# Patient Record
Sex: FEMALE | Race: ASIAN | NOT HISPANIC OR LATINO | Employment: UNEMPLOYED | URBAN - METROPOLITAN AREA
[De-identification: names, ages, dates, MRNs, and addresses within clinical notes are randomized per-mention and may not be internally consistent; named-entity substitution may affect disease eponyms.]

---

## 2019-03-15 ENCOUNTER — OFFICE VISIT CONVERTED (OUTPATIENT)
Dept: INTERNAL MEDICINE | Facility: CLINIC | Age: 69
End: 2019-03-15
Attending: INTERNAL MEDICINE

## 2019-06-17 ENCOUNTER — OFFICE VISIT CONVERTED (OUTPATIENT)
Dept: INTERNAL MEDICINE | Facility: CLINIC | Age: 69
End: 2019-06-17
Attending: INTERNAL MEDICINE

## 2019-06-17 ENCOUNTER — HOSPITAL ENCOUNTER (OUTPATIENT)
Dept: OTHER | Facility: HOSPITAL | Age: 69
Discharge: HOME | End: 2019-06-17
Attending: INTERNAL MEDICINE

## 2019-06-17 LAB
ALBUMIN SERPL-MCNC: 4.3 G/DL (ref 3.5–5)
ALBUMIN/GLOB SERPL: 1.4 {RATIO} (ref 1.4–2.6)
ALP SERPL-CCNC: 47 U/L (ref 43–160)
ALT SERPL-CCNC: 78 U/L (ref 10–40)
ANION GAP SERPL CALC-SCNC: 22 MMOL/L (ref 8–19)
AST SERPL-CCNC: 72 U/L (ref 15–50)
BASOPHILS # BLD AUTO: 0.03 10*3/UL (ref 0–0.2)
BASOPHILS NFR BLD AUTO: 0.6 % (ref 0–3)
BILIRUB SERPL-MCNC: 0.57 MG/DL (ref 0.2–1.3)
BUN SERPL-MCNC: 9 MG/DL (ref 5–25)
BUN/CREAT SERPL: 11 {RATIO} (ref 6–20)
CALCIUM SERPL-MCNC: 9.8 MG/DL (ref 8.7–10.4)
CHLORIDE SERPL-SCNC: 103 MMOL/L (ref 99–111)
CHOLEST SERPL-MCNC: 143 MG/DL (ref 107–200)
CHOLEST/HDLC SERPL: 3.3 {RATIO} (ref 3–6)
CONV ABS IMM GRAN: 0.01 10*3/UL (ref 0–0.2)
CONV CO2: 22 MMOL/L (ref 22–32)
CONV IMMATURE GRAN: 0.2 % (ref 0–1.8)
CONV TOTAL PROTEIN: 7.4 G/DL (ref 6.3–8.2)
CREAT UR-MCNC: 0.85 MG/DL (ref 0.5–0.9)
DEPRECATED RDW RBC AUTO: 51.8 FL (ref 36.4–46.3)
EOSINOPHIL # BLD AUTO: 0.05 10*3/UL (ref 0–0.7)
EOSINOPHIL # BLD AUTO: 1 % (ref 0–7)
ERYTHROCYTE [DISTWIDTH] IN BLOOD BY AUTOMATED COUNT: 13.4 % (ref 11.7–14.4)
GFR SERPLBLD BASED ON 1.73 SQ M-ARVRAT: >60 ML/MIN/{1.73_M2}
GLOBULIN UR ELPH-MCNC: 3.1 G/DL (ref 2–3.5)
GLUCOSE SERPL-MCNC: 125 MG/DL (ref 65–99)
HBA1C MFR BLD: 13.5 G/DL (ref 12–16)
HCT VFR BLD AUTO: 43.6 % (ref 37–47)
HDLC SERPL-MCNC: 44 MG/DL (ref 40–60)
LDLC SERPL CALC-MCNC: 74 MG/DL (ref 70–100)
LYMPHOCYTES # BLD AUTO: 2.34 10*3/UL (ref 1–5)
MCH RBC QN AUTO: 32.5 PG (ref 27–31)
MCHC RBC AUTO-ENTMCNC: 31 G/DL (ref 33–37)
MCV RBC AUTO: 104.8 FL (ref 81–99)
MONOCYTES # BLD AUTO: 0.34 10*3/UL (ref 0.2–1.2)
MONOCYTES NFR BLD AUTO: 7.1 % (ref 3–10)
NEUTROPHILS # BLD AUTO: 2.05 10*3/UL (ref 2–8)
NEUTROPHILS NFR BLD AUTO: 42.6 % (ref 30–85)
NRBC CBCN: 0 % (ref 0–0.7)
OSMOLALITY SERPL CALC.SUM OF ELEC: 294 MOSM/KG (ref 273–304)
PLATELET # BLD AUTO: 181 10*3/UL (ref 130–400)
PMV BLD AUTO: 11.9 FL (ref 9.4–12.3)
POTASSIUM SERPL-SCNC: 4.5 MMOL/L (ref 3.5–5.3)
RBC # BLD AUTO: 4.16 10*6/UL (ref 4.2–5.4)
SODIUM SERPL-SCNC: 142 MMOL/L (ref 135–147)
T4 FREE SERPL-MCNC: 1.5 NG/DL (ref 0.9–1.8)
TRIGL SERPL-MCNC: 124 MG/DL (ref 40–150)
TSH SERPL-ACNC: 2.1 M[IU]/L (ref 0.27–4.2)
VARIANT LYMPHS NFR BLD MANUAL: 48.5 % (ref 20–45)
VLDLC SERPL-MCNC: 25 MG/DL (ref 5–37)
WBC # BLD AUTO: 4.82 10*3/UL (ref 4.8–10.8)

## 2019-06-19 ENCOUNTER — HOSPITAL ENCOUNTER (OUTPATIENT)
Dept: OTHER | Facility: HOSPITAL | Age: 69
Discharge: HOME | End: 2019-06-19
Attending: INTERNAL MEDICINE

## 2019-06-19 LAB
EST. AVERAGE GLUCOSE BLD GHB EST-MCNC: 140 MG/DL
HBA1C MFR BLD: 6.5 % (ref 3.5–5.7)

## 2019-08-26 ENCOUNTER — HOSPITAL ENCOUNTER (OUTPATIENT)
Dept: URGENT CARE | Facility: CLINIC | Age: 69
Discharge: HOME | End: 2019-08-26
Attending: EMERGENCY MEDICINE

## 2019-08-28 ENCOUNTER — OFFICE VISIT CONVERTED (OUTPATIENT)
Dept: INTERNAL MEDICINE | Facility: CLINIC | Age: 69
End: 2019-08-28
Attending: INTERNAL MEDICINE

## 2019-08-28 LAB — BACTERIA UR CULT: NORMAL

## 2019-11-19 ENCOUNTER — HOSPITAL ENCOUNTER (OUTPATIENT)
Dept: URGENT CARE | Facility: CLINIC | Age: 69
Discharge: HOME | End: 2019-11-19
Attending: NURSE PRACTITIONER

## 2019-11-19 LAB
C TRACH RRNA CVX QL NAA+PROBE: NOT DETECTED
N GONORRHOEA DNA SPEC QL NAA+PROBE: NOT DETECTED

## 2019-11-21 LAB — BACTERIA UR CULT: NORMAL

## 2019-11-22 ENCOUNTER — HOSPITAL ENCOUNTER (OUTPATIENT)
Dept: OTHER | Facility: HOSPITAL | Age: 69
Discharge: HOME | End: 2019-11-22
Attending: INTERNAL MEDICINE

## 2019-11-22 ENCOUNTER — OFFICE VISIT CONVERTED (OUTPATIENT)
Dept: INTERNAL MEDICINE | Facility: CLINIC | Age: 69
End: 2019-11-22
Attending: INTERNAL MEDICINE

## 2019-11-22 LAB
ALBUMIN SERPL-MCNC: 4.4 G/DL (ref 3.5–5)
ALBUMIN/GLOB SERPL: 1.6 {RATIO} (ref 1.4–2.6)
ALP SERPL-CCNC: 53 U/L (ref 43–160)
ALT SERPL-CCNC: 47 U/L (ref 10–40)
ANION GAP SERPL CALC-SCNC: 17 MMOL/L (ref 8–19)
AST SERPL-CCNC: 52 U/L (ref 15–50)
BILIRUB SERPL-MCNC: 0.44 MG/DL (ref 0.2–1.3)
BUN SERPL-MCNC: 7 MG/DL (ref 5–25)
BUN/CREAT SERPL: 8 {RATIO} (ref 6–20)
CALCIUM SERPL-MCNC: 9.9 MG/DL (ref 8.7–10.4)
CHLORIDE SERPL-SCNC: 101 MMOL/L (ref 99–111)
CHOLEST SERPL-MCNC: 122 MG/DL (ref 107–200)
CHOLEST/HDLC SERPL: 2.9 {RATIO} (ref 3–6)
CONV CO2: 26 MMOL/L (ref 22–32)
CONV TOTAL PROTEIN: 7.2 G/DL (ref 6.3–8.2)
CREAT UR-MCNC: 0.87 MG/DL (ref 0.5–0.9)
EST. AVERAGE GLUCOSE BLD GHB EST-MCNC: 151 MG/DL
GFR SERPLBLD BASED ON 1.73 SQ M-ARVRAT: >60 ML/MIN/{1.73_M2}
GLOBULIN UR ELPH-MCNC: 2.8 G/DL (ref 2–3.5)
GLUCOSE SERPL-MCNC: 226 MG/DL (ref 65–99)
HBA1C MFR BLD: 6.9 % (ref 3.5–5.7)
HDLC SERPL-MCNC: 42 MG/DL (ref 40–60)
LDLC SERPL CALC-MCNC: 52 MG/DL (ref 70–100)
OSMOLALITY SERPL CALC.SUM OF ELEC: 295 MOSM/KG (ref 273–304)
POTASSIUM SERPL-SCNC: 4.3 MMOL/L (ref 3.5–5.3)
SODIUM SERPL-SCNC: 140 MMOL/L (ref 135–147)
T4 FREE SERPL-MCNC: 1.2 NG/DL (ref 0.9–1.8)
TRIGL SERPL-MCNC: 142 MG/DL (ref 40–150)
TSH SERPL-ACNC: 3.48 M[IU]/L (ref 0.27–4.2)
VLDLC SERPL-MCNC: 28 MG/DL (ref 5–37)

## 2020-02-17 ENCOUNTER — OFFICE VISIT CONVERTED (OUTPATIENT)
Dept: INTERNAL MEDICINE | Facility: CLINIC | Age: 70
End: 2020-02-17
Attending: INTERNAL MEDICINE

## 2020-02-17 ENCOUNTER — HOSPITAL ENCOUNTER (OUTPATIENT)
Dept: OTHER | Facility: HOSPITAL | Age: 70
Discharge: HOME | End: 2020-02-17
Attending: INTERNAL MEDICINE

## 2020-02-17 LAB
ALBUMIN SERPL-MCNC: 4.1 G/DL (ref 3.5–5)
ALBUMIN/GLOB SERPL: 1.4 {RATIO} (ref 1.4–2.6)
ALP SERPL-CCNC: 50 U/L (ref 43–160)
ALT SERPL-CCNC: 38 U/L (ref 10–40)
ANION GAP SERPL CALC-SCNC: 19 MMOL/L (ref 8–19)
AST SERPL-CCNC: 49 U/L (ref 15–50)
BASOPHILS # BLD AUTO: 0.03 10*3/UL (ref 0–0.2)
BASOPHILS NFR BLD AUTO: 0.5 % (ref 0–3)
BILIRUB SERPL-MCNC: 0.47 MG/DL (ref 0.2–1.3)
BUN SERPL-MCNC: 14 MG/DL (ref 5–25)
BUN/CREAT SERPL: 16 {RATIO} (ref 6–20)
CALCIUM SERPL-MCNC: 9.7 MG/DL (ref 8.7–10.4)
CHLORIDE SERPL-SCNC: 104 MMOL/L (ref 99–111)
CHOLEST SERPL-MCNC: 142 MG/DL (ref 107–200)
CHOLEST/HDLC SERPL: 3.3 {RATIO} (ref 3–6)
CONV ABS IMM GRAN: 0.02 10*3/UL (ref 0–0.2)
CONV CO2: 22 MMOL/L (ref 22–32)
CONV IMMATURE GRAN: 0.3 % (ref 0–1.8)
CONV TOTAL PROTEIN: 7 G/DL (ref 6.3–8.2)
CREAT UR-MCNC: 0.9 MG/DL (ref 0.5–0.9)
DEPRECATED RDW RBC AUTO: 46.5 FL (ref 36.4–46.3)
EOSINOPHIL # BLD AUTO: 0.04 10*3/UL (ref 0–0.7)
EOSINOPHIL # BLD AUTO: 0.6 % (ref 0–7)
ERYTHROCYTE [DISTWIDTH] IN BLOOD BY AUTOMATED COUNT: 13 % (ref 11.7–14.4)
EST. AVERAGE GLUCOSE BLD GHB EST-MCNC: 163 MG/DL
GFR SERPLBLD BASED ON 1.73 SQ M-ARVRAT: >60 ML/MIN/{1.73_M2}
GLOBULIN UR ELPH-MCNC: 2.9 G/DL (ref 2–3.5)
GLUCOSE SERPL-MCNC: 108 MG/DL (ref 65–99)
HBA1C MFR BLD: 7.3 % (ref 3.5–5.7)
HCT VFR BLD AUTO: 41.8 % (ref 37–47)
HDLC SERPL-MCNC: 43 MG/DL (ref 40–60)
HGB BLD-MCNC: 13.6 G/DL (ref 12–16)
LDLC SERPL CALC-MCNC: 72 MG/DL (ref 70–100)
LYMPHOCYTES # BLD AUTO: 2.58 10*3/UL (ref 1–5)
LYMPHOCYTES NFR BLD AUTO: 41.2 % (ref 20–45)
MCH RBC QN AUTO: 32.1 PG (ref 27–31)
MCHC RBC AUTO-ENTMCNC: 32.5 G/DL (ref 33–37)
MCV RBC AUTO: 98.6 FL (ref 81–99)
MONOCYTES # BLD AUTO: 0.39 10*3/UL (ref 0.2–1.2)
MONOCYTES NFR BLD AUTO: 6.2 % (ref 3–10)
NEUTROPHILS # BLD AUTO: 3.2 10*3/UL (ref 2–8)
NEUTROPHILS NFR BLD AUTO: 51.2 % (ref 30–85)
NRBC CBCN: 0 % (ref 0–0.7)
OSMOLALITY SERPL CALC.SUM OF ELEC: 291 MOSM/KG (ref 273–304)
PLATELET # BLD AUTO: 197 10*3/UL (ref 130–400)
PMV BLD AUTO: 11.6 FL (ref 9.4–12.3)
POTASSIUM SERPL-SCNC: 4.7 MMOL/L (ref 3.5–5.3)
RBC # BLD AUTO: 4.24 10*6/UL (ref 4.2–5.4)
SODIUM SERPL-SCNC: 140 MMOL/L (ref 135–147)
TRIGL SERPL-MCNC: 134 MG/DL (ref 40–150)
VLDLC SERPL-MCNC: 27 MG/DL (ref 5–37)
WBC # BLD AUTO: 6.26 10*3/UL (ref 4.8–10.8)

## 2020-02-18 LAB — TSH SERPL-ACNC: 3.08 M[IU]/L (ref 0.27–4.2)

## 2020-03-04 ENCOUNTER — CONVERSION ENCOUNTER (OUTPATIENT)
Dept: CARDIOLOGY | Facility: CLINIC | Age: 70
End: 2020-03-04

## 2020-03-04 ENCOUNTER — OFFICE VISIT CONVERTED (OUTPATIENT)
Dept: CARDIOLOGY | Facility: CLINIC | Age: 70
End: 2020-03-04
Attending: INTERNAL MEDICINE

## 2020-03-09 ENCOUNTER — CONVERSION ENCOUNTER (OUTPATIENT)
Dept: CARDIOLOGY | Facility: CLINIC | Age: 70
End: 2020-03-09
Attending: INTERNAL MEDICINE

## 2020-03-13 ENCOUNTER — HOSPITAL ENCOUNTER (OUTPATIENT)
Dept: NUCLEAR MEDICINE | Facility: HOSPITAL | Age: 70
Discharge: HOME | End: 2020-03-13
Attending: INTERNAL MEDICINE

## 2020-05-18 ENCOUNTER — CONVERSION ENCOUNTER (OUTPATIENT)
Dept: INTERNAL MEDICINE | Facility: CLINIC | Age: 70
End: 2020-05-18

## 2020-05-18 ENCOUNTER — HOSPITAL ENCOUNTER (OUTPATIENT)
Dept: OTHER | Facility: HOSPITAL | Age: 70
Discharge: HOME | End: 2020-05-18
Attending: INTERNAL MEDICINE

## 2020-05-18 ENCOUNTER — OFFICE VISIT CONVERTED (OUTPATIENT)
Dept: INTERNAL MEDICINE | Facility: CLINIC | Age: 70
End: 2020-05-18
Attending: INTERNAL MEDICINE

## 2020-05-18 LAB
ALBUMIN SERPL-MCNC: 4.4 G/DL (ref 3.5–5)
ALBUMIN/GLOB SERPL: 1.5 {RATIO} (ref 1.4–2.6)
ALP SERPL-CCNC: 40 U/L (ref 43–160)
ALT SERPL-CCNC: 51 U/L (ref 10–40)
ANION GAP SERPL CALC-SCNC: 15 MMOL/L (ref 8–19)
AST SERPL-CCNC: 43 U/L (ref 15–50)
BASOPHILS # BLD AUTO: 0.03 10*3/UL (ref 0–0.2)
BASOPHILS NFR BLD AUTO: 0.5 % (ref 0–3)
BILIRUB SERPL-MCNC: 0.41 MG/DL (ref 0.2–1.3)
BUN SERPL-MCNC: 14 MG/DL (ref 5–25)
BUN/CREAT SERPL: 20 {RATIO} (ref 6–20)
CALCIUM SERPL-MCNC: 9.8 MG/DL (ref 8.7–10.4)
CHLORIDE SERPL-SCNC: 103 MMOL/L (ref 99–111)
CHOLEST SERPL-MCNC: 160 MG/DL (ref 107–200)
CHOLEST/HDLC SERPL: 3.6 {RATIO} (ref 3–6)
CONV ABS IMM GRAN: 0.01 10*3/UL (ref 0–0.2)
CONV CO2: 27 MMOL/L (ref 22–32)
CONV CREATININE URINE, RANDOM: 84.9 MG/DL (ref 10–300)
CONV IMMATURE GRAN: 0.2 % (ref 0–1.8)
CONV MICROALBUM.,U,RANDOM: <12 MG/L (ref 0–20)
CONV TOTAL PROTEIN: 7.3 G/DL (ref 6.3–8.2)
CREAT UR-MCNC: 0.69 MG/DL (ref 0.5–0.9)
DEPRECATED RDW RBC AUTO: 45.8 FL (ref 36.4–46.3)
EOSINOPHIL # BLD AUTO: 0.04 10*3/UL (ref 0–0.7)
EOSINOPHIL # BLD AUTO: 0.6 % (ref 0–7)
ERYTHROCYTE [DISTWIDTH] IN BLOOD BY AUTOMATED COUNT: 12.9 % (ref 11.7–14.4)
EST. AVERAGE GLUCOSE BLD GHB EST-MCNC: 137 MG/DL
GFR SERPLBLD BASED ON 1.73 SQ M-ARVRAT: >60 ML/MIN/{1.73_M2}
GLOBULIN UR ELPH-MCNC: 2.9 G/DL (ref 2–3.5)
GLUCOSE SERPL-MCNC: 110 MG/DL (ref 65–99)
HBA1C MFR BLD: 6.4 % (ref 3.5–5.7)
HCT VFR BLD AUTO: 40.6 % (ref 37–47)
HDLC SERPL-MCNC: 44 MG/DL (ref 40–60)
HGB BLD-MCNC: 13.4 G/DL (ref 12–16)
LDLC SERPL CALC-MCNC: 91 MG/DL (ref 70–100)
LYMPHOCYTES # BLD AUTO: 2.48 10*3/UL (ref 1–5)
LYMPHOCYTES NFR BLD AUTO: 37.2 % (ref 20–45)
MCH RBC QN AUTO: 32.4 PG (ref 27–31)
MCHC RBC AUTO-ENTMCNC: 33 G/DL (ref 33–37)
MCV RBC AUTO: 98.1 FL (ref 81–99)
MICROALBUMIN/CREAT UR: 14.1 MG/G{CRE} (ref 0–35)
MONOCYTES # BLD AUTO: 0.49 10*3/UL (ref 0.2–1.2)
MONOCYTES NFR BLD AUTO: 7.4 % (ref 3–10)
NEUTROPHILS # BLD AUTO: 3.61 10*3/UL (ref 2–8)
NEUTROPHILS NFR BLD AUTO: 54.1 % (ref 30–85)
NRBC CBCN: 0 % (ref 0–0.7)
OSMOLALITY SERPL CALC.SUM OF ELEC: 293 MOSM/KG (ref 273–304)
PLATELET # BLD AUTO: 193 10*3/UL (ref 130–400)
PMV BLD AUTO: 11.6 FL (ref 9.4–12.3)
POTASSIUM SERPL-SCNC: 4.1 MMOL/L (ref 3.5–5.3)
RBC # BLD AUTO: 4.14 10*6/UL (ref 4.2–5.4)
SODIUM SERPL-SCNC: 141 MMOL/L (ref 135–147)
T4 FREE SERPL-MCNC: 1.9 NG/DL (ref 0.9–1.8)
TRIGL SERPL-MCNC: 125 MG/DL (ref 40–150)
TSH SERPL-ACNC: 0.16 M[IU]/L (ref 0.27–4.2)
VLDLC SERPL-MCNC: 25 MG/DL (ref 5–37)
WBC # BLD AUTO: 6.66 10*3/UL (ref 4.8–10.8)

## 2020-08-13 ENCOUNTER — HOSPITAL ENCOUNTER (OUTPATIENT)
Dept: MAMMOGRAPHY | Facility: HOSPITAL | Age: 70
Discharge: HOME | End: 2020-08-13
Attending: INTERNAL MEDICINE

## 2020-09-03 ENCOUNTER — HOSPITAL ENCOUNTER (OUTPATIENT)
Dept: OTHER | Facility: HOSPITAL | Age: 70
Discharge: HOME | End: 2020-09-03
Attending: INTERNAL MEDICINE

## 2020-09-03 ENCOUNTER — OFFICE VISIT CONVERTED (OUTPATIENT)
Dept: INTERNAL MEDICINE | Facility: CLINIC | Age: 70
End: 2020-09-03
Attending: INTERNAL MEDICINE

## 2020-09-04 LAB
ALBUMIN SERPL-MCNC: 4.6 G/DL (ref 3.5–5)
ALBUMIN/GLOB SERPL: 1.6 {RATIO} (ref 1.4–2.6)
ALP SERPL-CCNC: 43 U/L (ref 43–160)
ALT SERPL-CCNC: 43 U/L (ref 10–40)
ANION GAP SERPL CALC-SCNC: 18 MMOL/L (ref 8–19)
AST SERPL-CCNC: 52 U/L (ref 15–50)
BILIRUB SERPL-MCNC: 0.52 MG/DL (ref 0.2–1.3)
BUN SERPL-MCNC: 13 MG/DL (ref 5–25)
BUN/CREAT SERPL: 14 {RATIO} (ref 6–20)
CALCIUM SERPL-MCNC: 9.8 MG/DL (ref 8.7–10.4)
CHLORIDE SERPL-SCNC: 102 MMOL/L (ref 99–111)
CHOLEST SERPL-MCNC: 157 MG/DL (ref 107–200)
CHOLEST/HDLC SERPL: 3.6 {RATIO} (ref 3–6)
CONV CO2: 24 MMOL/L (ref 22–32)
CONV TOTAL PROTEIN: 7.4 G/DL (ref 6.3–8.2)
CREAT UR-MCNC: 0.95 MG/DL (ref 0.5–0.9)
EST. AVERAGE GLUCOSE BLD GHB EST-MCNC: 140 MG/DL
GFR SERPLBLD BASED ON 1.73 SQ M-ARVRAT: >60 ML/MIN/{1.73_M2}
GLOBULIN UR ELPH-MCNC: 2.8 G/DL (ref 2–3.5)
GLUCOSE SERPL-MCNC: 145 MG/DL (ref 65–99)
HBA1C MFR BLD: 6.5 % (ref 3.5–5.7)
HDLC SERPL-MCNC: 44 MG/DL (ref 40–60)
LDLC SERPL CALC-MCNC: 85 MG/DL (ref 70–100)
OSMOLALITY SERPL CALC.SUM OF ELEC: 293 MOSM/KG (ref 273–304)
POTASSIUM SERPL-SCNC: 4.4 MMOL/L (ref 3.5–5.3)
SODIUM SERPL-SCNC: 140 MMOL/L (ref 135–147)
T4 FREE SERPL-MCNC: 1.5 NG/DL (ref 0.9–1.8)
TRIGL SERPL-MCNC: 138 MG/DL (ref 40–150)
TSH SERPL-ACNC: 0.69 M[IU]/L (ref 0.27–4.2)
VLDLC SERPL-MCNC: 28 MG/DL (ref 5–37)

## 2020-12-07 ENCOUNTER — OFFICE VISIT CONVERTED (OUTPATIENT)
Dept: INTERNAL MEDICINE | Facility: CLINIC | Age: 70
End: 2020-12-07
Attending: INTERNAL MEDICINE

## 2020-12-07 ENCOUNTER — HOSPITAL ENCOUNTER (OUTPATIENT)
Dept: OTHER | Facility: HOSPITAL | Age: 70
Discharge: HOME | End: 2020-12-07
Attending: INTERNAL MEDICINE

## 2020-12-08 LAB
ALBUMIN SERPL-MCNC: 4.9 G/DL (ref 3.5–5)
ALBUMIN/GLOB SERPL: 1.6 {RATIO} (ref 1.4–2.6)
ALP SERPL-CCNC: 48 U/L (ref 43–160)
ALT SERPL-CCNC: 36 U/L (ref 10–40)
ANION GAP SERPL CALC-SCNC: 26 MMOL/L (ref 8–19)
AST SERPL-CCNC: 43 U/L (ref 15–50)
BILIRUB SERPL-MCNC: 0.35 MG/DL (ref 0.2–1.3)
BUN SERPL-MCNC: 21 MG/DL (ref 5–25)
BUN/CREAT SERPL: 22 {RATIO} (ref 6–20)
CALCIUM SERPL-MCNC: 10.8 MG/DL (ref 8.7–10.4)
CHLORIDE SERPL-SCNC: 104 MMOL/L (ref 99–111)
CHOLEST SERPL-MCNC: 178 MG/DL (ref 107–200)
CHOLEST/HDLC SERPL: 4 {RATIO} (ref 3–6)
CONV CO2: 21 MMOL/L (ref 22–32)
CONV TOTAL PROTEIN: 7.9 G/DL (ref 6.3–8.2)
CREAT UR-MCNC: 0.97 MG/DL (ref 0.5–0.9)
EST. AVERAGE GLUCOSE BLD GHB EST-MCNC: 131 MG/DL
GFR SERPLBLD BASED ON 1.73 SQ M-ARVRAT: 59 ML/MIN/{1.73_M2}
GLOBULIN UR ELPH-MCNC: 3 G/DL (ref 2–3.5)
GLUCOSE SERPL-MCNC: 124 MG/DL (ref 65–99)
HBA1C MFR BLD: 6.2 % (ref 3.5–5.7)
HDLC SERPL-MCNC: 45 MG/DL (ref 40–60)
LDLC SERPL CALC-MCNC: 108 MG/DL (ref 70–100)
OSMOLALITY SERPL CALC.SUM OF ELEC: 306 MOSM/KG (ref 273–304)
POTASSIUM SERPL-SCNC: 4.9 MMOL/L (ref 3.5–5.3)
SODIUM SERPL-SCNC: 146 MMOL/L (ref 135–147)
T4 FREE SERPL-MCNC: 1.5 NG/DL (ref 0.9–1.8)
TRIGL SERPL-MCNC: 127 MG/DL (ref 40–150)
TSH SERPL-ACNC: 1.36 M[IU]/L (ref 0.27–4.2)
VLDLC SERPL-MCNC: 25 MG/DL (ref 5–37)

## 2021-02-23 ENCOUNTER — HOSPITAL ENCOUNTER (OUTPATIENT)
Dept: VACCINE CLINIC | Facility: HOSPITAL | Age: 71
Discharge: HOME | End: 2021-02-23
Attending: INTERNAL MEDICINE

## 2021-03-26 ENCOUNTER — HOSPITAL ENCOUNTER (OUTPATIENT)
Dept: VACCINE CLINIC | Facility: HOSPITAL | Age: 71
Discharge: HOME | End: 2021-03-26
Attending: INTERNAL MEDICINE

## 2021-04-01 ENCOUNTER — OFFICE VISIT CONVERTED (OUTPATIENT)
Dept: INTERNAL MEDICINE | Facility: CLINIC | Age: 71
End: 2021-04-01
Attending: INTERNAL MEDICINE

## 2021-05-13 NOTE — PROGRESS NOTES
Progress Note      Patient Name: Susan Hernandez   Patient ID: [id number]   Sex: Female   YOB: 1950    Primary Care Provider: Rigoberto Gomes MD   Referring Provider: Bobby Cardoso MD    Visit Date: September 3, 2020    Provider: Rigoberto Gomes MD   Location: [facility]   Location Address: [address] [place] [address], [address]  [place], [place]  [id number]   Location Phone: [phone]          Chief Complaint  · Follow up DM2      History Of Present Illness  Susan Hernandez is a 69 year old  female who presents for evaluation and treatment of:      pt cont to c/o vaginal discharge. pt has been on multiple medications via GYN, but without resolution. pt reports it has been problematic for approx. 4 years.  DM2- pt reports normal BG with highest readings 130-140s. pt denies hypoglycemic events.   HLD- doing well on statin  hypothyroid- due for recheck. pt reports feeling more normal with current dose.    GERD- pt reports doing well with PPI and H2 blocker as needed.          Past Medical History  Disease Name Date Onset Notes   Arthritis --  --    Cancer --  --    Cancer, colon --  --    Diabetes Mellitus, Type II --  --    Gastroesophageal Reflux Disorder --  --    Pain, Lumbar --  --    Small bowel obstruction --  --          Past Surgical History  Procedure Name Date Notes   Appendectomy --  --    Colon Surgery 2006 /2011 chemo cancer    Hysterectomy 2004 --          Medication List  Name Date Started Instructions   Calcium 500 500 mg calcium (1,250 mg) oral tablet 03/06/2020 take 1 tablet by oral route 2 times a day for 90 days   docusate calcium 240 mg oral capsule 03/05/2020 take 1 capsule (240 mg) by oral route 2 times per day for 90 days   famotidine 40 mg oral tablet 05/18/2020 take 1 tablet (40 mg) by oral route once daily at bedtime for 90 days   Fish Oil 1,000 mg (120 mg-180 mg) oral capsule 08/28/2019 take 1 capsule by oral route 2 times a day for 90 days   Januvia 100 mg oral  tablet 03/06/2020 take 1 tablet (100 mg) by oral route once daily for 90 days   lidocaine 5 % topical adhesive patch,medicated 05/05/2020 apply 1 patch by transdermal route once daily (May wear up to 12hours.) for 90 days   Lyrica 150 mg oral capsule 08/11/2020 take 1 capsule by oral route 3 times a day for 30 days   metformin 500 mg oral tablet 02/18/2020 take 2 tablets (1,000 mg) by oral route 2 times per day with morning and evening meals for 90 days   omeprazole 40 mg oral capsule,delayed release(DR/EC) 05/18/2020 take 1 capsule (40 mg) by oral route once daily before a meal for 90 days   Refresh Plus 0.5 % ophthalmic (eye) dropperette 09/03/2020 instill 2 drops by ophthalmic route 4 times a day   simvastatin 20 mg oral tablet 03/05/2020 take 1 tablet (20 mg) by oral route once daily in the evening for 90 days   Synthroid 75 mcg oral tablet 08/27/2020 take 1 tablet (75 mcg) by oral route once daily for 90 days   Vitamin D3 50 mcg (2,000 unit) oral capsule 03/05/2020 take 1 capsule by oral route daily for 90 days   Voltaren 1 % topical gel  apply 2 gram to the affected area(s) by topical route 4 times per day         Allergy List  Allergen Name Date Reaction Notes   aspirin --  --  --    doxycycline hyclate --  --  --    penicillin V potassium --  --  --          Family Medical History  Disease Name Relative/Age Notes   Family history of breast cancer Brother/   Brother   Renal Cancer Brother/   Brother   *No Known Family History  --          Social History  Finding Status Start/Stop Quantity Notes   Alcohol Former --/-- --  --    Caffeine Current - status unknown --/-- --  drinks 3-4 times per day   Second hand smoke exposure Never --/-- --  no   Tobacco Former 8/-- --  started smoking at age 8         Immunizations  NameDate Admin Mfg Trade Name Lot Number Route Inj VIS Given VIS Publication   InfluenzaRefused 02/17/2020 NE Not Entered  NE NE     Comments:    InfluenzaDeferred 06/17/2019 NE Not Entered  NE NE    "  Comments:    Pvbyecpht8167/03/2020 SKB PNEUMOVAX 23 [id number] IM RD 09/03/2020    Comments: pt tolerated well, left office in stable condition   Prevnar 1306/17/2019 WAL PREVNAR 13 [id number] IM LD 06/17/2019    Comments: Pt tolerated well and left office in stable condition         Review of Systems  · Constitutional  o Denies  o : fever, fatigue, weight loss, weight gain  · Cardiovascular  o Denies  o : lower extremity edema, chest pressure, palpitations  · Respiratory  o Denies  o : shortness of breath, wheezing, frequent cough, dyspnea on exertion  · Gastrointestinal  o Denies  o : nausea, vomiting, diarrhea, constipation, abdominal pain      Vitals  Date Time BP Position Site L\R Cuff Size HR RR TEMP (F) WT  HT  BMI kg/m2 BSA m2 O2 Sat HC       03/04/2020 10:00 /76 Sitting    62 - R   151lbs 0oz 5'  4\" 25.92 1.76     05/18/2020 02:08 /64 Sitting    88 - R  97.8 147lbs 16oz 5'  4\" 25.4 1.74 98 %    09/03/2020 11:32 /68 Sitting    70 - R  96.6 146lbs 4oz 5'  4\" 25.1 1.73 97 %          Physical Examination  · Constitutional  o Appearance  o : no acute distress, well-nourished  · Head and Face  o Head  o :   § Inspection  § : atraumatic, normocephalic  · Eyes  o Eyes  o : extraocular movements intact, no scleral icterus, no conjunctival injection  · Ears, Nose, Mouth and Throat  o Ears  o :   § External Ears  § : normal  o Nose  o :   § Intranasal Exam  § : nares patent  o Oral Cavity  o :   § Oral Mucosa  § : moist mucous membranes  · Respiratory  o Respiratory Effort  o : breathing comfortably, symmetric chest rise  o Auscultation of Lungs  o : clear to asculatation bilaterally, no wheezes, rales, or rhonchii  · Cardiovascular  o Heart  o :   § Auscultation of Heart  § : regular rate and rhythm, no murmurs, rubs, or gallops  o Peripheral Vascular System  o :   § Extremities  § : no edema  · Neurologic  o Mental Status Examination  o :   § Orientation  § : grossly oriented to person, place and " time  o Gait and Station  o :   § Gait Screening  § : normal gait  · Psychiatric  o General  o : normal mood and affect              Assessment  · Diabetes Mellitus, Type II     250.00/E11.9  check labs. cont current regimen. due for ppsv23  · Gastroesophageal Reflux Disorder     530.81  cont current regimen  · Need for pneumococcal vaccination     V03.82/Z23  · Hyperlipidemia     272.4/E78.5  check lipids.   · Hypothyroidism     244.9/E03.9  check TSH and adjust synthroid accordingly.   · Vaginal discharge     623.5/N89.8  will add florastor to regimen. cont to f/u with gyn.     Problems Reconciled  Plan  · Orders  o ACO-15: Pneumococcal Vaccine Administered or Previously Received (4040F) - V03.82/Z23 - 09/03/2020  o Thyroid Profile (85518, 44290, THYII) - 244.9/E03.9 - 09/03/2020  o Immunization Admin Fee (Single) ([facility]) (24676) - - 09/03/2020  o ACO-39: Current medications updated and reviewed () - - 09/03/2020  o ACO-20: Screening Mammography documented and reviewed () - - 09/03/2020  o ACO-19: Colorectal cancer screening results documented and reviewed (3017F) - - 09/03/2020  o Diabetes 1 Panel (CMP, Lipid, A1c) [facility] (28035, 64699, 19986) - 250.00/E11.9, 272.4/E78.5 - 09/03/2020  o [id number] Vaccine (27333) - - 09/03/2020   Vaccine - [id number]; Dose: 0.5; Site: Right Deltoid; Route: Intramuscular; Date: 09/03/2020 12:27:00; Exp: 12/03/2021; Lot: [id number]; Mfg: [email or web address]; TradeName: PNEUMOVAX 23; Administered By: Vaishali Ramirez; Comment: pt tolerated well, left office in stable condition  · Medications  o Florastor 250 mg oral capsule   SIG: take 1 capsule by oral route 2 times a day for 90 days   DISP: (180) capsules with 1 refills  Prescribed on 09/03/2020     o Medications have been Reconciled  o Transition of Care or Provider Policy  · Instructions  o Patient was educated/instructed on their diagnosis, treatment and medications prior to discharge from the clinic  today.  · Disposition  o f/u in 3 months  o labs done in clinic            Electronically Signed by: Rigoberto Gomes MD -Author on September 3, 2020 04:52:12 PM

## 2021-05-13 NOTE — PROGRESS NOTES
Progress Note      Patient Name: Susan Hernandez   Patient ID: [id number]   Sex: Female   YOB: 1950    Primary Care Provider: Rigoberto Gomes MD   Referring Provider: Bobby Cardoso MD    Visit Date: December 7, 2020    Provider: Rigoberto Gomes MD   Location: [facility]   Location Address: [address] [place] [address], [address]  [place], [place]  [id number]   Location Phone: [phone]          Chief Complaint  · Annual Wellness Exam      History Of Present Illness  The patient is a 69 year old  female who has come to this office for her Annual Wellness Visit.   Her Primary Care Provider is Rigoberto Gomes MD. Her comprehensive Care Team list, including suppliers, has been updated on the Facesheet. Her medical/family history, height, weight, BMI, and blood pressure have been reviewed and are in the chart. The Health Risk Assessment has been completed and scanned in the chart.   Medications are listed in the medication list.   The active problem list includes: Arthritis, Diabetes Mellitus, Type II, Gastroesophageal Reflux Disorder, and Pain, Lumbar   The patient does not have a history of substance use.   Patient reports her diet is adequate.   The Mini-Cog has been administered and is scanned in chart. The results are negative. Her cognitive function is without limitation.   A hearing loss screen was completed today and the result is negative.   Patient does not have any risk factors for depression. Patient completed the PHQ-9 today and it has been scanned in the chart. The total score is 1-4.   The Timed Up and Go screen was administered today and the result is negative.   The [place] Index of [place] in ADLs indicated full function (score of 6).   A Falls Risk Assessment has been completed, including a review of home fall hazards and medication review.   Overall, the patient's functional ability is noted by this provider to be within normal limits. Her level of safety is noted  to be within normal limits. Her balance/gait is within normal limits. There has been a fall without injury in the past year. Patient-specific home safety recommendations have been reviewed and a copy has been given to patient.   She denies issues with leaking urine.   There are no additional risk factors identified.   Living Will/Advanced Directive has not previously been completed.   Personalized health advice was given to the patient and a written health screening schedule was established; see Plan for details.       Past Medical History  Disease Name Date Onset Notes   Arthritis --  --    Cancer --  --    Cancer, colon --  --    Diabetes Mellitus, Type II --  --    Gastroesophageal Reflux Disorder --  --    Pain, Lumbar --  --    Small bowel obstruction --  --          Past Surgical History  Procedure Name Date Notes   Appendectomy --  --    Colon Surgery 2006 /2011 chemo cancer    Hysterectomy 2004 --          Medication List  Name Date Started Instructions   docusate calcium 240 mg oral capsule 09/14/2020 take 1 capsule (240 mg) by oral route 2 times per day for 90 days   famotidine 40 mg oral tablet 05/18/2020 take 1 tablet (40 mg) by oral route once daily at bedtime for 90 days   Fish Oil 1,000 mg (120 mg-180 mg) oral capsule 08/28/2019 take 1 capsule by oral route 2 times a day for 90 days   Januvia 100 mg oral tablet 09/09/2020 take 1 tablet (100 mg) by oral route once daily for 90 days   lidocaine 5 % topical adhesive patch,medicated 05/05/2020 apply 1 patch by transdermal route once daily (May wear up to 12hours.) for 90 days   Lyrica 150 mg oral capsule 12/07/2020 take 1 capsule by oral route 3 times a day for 30 days   metformin 500 mg oral tablet 02/18/2020 take 2 tablets (1,000 mg) by oral route 2 times per day with morning and evening meals for 90 days   omeprazole 40 mg oral capsule,delayed release(DR/EC) 05/18/2020 take 1 capsule (40 mg) by oral route once daily before a meal for 90 days  "  pravastatin 10 mg oral tablet 12/07/2020 take 1 tablet (10 mg) by oral route once daily at bedtime for 90 days   Refresh Plus 0.5 % ophthalmic (eye) dropperette 12/07/2020 instill 2 drops by ophthalmic route 4 times a day   Synthroid 75 mcg oral tablet 11/30/2020 take 1 tablet (75 mcg) by oral route once daily for 90 days   Vitamin D3 50 mcg (2,000 unit) oral capsule 03/05/2020 take 1 capsule by oral route daily for 90 days         Allergy List  Allergen Name Date Reaction Notes   aspirin --  --  --    doxycycline hyclate --  --  --    penicillin V potassium --  --  --          Family Medical History  Disease Name Relative/Age Notes   Family history of breast cancer Brother/   Brother   Renal Cancer Brother/   Brother   *No Known Family History  --          Social History  Finding Status Start/Stop Quantity Notes   Alcohol Former --/-- --  --    Caffeine Current - status unknown --/-- --  drinks 3-4 times per day   Second hand smoke exposure Never --/-- --  no   Tobacco Former 8/-- --  started smoking at age 8         Immunizations  NameDate Admin Mfg Trade Name Lot Number Route Inj VIS Given VIS Publication   InfluenzaRefused 02/17/2020 NE Not Entered  NE NE     Comments:    Zyifaaztd0862/03/2020 SKB PNEUMOVAX 23 [id number] IM RD 09/03/2020    Comments: pt tolerated well, left office in stable condition   Prevnar 1306/17/2019 [facility] PREVNAR 13 [id number] IM LD 06/17/2019    Comments: Pt tolerated well and left office in stable condition         Vitals  Date Time BP Position Site L\R Cuff Size HR RR TEMP (F) WT  HT  BMI kg/m2 BSA m2 O2 Sat FR L/min FiO2 HC       12/07/2020 01:37 /70 Sitting    78 - R  97.1 146lbs 0oz 5'  4\" 25.06 1.73 98 %  21%              Assessment  · Pain, Lumbar     724.2  · Arthritis     V13.4/V13.4  · Diabetes Mellitus, Type II     250.00/E11.9  · Gastroesophageal Reflux Disorder     530.81  · Encounter for Medicare annual wellness exam     V70.0/Z00.00  · Screening for " depression     V79.0/Z13.89  · Screening for alcoholism     V79.1/Z13.39  · Advanced care planning/counseling discussion     V65.49/Z71.89    Problems Reconciled  Plan  · Orders  o Falls Risk Assessment Completed (3288F) - V70.0/Z00.00 - 12/07/2020  o Brief hearing screening (written) [facility] () - V70.0/Z00.00 - 12/07/2020  o Annual Wellness Visit-includes a Personalized Prevention Plan of Service (PPS), SUBSEQUENT VISIT (Medicare) () - V70.0/Z00.00 - 12/07/2020  o ACO-13: Fall Risk Screening with no falls in past year or only one fall without injury in the past year (1101F) - V70.0/Z00.00 - 12/07/2020  o Presence or absence of urinary incontinence assessed (HAO) (1090F) - V70.0/Z00.00 - 12/07/2020  o ACO-18: Negative screen for clinical depression using a standardized tool () - V79.0/Z13.89 - 12/07/2020  o Negative alcohol screening () - V79.1/Z13.39 - 12/07/2020  o ACO - Pt declines to or was not able to provide an Advance Care Plan or name a Surrogate Decision Maker (1124F) - V65.49/Z71.89 - 12/07/2020  o ACO-39: Current medications updated and reviewed (, 1159F) - - 12/07/2020  o ACO-19: Colorectal cancer screening results documented and reviewed (3017F) - - 12/07/2020  o ACO-20: Screening Mammography documented and reviewed () - - 12/07/2020  o Influenza immunization was not ordered or administered for reasons documented by clinician () - - 12/07/2020  o ACO-15: Pneumococcal Vaccine Administered or Previously Received (4040F) - - 12/07/2020  o ACO - Pt declines to or was not able to provide an Advance Care Plan or name a Surrogate Decision Maker (1124F) - - 12/07/2020  · Medications  o Medications have been Reconciled  o Transition of Care or Provider Policy  · Instructions  o Health Risk Assessment has been reviewed with the patient.  o Written health screening schedule for next 5-10 years was established with patient; information scanned in chart and given/mailed to  patient.  o Fall prevention methods discussed and a copy of recommendations given/mailed to patient.  o Today's PHQ-9 score is: _4__            Electronically Signed by: Rigoberto Gomes MD -Author on December 7, 2020 04:32:05 PM

## 2021-05-13 NOTE — PROGRESS NOTES
Progress Note      Patient Name: Susan Hernandez   Patient ID: [id number]   Sex: Female   YOB: 1950    Primary Care Provider: Rigoberto Gomes MD   Referring Provider: Rigoberto Gomes MD    Visit Date: May 18, 2020    Provider: Rigoberto Gomes MD   Location: [facility]   Location Address: [address] [place] [address]  [id number]   Location Phone: [phone]          Chief Complaint  · Follow up  · DM2  · Wants refill on nexium and omeprazole      History Of Present Illness  Susan Hernandez is a 69 year old  female who presents for evaluation and treatment of:      GERD - pt need refill on omeprazole  DM2- pt reports highest BG reading was 147. pt reports lowest is 80-90. pt reports hypoglycemic event approx once every 2 months. pt is on metformin on 2000mg daily as well as Januvia.     HLD- doing well on statin  hypothyroid- due for recheck  due for DEXA       Past Medical History  Disease Name Date Onset Notes   Arthritis --  --    Cancer --  --    Cancer, colon --  --    Diabetes mellitus, type II --  --    Gastroesophageal Reflux Disorder --  --    Pain, Lumbar --  --    Small bowel obstruction --  --          Past Surgical History  Procedure Name Date Notes   Appendectomy --  --    Colon Surgery 2006 /2011 chemo cancer    Hysterectomy 2004 --          Medication List  Name Date Started Instructions   Calcium 500 500 mg calcium (1,250 mg) oral tablet 03/06/2020 take 1 tablet by oral route 2 times a day for 90 days   docusate calcium 240 mg oral capsule 03/05/2020 take 1 capsule (240 mg) by oral route 2 times per day for 90 days   Fish Oil 1,000 mg (120 mg-180 mg) oral capsule 08/28/2019 take 1 capsule by oral route 2 times a day for 90 days   Januvia 100 mg oral tablet 03/06/2020 take 1 tablet (100 mg) by oral route once daily for 90 days   lidocaine 5 % topical adhesive patch,medicated 05/05/2020 apply 1 patch by transdermal route once daily (May wear up to  12hours.) for 90 days   Lyrica 150 mg oral capsule 05/18/2020 take 1 capsule by oral route 3 times a day for 30 days   metformin 500 mg oral tablet 02/18/2020 take 2 tablets (1,000 mg) by oral route 2 times per day with morning and evening meals for 90 days   omeprazole 40 mg oral capsule,delayed release(DR/EC) 05/18/2020 take 1 capsule (40 mg) by oral route once daily before a meal for 90 days   Refresh Plus 0.5 % ophthalmic (eye) dropperette 02/17/2020 instill 2 drops by ophthalmic route 4 times a day   simvastatin 20 mg oral tablet 03/05/2020 take 1 tablet (20 mg) by oral route once daily in the evening for 90 days   Synthroid 75 mcg oral tablet 05/19/2020 take 1 tablet (75 mcg) by oral route once daily for 90 days   Vitamin D3 50 mcg (2,000 unit) oral capsule 03/05/2020 take 1 capsule by oral route daily for 90 days   Voltaren 1 % topical gel  apply 2 gram to the affected area(s) by topical route 4 times per day         Allergy List  Allergen Name Date Reaction Notes   aspirin --  --  --    doxycycline hyclate --  --  --    penicillin V potassium --  --  --        Allergies Reconciled  Family Medical History  Disease Name Relative/Age Notes   Family history of breast cancer Brother/   Brother   Renal cancer Brother/   Brother   *No Known Family History  --          Social History  Finding Status Start/Stop Quantity Notes   Alcohol Former --/-- --  --    Caffeine Current - status unknown --/-- --  drinks 3-4 times per day   Second hand smoke exposure Never --/-- --  no   Tobacco Former 8/-- --  started smoking at age 8         Immunizations  NameDate Admin Mfg Trade Name Lot Number Route Inj VIS Given VIS Publication   InfluenzaRefused 02/17/2020 NE Not Entered  NE NE     Comments:    InfluenzaDeferred 06/17/2019 NE Not Entered  NE NE     Comments:    Prevnar 1306/17/2019 W[facility] PREVNAR 13 [id number] IM LD 06/17/2019    Comments: Pt tolerated well and left office in stable condition         Review of  "Systems  · Constitutional  o Denies  o : fever, fatigue, weight loss, weight gain  · Cardiovascular  o Denies  o : lower extremity edema, chest pressure, palpitations  · Respiratory  o Denies  o : shortness of breath, wheezing, frequent cough, dyspnea on exertion  · Gastrointestinal  o Denies  o : nausea, vomiting, diarrhea, constipation, abdominal pain      Vitals  Date Time BP Position Site L\R Cuff Size HR RR TEMP (F) WT  HT  BMI kg/m2 BSA m2 O2 Sat HC       02/17/2020 01:53 /74 Sitting    75 - R  98 150lbs 6oz 5'  4\" 25.81 1.75 96 %    03/04/2020 10:00 /76 Sitting    62 - R   151lbs 0oz 5'  4\" 25.92 1.76     05/18/2020 02:08 /64 Sitting    88 - R  97.8 147lbs 16oz 5'  4\" 25.4 1.74 98 %          Physical Examination  · Constitutional  o Appearance  o : no acute distress, well-nourished  · Head and Face  o Head  o :   § Inspection  § : atraumatic, normocephalic  · Eyes  o Eyes  o : extraocular movements intact, no scleral icterus, no conjunctival injection  · Ears, Nose, Mouth and Throat  o Ears  o :   § External Ears  § : normal  o Nose  o :   § Intranasal Exam  § : nares patent  o Oral Cavity  o :   § Oral Mucosa  § : moist mucous membranes  · Respiratory  o Respiratory Effort  o : breathing comfortably, symmetric chest rise  o Auscultation of Lungs  o : clear to asculatation bilaterally, no wheezes, rales, or rhonchii  · Cardiovascular  o Heart  o :   § Auscultation of Heart  § : regular rate and rhythm, no murmurs, rubs, or gallops  o Peripheral Vascular System  o :   § Extremities  § : no edema  · Neurologic  o Mental Status Examination  o :   § Orientation  § : grossly oriented to person, place and time  o Gait and Station  o :   § Gait Screening  § : normal gait  · Psychiatric  o General  o : normal mood and affect          Assessment  · Post menopausal syndrome     V49.81/Z78.0  · Diabetes mellitus, type 2     250.00/E11.9  well controlled based on home readings. check labs. cont regimen. "   · GERD (gastroesophageal reflux disease)     530.81/K21.9  will Rx famotidine and refill PPI  · Hyperlipidemia     272.4/E78.5  cont statin. check labs.   · Hypothyroidism     244.9/E03.9  check TSH and adjust TSH accordingly.     Problems Reconciled  Plan  · Orders  o DEXA Bone Density, 1 or more sites, axial skeleton [facility] (77301) - V49.81/Z78.0 - 05/18/2020  o Diabetes 2 Panel (Urine Microalbumin, CMP, Lipid, A1c, ) [facility] (20069, 05598, 95701, 08016) - - 05/18/2020  o CBC with Auto Diff [facility] (76468) - - 05/18/2020  o ACO-41: Dilated Diabetic eye exam completed this year and results in chart/reviewed (2022F) - - 05/18/2020  o Thyroid Profile (10155, 49454, THYII) - 244.9/E03.9 - 05/18/2020  o ACO-39: Current medications updated and reviewed () - - 05/18/2020  o ACO-20: Screening Mammography documented and reviewed () - - 05/27/2020  o ACO-19: Colorectal cancer screening results documented and reviewed (3017F) - - 05/27/2020  · Medications  o famotidine 40 mg oral tablet   SIG: take 1 tablet (40 mg) by oral route once daily at bedtime for 90 days   DISP: (90) tablets with 3 refills  Prescribed on 05/18/2020     o omeprazole 40 mg oral capsule,delayed release(DR/EC)   SIG: take 1 capsule (40 mg) by oral route once daily before a meal for 90 days   DISP: (90) capsules with 3 refills  Adjusted on 05/18/2020     o Medications have been Reconciled  o Transition of Care or Provider Policy  · Instructions  o Stop taking calcium supplements for at least 48 hours prior to your exam. Failure to stop supplements could alter results, and the radiologists will require you to reschedule your test.  o Patient was educated/instructed on their diagnosis, treatment and medications prior to discharge from the clinic today.  · Disposition  o f/u in 3 months  o labs done in clinic            Electronically Signed by: Rigoberto Gomes MD -Author on May 27, 2020 01:09:19 PM

## 2021-05-13 NOTE — PROGRESS NOTES
Progress Note      Patient Name: Susan Hernandez   Patient ID: [id number]   Sex: Female   YOB: 1950    Primary Care Provider: Rigoberto Gomes MD   Referring Provider: Bobby Cardoso MD    Visit Date: December 7, 2020    Provider: Rigoberto Gomes MD   Location: [facility]   Location Address: [address] [place] [address] 3  [place], [place]  [id number]   Location Phone: [phone]          Chief Complaint  · 3 month follow up       History Of Present Illness  Susan Hernandez is a 69 year old  female who presents for evaluation and treatment of:      DM2- pt reports home BG typically . pt denies hypoglycemic events.   hypothyroid- due for recheck  GERD- pt reports much better with current regimen.    vaginal discharge - cleared with florastor.   due for flu - pt defers       Past Medical History  Disease Name Date Onset Notes   Arthritis --  --    Cancer --  --    Cancer, colon --  --    Diabetes Mellitus, Type II --  --    Gastroesophageal Reflux Disorder --  --    Pain, Lumbar --  --    Small bowel obstruction --  --          Past Surgical History  Procedure Name Date Notes   Appendectomy --  --    Colon Surgery 2006 /2011 chemo cancer    Hysterectomy 2004 --          Medication List  Name Date Started Instructions   docusate calcium 240 mg oral capsule 09/14/2020 take 1 capsule (240 mg) by oral route 2 times per day for 90 days   famotidine 40 mg oral tablet 05/18/2020 take 1 tablet (40 mg) by oral route once daily at bedtime for 90 days   Fish Oil 1,000 mg (120 mg-180 mg) oral capsule 08/28/2019 take 1 capsule by oral route 2 times a day for 90 days   Januvia 100 mg oral tablet 09/09/2020 take 1 tablet (100 mg) by oral route once daily for 90 days   lidocaine 5 % topical adhesive patch,medicated 05/05/2020 apply 1 patch by transdermal route once daily (May wear up to 12hours.) for 90 days   Lyrica 150 mg oral capsule 12/07/2020 take 1 capsule by oral route 3 times a day for  30 days   metformin 500 mg oral tablet 02/18/2020 take 2 tablets (1,000 mg) by oral route 2 times per day with morning and evening meals for 90 days   omeprazole 40 mg oral capsule,delayed release(DR/EC) 05/18/2020 take 1 capsule (40 mg) by oral route once daily before a meal for 90 days   Refresh Plus 0.5 % ophthalmic (eye) dropperette 09/03/2020 instill 2 drops by ophthalmic route 4 times a day   simvastatin 20 mg oral tablet 03/05/2020 take 1 tablet (20 mg) by oral route once daily in the evening for 90 days   Synthroid 75 mcg oral tablet 11/30/2020 take 1 tablet (75 mcg) by oral route once daily for 90 days   Vitamin D3 50 mcg (2,000 unit) oral capsule 03/05/2020 take 1 capsule by oral route daily for 90 days         Allergy List  Allergen Name Date Reaction Notes   aspirin --  --  --    doxycycline hyclate --  --  --    penicillin V potassium --  --  --        Allergies Reconciled  Family Medical History  Disease Name Relative/Age Notes   Family history of breast cancer Brother/   Brother   Renal Cancer Brother/   Brother   *No Known Family History  --          Social History  Finding Status Start/Stop Quantity Notes   Alcohol Former --/-- --  --    Caffeine Current - status unknown --/-- --  drinks 3-4 times per day   Second hand smoke exposure Never --/-- --  no   Tobacco Former 8/-- --  started smoking at age 8         Immunizations  NameDate Admin Mfg Trade Name Lot Number Route Inj VIS Given VIS Publication   InfluenzaRefused 02/17/2020 NE Not Entered  NE NE     Comments:    Sizvnnfno6878/03/2020 [facility] PNEUMOVAX 23 [id number] IM RD 09/03/2020    Comments: pt tolerated well, left office in stable condition   Prevnar 1306/17/2019 [facility] PREVNAR 13 [id number] IM LD 06/17/2019    Comments: Pt tolerated well and left office in stable condition         Review of Systems  · Constitutional  o Denies  o : fever, fatigue, weight loss, weight gain  · Cardiovascular  o Denies  o : lower extremity edema, chest pressure,  "palpitations  · Respiratory  o Denies  o : shortness of breath, wheezing, frequent cough, dyspnea on exertion  · Gastrointestinal  o Denies  o : nausea, vomiting, diarrhea, constipation, abdominal pain      Vitals  Date Time BP Position Site L\R Cuff Size HR RR TEMP (F) WT  HT  BMI kg/m2 BSA m2 O2 Sat FR L/min FiO2 HC       05/18/2020 02:08 /64 Sitting    88 - R  97.8 147lbs 16oz 5'  4\" 25.4 1.74 98 %      09/03/2020 11:32 /68 Sitting    70 - R  96.6 146lbs 4oz 5'  4\" 25.1 1.73 97 %      12/07/2020 01:37 /70 Sitting    78 - R  97.1 146lbs 0oz 5'  4\" 25.06 1.73 98 %  21%          Physical Examination  · Constitutional  o Appearance  o : no acute distress, well-nourished  · Head and Face  o Head  o :   § Inspection  § : atraumatic, normocephalic  · Eyes  o Eyes  o : extraocular movements intact, no scleral icterus, no conjunctival injection  · Ears, Nose, Mouth and Throat  o Ears  o :   § External Ears  § : normal  o Nose  o :   § Intranasal Exam  § : nares patent  o Oral Cavity  o :   § Oral Mucosa  § : moist mucous membranes  · Respiratory  o Respiratory Effort  o : breathing comfortably, symmetric chest rise  · Cardiovascular  o Heart  o :   § Auscultation of Heart  § : regular rate  o Peripheral Vascular System  o :   § Extremities  § : no edema  · Neurologic  o Mental Status Examination  o :   § Orientation  § : grossly oriented to person, place and time  o Gait and Station  o :   § Gait Screening  § : normal gait  · Psychiatric  o General  o : normal mood and affect          Assessment  · Diabetes Mellitus, Type II     250.00/E11.9  cont regimen. check labs.   · Gastroesophageal Reflux Disorder     530.81  cont current regimen.   · Hyperlipidemia     272.4/E78.5  given side effects, switch to Pravachol. monitor.  · Hypothyroidism     244.9/E03.9  check TSH and adjust Synthroid accordingly.   · Vaginal discharge     623.5/N89.8  improved with florastor.     Problems " Reconciled  Plan  · Orders  o Thyroid Profile (THYII, 31699, 06378) - 244.9/E03.9 - 12/07/2020  o ACO-39: Current medications updated and reviewed (1159F, ) - - 12/07/2020  o Diabetes 1 Panel (CMP, Lipid, A1c) [facility] (50169, 83996, 87608) - 250.00/E11.9, 272.4/E78.5 - 12/07/2020  · Medications  o pravastatin 10 mg oral tablet   SIG: take 1 tablet (10 mg) by oral route once daily at bedtime for 90 days   DISP: (90) Tablet with 3 refills  Adjusted on 12/07/2020     o Lyrica 150 mg oral capsule   SIG: take 1 capsule by oral route 3 times a day for 30 days   DISP: (90) Capsule with 0 refills  Refilled on 12/07/2020     o Refresh Plus 0.5 % ophthalmic (eye) dropperette   SIG: instill 2 drops by ophthalmic route 4 times a day   DISP: (3) Box with 3 refills  Refilled on 12/07/2020     o Medications have been Reconciled  · Instructions  o Patient was educated/instructed on their diagnosis, treatment and medications prior to discharge from the clinic today.  · Disposition  o f/u in 3 months  o labs done in clinic            Electronically Signed by: Rigoberto Gomes MD -Author on December 7, 2020 02:24:14 PM

## 2021-05-14 VITALS
DIASTOLIC BLOOD PRESSURE: 70 MMHG | TEMPERATURE: 97.1 F | OXYGEN SATURATION: 98 % | HEIGHT: 64 IN | BODY MASS INDEX: 24.92 KG/M2 | HEART RATE: 78 BPM | WEIGHT: 146 LBS | SYSTOLIC BLOOD PRESSURE: 104 MMHG

## 2021-05-14 VITALS
BODY MASS INDEX: 24.97 KG/M2 | TEMPERATURE: 96.6 F | HEIGHT: 64 IN | SYSTOLIC BLOOD PRESSURE: 115 MMHG | OXYGEN SATURATION: 97 % | WEIGHT: 146.25 LBS | HEART RATE: 70 BPM | DIASTOLIC BLOOD PRESSURE: 68 MMHG

## 2021-05-14 VITALS
TEMPERATURE: 97.9 F | BODY MASS INDEX: 25.1 KG/M2 | HEIGHT: 64 IN | OXYGEN SATURATION: 97 % | SYSTOLIC BLOOD PRESSURE: 139 MMHG | WEIGHT: 147 LBS | DIASTOLIC BLOOD PRESSURE: 83 MMHG | HEART RATE: 64 BPM

## 2021-05-14 NOTE — PROGRESS NOTES
"   Progress Note      Patient Name: Susan Hernandez   Patient ID: [id number]   Sex: Female   YOB: 1950    Primary Care Provider: Rigoberto Gomes MD   Referring Provider: Bobby Cardoso MD    Visit Date: April 1, 2021    Provider: Rigoberto Gomes MD   Location: [facility] [place]   Location Address: [address] [place] [address]; [address] [place] [address]  [id number]   Location Phone: [phone]          Chief Complaint  · F/U Diabetes Mellitus      History Of Present Illness  Susan Hernandez is a 70 year old  female who presents for evaluation and treatment of:      DM2- pt denies hypoglycemic events. pt without home BG readings.   hypothyroid-due for recheck  HLD- pt unable to tolerate Pravachol due to GI side effects.   GERD- does well with Pepcid.   pt reports having stiff neck and unable to turn to the right since having fall. pt had MRI with results pending. pt has been using some OTC cream but with little relief.   due for mammo       Allergy List    Allergies Reconciled  Vitals  Date Time BP Position Site L\R Cuff Size HR RR TEMP (F) WT  HT  BMI kg/m2 BSA m2 O2 Sat FR L/min FiO2 HC       09/03/2020 11:32 /68 Sitting    70 - R  96.6 146lbs 4oz 5'  4\" 25.1 1.73 97 %      12/07/2020 01:37 /70 Sitting    78 - R  97.1 146lbs 0oz 5'  4\" 25.06 1.73 98 %  21%    04/01/2021 11:49 /83 Sitting    64 - R  97.9 146lbs 16oz 5'  4\" 25.23 1.74 97 %  21%          Physical Examination  · Constitutional  o Appearance  o : no acute distress, well-nourished  · Head and Face  o Head  o :   § Inspection  § : atraumatic, normocephalic  · Eyes  o Eyes  o : extraocular movements intact, no scleral icterus, no conjunctival injection  · Ears, Nose, Mouth and Throat  o Ears  o :   § External Ears  § : normal  o Nose  o :   § Intranasal Exam  § : nares patent  o Oral Cavity  o :   § Oral Mucosa  § : moist mucous membranes  · Respiratory  o Respiratory Effort  o : breathing " comfortably, symmetric chest rise  o Auscultation of Lungs  o : clear to asculatation bilaterally, no wheezes, rales, or rhonchii  · Cardiovascular  o Heart  o :   § Auscultation of Heart  § : regular rate and rhythm, no murmurs, rubs, or gallops  o Peripheral Vascular System  o :   § Extremities  § : no edema  · Neurologic  o Mental Status Examination  o :   § Orientation  § : grossly oriented to person, place and time  o Gait and Station  o :   § Gait Screening  § : normal gait  · Psychiatric  o General  o : normal mood and affect          Assessment  · Cervical pain (neck)     723.1/M54.2  possibly due to statin use? MRI read pending. pt also has f/u with [facility] within 1 month. cont Tylenol and NSAIDs as needed. refill lyrica. luc reviewed.   · Diabetes mellitus, type 2     250.00/E11.9  last labs reviewed with good control of BG. possibly poor glycemic control contributing to neck pain? will Rx BG monitor kit and supplies to check BG.   · Hyperlipidemia     272.4/E78.5  switch to simvastatin as pt previously tolerated.   · Hypothyroidism     244.9/E03.9  check with next labs.     Problems Reconciled  Plan  · Orders  o ACO-39: Current medications updated and reviewed (, 1159F) - - 04/01/2021  · Medications  o Blood Glucose Monitoring miscellaneous kit   SIG: use as directed   DISP: (1) Kit with 0 refills  Prescribed on 04/01/2021     o simvastatin 20 mg oral tablet   SIG: take 1 tablet (20 mg) by oral route once daily in the evening for 90 days   DISP: (90) Tablet with 3 refills  Adjusted on 04/01/2021     o Blood Glucose Test miscellaneous strip   SIG: use as directed to test blood sugar BID PRN DX E11.9   DISP: (2) Box with 4 refills  Refilled on 04/01/2021     o Lyrica 150 mg oral capsule   SIG: take 1 capsule by oral route 3 times a day for 30 days   DISP: (90) Capsule with 0 refills  Refilled on 04/01/2021     o Fish Oil 1,000 mg (120 mg-180 mg) oral capsule   SIG: take 1 capsule by oral route 2 times a  day for 90 days   DISP: (180) capsules with 3 refills  Discontinued on 04/01/2021     o Medications have been Reconciled  o Transition of Care or Provider Policy  · Instructions  o Patient was educated/instructed on their diagnosis, treatment and medications prior to discharge from the clinic today.  · Disposition  o f/u in 3 months            Electronically Signed by: Rigoberto Gomes MD -Author on April 20, 2021 08:12:10 AM

## 2021-05-15 VITALS
HEART RATE: 76 BPM | HEIGHT: 64 IN | TEMPERATURE: 97.2 F | OXYGEN SATURATION: 95 % | DIASTOLIC BLOOD PRESSURE: 72 MMHG | WEIGHT: 153 LBS | SYSTOLIC BLOOD PRESSURE: 112 MMHG | BODY MASS INDEX: 26.12 KG/M2

## 2021-05-15 VITALS
OXYGEN SATURATION: 98 % | HEART RATE: 88 BPM | TEMPERATURE: 97.8 F | SYSTOLIC BLOOD PRESSURE: 115 MMHG | WEIGHT: 148 LBS | HEIGHT: 64 IN | DIASTOLIC BLOOD PRESSURE: 64 MMHG | BODY MASS INDEX: 25.27 KG/M2

## 2021-05-15 VITALS
HEART RATE: 73 BPM | HEIGHT: 64 IN | TEMPERATURE: 97.4 F | DIASTOLIC BLOOD PRESSURE: 72 MMHG | BODY MASS INDEX: 25.48 KG/M2 | SYSTOLIC BLOOD PRESSURE: 120 MMHG | OXYGEN SATURATION: 95 % | WEIGHT: 149.25 LBS

## 2021-05-15 VITALS
BODY MASS INDEX: 24.41 KG/M2 | SYSTOLIC BLOOD PRESSURE: 116 MMHG | DIASTOLIC BLOOD PRESSURE: 72 MMHG | HEART RATE: 79 BPM | OXYGEN SATURATION: 94 % | WEIGHT: 143 LBS | HEIGHT: 64 IN | TEMPERATURE: 97.2 F

## 2021-05-15 VITALS
WEIGHT: 150.37 LBS | BODY MASS INDEX: 25.67 KG/M2 | OXYGEN SATURATION: 96 % | TEMPERATURE: 98 F | SYSTOLIC BLOOD PRESSURE: 120 MMHG | HEART RATE: 75 BPM | HEIGHT: 64 IN | DIASTOLIC BLOOD PRESSURE: 74 MMHG

## 2021-05-15 VITALS
BODY MASS INDEX: 27.23 KG/M2 | TEMPERATURE: 98 F | DIASTOLIC BLOOD PRESSURE: 82 MMHG | SYSTOLIC BLOOD PRESSURE: 114 MMHG | HEART RATE: 80 BPM | WEIGHT: 148 LBS | HEIGHT: 62 IN | OXYGEN SATURATION: 95 %

## 2021-05-15 VITALS
DIASTOLIC BLOOD PRESSURE: 76 MMHG | SYSTOLIC BLOOD PRESSURE: 140 MMHG | HEIGHT: 64 IN | BODY MASS INDEX: 25.78 KG/M2 | WEIGHT: 151 LBS | HEART RATE: 62 BPM

## 2021-06-10 ENCOUNTER — TELEPHONE (OUTPATIENT)
Dept: INTERNAL MEDICINE | Facility: CLINIC | Age: 71
End: 2021-06-10

## 2021-06-10 RX ORDER — PREGABALIN 150 MG/1
CAPSULE ORAL
COMMUNITY
End: 2021-06-10 | Stop reason: SDUPTHER

## 2021-06-10 RX ORDER — PREGABALIN 150 MG/1
150 CAPSULE ORAL 3 TIMES DAILY PRN
Qty: 90 CAPSULE | Refills: 0 | Status: SHIPPED | OUTPATIENT
Start: 2021-06-10 | End: 2021-07-09 | Stop reason: SDUPTHER

## 2021-06-10 NOTE — TELEPHONE ENCOUNTER
Caller: Susan Hernandez    Relationship: Self    Best call back number: [phone]     Medication needed: LYRICA     When do you need the refill by:06/10/21    What additional details did the patient provide when requesting the medication:     Does the patient have less than a 3 day supply:  [x] Yes  [] No    What is the patient's preferred pharmacy:    [facility]

## 2021-06-18 ENCOUNTER — OFFICE VISIT (OUTPATIENT)
Dept: NEUROSURGERY | Facility: CLINIC | Age: 71
End: 2021-06-18

## 2021-06-18 VITALS
DIASTOLIC BLOOD PRESSURE: 73 MMHG | HEIGHT: 64 IN | WEIGHT: 144.6 LBS | BODY MASS INDEX: 24.69 KG/M2 | SYSTOLIC BLOOD PRESSURE: 107 MMHG

## 2021-06-18 DIAGNOSIS — M47.22 CERVICAL SPONDYLOSIS WITH RADICULOPATHY: ICD-10-CM

## 2021-06-18 DIAGNOSIS — M54.2 CERVICALGIA: Primary | ICD-10-CM

## 2021-06-18 PROCEDURE — 99203 OFFICE O/P NEW LOW 30 MIN: CPT | Performed by: NEUROLOGICAL SURGERY

## 2021-06-18 RX ORDER — MELATONIN
COMMUNITY
Start: 2021-04-14 | End: 2022-07-21 | Stop reason: SDUPTHER

## 2021-06-18 RX ORDER — LEVOTHYROXINE SODIUM 0.03 MG/1
TABLET ORAL
COMMUNITY
End: 2021-07-09 | Stop reason: ALTCHOICE

## 2021-06-18 RX ORDER — PREGABALIN 150 MG/1
CAPSULE ORAL
COMMUNITY
Start: 2021-05-13 | End: 2021-07-09

## 2021-06-18 RX ORDER — OMEPRAZOLE 40 MG/1
CAPSULE, DELAYED RELEASE ORAL
COMMUNITY
Start: 2021-03-17 | End: 2021-07-09 | Stop reason: SDUPTHER

## 2021-06-18 RX ORDER — DOCUSATE CALCIUM 240 MG
CAPSULE ORAL
COMMUNITY
Start: 2021-04-14 | End: 2021-07-09 | Stop reason: SDUPTHER

## 2021-06-18 RX ORDER — FAMOTIDINE 40 MG/1
40 TABLET, FILM COATED ORAL DAILY
COMMUNITY
End: 2021-07-16

## 2021-06-18 RX ORDER — SIMVASTATIN 20 MG
20 TABLET ORAL NIGHTLY
COMMUNITY
End: 2021-10-12

## 2021-06-18 RX ORDER — CHLORAL HYDRATE 500 MG
1000 CAPSULE ORAL
COMMUNITY

## 2021-06-18 NOTE — PROGRESS NOTES
"Chief Complaint  Neck Pain    Subjective          Susan Hernandez who is a 70 y.o. year old female who presents to [facility] for Evaluation of the Spine.     The patient complains of pain located in the Cervical Spine.  Patients states the pain has been present for 5 months.  The pain came on acutely.  The pain scaled level is 2-10/10 in severity.  The pain does radiates into the right arm in a nonspecific distribution. The pain is waxing/waning and described as sharp and pressure like.  The pain is worse at nighttime and awakens the patient at night. Patient states nothing improves the pain.  Patient states nothing worsens the pain.    Associated Symptoms Include: Numbness and Tingling    Was this the result of an injury or accident?: Yes, Fall    History of Previous Spinal Surgery?: No     reports that she has quit smoking. She has never used smokeless tobacco.    Review of Systems   HENT: Positive for sinus pressure.    Musculoskeletal: Positive for arthralgias, back pain, myalgias and neck pain.   Neurological: Positive for numbness and headache.   Psychiatric/Behavioral: Positive for depressed mood.        Objective   Vital Signs:   /73 (BP Location: Left arm, Patient Position: Sitting)   Ht 162.6 cm (64\")   Wt 65.6 kg (144 lb 9.6 oz)   BMI 24.82 kg/m²       Physical Exam  HENT:      Head: Normocephalic and atraumatic.   Pulmonary:      Effort: Pulmonary effort is normal.   Musculoskeletal:      Cervical back: Normal range of motion.   Neurological:      Mental Status: She is alert.      Sensory: Sensory deficit present.      Motor: No weakness.      Deep Tendon Reflexes: Reflexes normal.      Comments: -Hoffmans     Psychiatric:         Mood and Affect: Mood normal.            Result Review :   C5-6 and C6-7 stenosis with foraminal narrowing       Assessment and Plan    Diagnoses and all orders for this visit:    1. Cervicalgia (Primary)    2. Cervical " spondylosis with radiculopathy    Her options include PT/CESB/or C5-6 and C6-7 ACDF.    Follow Up   No follow-ups on file.  Patient was given instructions and counseling regarding her condition or for health maintenance advice. Please see specific information pulled into the AVS if appropriate.

## 2021-06-18 NOTE — PATIENT INSTRUCTIONS
If physical therapy is not helpful, she would like to try pain management prior to considering surgery. She is not interested in surgery at the present time.

## 2021-07-01 NOTE — TELEPHONE ENCOUNTER
Caller: Susan Hernandez    Relationship: Self    Best call back number: [phone]    Medication needed:   Requested Prescriptions     Pending Prescriptions Disp Refills   • levothyroxine (SYNTHROID, LEVOTHROID) 25 MCG tablet         When do you need the refill by: ASAP      What additional details did the patient provide when requesting the medication: PATIENT HAS 1 PILL LEFT     Does the patient have less than a 3 day supply:  [x] Yes  [] No    What is the patient's preferred pharmacy: [facility]  [place] - [address] [facility] [phone] [facility] [phone]   [phone]

## 2021-07-07 RX ORDER — LEVOTHYROXINE SODIUM 0.03 MG/1
TABLET ORAL
Status: CANCELLED | OUTPATIENT
Start: 2021-07-07

## 2021-07-07 RX ORDER — OMEPRAZOLE 40 MG/1
CAPSULE, DELAYED RELEASE ORAL
Status: CANCELLED | OUTPATIENT
Start: 2021-07-07

## 2021-07-08 RX ORDER — LEVOTHYROXINE SODIUM 0.03 MG/1
TABLET ORAL
OUTPATIENT
Start: 2021-07-08

## 2021-07-08 RX ORDER — LEVOTHYROXINE SODIUM 0.07 MG/1
75 TABLET ORAL DAILY
Qty: 90 TABLET | Refills: 6 | Status: SHIPPED | OUTPATIENT
Start: 2021-07-08 | End: 2021-07-09 | Stop reason: SDUPTHER

## 2021-07-09 ENCOUNTER — OFFICE VISIT (OUTPATIENT)
Dept: INTERNAL MEDICINE | Facility: CLINIC | Age: 71
End: 2021-07-09

## 2021-07-09 VITALS
DIASTOLIC BLOOD PRESSURE: 83 MMHG | TEMPERATURE: 96.9 F | HEART RATE: 79 BPM | HEIGHT: 64 IN | SYSTOLIC BLOOD PRESSURE: 137 MMHG | WEIGHT: 144.8 LBS | BODY MASS INDEX: 24.72 KG/M2 | OXYGEN SATURATION: 95 %

## 2021-07-09 DIAGNOSIS — E03.9 HYPOTHYROIDISM, UNSPECIFIED TYPE: ICD-10-CM

## 2021-07-09 DIAGNOSIS — K59.00 CONSTIPATION, UNSPECIFIED CONSTIPATION TYPE: ICD-10-CM

## 2021-07-09 DIAGNOSIS — E11.9 TYPE 2 DIABETES MELLITUS WITHOUT COMPLICATION, WITHOUT LONG-TERM CURRENT USE OF INSULIN (HCC): Primary | ICD-10-CM

## 2021-07-09 DIAGNOSIS — M50.30 DDD (DEGENERATIVE DISC DISEASE), CERVICAL: ICD-10-CM

## 2021-07-09 DIAGNOSIS — K21.9 GASTROESOPHAGEAL REFLUX DISEASE WITHOUT ESOPHAGITIS: ICD-10-CM

## 2021-07-09 PROBLEM — M54.50 LOW BACK PAIN: Status: ACTIVE | Noted: 2021-07-09

## 2021-07-09 PROBLEM — M19.90 ARTHRITIS: Status: ACTIVE | Noted: 2021-07-09

## 2021-07-09 PROBLEM — K56.609 SMALL BOWEL OBSTRUCTION (HCC): Status: ACTIVE | Noted: 2021-07-09

## 2021-07-09 PROBLEM — C18.9 CANCER, COLON: Status: ACTIVE | Noted: 2021-07-09

## 2021-07-09 LAB
ALBUMIN SERPL-MCNC: 4.6 G/DL (ref 3.5–5.2)
ALBUMIN UR-MCNC: <1.2 MG/DL
ALBUMIN/GLOB SERPL: 2.3 G/DL
ALP SERPL-CCNC: 59 U/L (ref 39–117)
ALT SERPL W P-5'-P-CCNC: 43 U/L (ref 1–33)
ANION GAP SERPL CALCULATED.3IONS-SCNC: 7.7 MMOL/L (ref 5–15)
AST SERPL-CCNC: 34 U/L (ref 1–32)
BASOPHILS # BLD AUTO: 0.02 10*3/MM3 (ref 0–0.2)
BASOPHILS NFR BLD AUTO: 0.3 % (ref 0–1.5)
BILIRUB SERPL-MCNC: 0.4 MG/DL (ref 0–1.2)
BUN SERPL-MCNC: 11 MG/DL (ref 8–23)
BUN/CREAT SERPL: 12.9 (ref 7–25)
CALCIUM SPEC-SCNC: 9.6 MG/DL (ref 8.6–10.5)
CHLORIDE SERPL-SCNC: 101 MMOL/L (ref 98–107)
CHOLEST SERPL-MCNC: 175 MG/DL (ref 0–200)
CO2 SERPL-SCNC: 29.3 MMOL/L (ref 22–29)
CREAT SERPL-MCNC: 0.85 MG/DL (ref 0.57–1)
DEPRECATED RDW RBC AUTO: 45.6 FL (ref 37–54)
EOSINOPHIL # BLD AUTO: 0.06 10*3/MM3 (ref 0–0.4)
EOSINOPHIL NFR BLD AUTO: 1 % (ref 0.3–6.2)
ERYTHROCYTE [DISTWIDTH] IN BLOOD BY AUTOMATED COUNT: 12.7 % (ref 12.3–15.4)
GFR SERPL CREATININE-BSD FRML MDRD: 66 ML/MIN/1.73
GFR SERPL CREATININE-BSD FRML MDRD: 80 ML/MIN/1.73
GLOBULIN UR ELPH-MCNC: 2 GM/DL
GLUCOSE SERPL-MCNC: 204 MG/DL (ref 65–99)
HBA1C MFR BLD: 6.5 % (ref 4.8–5.6)
HCT VFR BLD AUTO: 42.5 % (ref 34–46.6)
HDLC SERPL-MCNC: 46 MG/DL (ref 40–60)
HGB BLD-MCNC: 13.8 G/DL (ref 12–15.9)
IMM GRANULOCYTES # BLD AUTO: 0.02 10*3/MM3 (ref 0–0.05)
IMM GRANULOCYTES NFR BLD AUTO: 0.3 % (ref 0–0.5)
LDLC SERPL CALC-MCNC: 100 MG/DL (ref 0–100)
LDLC/HDLC SERPL: 2.07 {RATIO}
LYMPHOCYTES # BLD AUTO: 2.79 10*3/MM3 (ref 0.7–3.1)
LYMPHOCYTES NFR BLD AUTO: 45.8 % (ref 19.6–45.3)
MCH RBC QN AUTO: 31.9 PG (ref 26.6–33)
MCHC RBC AUTO-ENTMCNC: 32.5 G/DL (ref 31.5–35.7)
MCV RBC AUTO: 98.4 FL (ref 79–97)
MONOCYTES # BLD AUTO: 0.39 10*3/MM3 (ref 0.1–0.9)
MONOCYTES NFR BLD AUTO: 6.4 % (ref 5–12)
NEUTROPHILS NFR BLD AUTO: 2.81 10*3/MM3 (ref 1.7–7)
NEUTROPHILS NFR BLD AUTO: 46.2 % (ref 42.7–76)
NRBC BLD AUTO-RTO: 0 /100 WBC (ref 0–0.2)
PLATELET # BLD AUTO: 241 10*3/MM3 (ref 140–450)
PMV BLD AUTO: 11.1 FL (ref 6–12)
POTASSIUM SERPL-SCNC: 4.3 MMOL/L (ref 3.5–5.2)
PROT SERPL-MCNC: 6.6 G/DL (ref 6–8.5)
RBC # BLD AUTO: 4.32 10*6/MM3 (ref 3.77–5.28)
SODIUM SERPL-SCNC: 138 MMOL/L (ref 136–145)
TRIGL SERPL-MCNC: 169 MG/DL (ref 0–150)
TSH SERPL DL<=0.05 MIU/L-ACNC: 2.97 UIU/ML (ref 0.27–4.2)
VLDLC SERPL-MCNC: 29 MG/DL (ref 5–40)
WBC # BLD AUTO: 6.09 10*3/MM3 (ref 3.4–10.8)

## 2021-07-09 PROCEDURE — 80053 COMPREHEN METABOLIC PANEL: CPT | Performed by: INTERNAL MEDICINE

## 2021-07-09 PROCEDURE — 80061 LIPID PANEL: CPT | Performed by: INTERNAL MEDICINE

## 2021-07-09 PROCEDURE — 82043 UR ALBUMIN QUANTITATIVE: CPT | Performed by: INTERNAL MEDICINE

## 2021-07-09 PROCEDURE — 83036 HEMOGLOBIN GLYCOSYLATED A1C: CPT | Performed by: INTERNAL MEDICINE

## 2021-07-09 PROCEDURE — 84443 ASSAY THYROID STIM HORMONE: CPT | Performed by: INTERNAL MEDICINE

## 2021-07-09 PROCEDURE — 85025 COMPLETE CBC W/AUTO DIFF WBC: CPT | Performed by: INTERNAL MEDICINE

## 2021-07-09 PROCEDURE — 99214 OFFICE O/P EST MOD 30 MIN: CPT | Performed by: INTERNAL MEDICINE

## 2021-07-09 RX ORDER — LEVOTHYROXINE SODIUM 0.07 MG/1
75 TABLET ORAL DAILY
Qty: 90 TABLET | Refills: 6 | Status: SHIPPED | OUTPATIENT
Start: 2021-07-09 | End: 2021-12-22 | Stop reason: SDUPTHER

## 2021-07-09 RX ORDER — PREGABALIN 150 MG/1
150 CAPSULE ORAL 3 TIMES DAILY
Qty: 90 CAPSULE | Refills: 3 | Status: CANCELLED | OUTPATIENT
Start: 2021-07-09

## 2021-07-09 RX ORDER — DULOXETIN HYDROCHLORIDE 30 MG/1
30 CAPSULE, DELAYED RELEASE ORAL DAILY
Qty: 90 CAPSULE | Refills: 1 | Status: SHIPPED | OUTPATIENT
Start: 2021-07-09 | End: 2021-12-22 | Stop reason: SDUPTHER

## 2021-07-09 RX ORDER — PREGABALIN 150 MG/1
150 CAPSULE ORAL 3 TIMES DAILY PRN
Qty: 90 CAPSULE | Refills: 0 | Status: SHIPPED | OUTPATIENT
Start: 2021-07-09 | End: 2021-07-12 | Stop reason: SDUPTHER

## 2021-07-09 RX ORDER — OMEPRAZOLE 40 MG/1
40 CAPSULE, DELAYED RELEASE ORAL DAILY
Qty: 90 CAPSULE | Refills: 3 | Status: SHIPPED | OUTPATIENT
Start: 2021-07-09 | End: 2022-04-11 | Stop reason: SDUPTHER

## 2021-07-09 RX ORDER — DOCUSATE CALCIUM 240 MG
240 CAPSULE ORAL 2 TIMES DAILY PRN
Qty: 90 CAPSULE | Refills: 3 | Status: SHIPPED | OUTPATIENT
Start: 2021-07-09 | End: 2022-07-18
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
## 2021-07-12 DIAGNOSIS — M50.30 DDD (DEGENERATIVE DISC DISEASE), CERVICAL: ICD-10-CM

## 2021-07-12 NOTE — TELEPHONE ENCOUNTER
PATIENT CALLED BACK AND HUB ATTEMPTED TO RELAY MESSAGE TO HER . HUB THEN ATTEMPTED WARM TRANSFER BUT PATIENT DISCONNECTED CALL.

## 2021-07-12 NOTE — TELEPHONE ENCOUNTER
Caller: Susan Hernandez    Relationship: Self    Best call back number: [phone]    Medication needed:   Requested Prescriptions     Pending Prescriptions Disp Refills   • pregabalin (Lyrica) 150 MG capsule 90 capsule 0     Sig: Take 1 capsule by mouth 3 (Three) Times a Day As Needed (pain).       When do you need the refill by: ASAP    Does the patient have less than a 3 day supply:  [x] Yes  [] No    What is the patient's preferred [id number] [place] [place] - [address] [facility] AT [facility] OF [address] [facility] & [place] - [phone] [facility] [phone]         PLEASE CALL PATIENT WHEN SENT IN.

## 2021-07-12 NOTE — TELEPHONE ENCOUNTER
ATTEMPTED TO CONTACT PT PER PROVIDER'S INSTRUCTIONS     NO ANSWER     LEFT VOICEMAIL WITH INSTRUCTIONS TO RETURN CALL TO OFFICE AT [phone]    OK FOR HUB TO ADVISE/READ    Rigoberto Gomes Jr., MD   7/12/2021  7:57 AM EDT      Stable labs. Elevated triglycerides, which are the storage form of sugar - recommend lower carbohydrate/sugar intake.

## 2021-07-12 NOTE — TELEPHONE ENCOUNTER
----- Message from Rigoberto Gomes Jr., MD sent at 7/12/2021  7:57 AM EDT -----  Stable labs. Elevated triglycerides, which are the storage form of sugar - recommend lower carbohydrate/sugar intake.

## 2021-07-14 NOTE — TELEPHONE ENCOUNTER
ATTEMPTED TO CONTACT PT PER PROVIDER'S INSTRUCTIONS     NO ANSWER     LEFT VOICEMAIL WITH INSTRUCTIONS TO RETURN CALL TO OFFICE AT [phone]

## 2021-07-15 ENCOUNTER — TELEPHONE (OUTPATIENT)
Dept: INTERNAL MEDICINE | Facility: CLINIC | Age: 71
End: 2021-07-15

## 2021-07-15 NOTE — TELEPHONE ENCOUNTER
CONTACTED PT PER PROVIDER'S INSTRUCTIONS   Rigoberto Gomes Jr., MD   7/12/2021  7:57 AM EDT      Stable labs. Elevated triglycerides, which are the storage form of sugar - recommend lower carbohydrate/sugar intake.

## 2021-07-16 ENCOUNTER — APPOINTMENT (OUTPATIENT)
Dept: CT IMAGING | Facility: HOSPITAL | Age: 71
End: 2021-07-16

## 2021-07-16 ENCOUNTER — HOSPITAL ENCOUNTER (EMERGENCY)
Facility: HOSPITAL | Age: 71
Discharge: HOME | End: 2021-07-16
Attending: EMERGENCY MEDICINE | Admitting: EMERGENCY MEDICINE

## 2021-07-16 ENCOUNTER — APPOINTMENT (OUTPATIENT)
Dept: GENERAL RADIOLOGY | Facility: HOSPITAL | Age: 71
End: 2021-07-16

## 2021-07-16 VITALS
BODY MASS INDEX: 24.77 KG/M2 | RESPIRATION RATE: 16 BRPM | OXYGEN SATURATION: 97 % | HEART RATE: 72 BPM | SYSTOLIC BLOOD PRESSURE: 137 MMHG | WEIGHT: 145.06 LBS | HEIGHT: 64 IN | DIASTOLIC BLOOD PRESSURE: 78 MMHG | TEMPERATURE: 98.1 F

## 2021-07-16 DIAGNOSIS — R19.7 DIARRHEA, UNSPECIFIED TYPE: Primary | ICD-10-CM

## 2021-07-16 LAB
ALBUMIN SERPL-MCNC: 4.5 G/DL (ref 3.5–5.2)
ALBUMIN/GLOB SERPL: 1.7 G/DL
ALP SERPL-CCNC: 63 U/L (ref 39–117)
ALT SERPL W P-5'-P-CCNC: 46 U/L (ref 1–33)
ANION GAP SERPL CALCULATED.3IONS-SCNC: 11.9 MMOL/L (ref 5–15)
AST SERPL-CCNC: 38 U/L (ref 1–32)
BACTERIA UR QL AUTO: ABNORMAL /HPF
BASOPHILS # BLD AUTO: 0.03 10*3/MM3 (ref 0–0.2)
BASOPHILS NFR BLD AUTO: 0.5 % (ref 0–1.5)
BILIRUB SERPL-MCNC: 0.6 MG/DL (ref 0–1.2)
BILIRUB UR QL STRIP: NEGATIVE
BUN SERPL-MCNC: 10 MG/DL (ref 8–23)
BUN/CREAT SERPL: 11.1 (ref 7–25)
CALCIUM SPEC-SCNC: 9.3 MG/DL (ref 8.6–10.5)
CHLORIDE SERPL-SCNC: 100 MMOL/L (ref 98–107)
CLARITY UR: CLEAR
CO2 SERPL-SCNC: 25.1 MMOL/L (ref 22–29)
COLOR UR: YELLOW
CREAT SERPL-MCNC: 0.9 MG/DL (ref 0.57–1)
DEPRECATED RDW RBC AUTO: 44.1 FL (ref 37–54)
EOSINOPHIL # BLD AUTO: 0.03 10*3/MM3 (ref 0–0.4)
EOSINOPHIL NFR BLD AUTO: 0.5 % (ref 0.3–6.2)
ERYTHROCYTE [DISTWIDTH] IN BLOOD BY AUTOMATED COUNT: 12.8 % (ref 12.3–15.4)
GFR SERPL CREATININE-BSD FRML MDRD: 62 ML/MIN/1.73
GFR SERPL CREATININE-BSD FRML MDRD: 75 ML/MIN/1.73
GLOBULIN UR ELPH-MCNC: 2.7 GM/DL
GLUCOSE SERPL-MCNC: 135 MG/DL (ref 65–99)
GLUCOSE UR STRIP-MCNC: NEGATIVE MG/DL
HCT VFR BLD AUTO: 41.2 % (ref 34–46.6)
HGB BLD-MCNC: 13.9 G/DL (ref 12–15.9)
HGB UR QL STRIP.AUTO: NEGATIVE
HOLD SPECIMEN: NORMAL
HOLD SPECIMEN: NORMAL
HYALINE CASTS UR QL AUTO: ABNORMAL /LPF
IMM GRANULOCYTES # BLD AUTO: 0.02 10*3/MM3 (ref 0–0.05)
IMM GRANULOCYTES NFR BLD AUTO: 0.3 % (ref 0–0.5)
KETONES UR QL STRIP: NEGATIVE
LEUKOCYTE ESTERASE UR QL STRIP.AUTO: ABNORMAL
LIPASE SERPL-CCNC: 54 U/L (ref 13–60)
LYMPHOCYTES # BLD AUTO: 2.35 10*3/MM3 (ref 0.7–3.1)
LYMPHOCYTES NFR BLD AUTO: 37.4 % (ref 19.6–45.3)
MCH RBC QN AUTO: 32.1 PG (ref 26.6–33)
MCHC RBC AUTO-ENTMCNC: 33.7 G/DL (ref 31.5–35.7)
MCV RBC AUTO: 95.2 FL (ref 79–97)
MONOCYTES # BLD AUTO: 0.41 10*3/MM3 (ref 0.1–0.9)
MONOCYTES NFR BLD AUTO: 6.5 % (ref 5–12)
NEUTROPHILS NFR BLD AUTO: 3.45 10*3/MM3 (ref 1.7–7)
NEUTROPHILS NFR BLD AUTO: 54.8 % (ref 42.7–76)
NITRITE UR QL STRIP: NEGATIVE
NRBC BLD AUTO-RTO: 0 /100 WBC (ref 0–0.2)
PH UR STRIP.AUTO: 6.5 [PH] (ref 5–8)
PLATELET # BLD AUTO: 212 10*3/MM3 (ref 140–450)
PMV BLD AUTO: 10.7 FL (ref 6–12)
POTASSIUM SERPL-SCNC: 4.3 MMOL/L (ref 3.5–5.2)
PROT SERPL-MCNC: 7.2 G/DL (ref 6–8.5)
PROT UR QL STRIP: NEGATIVE
RBC # BLD AUTO: 4.33 10*6/MM3 (ref 3.77–5.28)
RBC # UR: ABNORMAL /HPF
REF LAB TEST METHOD: ABNORMAL
SODIUM SERPL-SCNC: 137 MMOL/L (ref 136–145)
SP GR UR STRIP: 1.01 (ref 1–1.03)
SQUAMOUS #/AREA URNS HPF: ABNORMAL /HPF
UROBILINOGEN UR QL STRIP: ABNORMAL
WBC # BLD AUTO: 6.29 10*3/MM3 (ref 3.4–10.8)
WBC UR QL AUTO: ABNORMAL /HPF
WHOLE BLOOD HOLD SPECIMEN: NORMAL

## 2021-07-16 PROCEDURE — 99283 EMERGENCY DEPT VISIT LOW MDM: CPT

## 2021-07-16 PROCEDURE — 81001 URINALYSIS AUTO W/SCOPE: CPT | Performed by: EMERGENCY MEDICINE

## 2021-07-16 PROCEDURE — 80053 COMPREHEN METABOLIC PANEL: CPT | Performed by: EMERGENCY MEDICINE

## 2021-07-16 PROCEDURE — 85025 COMPLETE CBC W/AUTO DIFF WBC: CPT | Performed by: EMERGENCY MEDICINE

## 2021-07-16 PROCEDURE — 83690 ASSAY OF LIPASE: CPT | Performed by: EMERGENCY MEDICINE

## 2021-07-16 RX ORDER — DIPHENOXYLATE HYDROCHLORIDE AND ATROPINE SULFATE 2.5; .025 MG/1; MG/1
1 TABLET ORAL 4 TIMES DAILY PRN
Qty: 15 TABLET | Refills: 0 | Status: SHIPPED | OUTPATIENT
Start: 2021-07-16 | End: 2022-04-11

## 2021-07-16 RX ORDER — SODIUM CHLORIDE 0.9 % (FLUSH) 0.9 %
10 SYRINGE (ML) INJECTION AS NEEDED
Status: DISCONTINUED | OUTPATIENT
Start: 2021-07-16 | End: 2021-07-16 | Stop reason: HOSPADM

## 2021-07-16 RX ORDER — SODIUM CHLORIDE 0.9 % (FLUSH) 0.9 %
10 SYRINGE (ML) INJECTION AS NEEDED
Status: DISCONTINUED | OUTPATIENT
Start: 2021-07-16 | End: 2021-07-16

## 2021-07-16 RX ORDER — DIPHENOXYLATE HYDROCHLORIDE AND ATROPINE SULFATE 2.5; .025 MG/1; MG/1
1 TABLET ORAL 4 TIMES DAILY PRN
Qty: 15 TABLET | Refills: 0 | Status: SHIPPED | OUTPATIENT
Start: 2021-07-16 | End: 2021-07-16 | Stop reason: SDUPTHER

## 2021-07-16 RX ADMIN — SODIUM CHLORIDE 1000 ML: 9 INJECTION, SOLUTION INTRAVENOUS at 16:49

## 2021-07-16 NOTE — DISCHARGE INSTRUCTIONS
Plenty of fluids. Take medications as directed for diarrhea. Return for worsening symptoms such as severe abdominal pain, fever, rectal bleeding. Follow-up with Dr. Owens and your primary care doctor next week

## 2021-07-16 NOTE — ED PROVIDER NOTES
"Time: 3:58 PM EDT  Arrived by: private vehicle   Chief Complaint: DIARRHEA   History provided by: pt     History of Present Illness:  Patient is a 70 y.o. year old female that presents to the emergency department with DIARRHEA. She states that this has been ongoing for a months and is still present. It is moderate in severity. Nothing improves or worsens symptoms. She has not tried any remedies for the diarrhea including imodium. No recent abx use.     She states that she has diarrhea \"all day\" and that about 30 minutes after eating she has a bowel movement. Pt has had weakness and dizziness. No sore throat or emesis.     Hx of colon CA and DM. Pt has had both COVID-19 vaccines.       History provided by:  Patient    Similar Symptoms Previously: no  Recently seen: Pt was seen at  today and was referred to the ED for further evaluation and management.       Patient Care Team  Primary Care Provider: Rigoberto Gomes Jr., MD     Past Medical History:     Allergies   Allergen Reactions   • Aspirin Hives   • Penicillin V Potassium Rash     Past Medical History:   Diagnosis Date   • Arthritis    • Cancer (CMS/HCC)    • Colon cancer (CMS/HCC)    • Diabetes mellitus, type 2 (CMS/HCC)    • Disease of thyroid gland    • GERD (gastroesophageal reflux disease)    • Lumbar pain    • Small bowel obstruction (CMS/HCC)      Past Surgical History:   Procedure Laterality Date   • APPENDECTOMY     • COLON SURGERY  2011,2006    CANCER, CHEMO   • HYSTERECTOMY  2004     Family History   Problem Relation Age of Onset   • Breast cancer Brother    • Other Brother         RENAL CANCER       Home Medications:  Prior to Admission medications    Medication Sig Start Date End Date Taking? Authorizing Provider   calcium carbonate-cholecalciferol 500-400 MG-UNIT tablet tablet Take  by mouth Daily.    Jaye Wynne MD   cholecalciferol (VITAMIN D3) 25 MCG (1000 UT) tablet  4/14/21   ProviderJaye MD   docusate calcium " (SURFAK) 240 MG capsule Take 1 capsule by mouth 2 (Two) Times a Day As Needed for Constipation. 7/9/21   Rigoberto Gomes Jr., MD   DULoxetine (CYMBALTA) 30 MG capsule Take 1 capsule by mouth Daily. 7/9/21   Rigoberto Gomes Jr., MD   levothyroxine (Synthroid) 75 MCG tablet Take 1 tablet by mouth Daily. 7/9/21   Rigoberto Gomes Jr., MD   metFORMIN (GLUCOPHAGE) 500 MG tablet metformin 500 mg oral tablet take 2 tablets (1,000 mg) by oral route 2 times per day with morning and evening meals for 90 days 2/24/2021  Active 2/24/21   Jaye Wynne MD   ofloxacin (OCUFLOX) 0.3 % ophthalmic solution INSTILL 1 DROP IN OPERATIVE EYE FOUR TIMES DAILY BEGINNING 1 DAY PRIOR TO SURGERY 6/24/21   Emergency, Nurse [facility], RN   Omega-3 Fatty Acids (fish oil) 1000 MG capsule capsule Take  by mouth Daily With Breakfast.    Jaye Wynne MD   omeprazole (priLOSEC) 40 MG capsule Take 1 capsule by mouth Daily. 7/9/21   Rigoberto Gomes Jr., MD   pregabalin (Lyrica) 150 MG capsule Take 1 capsule by mouth 3 (Three) Times a Day As Needed (pain). 7/9/21   Rigoberto Gomes Jr., MD   simvastatin (ZOCOR) 20 MG tablet Take 20 mg by mouth Every Night.    Jaye Wynne MD   SITagliptin (Januvia) 100 MG tablet Januvia 100 mg oral tablet take 1 tablet (100 mg) by oral route once daily for 90 days 3/10/2021  Active 3/10/21   Jaye Wynne MD   famotidine (PEPCID) 40 MG tablet Take 40 mg by mouth Daily.  7/16/21  Jaye Wynne MD        Social History:   PT  reports that she has quit smoking. She has quit using smokeless tobacco. She reports previous alcohol use. She reports that she does not use drugs.    Record Review:  I have reviewed the patient's records in [facility].     Review of Systems  Review of Systems   Constitutional: Negative for chills, diaphoresis and fever.   HENT: Negative for ear discharge and nosebleeds.    Eyes: Negative for photophobia.   Respiratory: Negative  "for shortness of breath.    Cardiovascular: Negative for chest pain.   Gastrointestinal: Positive for diarrhea. Negative for nausea and vomiting.   Genitourinary: Negative for dysuria.   Musculoskeletal: Negative for back pain and neck pain.   Skin: Negative for rash.   Neurological: Positive for dizziness and weakness. Negative for headaches.        Physical Exam  /78 (BP Location: Right arm, Patient Position: Sitting)   Pulse 72   Temp 98.1 °F (36.7 °C) (Oral)   Resp 16   Ht 162.6 cm (64\")   Wt 65.8 kg (145 lb 1 oz)   SpO2 97%   BMI 24.90 kg/m²     Physical Exam  Vitals and nursing note reviewed.   Constitutional:       General: She is not in acute distress.     Appearance: Normal appearance.   HENT:      Head: Normocephalic and atraumatic.      Nose: Nose normal.   Eyes:      General: No scleral icterus.  Cardiovascular:      Rate and Rhythm: Normal rate and regular rhythm.      Heart sounds: Normal heart sounds.   Pulmonary:      Effort: Pulmonary effort is normal. No respiratory distress.      Breath sounds: Normal breath sounds.   Abdominal:      Palpations: Abdomen is soft.      Tenderness: There is no abdominal tenderness.   Musculoskeletal:         General: Normal range of motion.      Cervical back: Neck supple.      Right lower leg: No edema.      Left lower leg: No edema.   Skin:     General: Skin is warm and dry.   Neurological:      General: No focal deficit present.      Mental Status: She is alert and oriented to person, place, and time.      Sensory: No sensory deficit.      Motor: No weakness.                  ED Course  /78 (BP Location: Right arm, Patient Position: Sitting)   Pulse 72   Temp 98.1 °F (36.7 °C) (Oral)   Resp 16   Ht 162.6 cm (64\")   Wt 65.8 kg (145 lb 1 oz)   SpO2 97%   BMI 24.90 kg/m²   Results for orders placed or performed during the hospital encounter of 07/16/21   Comprehensive Metabolic Panel    Specimen: Blood   Result Value Ref Range    Glucose 135 " (H) 65 - 99 mg/dL    BUN 10 8 - 23 mg/dL    Creatinine 0.90 0.57 - 1.00 mg/dL    Sodium 137 136 - 145 mmol/L    Potassium 4.3 3.5 - 5.2 mmol/L    Chloride 100 98 - 107 mmol/L    CO2 25.1 22.0 - 29.0 mmol/L    Calcium 9.3 8.6 - 10.5 mg/dL    Total Protein 7.2 6.0 - 8.5 g/dL    Albumin 4.50 3.50 - 5.20 g/dL    ALT (SGPT) 46 (H) 1 - 33 U/L    AST (SGOT) 38 (H) 1 - 32 U/L    Alkaline Phosphatase 63 39 - 117 U/L    Total Bilirubin 0.6 0.0 - 1.2 mg/dL    eGFR Non African Amer 62 >60 mL/min/1.73    eGFR  African Amer 75 >60 mL/min/1.73    Globulin 2.7 gm/dL    A/G Ratio 1.7 g/dL    BUN/Creatinine Ratio 11.1 7.0 - 25.0    Anion Gap 11.9 5.0 - 15.0 mmol/L   Lipase    Specimen: Blood   Result Value Ref Range    Lipase 54 13 - 60 U/L   Urinalysis With Microscopic If Indicated (No Culture) - Urine, Clean Catch    Specimen: Urine, Clean Catch   Result Value Ref Range    Color, UA Yellow Yellow, Straw    Appearance, UA Clear Clear    pH, UA 6.5 5.0 - 8.0    Specific Gravity, UA 1.012 1.005 - 1.030    Glucose, UA Negative Negative    Ketones, UA Negative Negative    Bilirubin, UA Negative Negative    Blood, UA Negative Negative    Protein, UA Negative Negative    Leuk Esterase, UA Moderate (2+) (A) Negative    Nitrite, UA Negative Negative    Urobilinogen, UA 0.2 E.U./dL 0.2 - 1.0 E.U./dL   CBC Auto Differential    Specimen: Blood   Result Value Ref Range    WBC 6.29 3.40 - 10.80 10*3/mm3    RBC 4.33 3.77 - 5.28 10*6/mm3    Hemoglobin 13.9 12.0 - 15.9 g/dL    Hematocrit 41.2 34.0 - 46.6 %    MCV 95.2 79.0 - 97.0 fL    MCH 32.1 26.6 - 33.0 pg    MCHC 33.7 31.5 - 35.7 g/dL    RDW 12.8 12.3 - 15.4 %    RDW-SD 44.1 37.0 - 54.0 fl    MPV 10.7 6.0 - 12.0 fL    Platelets 212 140 - 450 10*3/mm3    Neutrophil % 54.8 42.7 - 76.0 %    Lymphocyte % 37.4 19.6 - 45.3 %    Monocyte % 6.5 5.0 - 12.0 %    Eosinophil % 0.5 0.3 - 6.2 %    Basophil % 0.5 0.0 - 1.5 %    Immature Grans % 0.3 0.0 - 0.5 %    Neutrophils, Absolute 3.45 1.70 - 7.00  10*3/mm3    Lymphocytes, Absolute 2.35 0.70 - 3.10 10*3/mm3    Monocytes, Absolute 0.41 0.10 - 0.90 10*3/mm3    Eosinophils, Absolute 0.03 0.00 - 0.40 10*3/mm3    Basophils, Absolute 0.03 0.00 - 0.20 10*3/mm3    Immature Grans, Absolute 0.02 0.00 - 0.05 10*3/mm3    nRBC 0.0 0.0 - 0.2 /100 WBC   Urinalysis, Microscopic Only - Urine, Clean Catch    Specimen: Urine, Clean Catch   Result Value Ref Range    RBC, UA 0-2 (A) None Seen /HPF    WBC, UA 6-12 (A) None Seen /HPF    Bacteria, UA None Seen None Seen /HPF    Squamous Epithelial Cells, UA 0-2 None Seen, 0-2 /HPF    Hyaline Casts, UA 0-2 None Seen /LPF    Methodology Automated Microscopy    Green Top (Gel)   Result Value Ref Range    Extra Tube Hold for add-ons.    Lavender Top   Result Value Ref Range    Extra Tube hold for add-on    Gold Top - SST   Result Value Ref Range    Extra Tube Hold for add-ons.      Medications   sodium chloride 0.9 % flush 10 mL (has no administration in time range)   sodium chloride 0.9 % bolus 1,000 mL (1,000 mL Intravenous New Bag 7/16/21 1649)     No results found.    Procedures/EKGs:  Procedures    Medical Decision Making:                     MDM  Number of Diagnoses or Management Options  Diagnosis management comments: The patient has been stable her entire emergency department stay.  She is taking p.o. without difficulty.  She has no abdominal pain she has no fever she has no bloody diarrhea.  The patient be discharged home on antidiarrheals and she is to follow-up with her primary care doctor and or gastroenterology as directed.       Amount and/or Complexity of Data Reviewed  Clinical lab tests: reviewed    Patient Progress  Patient progress: improved       Final diagnoses:   Diarrhea, unspecified type        Disposition:  ED Disposition     ED Disposition Condition Comment    Discharge Stable           Documentation assistance provided by Romina Costa acting as scribe for Christiano Baum DO. Information recorded by the  scribe was done at my direction and has been verified and validated by me.       Romina Costa  07/16/21 1602       Romina Costa  07/16/21 1621       Christiano Baum,   07/16/21 1812       Christiano Baum,   07/16/21 1813

## 2021-07-22 RX ORDER — PREGABALIN 150 MG/1
150 CAPSULE ORAL 3 TIMES DAILY PRN
Qty: 90 CAPSULE | Refills: 0 | Status: SHIPPED | OUTPATIENT
Start: 2021-07-22 | End: 2021-08-09 | Stop reason: SDUPTHER

## 2021-07-22 RX ORDER — PREGABALIN 150 MG/1
150 CAPSULE ORAL 3 TIMES DAILY PRN
Qty: 90 CAPSULE | Refills: 0 | Status: SHIPPED | OUTPATIENT
Start: 2021-07-22 | End: 2021-07-22

## 2021-08-09 DIAGNOSIS — M50.30 DDD (DEGENERATIVE DISC DISEASE), CERVICAL: ICD-10-CM

## 2021-08-09 NOTE — TELEPHONE ENCOUNTER
Caller: Susan Hernandez Art    Relationship: Self    Best call back number: [phone]    Medication needed:   Requested Prescriptions     Pending Prescriptions Disp Refills   • pregabalin (Lyrica) 150 MG capsule 90 capsule 0     Sig: Take 1 capsule by mouth 3 (Three) Times a Day As Needed (pain).       When do you need the refill by: 08/09/21    What additional details did the patient provide when requesting the medication:     Does the patient have less than a 3 day supply:  [x] Yes  [] No    What is the patient's preferred pharmacy: [facility] [place] [facility] [place]  [address] [facility] - [phone] [facility] [phone]

## 2021-08-10 RX ORDER — PREGABALIN 150 MG/1
150 CAPSULE ORAL 3 TIMES DAILY PRN
Qty: 90 CAPSULE | Refills: 0 | Status: SHIPPED | OUTPATIENT
Start: 2021-08-10 | End: 2021-09-09 | Stop reason: SDUPTHER

## 2021-08-25 ENCOUNTER — TELEPHONE (OUTPATIENT)
Dept: NEUROSURGERY | Facility: CLINIC | Age: 71
End: 2021-08-25

## 2021-08-25 NOTE — TELEPHONE ENCOUNTER
"Caller: Susan Hernandez    Relationship to patient: Self    Best call back number: [phone]  Chief complaint: PHYSICAL THERAPY NOT HELPFUL.     Type of visit: FOLLOW UP/FOLLOW UP EXT.  Requested date: N/A    Additional notes: PATIENT CALLED TO STATE THAT SHE WOULD LIKE TO SCHEDULE AN APPOINTMENT WITH .     LAST OFFICE NOTE STATES \"If physical therapy is not helpful, she would like to try pain management prior to considering surgery. She is not interested in surgery at the present time.\"    PLEASE ADVISE, THANK YOU!  "

## 2021-09-09 DIAGNOSIS — M50.30 DDD (DEGENERATIVE DISC DISEASE), CERVICAL: ICD-10-CM

## 2021-09-09 RX ORDER — PREGABALIN 150 MG/1
150 CAPSULE ORAL 3 TIMES DAILY PRN
Qty: 90 CAPSULE | Refills: 0 | Status: SHIPPED | OUTPATIENT
Start: 2021-09-09 | End: 2021-09-09

## 2021-09-09 RX ORDER — PREGABALIN 150 MG/1
150 CAPSULE ORAL 3 TIMES DAILY PRN
Qty: 90 CAPSULE | Refills: 0 | Status: SHIPPED | OUTPATIENT
Start: 2021-09-09 | End: 2021-10-11 | Stop reason: SDUPTHER

## 2021-09-09 NOTE — TELEPHONE ENCOUNTER
Caller: Susan Hernandez Art    Relationship: Self    Best call back number: [phone]    Medication needed:   Requested Prescriptions     Pending Prescriptions Disp Refills   • pregabalin (Lyrica) 150 MG capsule 90 capsule 0     Sig: Take 1 capsule by mouth 3 (Three) Times a Day As Needed (pain).       When do you need the refill by: 09/10    What additional details did the patient provide when requesting the medication: 2 DAYS LEFT    Does the patient have less than a 3 day supply:  [x] Yes  [] No    What is the patient's preferred pharmacy: [facility] [place] [facility] [place]  [address] [facility] - [phone] [facility] [phone]

## 2021-09-14 DIAGNOSIS — K21.9 GASTROESOPHAGEAL REFLUX DISEASE WITHOUT ESOPHAGITIS: ICD-10-CM

## 2021-09-14 NOTE — TELEPHONE ENCOUNTER
ATTEMPTED TO CONTACT PT PER PROVIDER'S INSTRUCTIONS     NO ANSWER     LEFT VOICEMAIL WITH INSTRUCTIONS TO RETURN CALL TO OFFICE AT [phone]    OK FOR HUB TO ADVISE/READ   SIGNED [facility] SCRIPT IS READY TO BE PICKED UP DURING REGULAR BUSINESS HOURS

## 2021-09-14 NOTE — TELEPHONE ENCOUNTER
Caller: Susan Hernandez    Relationship: Self    Best call back number: [phone]     Medication needed:   Requested Prescriptions     Pending Prescriptions Disp Refills   • SITagliptin (Januvia) 100 MG tablet         When do you need the refill by: ASAP    Does the patient have less than a 3 day supply:  [] Yes  [x] No    What is the patient's [facility] [address] [phone] [facility] [phone]      PATIENT REQUESTS CALL BACK WHEN PRESCRIPTION HAS BEEN SENT.

## 2021-09-15 NOTE — TELEPHONE ENCOUNTER
PT VERIFIED      CONTACTED PT PER PROVIDER'S INSTRUCTIONS     CONFIRMED PT PHARMACY ON FILE FOR PRESCRIPTIONS    SIGNED [facility] SCRIPT IS READY TO BE PICKED UP DURING REGULAR BUSINESS HOURS    PT STATES SHE ALREADY PICKED UP SCRIPT FROM [facility]

## 2021-09-16 ENCOUNTER — OFFICE VISIT (OUTPATIENT)
Dept: NEUROSURGERY | Facility: CLINIC | Age: 71
End: 2021-09-16

## 2021-09-16 VITALS
WEIGHT: 141.8 LBS | DIASTOLIC BLOOD PRESSURE: 78 MMHG | BODY MASS INDEX: 24.21 KG/M2 | HEIGHT: 64 IN | SYSTOLIC BLOOD PRESSURE: 126 MMHG

## 2021-09-16 DIAGNOSIS — M48.061 SPINAL STENOSIS OF LUMBAR REGION WITH RADICULOPATHY: Primary | ICD-10-CM

## 2021-09-16 DIAGNOSIS — M54.2 CERVICALGIA: ICD-10-CM

## 2021-09-16 DIAGNOSIS — M54.16 SPINAL STENOSIS OF LUMBAR REGION WITH RADICULOPATHY: Primary | ICD-10-CM

## 2021-09-16 PROCEDURE — 99213 OFFICE O/P EST LOW 20 MIN: CPT | Performed by: NEUROLOGICAL SURGERY
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
## 2021-10-11 ENCOUNTER — OFFICE VISIT (OUTPATIENT)
Dept: INTERNAL MEDICINE | Facility: CLINIC | Age: 71
End: 2021-10-11

## 2021-10-11 VITALS
WEIGHT: 148.6 LBS | SYSTOLIC BLOOD PRESSURE: 111 MMHG | BODY MASS INDEX: 25.37 KG/M2 | TEMPERATURE: 96.8 F | OXYGEN SATURATION: 94 % | HEART RATE: 78 BPM | DIASTOLIC BLOOD PRESSURE: 72 MMHG | HEIGHT: 64 IN

## 2021-10-11 DIAGNOSIS — M50.30 DDD (DEGENERATIVE DISC DISEASE), CERVICAL: ICD-10-CM

## 2021-10-11 DIAGNOSIS — E11.9 TYPE 2 DIABETES MELLITUS WITHOUT COMPLICATION, WITHOUT LONG-TERM CURRENT USE OF INSULIN (HCC): Primary | ICD-10-CM

## 2021-10-11 DIAGNOSIS — E03.9 ACQUIRED HYPOTHYROIDISM: ICD-10-CM

## 2021-10-11 DIAGNOSIS — E78.49 OTHER HYPERLIPIDEMIA: ICD-10-CM

## 2021-10-11 LAB
ALBUMIN SERPL-MCNC: 4.5 G/DL (ref 3.5–5.2)
ALBUMIN/GLOB SERPL: 1.7 G/DL
ALP SERPL-CCNC: 71 U/L (ref 39–117)
ALT SERPL W P-5'-P-CCNC: 69 U/L (ref 1–33)
ANION GAP SERPL CALCULATED.3IONS-SCNC: 9.2 MMOL/L (ref 5–15)
AST SERPL-CCNC: 71 U/L (ref 1–32)
BASOPHILS # BLD AUTO: 0.05 10*3/MM3 (ref 0–0.2)
BASOPHILS NFR BLD AUTO: 0.8 % (ref 0–1.5)
BILIRUB SERPL-MCNC: 0.6 MG/DL (ref 0–1.2)
BUN SERPL-MCNC: 13 MG/DL (ref 8–23)
BUN/CREAT SERPL: 17.3 (ref 7–25)
CALCIUM SPEC-SCNC: 9.8 MG/DL (ref 8.6–10.5)
CHLORIDE SERPL-SCNC: 100 MMOL/L (ref 98–107)
CHOLEST SERPL-MCNC: 195 MG/DL (ref 0–200)
CO2 SERPL-SCNC: 27.8 MMOL/L (ref 22–29)
CREAT SERPL-MCNC: 0.75 MG/DL (ref 0.57–1)
DEPRECATED RDW RBC AUTO: 46.6 FL (ref 37–54)
EOSINOPHIL # BLD AUTO: 0.05 10*3/MM3 (ref 0–0.4)
EOSINOPHIL NFR BLD AUTO: 0.8 % (ref 0.3–6.2)
ERYTHROCYTE [DISTWIDTH] IN BLOOD BY AUTOMATED COUNT: 12.9 % (ref 12.3–15.4)
GFR SERPL CREATININE-BSD FRML MDRD: 76 ML/MIN/1.73
GFR SERPL CREATININE-BSD FRML MDRD: 93 ML/MIN/1.73
GLOBULIN UR ELPH-MCNC: 2.6 GM/DL
GLUCOSE SERPL-MCNC: 241 MG/DL (ref 65–99)
HBA1C MFR BLD: 6.77 % (ref 4.8–5.6)
HCT VFR BLD AUTO: 40.3 % (ref 34–46.6)
HDLC SERPL-MCNC: 46 MG/DL (ref 40–60)
HGB BLD-MCNC: 13.3 G/DL (ref 12–15.9)
IMM GRANULOCYTES # BLD AUTO: 0.02 10*3/MM3 (ref 0–0.05)
IMM GRANULOCYTES NFR BLD AUTO: 0.3 % (ref 0–0.5)
LDLC SERPL CALC-MCNC: 123 MG/DL (ref 0–100)
LDLC/HDLC SERPL: 2.61 {RATIO}
LYMPHOCYTES # BLD AUTO: 2.47 10*3/MM3 (ref 0.7–3.1)
LYMPHOCYTES NFR BLD AUTO: 38.9 % (ref 19.6–45.3)
MCH RBC QN AUTO: 32.3 PG (ref 26.6–33)
MCHC RBC AUTO-ENTMCNC: 33 G/DL (ref 31.5–35.7)
MCV RBC AUTO: 97.8 FL (ref 79–97)
MONOCYTES # BLD AUTO: 0.43 10*3/MM3 (ref 0.1–0.9)
MONOCYTES NFR BLD AUTO: 6.8 % (ref 5–12)
NEUTROPHILS NFR BLD AUTO: 3.33 10*3/MM3 (ref 1.7–7)
NEUTROPHILS NFR BLD AUTO: 52.4 % (ref 42.7–76)
NRBC BLD AUTO-RTO: 0 /100 WBC (ref 0–0.2)
PLATELET # BLD AUTO: 232 10*3/MM3 (ref 140–450)
PMV BLD AUTO: 11.1 FL (ref 6–12)
POTASSIUM SERPL-SCNC: 4.4 MMOL/L (ref 3.5–5.2)
PROT SERPL-MCNC: 7.1 G/DL (ref 6–8.5)
RBC # BLD AUTO: 4.12 10*6/MM3 (ref 3.77–5.28)
SODIUM SERPL-SCNC: 137 MMOL/L (ref 136–145)
TRIGL SERPL-MCNC: 145 MG/DL (ref 0–150)
TSH SERPL DL<=0.05 MIU/L-ACNC: 0.92 UIU/ML (ref 0.27–4.2)
VLDLC SERPL-MCNC: 26 MG/DL (ref 5–40)
WBC # BLD AUTO: 6.35 10*3/MM3 (ref 3.4–10.8)

## 2021-10-11 PROCEDURE — 80061 LIPID PANEL: CPT | Performed by: INTERNAL MEDICINE

## 2021-10-11 PROCEDURE — 99214 OFFICE O/P EST MOD 30 MIN: CPT | Performed by: INTERNAL MEDICINE

## 2021-10-11 PROCEDURE — 83036 HEMOGLOBIN GLYCOSYLATED A1C: CPT | Performed by: INTERNAL MEDICINE

## 2021-10-11 PROCEDURE — 80053 COMPREHEN METABOLIC PANEL: CPT | Performed by: INTERNAL MEDICINE

## 2021-10-11 PROCEDURE — 84443 ASSAY THYROID STIM HORMONE: CPT | Performed by: INTERNAL MEDICINE

## 2021-10-11 PROCEDURE — 85025 COMPLETE CBC W/AUTO DIFF WBC: CPT | Performed by: INTERNAL MEDICINE

## 2021-10-11 RX ORDER — PREGABALIN 150 MG/1
150 CAPSULE ORAL 3 TIMES DAILY PRN
Qty: 90 CAPSULE | Refills: 0 | Status: SHIPPED | OUTPATIENT
Start: 2021-10-11 | End: 2021-11-02 | Stop reason: SDUPTHER

## 2021-10-12 ENCOUNTER — TELEPHONE (OUTPATIENT)
Dept: INTERNAL MEDICINE | Facility: CLINIC | Age: 71
End: 2021-10-12

## 2021-10-12 RX ORDER — SIMVASTATIN 20 MG
20 TABLET ORAL NIGHTLY
Qty: 90 TABLET | Refills: 3 | Status: SHIPPED | OUTPATIENT
Start: 2021-10-12 | End: 2021-10-26 | Stop reason: SDUPTHER

## 2021-10-26 DIAGNOSIS — M50.30 DDD (DEGENERATIVE DISC DISEASE), CERVICAL: ICD-10-CM

## 2021-10-26 RX ORDER — SIMVASTATIN 20 MG
20 TABLET ORAL NIGHTLY
Qty: 90 TABLET | Refills: 3 | Status: SHIPPED | OUTPATIENT
Start: 2021-10-26 | End: 2022-04-13 | Stop reason: SDUPTHER

## 2021-10-29 NOTE — TELEPHONE ENCOUNTER
ATTEMPTED TO CONTACT PT PER PROVIDER'S INSTRUCTIONS     NO ANSWER     LEFT VOICEMAIL WITH INSTRUCTIONS TO RETURN CALL TO OFFICE AT [phone]    OK FOR HUB TO ADVISE/READ   Rigoberto Gomes Jr., MD  to [facility] Clinical Pool    DA      4:18 PM  Please notify pt that Prescription have been sent

## 2021-11-02 RX ORDER — PREGABALIN 150 MG/1
150 CAPSULE ORAL 3 TIMES DAILY PRN
Qty: 90 CAPSULE | Refills: 0 | Status: SHIPPED | OUTPATIENT
Start: 2021-11-02 | End: 2021-12-10 | Stop reason: SDUPTHER

## 2021-12-10 DIAGNOSIS — M50.30 DDD (DEGENERATIVE DISC DISEASE), CERVICAL: ICD-10-CM

## 2021-12-10 NOTE — TELEPHONE ENCOUNTER
Caller: Susan Hernandez    Relationship: Self    Best call back number: [phone]     Requested Prescriptions:   Requested Prescriptions     Pending Prescriptions Disp Refills   • pregabalin (Lyrica) 150 MG capsule 90 capsule 0     Sig: Take 1 capsule by mouth 3 (Three) Times a Day As Needed (pain).        Pharmacy where request should be sent: [facility] [place] [facility] [place] - [address] [facility] [phone] [facility] [phone] [place]       Does the patient have less than a 3 day supply:  [x] Yes  [] No    Chandu Chambers Rep   12/10/21 09:02 EST

## 2021-12-10 NOTE — TELEPHONE ENCOUNTER
CONTROLLED MEDICATION REFILL REQUEST     URINE DRUG SCREEN: 03/05/2021    LAST OFFICE VISIT: Office Visit with Rigoberto Gomes Jr., MD (10/11/2021)    NARC CONSENT: 10/12/2020 [place]    NEXT OFFICE VISIT: Appointment with Rigoberto Gomes Jr., MD (04/11/2022)    MEDICATION: pregabalin (Lyrica) 150 MG capsule (11/02/2021)    OVER 6 MONTHS SINCE LAST UDS     OVER A YEAR SINCE LAST NARC CONSENT    PROVIDER PLEASE ADVISE

## 2021-12-13 ENCOUNTER — TELEPHONE (OUTPATIENT)
Dept: INTERNAL MEDICINE | Facility: CLINIC | Age: 71
End: 2021-12-13

## 2021-12-13 DIAGNOSIS — S09.93XA DENTAL INJURY, INITIAL ENCOUNTER: ICD-10-CM

## 2021-12-13 DIAGNOSIS — S60.419A ABRASION OF FINGER OF RIGHT HAND, INITIAL ENCOUNTER: ICD-10-CM
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
## 2021-12-13 DIAGNOSIS — S89.91XA INJURY OF RIGHT KNEE, INITIAL ENCOUNTER: ICD-10-CM

## 2021-12-13 RX ORDER — PREGABALIN 150 MG/1
150 CAPSULE ORAL 3 TIMES DAILY PRN
Qty: 90 CAPSULE | Refills: 0 | Status: SHIPPED | OUTPATIENT
Start: 2021-12-13 | End: 2022-01-10 | Stop reason: SDUPTHER

## 2021-12-13 NOTE — TELEPHONE ENCOUNTER
Was alerted by Guerda DE JESUS) that Miss Hernandez had had a fall.    I went to examine Miss Hernandez.  She reported that she had had a fall on the steps outside the building, and had hit one of her teeth in the fall (as well as her right knee).  On my exam, she was well appearing with no abrasions or other abnormalities on her face.  On exam of her oropharynx, I noted mild tenderness to percussion of one of the mandibular teeth (left incisor).  Physical exam also notable for a small abrasion on the right hand fourth finger.  Also an approximately 2 cm abrasion on her right knee.  On my exam of her right knee and noticed some tenderness to palpation of the patella.  On exam of the heart unnoted regular rate and rhythm no murmurs rubs or gallops.  Lungs were clear to auscultation bilaterally.    I did advise Ms. Hernandez to present to the ER immediately for evaluation.  She refused.  I also advised her to contact her dentist to have him evaluated and she also refused.  She stated that she will see him in in in 1 month's time.  I placed orders for x-rays of her right knee as well as her face, and encouraged her to obtain these images this evening before the imaging center closes.  She refused she said that she would go tomorrow to obtain the images.

## 2021-12-13 NOTE — TELEPHONE ENCOUNTER
Caller: Johnna Hernandez    Relationship: Self    Best call back number: [phone]     What is the best time to reach you: ANYTIME    Who are you requesting to speak with (clinical staff, provider,  specific staff member): CLINICAL    Do you know the name of the person who called: JOHNNA GARCIA     What was the call regarding: PATIENT CALLED TO CHECK ON THIS REQUEST. STATES SHE IS COMPLETELY OUT OF HER MEDICINE NOW. WOULD LIKE A CALL ONCE THE SCRIPT IS READY FOR .    Do you require a callback: YES

## 2021-12-13 NOTE — TELEPHONE ENCOUNTER
PT VERIFIED     CONTACTED PT PER PROVIDER'S INSTRUCTIONS  Rigoberto Gomes Jr., MD  [facility] 26 minutes ago (3:58 PM)     Prescription has been printed and place din signed folder. Please notify patient..

## 2021-12-14 ENCOUNTER — IMMUNIZATION (OUTPATIENT)
Dept: INTERNAL MEDICINE | Facility: CLINIC | Age: 71
End: 2021-12-14

## 2021-12-14 DIAGNOSIS — Z23 NEED FOR COVID-19 VACCINE: Primary | ICD-10-CM

## 2021-12-14 PROCEDURE — 0003A COVID-19 (PFIZER): CPT | Performed by: INTERNAL MEDICINE

## 2021-12-14 PROCEDURE — 91300 COVID-19 (PFIZER): CPT | Performed by: INTERNAL MEDICINE

## 2021-12-15 ENCOUNTER — OFFICE VISIT (OUTPATIENT)
Dept: INTERNAL MEDICINE | Facility: CLINIC | Age: 71
End: 2021-12-15

## 2021-12-15 VITALS
BODY MASS INDEX: 26.12 KG/M2 | DIASTOLIC BLOOD PRESSURE: 88 MMHG | WEIGHT: 153 LBS | SYSTOLIC BLOOD PRESSURE: 141 MMHG | HEIGHT: 64 IN | HEART RATE: 102 BPM | OXYGEN SATURATION: 94 % | TEMPERATURE: 98 F

## 2021-12-15 DIAGNOSIS — R29.6 FREQUENT FALLS: ICD-10-CM
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
## 2021-12-15 DIAGNOSIS — G44.40 MEDICATION OVERUSE HEADACHE: ICD-10-CM

## 2021-12-15 DIAGNOSIS — E11.649 TYPE 2 DIABETES MELLITUS WITH HYPOGLYCEMIA WITHOUT COMA, WITHOUT LONG-TERM CURRENT USE OF INSULIN (HCC): ICD-10-CM

## 2021-12-15 DIAGNOSIS — S89.91XD INJURY OF RIGHT KNEE, SUBSEQUENT ENCOUNTER: ICD-10-CM

## 2021-12-15 DIAGNOSIS — S60.419D ABRASION OF FINGER OF RIGHT HAND, SUBSEQUENT ENCOUNTER: ICD-10-CM

## 2021-12-15 PROCEDURE — 99215 OFFICE O/P EST HI 40 MIN: CPT | Performed by: STUDENT IN AN ORGANIZED HEALTH CARE EDUCATION/TRAINING PROGRAM

## 2021-12-15 RX ORDER — PRAVASTATIN SODIUM 10 MG
TABLET ORAL
COMMUNITY
Start: 2021-11-22 | End: 2022-04-11

## 2021-12-15 NOTE — PATIENT INSTRUCTIONS
Cooking With Less Salt  Cooking with less salt is one way to reduce the amount of sodium you get from food. Sodium is one of the elements that make up salt. It is found naturally in foods and is also added to certain foods. Depending on your condition and overall health, your health care provider or dietitian may recommend that you reduce your sodium intake. Most people should have less than 2,300 milligrams (mg) of sodium each day. If you have high blood pressure (hypertension), you may need to limit your sodium to 1,500 mg each day. Follow the tips below to help reduce your sodium intake.  What are tips for eating less sodium?  Reading food labels       · Check the food label before buying or using packaged ingredients. Always check the label for the serving size and sodium content.  · Look for products with no more than 140 mg of sodium in one serving.  · Check the % Daily Value column to see what percent of the daily recommended amount of sodium is provided in one serving of the product. Foods with 5% or less in this column are considered low in sodium. Foods with 20% or higher are considered high in sodium.  · Do not choose foods with salt as one of the first three ingredients on the ingredients list. If salt is one of the first three ingredients, it usually means the item is high in sodium.     Shopping  1. Buy sodium-free or low-sodium products. Look for the following words on food labels:  ? Low-sodium.  ? Sodium-free.  ? Reduced-sodium.  ? No salt added.  ? Unsalted.  2. Always check the sodium content even if foods are labeled as low-sodium or no salt added.  3. Buy fresh foods.  Cooking  · Use herbs, seasonings without salt, and spices as substitutes for salt.  · Use sodium-free baking soda when baking.  · Ranchettes, braise, or roast foods to add flavor with less salt.  · Avoid adding salt to pasta, rice, or hot cereals.  · Drain and rinse canned vegetables, beans, and meat before use.  · Avoid adding salt  "when cooking sweets and desserts.  · Cook with low-sodium ingredients.  What foods are high in sodium?  Vegetables  Regular canned vegetables (not low-sodium or reduced-sodium). Sauerkraut, pickled vegetables, and relishes. Olives. French fries. Onion rings. Regular canned tomato sauce and paste. Regular tomato and vegetable juice. Frozen vegetables in sauces.  Grains  Instant hot cereals. Bread stuffing, pancake, and biscuit mixes. Croutons. Seasoned rice or pasta mixes. Noodle soup cups. Boxed or frozen macaroni and cheese. Regular salted crackers. Self-rising flour. Rolls. Bagels. Flour tortillas and wraps.  Meats and other proteins  Meat or fish that is salted, canned, smoked, cured, spiced, or pickled. This includes flores, ham, sausages, hot dogs, corned beef, chipped beef, meat loaves, salt pork, jerky, pickled herring, anchovies, regular canned tuna, and sardines. Salted nuts.  Dairy  Processed cheese and cheese spreads. Cheese curds. Blue cheese. Feta cheese. String cheese. Regular cottage cheese. Buttermilk. Canned milk.  The items listed above may not be a complete list of foods high in sodium. Actual amounts of sodium may be different depending on processing. Contact a dietitian for more information.  What foods are low in sodium?  Fruits  Fresh, frozen, or canned fruit with no sauce added. Fruit juice.  Vegetables  Fresh or frozen vegetables with no sauce added. \"No salt added\" canned vegetables. \"No salt added\" tomato sauce and paste. Low-sodium or reduced-sodium tomato and vegetable juice.  Grains  Noodles, pasta, quinoa, rice. Shredded or puffed wheat or puffed rice. Regular or quick oats (not instant). Low-sodium crackers. Low-sodium bread. Whole-grain bread and whole-grain pasta. Unsalted popcorn.  Meats and other proteins  Fresh or frozen whole meats, poultry (not injected with sodium), and fish with no sauce added. Unsalted nuts. Dried peas, beans, and lentils without added salt. Unsalted canned " beans. Eggs. Unsalted nut butters. Low-sodium canned tuna or chicken.  Dairy  Milk. Soy milk. Yogurt. Low-sodium cheeses, such as Swiss, [place], mozzarella, and ricotta. Sherbet or ice cream (keep to ½ cup per serving). Cream cheese.  Fats and oils  Unsalted butter or margarine.  Other foods  Homemade pudding. Sodium-free baking soda and baking powder. Herbs and spices. Low-sodium seasoning mixes.  Beverages  Coffee and tea. Carbonated beverages.  The items listed above may not be a complete list of foods low in sodium. Actual amounts of sodium may be different depending on processing. Contact a dietitian for more information.  What are some salt alternatives when cooking?  The following are herbs, seasonings, and spices that can be used instead of salt to flavor your food. Herbs should be fresh or dried. Do not choose packaged mixes. Next to the name of the herb, spice, or seasoning are some examples of foods you can pair it with.  Herbs  · Bay leaves - Soups, meat and vegetable dishes, and spaghetti sauce.  · Basil - Italian dishes, soups, pasta, and fish dishes.  · Cilantro - Meat, poultry, and vegetable dishes.  · Chili powder - Marinades and Mexican dishes.  · Chives - Salad dressings and potato dishes.  · Cumin - Mexican dishes, couscous, and meat dishes.  · Dill - Fish dishes, sauces, and salads.  · Fennel - Meat and vegetable dishes, breads, and cookies.  · Garlic (do not use garlic salt) - Italian dishes, meat dishes, salad dressings, and sauces.  · Marjoram - Soups, potato dishes, and meat dishes.  · Oregano - Pizza and spaghetti sauce.  · Parsley - Salads, soups, pasta, and meat dishes.  · Anastasia - Italian dishes, salad dressings, soups, and red meats.  · Saffron - Fish dishes, pasta, and some poultry dishes.  · Anthony - Stuffings and sauces.  · Tarragon - Fish and poultry dishes.  · Thyme - Stuffing, meat, and fish dishes.  Seasonings  · Lemon juice - Fish dishes, poultry dishes, vegetables, and  "salads.  · Vinegar - Salad dressings, vegetables, and fish dishes.  Spices  · Cinnamon - Sweet dishes, such as cakes, cookies, and puddings.  · Cloves - Gingerbread, puddings, and marinades for meats.  · Manning - Vegetable dishes, fish and poultry dishes, and stir-antonio dishes.  · Kareen - Vegetable dishes, fish dishes, and stir-antonio dishes.  · Nutmeg - Pasta, vegetables, poultry, fish dishes, and custard.  Summary  · Cooking with less salt is one way to reduce the amount of sodium that you get from food.  · Buy sodium-free or low-sodium products.  · Check the food label before using or buying packaged ingredients.  · Use herbs, seasonings without salt, and spices as substitutes for salt in foods.  This information is not intended to replace advice given to you by your health care provider. Make sure you discuss any questions you have with your health care provider.  Document Revised: 12/09/2020 Document Reviewed: 12/09/2020  [employer] Patient Education © 2021 [employer]      https://www.nhlbi.nih.gov/files/docs/public/heart/dash_brief.pdf\">   DASH Eating Plan  DASH stands for Dietary Approaches to Stop Hypertension. The DASH eating plan is a healthy eating plan that has been shown to:  · Reduce high blood pressure (hypertension).  · Reduce your risk for type 2 diabetes, heart disease, and stroke.  · Help with weight loss.  What are tips for following this plan?  Reading food labels  · Check food labels for the amount of salt (sodium) per serving. Choose foods with less than 5 percent of the Daily Value of sodium. Generally, foods with less than 300 milligrams (mg) of sodium per serving fit into this eating plan.  · To find whole grains, look for the word \"whole\" as the first word in the ingredient list.  Shopping  · Buy products labeled as \"low-sodium\" or \"no salt added.\"  · Buy fresh foods. Avoid canned foods and pre-made or frozen meals.  Cooking  · Avoid adding salt when cooking. Use salt-free seasonings or herbs " instead of table salt or sea salt. Check with your health care provider or pharmacist before using salt substitutes.  · Do not antonio foods. Cook foods using healthy methods such as baking, boiling, grilling, roasting, and broiling instead.  · Cook with heart-healthy oils, such as olive, canola, avocado, soybean, or sunflower oil.  Meal planning       1. Eat a balanced diet that includes:  ? 4 or more servings of fruits and 4 or more servings of vegetables each day. Try to fill one-half of your plate with fruits and vegetables.  ? 6-8 servings of whole grains each day.  ? Less than 6 oz (170 g) of lean meat, poultry, or fish each day. A 3-oz (85-g) serving of meat is about the same size as a deck of cards. One egg equals 1 oz (28 g).  ? 2-3 servings of low-fat dairy each day. One serving is 1 cup (237 mL).  ? 1 serving of nuts, seeds, or beans 5 times each week.  ? 2-3 servings of heart-healthy fats. Healthy fats called omega-3 fatty acids are found in foods such as walnuts, flaxseeds, fortified milks, and eggs. These fats are also found in cold-water fish, such as sardines, salmon, and mackerel.  2. Limit how much you eat of:  ? Canned or prepackaged foods.  ? Food that is high in trans fat, such as some fried foods.  ? Food that is high in saturated fat, such as fatty meat.  ? Desserts and other sweets, sugary drinks, and other foods with added sugar.  ? Full-fat dairy products.  3. Do not salt foods before eating.  4. Do not eat more than 4 egg yolks a week.  5. Try to eat at least 2 vegetarian meals a week.  6. Eat more home-cooked food and less restaurant, buffet, and fast food.     Lifestyle  1. When eating at a restaurant, ask that your food be prepared with less salt or no salt, if possible.  2. If you drink alcohol:  1. Limit how much you use to:  § 0-1 drink a day for women who are not pregnant.  § 0-2 drinks a day for men.  2. Be aware of how much alcohol is in your drink. In the [place] one drink equals one  12 oz bottle of beer (355 mL), one 5 oz glass of wine (148 mL), or one 1½ oz glass of hard liquor (44 mL).  General information  · Avoid eating more than 2,300 mg of salt a day. If you have hypertension, you may need to reduce your sodium intake to 1,500 mg a day.  · Work with your health care provider to maintain a healthy body weight or to lose weight. Ask what an ideal weight is for you.  · Get at least 30 minutes of exercise that causes your heart to beat faster (aerobic exercise) most days of the week. Activities may include walking, swimming, or biking.  · Work with your health care provider or dietitian to adjust your eating plan to your individual calorie needs.  What foods should I eat?  Fruits  All fresh, dried, or frozen fruit. Canned fruit in natural juice (without added sugar).  Vegetables  Fresh or frozen vegetables (raw, steamed, roasted, or grilled). Low-sodium or reduced-sodium tomato and vegetable juice. Low-sodium or reduced-sodium tomato sauce and tomato paste. Low-sodium or reduced-sodium canned vegetables.  Grains  Whole-grain or whole-wheat bread. Whole-grain or whole-wheat pasta. Brown rice. Oatmeal. Quinoa. Bulgur. Whole-grain and low-sodium cereals. Lidia bread. Low-fat, low-sodium crackers. Whole-wheat flour tortillas.  Meats and other proteins  Skinless chicken or turkey. Ground chicken or turkey. Pork with fat trimmed off. Fish and seafood. Egg whites. Dried beans, peas, or lentils. Unsalted nuts, nut butters, and seeds. Unsalted canned beans. Lean cuts of beef with fat trimmed off. Low-sodium, lean precooked or cured meat, such as sausages or meat loaves.  Dairy  Low-fat (1%) or fat-free (skim) milk. Reduced-fat, low-fat, or fat-free cheeses. Nonfat, low-sodium ricotta or cottage cheese. Low-fat or nonfat yogurt. Low-fat, low-sodium cheese.  Fats and oils  Soft margarine without trans fats. Vegetable oil. Reduced-fat, low-fat, or light mayonnaise and salad dressings (reduced-sodium).  Canola, safflower, olive, avocado, soybean, and sunflower oils. Avocado.  Seasonings and condiments  Herbs. Spices. Seasoning mixes without salt.  Other foods  Unsalted popcorn and pretzels. Fat-free sweets.  The items listed above may not be a complete list of foods and beverages you can eat. Contact a dietitian for more information.  What foods should I avoid?  Fruits  Canned fruit in a light or heavy syrup. Fried fruit. Fruit in cream or butter sauce.  Vegetables  Creamed or fried vegetables. Vegetables in a cheese sauce. Regular canned vegetables (not low-sodium or reduced-sodium). Regular canned tomato sauce and paste (not low-sodium or reduced-sodium). Regular tomato and vegetable juice (not low-sodium or reduced-sodium). Pickles. Olives.  Grains  Baked goods made with fat, such as croissants, muffins, or some breads. Dry pasta or rice meal packs.  Meats and other proteins  Fatty cuts of meat. Ribs. Fried meat. [place]. Bologna, salami, and other precooked or cured meats, such as sausages or meat loaves. Fat from the back of a pig (fatback). Bratwurst. Salted nuts and seeds. Canned beans with added salt. Canned or smoked fish. Whole eggs or egg yolks. Chicken or turkey with skin.  Dairy  Whole or 2% milk, cream, and half-and-half. Whole or full-fat cream cheese. Whole-fat or sweetened yogurt. Full-fat cheese. Nondairy creamers. Whipped toppings. Processed cheese and cheese spreads.  Fats and oils  Butter. Stick margarine. Lard. Shortening. Ghee. Benson fat. Tropical oils, such as coconut, palm kernel, or palm oil.  Seasonings and condiments  Onion salt, garlic salt, seasoned salt, table salt, and sea salt. Worcestershire sauce. Tartar sauce. Barbecue sauce. Teriyaki sauce. Soy sauce, including reduced-sodium. Steak sauce. Canned and packaged gravies. Fish sauce. Oyster sauce. Cocktail sauce. Store-bought horseradish. Ketchup. Mustard. Meat flavorings and tenderizers. Bouillon cubes. Hot sauces. Pre-made or  packaged marinades. Pre-made or packaged taco seasonings. Relishes. Regular salad dressings.  Other foods  Salted popcorn and pretzels.  The items listed above may not be a complete list of foods and beverages you should avoid. Contact a dietitian for more information.  Where to find more information  · National Heart, Lung, and Blood [place]: www.nhlbi.nih.gov  · American Heart Association: www.heart.org  · Academy of Nutrition and Dietetics: www.eatright.org  · National Kidney Foundation: www.kidney.org  Summary  · The DASH eating plan is a healthy eating plan that has been shown to reduce high blood pressure (hypertension). It may also reduce your risk for type 2 diabetes, heart disease, and stroke.  · When on the DASH eating plan, aim to eat more fresh fruits and vegetables, whole grains, lean proteins, low-fat dairy, and heart-healthy fats.  · With the DASH eating plan, you should limit salt (sodium) intake to 2,300 mg a day. If you have hypertension, you may need to reduce your sodium intake to 1,500 mg a day.  · Work with your health care provider or dietitian to adjust your eating plan to your individual calorie needs.  This information is not intended to replace advice given to you by your health care provider. Make sure you discuss any questions you have with your health care provider.  Document Revised: 11/20/2020 Document Reviewed: 11/20/2020  [employer] Patient Education © 2021 [employer]       Heart-Healthy Eating Plan  Heart-healthy meal planning includes:  · Eating less unhealthy fats.  · Eating more healthy fats.  · Making other changes in your diet.  Talk with your doctor or a diet specialist (dietitian) to create an eating plan that is right for you.  What is my plan?  Your doctor may recommend an eating plan that includes:  · Total fat: ______% or less of total calories a day.  · Saturated fat: ______% or less of total calories a day.  · Cholesterol: less than _________mg a day.  What are tips  for following this plan?  Cooking  Avoid frying your food. Try to bake, boil, grill, or broil it instead. You can also reduce fat by:  · Removing the skin from poultry.  · Removing all visible fats from meats.  · Steaming vegetables in water or broth.  Meal planning       1. At meals, divide your plate into four equal parts:  ? Fill one-half of your plate with vegetables and green salads.  ? Fill one-fourth of your plate with whole grains.  ? Fill one-fourth of your plate with lean protein foods.  2. Eat 4-5 servings of vegetables per day. A serving of vegetables is:  ? 1 cup of raw or cooked vegetables.  ? 2 cups of raw leafy greens.  3. Eat 4-5 servings of fruit per day. A serving of fruit is:  ? 1 medium whole fruit.  ? ¼ cup of dried fruit.  ? ½ cup of fresh, frozen, or canned fruit.  ? ½ cup of 100% fruit juice.  4. Eat more foods that have soluble fiber. These are apples, broccoli, carrots, beans, peas, and barley. Try to get 20-30 g of fiber per day.  5. Eat 4-5 servings of nuts, legumes, and seeds per week:  ? 1 serving of dried beans or legumes equals ½ cup after being cooked.  ? 1 serving of nuts is ¼ cup.  ? 1 serving of seeds equals 1 tablespoon.     General information  · Eat more home-cooked food. Eat less restaurant, buffet, and fast food.  · Limit or avoid alcohol.  · Limit foods that are high in starch and sugar.  · Avoid fried foods.  · Lose weight if you are overweight.  · Keep track of how much salt (sodium) you eat. This is important if you have high blood pressure. Ask your doctor to tell you more about this.  · Try to add vegetarian meals each week.  Fats  1. Choose healthy fats. These include olive oil and canola oil, flaxseeds, walnuts, almonds, and seeds.  2. Eat more omega-3 fats. These include salmon, mackerel, sardines, tuna, flaxseed oil, and ground flaxseeds. Try to eat fish at least 2 times each week.  3. Check food labels. Avoid foods with trans fats or high amounts of saturated  fat.  4. Limit saturated fats.  ? These are often found in animal products, such as meats, butter, and cream.  ? These are also found in plant foods, such as palm oil, palm kernel oil, and coconut oil.  5. Avoid foods with partially hydrogenated oils in them. These have trans fats. Examples are stick margarine, some tub margarines, cookies, crackers, and other baked goods.  What foods can I eat?  Fruits  All fresh, canned (in natural juice), or frozen fruits.  Vegetables  Fresh or frozen vegetables (raw, steamed, roasted, or grilled). Green salads.  Grains  Most grains. Choose whole wheat and whole grains most of the time. Rice and pasta, including brown rice and pastas made with whole wheat.  Meats and other proteins  Lean, well-trimmed beef, veal, pork, and lamb. Chicken and turkey without skin. All fish and shellfish. Wild duck, rabbit, pheasant, and venison. Egg whites or low-cholesterol egg substitutes. Dried beans, peas, lentils, and tofu. Seeds and most nuts.  Dairy  Low-fat or nonfat cheeses, including ricotta and mozzarella. Skim or 1% milk that is liquid, powdered, or evaporated. Buttermilk that is made with low-fat milk. Nonfat or low-fat yogurt.  Fats and oils  Non-hydrogenated (trans-free) margarines. Vegetable oils, including soybean, sesame, sunflower, olive, peanut, safflower, corn, canola, and cottonseed. Salad dressings or mayonnaise made with a vegetable oil.  Beverages  Mineral water. Coffee and tea. Diet carbonated beverages.  Sweets and desserts  Sherbet, gelatin, and fruit ice. Small amounts of dark chocolate.  Limit all sweets and desserts.  Seasonings and condiments  All seasonings and condiments.  The items listed above may not be a complete list of foods and drinks you can eat. Contact a dietitian for more options.  What foods should I avoid?  Fruits  Canned fruit in heavy syrup. Fruit in cream or butter sauce. Fried fruit. Limit coconut.  Vegetables  Vegetables cooked in cheese, cream, or  butter sauce. Fried vegetables.  Grains  Breads that are made with saturated or trans fats, oils, or whole milk. Croissants. Sweet rolls. Donuts. High-fat crackers, such as cheese crackers.  Meats and other proteins  Fatty meats, such as hot dogs, ribs, sausage, flores, rib-eye roast or steak. High-fat deli meats, such as salami and bologna. Caviar. Domestic duck and goose. Organ meats, such as liver.  Dairy  Cream, sour cream, cream cheese, and creamed cottage cheese. Whole-milk cheeses. Whole or 2% milk that is liquid, evaporated, or condensed. Whole buttermilk. Cream sauce or high-fat cheese sauce. Yogurt that is made from whole milk.  Fats and oils  Meat fat, or shortening. Cocoa butter, hydrogenated oils, palm oil, coconut oil, palm kernel oil. Solid fats and shortenings, including flores fat, salt pork, lard, and butter. Nondairy cream substitutes. Salad dressings with cheese or sour cream.  Beverages  Regular sodas and juice drinks with added sugar.  Sweets and desserts  Frosting. Pudding. Cookies. Cakes. Pies. Milk chocolate or white chocolate. Buttered syrups. Full-fat ice cream or ice cream drinks.  The items listed above may not be a complete list of foods and drinks to avoid. Contact a dietitian for more information.  Summary  · Heart-healthy meal planning includes eating less unhealthy fats, eating more healthy fats, and making other changes in your diet.  · Eat a balanced diet. This includes fruits and vegetables, low-fat or nonfat dairy, lean protein, nuts and legumes, whole grains, and heart-healthy oils and fats.  This information is not intended to replace advice given to you by your health care provider. Make sure you discuss any questions you have with your health care provider.  Document Revised: 02/21/2019 Document Reviewed: 01/25/2019  [employer] Patient Education © 2021 [employer]       Mediterranean Diet  A Mediterranean diet refers to food and lifestyle choices that are based on the  traditions of countries located on the [place]. This way of eating has been shown to help prevent certain conditions and improve outcomes for people who have chronic diseases, like kidney disease and heart disease.  What are tips for following this plan?  Lifestyle  1. Cook and eat meals together with your family, when possible.  2. Drink enough fluid to keep your urine clear or pale yellow.  3. Be physically active every day. This includes:  ? Aerobic exercise like running or swimming.  ? Leisure activities like gardening, walking, or housework.  4. Get 7-8 hours of sleep each night.  5. If recommended by your health care provider, drink red wine in moderation. This means 1 glass a day for nonpregnant women and 2 glasses a day for men. A glass of wine equals 5 oz (150 mL).  Reading food labels       · Check the serving size of packaged foods. For foods such as rice and pasta, the serving size refers to the amount of cooked product, not dry.  · Check the total fat in packaged foods. Avoid foods that have saturated fat or trans fats.  · Check the ingredients list for added sugars, such as corn syrup.     Shopping  1. At the grocery store, buy most of your food from the areas near the walls of the store. This includes:  ? Fresh fruits and vegetables (produce).  ? Grains, beans, nuts, and seeds. Some of these may be available in unpackaged forms or large amounts (in bulk).  ? Fresh seafood.  ? Poultry and eggs.  ? Low-fat dairy products.  2. Buy whole ingredients instead of prepackaged foods.  3. Buy fresh fruits and vegetables in-season from local farmers markets.  4. Buy frozen fruits and vegetables in resealable bags.  5. If you do not have access to quality fresh seafood, buy precooked frozen shrimp or canned fish, such as tuna, salmon, or sardines.  6. Buy small amounts of raw or cooked vegetables, salads, or olives from the deli or salad bar at your store.  7. Stock your pantry so you always have  certain foods on hand, such as olive oil, canned tuna, canned tomatoes, rice, pasta, and beans.  Cooking  · Cook foods with extra-virgin olive oil instead of using butter or other vegetable oils.  · Have meat as a side dish, and have vegetables or grains as your main dish. This means having meat in small portions or adding small amounts of meat to foods like pasta or stew.  · Use beans or vegetables instead of meat in common dishes like chili or lasagna.  · [place] with different cooking methods. Try roasting or broiling vegetables instead of steaming or sautéeing them.  · Add frozen vegetables to soups, stews, pasta, or rice.  · Add nuts or seeds for added healthy fat at each meal. You can add these to yogurt, salads, or vegetable dishes.  · Marinate fish or vegetables using olive oil, lemon juice, garlic, and fresh herbs.  Meal planning       1. Plan to eat 1 vegetarian meal one day each week. Try to work up to 2 vegetarian meals, if possible.  2. Eat seafood 2 or more times a week.  3. Have healthy snacks readily available, such as:  ? Vegetable sticks with hummus.  ? Greek yogurt.  ? Fruit and nut trail mix.  4. Eat balanced meals throughout the week. This includes:  ? Fruit: 2-3 servings a day  ? Vegetables: 4-5 servings a day  ? Low-fat dairy: 2 servings a day  ? Fish, poultry, or lean meat: 1 serving a day  ? Beans and legumes: 2 or more servings a week  ? Nuts and seeds: 1-2 servings a day  ? Whole grains: 6-8 servings a day  ? Extra-virgin olive oil: 3-4 servings a day  5. Limit red meat and sweets to only a few servings a month     What are my food choices?  1. Mediterranean diet  1. Recommended  § Grains: Whole-grain pasta. Brown rice. Bulgar wheat. Polenta. Couscous. Whole-wheat bread. Oatmeal. Quinoa.  § Vegetables: Artichokes. Beets. Broccoli. Cabbage. Carrots. Eggplant. Green beans. Chard. Kale. Spinach. Onions. Leeks. Peas. Squash. Tomatoes. Peppers. Radishes.  § Fruits: Apples. Apricots. Avocado.  Berries. Bananas. Cherries. Dates. Figs. Grapes. Gracie. Melon. Oranges. Peaches. Plums. Pomegranate.  § Meats and other protein foods: Beans. Almonds. Sunflower seeds. Pine nuts. Peanuts. Cod. [place]. Scallops. Shrimp. Tuna. Tilapia. Clams. Oysters. Eggs.  § Dairy: Low-fat milk. Cheese. Greek yogurt.  § Beverages: Water. Red wine. Herbal tea.  § Fats and oils: Extra virgin olive oil. Avocado oil. Grape seed oil.  § Sweets and desserts: Greek yogurt with honey. Baked apples. Poached pears. Trail mix.  § Seasoning and other foods: Basil. Cilantro. Coriander. Cumin. Mint. Parsley. [place]. Rosemary. Tarragon. Garlic. Oregano. Thyme. Pepper. Balsalmic vinegar. Tahini. Hummus. Tomato sauce. Olives. Mushrooms.  2. Limit these  § Grains: Prepackaged pasta or rice dishes. Prepackaged cereal with added sugar.  § Vegetables: Deep fried potatoes (french fries).  § Fruits: Fruit canned in syrup.  § Meats and other protein foods: Beef. Pork. Lamb. Poultry with skin. Hot dogs. [place].  § Dairy: Ice cream. Sour cream. Whole milk.  § Beverages: Juice. Sugar-sweetened soft drinks. Beer. Liquor and spirits.  § Fats and oils: Butter. Canola oil. Vegetable oil. Beef fat (tallow). Lard.  § Sweets and desserts: Cookies. Cakes. Pies. Candy.  § Seasoning and other foods: Mayonnaise. Premade sauces and marinades.  The items listed may not be a complete list. Talk with your dietitian about what dietary choices are right for you.  Summary  · The Mediterranean diet includes both food and lifestyle choices.  · Eat a variety of fresh fruits and vegetables, beans, nuts, seeds, and whole grains.  · Limit the amount of red meat and sweets that you eat.  · Talk with your health care provider about whether it is safe for you to drink red wine in moderation. This means 1 glass a day for nonpregnant women and 2 glasses a day for men. A glass of wine equals 5 oz (150 mL).  This information is not intended to replace advice given to you by your health care  provider. Make sure you discuss any questions you have with your health care provider.  Document Revised: 08/17/2017 Document Reviewed: 08/10/2017  [employer] Patient Education © 2020 [employer]         Exercising to Stay Healthy  To become healthy and stay healthy, it is recommended that you do moderate-intensity and vigorous-intensity exercise. You can tell that you are exercising at a moderate intensity if your heart starts beating faster and you start breathing faster but can still hold a conversation. You can tell that you are exercising at a vigorous intensity if you are breathing much harder and faster and cannot hold a conversation while exercising.  Exercising regularly is important. It has many health benefits, such as:  · Improving overall fitness, flexibility, and endurance.  · Increasing bone density.  · Helping with weight control.  · Decreasing body fat.  · Increasing muscle strength.  · Reducing stress and tension.  · Improving overall health.  How often should I exercise?  Choose an activity that you enjoy, and set realistic goals. Your health care provider can help you make an activity plan that works for you.  Exercise regularly as told by your health care provider. This may include:  1. Doing strength training two times a week, such as:  ? Lifting weights.  ? Using resistance bands.  ? Push-ups.  ? Sit-ups.  ? Yoga.  2. Doing a certain intensity of exercise for a given amount of time. Choose from these options:  ? A total of 150 minutes of moderate-intensity exercise every week.  ? A total of 75 minutes of vigorous-intensity exercise every week.  ? A mix of moderate-intensity and vigorous-intensity exercise every week.  Children, pregnant women, people who have not exercised regularly, people who are overweight, and older adults may need to talk with a health care provider about what activities are safe to do. If you have a medical condition, be sure to talk with your health care provider before  you start a new exercise program.  What are some exercise ideas?    Moderate-intensity exercise ideas include:  · Walking 1 mile (1.6 km) in about 15 minutes.  · Biking.  · Hiking.  · Golfing.  · Dancing.  · Water aerobics.  Vigorous-intensity exercise ideas include:  · Walking 4.5 miles (7.2 km) or more in about 1 hour.  · Jogging or running 5 miles (8 km) in about 1 hour.  · Biking 10 miles (16.1 km) or more in about 1 hour.  · Lap swimming.  · Roller-skating or in-line skating.  · Cross-country skiing.  · Vigorous competitive sports, such as football, basketball, and soccer.  · Jumping rope.  · Aerobic dancing.  What are some everyday activities that can help me to get exercise?  1. Yard work, such as:  ? Pushing a .  ? Raking and bagging leaves.  2. Washing your car.  3. Pushing a stroller.  4. Shoveling snow.  5. Gardening.  6. Washing windows or floors.  How can I be more active in my day-to-day activities?  · Use stairs instead of an elevator.  · Take a walk during your lunch break.  · If you drive, park your car farther away from your work or school.  · If you take public transportation, get off one stop early and walk the rest of the way.  · Stand up or walk around during all of your indoor phone calls.  · Get up, stretch, and walk around every 30 minutes throughout the day.  · Enjoy exercise with a friend. Support to continue exercising will help you keep a regular routine of activity.  What guidelines can I follow while exercising?  · Before you start a new exercise program, talk with your health care provider.  · Do not exercise so much that you hurt yourself, feel dizzy, or get very short of breath.  · Wear comfortable clothes and wear shoes with good support.  · Drink plenty of water while you exercise to prevent dehydration or heat stroke.  · Work out until your breathing and your heartbeat get faster.  Where to find more information  · [place] Department of Health and Human Services:  www.hhs.gov  · Centers for Disease Control and Prevention (CDC): www.cdc.gov  Summary  · Exercising regularly is important. It will improve your overall fitness, flexibility, and endurance.  · Regular exercise also will improve your overall health. It can help you control your weight, reduce stress, and improve your bone density.  · Do not exercise so much that you hurt yourself, feel dizzy, or get very short of breath.  · Before you start a new exercise program, talk with your health care provider.  This information is not intended to replace advice given to you by your health care provider. Make sure you discuss any questions you have with your health care provider.  Document Revised: 11/30/2018 Document Reviewed: 11/08/2018  [employer] Patient Education © 2021 [employer]           Mindfulness-Based Stress Reduction  Mindfulness-based stress reduction (MBSR) is a program that helps people learn to practice mindfulness. Mindfulness is the practice of intentionally paying attention to the present moment. It can be learned and practiced through techniques such as education, breathing exercises, meditation, and yoga. MBSR includes several mindfulness techniques in one program.  MBSR works best when you understand the treatment, are willing to try new things, and can commit to spending time practicing what you learn. MBSR training may include learning about:  · How your emotions, thoughts, and reactions affect your body.  · New ways to respond to things that cause negative thoughts to start (triggers).  · How to notice your thoughts and let go of them.  · Practicing awareness of everyday things that you normally do without thinking.  · The techniques and goals of different types of meditation.  What are the benefits of MBSR?  MBSR can have many benefits, which include helping you to:  · Develop self-awareness. This refers to knowing and understanding yourself.  · Learn skills and attitudes that help you to participate  in your own health care.  · Learn new ways to care for yourself.  · Be more accepting about how things are, and let things go.  · Be less judgmental and approach things with an open mind.  · Be patient with yourself and trust yourself more.  MBSR has also been shown to:  · Reduce negative emotions, such as depression and anxiety.  · Improve memory and focus.  · Change how you sense and approach pain.  · Boost your body's ability to fight infections.  · Help you connect better with other people.  · Improve your sense of well-being.  Follow these instructions at home:       1. Find a local in-person or online MBSR program.  2. Set aside some time regularly for mindfulness practice.  3. Find a mindfulness practice that works best for you. This may include one or more of the following:  ? Meditation. Meditation involves focusing your mind on a certain thought or activity.  ? Breathing awareness exercises. These help you to stay present by focusing on your breath.  ? Body scan. For this practice, you lie down and pay attention to each part of your body from head to toe. You can identify tension and soreness and intentionally relax parts of your body.  ? Yoga. Yoga involves stretching and breathing, and it can improve your ability to move and be flexible. It can also provide an experience of testing your body's limits, which can help you release stress.  ? Mindful eating. This way of eating involves focusing on the taste, texture, color, and smell of each bite of food. Because this slows down eating and helps you feel full sooner, it can be an important part of a weight-loss plan.  4. Find a podcast or recording that provides guidance for breathing awareness, body scan, or meditation exercises. You can listen to these any time when you have a free moment to rest without distractions.  5. Follow your treatment plan as told by your health care provider. This may include taking regular medicines and making changes to your  diet or lifestyle as recommended.  How to practice mindfulness  To do a basic awareness exercise:  · Find a comfortable place to sit.  · Pay attention to the present moment. Observe your thoughts, feelings, and surroundings just as they are.  · Avoid placing judgment on yourself, your feelings, or your surroundings. Make note of any judgment that comes up, and let it go.  · Your mind may wander, and that is okay. Make note of when your thoughts drift, and return your attention to the present moment.  To do basic mindfulness meditation:  1. Find a comfortable place to sit. This may include a stable chair or a firm floor cushion.  ? Sit upright with your back straight. Let your arms fall next to your side with your hands resting on your legs.  ? If sitting in a chair, rest your feet flat on the floor.  ? If sitting on a cushion, cross your legs in front of you.  2. Keep your head in a neutral position with your chin dropped slightly. Relax your jaw and rest the tip of your tongue on the roof of your mouth. Drop your gaze to the floor. You can close your eyes if you like.  3. Breathe normally and pay attention to your breath. Feel the air moving in and out of your nose. Feel your belly expanding and relaxing with each breath.  4. Your mind may wander, and that is okay. Make note of when your thoughts drift, and return your attention to your breath.  5. Avoid placing judgment on yourself, your feelings, or your surroundings. Make note of any judgment or feelings that come up, let them go, and bring your attention back to your breath.  6. When you are ready, lift your gaze or open your eyes. Pay attention to how your body feels after the meditation.  Where to find more information  You can find more information about MBSR from:  · Your health care provider.  · Community-based meditation centers or programs.  · Programs offered near you.  Summary  · Mindfulness-based stress reduction (MBSR) is a program that teaches you  how to intentionally pay attention to the present moment. It is used with other treatments to help you cope better with daily stress, emotions, and pain.  · MBSR focuses on developing self-awareness, which allows you to respond to life stress without judgment or negative emotions.  · MBSR programs may involve learning different mindfulness practices, such as breathing exercises, meditation, yoga, body scan, or mindful eating. Find a mindfulness practice that works best for you, and set aside time for it on a regular basis.  This information is not intended to replace advice given to you by your health care provider. Make sure you discuss any questions you have with your health care provider.  Document Revised: 02/22/2021 Document Reviewed: 04/26/2018  [employer] Patient Education © 2021 [employer]     Analgesic Rebound Headache  An analgesic rebound headache, sometimes called a medication overuse headache or a drug-induced headache, is a secondary disorder that is caused by the overuse of pain medicine (analgesic) to treat the original (primary) headache. Any type of primary headache can return as a rebound headache if a person regularly takes analgesics.  The types of primary headaches that are commonly associated with rebound headaches include:  · Migraines.  · Headaches that are caused by tense muscles in the head and neck area (tension headaches).  · Headaches that develop and happen again on one side of the head and around the eye (cluster headaches).  If rebound headaches continue, they can become long-term, daily headaches.  What are the causes?  This condition may be caused by frequent use of:  · Over-the-counter medicines such as aspirin, ibuprofen, and acetaminophen.  · Sinus-relief medicines and medicines that contain caffeine.  · Narcotic pain medicines such as codeine and oxycodone.  · Some prescription migraine medicines.  What are the signs or symptoms?  The symptoms of a rebound headache are the  same as the symptoms of the original headache. Some of the symptoms of specific types of headaches include:  Migraine headache  · Pulsing or throbbing pain on one or both sides of the head.  · Severe pain that interferes with daily activities.  · Pain that gets worse with physical activity.  · Nausea, vomiting, or both.  · Pain and sensitivity with exposure to bright light, loud noises, or strong smells.  · Visual changes.  · Numbness of one or both arms.  Tension headache  · Pressure around the head.  · Dull, aching head pain.  · Pain felt over the front and sides of the head.  · Tenderness in the muscles of the head, neck, and shoulders.  Cluster headache  · Severe pain that begins in or around one eye or temple.  · Droopy or swollen eyelid, or redness and tearing in the eye on the same side as the pain.  · One-sided head pain.  · Nausea.  · Runny nose.  · Sweaty, pale facial skin.  · Restlessness.  How is this diagnosed?  This condition is diagnosed by:  · Reviewing your medical history. This includes the nature of your primary headaches.  · Reviewing the types of pain medicines that you have been using to treat your primary headaches and how often you take them.  How is this treated?  This condition may be treated or managed by:  · Discontinuing frequent use of the analgesic medicine. Doing this may worsen your headaches at first, but the pain should eventually become more manageable, less frequent, and less severe.  · Seeing a headache specialist. He or she may be able to help you manage your headaches and help make sure there is not another cause of the headaches.  · Using methods of stress relief, such as acupuncture, counseling, biofeedback, and massage. Talk with your health care provider about which methods might be good for you.  Follow these instructions at home:  Medicines    · Take over-the-counter and prescription medicines only as told by your health care provider.  · Stop the repeated use of pain  medicine as told by your health care provider. Stopping can be difficult. Carefully follow instructions from your health care provider.    Lifestyle    · Follow a regular sleep schedule. Do not vary the time that you go to bed or the amount that you sleep from day to day. It is important to stay on the same schedule to help prevent headaches. Get 7-9 hours of sleep each night, or the amount recommended by your health care provider.  · Exercise regularly. Exercise for at least 30 minutes, 5 times each week.  · Limit or manage stress. Consider stress-relief options such as acupuncture, counseling, biofeedback, and massage. Talk with your health care provider about which methods might be good for you.  · Do not drink alcohol.  · Do not use any products that contain nicotine or tobacco, such as cigarettes, e-cigarettes, and chewing tobacco. If you need help quitting, ask your health care provider.    General instructions  · Avoid triggers that are known to cause your primary headaches.  · Keep all follow-up visits as told by your health care provider. This is important.  Contact a health care provider if:  · You continue to experience headaches after following treatments that your health care provider recommended.  Get help right away if you have:  · New headache pain.  · Headache pain that is different than what you have experienced in the past.  · Numbness or tingling in your arms or legs.  · Changes in your speech or vision.  Summary  · An analgesic rebound headache, sometimes called a medication overuse headache or a drug-induced headache, is caused by the overuse of pain medicine (analgesic) to treat the original (primary) headache.  · Any type of primary headache can return as a rebound headache if a person regularly takes analgesics. The types of primary headaches that are commonly associated with rebound headaches include migraines, tension headaches, and cluster headaches.  · Analgesic rebound headaches can  occur with frequent use of over-the-counter medicines and prescription medicines.  · Treatment involves stopping the medicine that is being overused. This will improve headache frequency and severity.  This information is not intended to replace advice given to you by your health care provider. Make sure you discuss any questions you have with your health care provider.  Document Revised: 01/14/2021 Document Reviewed: 01/14/2021  [employer] Patient Education © 2021 [employer]

## 2021-12-15 NOTE — PROGRESS NOTES
Chief Complaint  Follow-up (from fall in parking lot 12/13) and Dizziness (pt states first time she almost blacked out. states that if she moves it get worse)    Subjective          Sun Art Hernandez presents to [facility]  History of Present Illness    Ms. Hernandez had a mechanical fall while entering our clinic Bldg. 2 days ago.  At that time, she injured her right knee, her right fourth finger, and complained of dental pain.  I advised her at that time after examining her that she should go to the ER for further evaluation.  She refused.  I ordered imaging for her to obtain which she said that she would get in the morning.  I did at that time advised her to get imaging immediately, and she refused.    Today she reports that she has not gotten imaging and will not get imaging because she feels fine.  She reports the dental pain has since gone away.  She reports that her knee and finger feeling much better.  She does have chronic bilateral upper extremity pain that is unrelated to this episode.  She has seen neurosurgery for this in the past and has declined surgery.    Of note, in the last year she has fallen 4 times.  She is actually just finished 6 months of PT today.    Today she is also here to discuss dizziness.  By dizziness she denies vertigo as such, but reports that she feels lightheaded at times.  Denies syncope.  These episodes typically occur between 5 and 6:00 PM.  She reports that she eats sweet things in order to keep her sugar up, and at this improve symptoms.  She has not checked her blood sugar in a month, but she reports that when she previously did at around this time [facility] blood sugar would be around 70.      Also with complaints of daily headaches now.  These are bilateral frontal headaches, and she reports using Tylenol every day to treat them.  They do not seem to be associated with the hypoglycemic episodes between 5 and 6 PM alluded to  "above.      Objective   Vital Signs:   /88 (BP Location: Right arm, Patient Position: Sitting, Cuff Size: Adult)   Pulse 102   Temp 98 °F (36.7 °C) (Temporal)   Ht 162.6 cm (64\")   Wt 69.4 kg (153 lb)   SpO2 94%   BMI 26.26 kg/m²     Physical Exam  Constitutional:       General: She is not in acute distress.     Appearance: Normal appearance. She is normal weight. She is not ill-appearing or toxic-appearing.   HENT:      Head: Normocephalic and atraumatic.      Right Ear: Tympanic membrane, ear canal and external ear normal.      Left Ear: Tympanic membrane, ear canal and external ear normal.      Mouth/Throat:      Mouth: Mucous membranes are moist.      Pharynx: Oropharynx is clear. No oropharyngeal exudate or posterior oropharyngeal erythema.      Comments: Front incisors and canines maxillary and mandibular are non-tender to percussion.  Exam of gums unremarkable  Eyes:      Conjunctiva/sclera: Conjunctivae normal.   Cardiovascular:      Rate and Rhythm: Normal rate and regular rhythm.      Pulses: Normal pulses.      Heart sounds: Normal heart sounds. No murmur heard.      Pulmonary:      Effort: Pulmonary effort is normal.      Breath sounds: Normal breath sounds.   Abdominal:      General: Abdomen is flat.      Palpations: Abdomen is soft.   Musculoskeletal:      Right lower leg: No edema.      Left lower leg: No edema.      Comments: Small abrasion of right fourth finger.  5 out of 5  strength bilateral hands.  Anatomic snuffbox is nontender to palpation bilaterally.  Abrasion noted on right kneecap.  No effusion.  Patella is nontender to palpation.   Skin:     General: Skin is warm and dry.   Neurological:      General: No focal deficit present.      Mental Status: She is alert. Mental status is at baseline.   Psychiatric:         Mood and Affect: Mood normal.         Behavior: Behavior normal.         Thought Content: Thought content normal.         Judgment: Judgment normal.        Result " Review :                 Assessment and Plan    Diagnoses and all orders for this visit:    1. Accident due to mechanical fall without injury, subsequent encounter (Primary)    2. Frequent falls    3. Abrasion of finger of right hand, subsequent encounter    4. Injury of right knee, subsequent encounter    5. Medication overuse headache    6. Type 2 diabetes mellitus with hypoglycemia without coma, without long-term current use of insulin (HCC)    Mechanical fall/frequent falls:  -Did advise obtaining radiographs that I previously ordered, and Ms. Hernandez has declined stating that she feels fine    Medication overuse headache:  -Recommended abstaining from NSAIDs for approximately 2 weeks and then cautiously introducing, no more than 2 times a week    Type 2 diabetes:  -Counseled on the role 15, if symptomatic hypoglycemia or low blood sugar reading on glucometer can give self 15 g of carbohydrates and recheck in 15 minutes time.  If still hypoglycemic can give us a self another bolus of sugary food or drink.      I spent 40 minutes caring for Sun on this date of service. This time includes time spent by me in the following activities:preparing for the visit, reviewing tests, obtaining and/or reviewing a separately obtained history, performing a medically appropriate examination and/or evaluation , counseling and educating the patient/family/caregiver and documenting information in the medical record  Follow Up   Return if symptoms worsen or fail to improve.  Patient was given instructions and counseling regarding her condition or for health maintenance advice. Please see specific information pulled into the AVS if appropriate.

## 2021-12-22 DIAGNOSIS — M50.30 DDD (DEGENERATIVE DISC DISEASE), CERVICAL: ICD-10-CM

## 2021-12-22 DIAGNOSIS — E03.9 HYPOTHYROIDISM, UNSPECIFIED TYPE: ICD-10-CM

## 2021-12-22 RX ORDER — LEVOTHYROXINE SODIUM 0.07 MG/1
75 TABLET ORAL DAILY
Qty: 90 TABLET | Refills: 6 | Status: SHIPPED | OUTPATIENT
Start: 2021-12-22 | End: 2021-12-27 | Stop reason: SDUPTHER

## 2021-12-22 RX ORDER — DULOXETIN HYDROCHLORIDE 30 MG/1
30 CAPSULE, DELAYED RELEASE ORAL DAILY
Qty: 90 CAPSULE | Refills: 1 | Status: SHIPPED | OUTPATIENT
Start: 2021-12-22 | End: 2021-12-27 | Stop reason: SDUPTHER

## 2021-12-22 NOTE — TELEPHONE ENCOUNTER
Caller: Mary Susan Cordova    Relationship: Self    Best call back number: [phone]    Requested Prescriptions:   Requested Prescriptions     Pending Prescriptions Disp Refills   • levothyroxine (Synthroid) 75 MCG tablet 90 tablet 6     Sig: Take 1 tablet by mouth Daily.        Pharmacy where request should be sent: [facility] [place] [facility] -  [place] - [address] [facility] - [phone] [facility] [phone] FX     Does the patient have less than a 3 day supply:  [x] Yes  [] No    Chandu Tracy Rep   12/22/21 15:43 EST

## 2021-12-22 NOTE — TELEPHONE ENCOUNTER
Caller: Susan Hernandez    Relationship: Self    Best call back number: [phone]    Requested Prescriptions:   Requested Prescriptions     Pending Prescriptions Disp Refills   • DULoxetine (CYMBALTA) 30 MG capsule 90 capsule 1     Sig: Take 1 capsule by mouth Daily.        Pharmacy where request should be sent: [facility] [place] [facility] -  [place] - [address] - [phone]  - [phone] FX     Additional details provided by patient: PATIENT HAS A 3 DAY SUPPLY     Does the patient have less than a 3 day supply:  [x] Yes  [] No    Chandu Juan Rep   12/22/21 16:02 EST

## 2021-12-27 RX ORDER — DULOXETIN HYDROCHLORIDE 30 MG/1
30 CAPSULE, DELAYED RELEASE ORAL DAILY
Qty: 90 CAPSULE | Refills: 3 | Status: SHIPPED | OUTPATIENT
Start: 2021-12-27 | End: 2023-01-10 | Stop reason: SDUPTHER

## 2021-12-27 RX ORDER — LEVOTHYROXINE SODIUM 0.07 MG/1
75 TABLET ORAL DAILY
Qty: 90 TABLET | Refills: 3 | Status: SHIPPED | OUTPATIENT
Start: 2021-12-27 | End: 2022-01-12

## 2021-12-28 NOTE — TELEPHONE ENCOUNTER
PT VERIFIED     CONTACTED PT PER PROVIDER'S INSTRUCTIONS  Rigoberto Gomes Jr., MD  [facility] Pool 14 hours ago (6:10 PM)     Please notify pt that scripts have been sent to [facility]. Thank you.

## 2021-12-28 NOTE — TELEPHONE ENCOUNTER
PT VERIFIED     CONTACTED PT PER PROVIDER'S INSTRUCTIONS  Rigoberto Gomes Jr., MD  [facility] Pool 14 hours ago (6:10 PM)     Please notify pt that scripts have been sent to [facility]. Thank you.

## 2022-01-10 DIAGNOSIS — M50.30 DDD (DEGENERATIVE DISC DISEASE), CERVICAL: ICD-10-CM

## 2022-01-10 DIAGNOSIS — E03.9 HYPOTHYROIDISM, UNSPECIFIED TYPE: ICD-10-CM

## 2022-01-10 RX ORDER — OFLOXACIN 3 MG/ML
SOLUTION/ DROPS OPHTHALMIC
Status: CANCELLED | OUTPATIENT
Start: 2022-01-10

## 2022-01-10 NOTE — TELEPHONE ENCOUNTER
CONTROLLED MEDICATION REFILL REQUEST     URINE DRUG SCREEN: 03/05/2021    LAST OFFICE VISIT: Office Visit with Jonathan Escalante MD (12/15/2021)    NARC CONSENT: 10/12/2020 [place]    NEXT OFFICE VISIT: Appointment with Rigoberto Gomes Jr., MD (04/11/2022)    MEDICATION: pregabalin (Lyrica) 150 MG capsule (12/13/2021)    OVER 6 MONTHS SINCE LAST UDS     OVER A YEAR SINCE LAST NARC CONSENT     PROVIDER PLEASE ADVISE

## 2022-01-10 NOTE — TELEPHONE ENCOUNTER
----- Message from Rigoberto Gomes Jr., MD sent at 1/10/2022  2:38 PM EST -----  Labs reviewed. Please contact patient. Wanted to ensure she had her thyroid medication adjusted.

## 2022-01-10 NOTE — TELEPHONE ENCOUNTER
PT VERIFIED     CONTACTED PT PER PROVIDER'S INSTRUCTIONS    PT(PATIENT) CONFIRMED 75MCG DOSAGE  levothyroxine (Synthroid) 75 MCG tablet (2021)

## 2022-01-10 NOTE — TELEPHONE ENCOUNTER
Caller: Susan Hernandez Art    Relationship: Self    Best call back number: [phone]    Requested Prescriptions:   Requested Prescriptions     Pending Prescriptions Disp Refills   • pregabalin (Lyrica) 150 MG capsule 90 capsule 0     Sig: Take 1 capsule by mouth 3 (Three) Times a Day As Needed (pain).   • ofloxacin (OCUFLOX) 0.3 % ophthalmic solution          Pharmacy where request should be sent: [facility] [place] [facility] -  [place] - [address] [facility] [phone] [facility] [phone] FX       Does the patient have less than a 3 day supply:  [x] Yes  [] No    Chandu Murillo Rep   01/10/22 10:41 EST

## 2022-01-12 RX ORDER — LEVOTHYROXINE SODIUM 0.05 MG/1
50 TABLET ORAL DAILY
Qty: 90 TABLET | Refills: 1 | Status: SHIPPED | OUTPATIENT
Start: 2022-01-12 | End: 2022-04-11 | Stop reason: SDUPTHER

## 2022-01-12 RX ORDER — LEVOTHYROXINE SODIUM 0.05 MG/1
50 TABLET ORAL DAILY
Qty: 90 TABLET | Refills: 1 | Status: SHIPPED | OUTPATIENT
Start: 2022-01-12 | End: 2022-01-12

## 2022-01-12 RX ORDER — PREGABALIN 150 MG/1
150 CAPSULE ORAL 3 TIMES DAILY PRN
Qty: 90 CAPSULE | Refills: 0 | Status: SHIPPED | OUTPATIENT
Start: 2022-01-12 | End: 2022-02-08 | Stop reason: SDUPTHER

## 2022-01-12 RX ORDER — PREGABALIN 150 MG/1
150 CAPSULE ORAL 3 TIMES DAILY PRN
Qty: 90 CAPSULE | Refills: 0 | Status: SHIPPED | OUTPATIENT
Start: 2022-01-12 | End: 2022-01-12

## 2022-01-12 NOTE — TELEPHONE ENCOUNTER
PT VERIFIED     CONTACTED PT PER PROVIDER'S INSTRUCTIONS    PT(PATIENT) STATES SHE WISHES FOR HER MEDICATION TO GO TO [facility] [place]    PT(PATIENT) DOES NOT WANT HER MEDICATION SENT TO [facility]    PT(PATIENT) STATES SHE WOULD LIKE A PAPER SCRIPT FOR HER LYRICA FOR [facility] [place]

## 2022-01-12 NOTE — TELEPHONE ENCOUNTER
[facility]. Patient should start taking lower dose of synthroid based on recent lab results. will send Prescription 50mcg daily.   The ofloxacin eye drops were intended to be perioperative eye antibiotics, I do no think she is supposed to continue those indefinitely - may follow-up with ophtho to confirm.

## 2022-02-08 DIAGNOSIS — M50.30 DDD (DEGENERATIVE DISC DISEASE), CERVICAL: ICD-10-CM

## 2022-02-08 NOTE — TELEPHONE ENCOUNTER
Caller: Susan Hernandez Art    Relationship: Self    Best call back number: [phone]    Requested Prescriptions:   Requested Prescriptions     Pending Prescriptions Disp Refills   • pregabalin (Lyrica) 150 MG capsule 90 capsule 0     Sig: Take 1 capsule by mouth 3 (Three) Times a Day As Needed (pain).        Pharmacy where request should be sent[facility]  [place] - [address] [facility] [phone] [facility] [phone] FX     Additional details provided by patient: [facility]- PCP, [facility]: 04/11/2022     Does the patient have less than a 3 day supply:  [x] Yes  [] No    Chandu Cervantes Rep   02/08/22 13:20 EST

## 2022-02-09 RX ORDER — PREGABALIN 150 MG/1
150 CAPSULE ORAL 3 TIMES DAILY PRN
Qty: 90 CAPSULE | Refills: 0 | Status: SHIPPED | OUTPATIENT
Start: 2022-02-09 | End: 2022-03-09 | Stop reason: SDUPTHER

## 2022-02-09 NOTE — TELEPHONE ENCOUNTER
Last OV 12/15/2021  Next OV 04/11/2022  Do not see urine drug screen on file.  Do not see urine drug screen

## 2022-03-09 ENCOUNTER — OFFICE VISIT (OUTPATIENT)
Dept: INTERNAL MEDICINE | Facility: CLINIC | Age: 72
End: 2022-03-09

## 2022-03-09 VITALS
DIASTOLIC BLOOD PRESSURE: 97 MMHG | HEART RATE: 79 BPM | TEMPERATURE: 96.9 F | BODY MASS INDEX: 26.26 KG/M2 | HEIGHT: 64 IN | SYSTOLIC BLOOD PRESSURE: 162 MMHG

## 2022-03-09 DIAGNOSIS — M50.30 DDD (DEGENERATIVE DISC DISEASE), CERVICAL: ICD-10-CM

## 2022-03-09 DIAGNOSIS — Z79.899 ON LONG TERM DRUG THERAPY: ICD-10-CM

## 2022-03-09 DIAGNOSIS — Z79.899 ENCOUNTER FOR LONG-TERM (CURRENT) USE OF OTHER MEDICATIONS: ICD-10-CM

## 2022-03-09 DIAGNOSIS — T14.8XXA INFECTED WOUND: Primary | ICD-10-CM

## 2022-03-09 DIAGNOSIS — L08.9 INFECTED WOUND: Primary | ICD-10-CM

## 2022-03-09 PROCEDURE — 99213 OFFICE O/P EST LOW 20 MIN: CPT | Performed by: NURSE PRACTITIONER

## 2022-03-09 PROCEDURE — 80305 DRUG TEST PRSMV DIR OPT OBS: CPT | Performed by: NURSE PRACTITIONER

## 2022-03-09 RX ORDER — CLINDAMYCIN HYDROCHLORIDE 300 MG/1
300 CAPSULE ORAL 2 TIMES DAILY
Qty: 14 CAPSULE | Refills: 0 | Status: SHIPPED | OUTPATIENT
Start: 2022-03-09 | End: 2022-03-16

## 2022-03-09 NOTE — TELEPHONE ENCOUNTER
CONTROLLED MEDICATION REFILL REQUEST     URINE DRUG SCREEN: 03/05/2021    LAST OFFICE VISIT: Office Visit with Halle Patel APRN (03/09/2022)    NARC CONSENT: 10[birth year]2020 [place]    NEXT OFFICE VISIT: Appointment with Rigoberto Gomes Jr., MD (04/11/2022)    MEDICATION: pregabalin (Lyrica) 150 MG capsule (02/09/2022)    OVER 6 MONTHS SINCE LAST UDS     OVER A YEAR SINCE LAST NARC CONSENT     PROVIDER PLEASE ADVISE   EGD, colonoscopy

## 2022-03-09 NOTE — PROGRESS NOTES
Chief Complaint  Wound Check (LEFT WRIST)    Subjective          Sun Art Hernandez presents to [facility]  Wound Infection  This is a new (1.5 in left wrist wound ) problem. Episode onset: 2/17 - cut her wrist accidentally with a knife  Pertinent negatives include no anorexia, arthralgias, diaphoresis, fever, joint swelling or myalgias. Associated symptoms comments: Purulent drainage from the wound . Nothing aggravates the symptoms. Treatments tried: has been using peroxide and alcohol to the area.    Patient requesting sutures. I explained that her wound is too old to place sutures at this time.     Also due for controlled consent/UDS which was obtained by MA   Current Outpatient Medications   Medication Instructions   • calcium carbonate-cholecalciferol 500-400 MG-UNIT tablet tablet Oral, Daily   • cholecalciferol (VITAMIN D3) 25 MCG (1000 UT) tablet No dose, route, or frequency recorded.   • clindamycin (CLEOCIN) 300 mg, Oral, 2 Times Daily   • diphenoxylate-atropine (LOMOTIL) 2.5-0.025 MG per tablet 1 tablet, Oral, 4 Times Daily PRN   • docusate calcium (SURFAK) 240 mg, Oral, 2 Times Daily PRN   • DULoxetine (CYMBALTA) 30 mg, Oral, Daily   • empagliflozin (JARDIANCE) 10 mg, Oral, Daily   • levothyroxine (SYNTHROID) 50 mcg, Oral, Daily   • metFORMIN (GLUCOPHAGE) 500 MG tablet metformin 500 mg oral tablet take 2 tablets (1,000 mg) by oral route 2 times per day with morning and evening meals for 90 days 2/24/2021  Active   • Omega-3 Fatty Acids (fish oil) 1000 MG capsule capsule Oral, Daily With Breakfast   • omeprazole (PRILOSEC) 40 mg, Oral, Daily   • pravastatin (PRAVACHOL) 10 MG tablet No dose, route, or frequency recorded.   • pregabalin (LYRICA) 150 mg, Oral, 3 Times Daily PRN   • simvastatin (ZOCOR) 20 mg, Oral, Nightly   • SITagliptin (JANUVIA) 100 mg, Oral, Daily       The following portions of the patient's history were reviewed and updated as appropriate:  "allergies, current medications, past family history, past medical history, past social history, past surgical history, and problem list.    Objective   Vital Signs:   /97 (BP Location: Left arm, Patient Position: Sitting, Cuff Size: Adult)   Pulse 79   Temp 96.9 °F (36.1 °C) (Temporal)   Ht 162.6 cm (64\")   BMI 26.26 kg/m²     Wt Readings from Last 3 Encounters:   12/15/21 69.4 kg (153 lb)   10/11/21 67.4 kg (148 lb 9.6 oz)   09/16/21 64.3 kg (141 lb 12.8 oz)     BP Readings from Last 3 Encounters:   03/09/22 162/97   12/15/21 141/88   10/11/21 111/72     Physical Exam  Vitals and nursing note reviewed.   Constitutional:       General: She is not in acute distress.     Appearance: Normal appearance. She is not ill-appearing.   Eyes:      Extraocular Movements: Extraocular movements intact.      Conjunctiva/sclera: Conjunctivae normal.   Pulmonary:      Effort: Pulmonary effort is normal.   Skin:     General: Skin is warm and dry.      Capillary Refill: Capillary refill takes less than 2 seconds.      Comments: 1.5 in wound noted to left wrist.   Neurovascular status intact.   Area of purulent drainage and surrounding erythema noted.    Neurological:      General: No focal deficit present.      Mental Status: She is alert and oriented to person, place, and time. Mental status is at baseline.   Psychiatric:         Mood and Affect: Mood normal.         Behavior: Behavior normal.         Thought Content: Thought content normal.         Judgment: Judgment normal.        Wound approximating well     Result Review :   The following data was reviewed by: KEITH Andino on 03/09/2022:  Common labs    Common Labsle 7/9/21 7/9/21 7/9/21 7/9/21 7/9/21 7/16/21 7/16/21 10/11/21 10/11/21 10/11/21 10/11/21    1426 1426 1426 1426 1426 1541 1541 1449 1449 1449 1449   Glucose    204 (A)   135 (A)    241 (A)   BUN    11   10    13   Creatinine    0.85   0.90    0.75   eGFR Non  Am    66   62    76   eGFR "  Am    80   75    93   Sodium    138   137    137   Potassium    4.3   4.3    4.4   Chloride    101   100    100   Calcium    9.6   9.3    9.8   Albumin    4.60   4.50    4.50   Total Bilirubin    0.4   0.6    0.6   Alkaline Phosphatase    59   63    71   AST (SGOT)    34 (A)   38 (A)    71 (A)   ALT (SGPT)    43 (A)   46 (A)    69 (A)   WBC 6.09     6.29  6.35      Hemoglobin 13.8     13.9  13.3      Hematocrit 42.5     41.2  40.3      Platelets 241     212  232      Total Cholesterol   175       195    Triglycerides   169 (A)       145    HDL Cholesterol   46       46    LDL Cholesterol    100       123 (A)    Hemoglobin A1C  6.50 (A)       6.77 (A)     Microalbumin, Urine     <1.2         (A) Abnormal value       Comments are available for some flowsheets but are not being displayed.           UDS reviwed.        Lab Results   Component Value Date    BILIRUBINUR Negative 07/16/2021       Procedures        Assessment and Plan    Diagnoses and all orders for this visit:    1. Infected wound (Primary)  -     clindamycin (CLEOCIN) 300 MG capsule; Take 1 capsule by mouth 2 (Two) Times a Day for 7 days.  Dispense: 14 capsule; Refill: 0    2. On long term drug therapy  -     Cancel: POC Urine Drug Screen [facility]    3. Encounter for long-term (current) use of other medications  -     POC Urine Drug Screen [facility]      - I did advise patient to stop using the alcohol and peroxide to the area   - Wash wound with soap and water 2x/day   - take abx as prescribed.   - call or return to clinic if symptoms persist.     There are no discontinued medications.       Follow Up   Return if symptoms worsen or fail to improve.  Patient was given instructions and counseling regarding her condition or for health maintenance advice. Please see specific information pulled into the AVS if appropriate.       Halle Patel, APRN  03/09/22  15:18 EST

## 2022-03-10 DIAGNOSIS — M50.30 DDD (DEGENERATIVE DISC DISEASE), CERVICAL: ICD-10-CM

## 2022-03-10 RX ORDER — PREGABALIN 150 MG/1
150 CAPSULE ORAL 3 TIMES DAILY PRN
Qty: 90 CAPSULE | Refills: 0 | Status: SHIPPED | OUTPATIENT
Start: 2022-03-10 | End: 2022-04-11 | Stop reason: SDUPTHER

## 2022-03-10 RX ORDER — PREGABALIN 150 MG/1
150 CAPSULE ORAL 3 TIMES DAILY PRN
Qty: 90 CAPSULE | Refills: 0 | Status: SHIPPED | OUTPATIENT
Start: 2022-03-10 | End: 2022-03-10 | Stop reason: SDUPTHER

## 2022-03-10 RX ORDER — CARBOXYMETHYLCELLULOSE SODIUM 5 MG/ML
2 SOLUTION/ DROPS OPHTHALMIC 3 TIMES DAILY PRN
Qty: 70 EACH | Refills: 3 | Status: SHIPPED | OUTPATIENT
Start: 2022-03-10 | End: 2022-04-11 | Stop reason: SDUPTHER

## 2022-03-10 NOTE — TELEPHONE ENCOUNTER
PT(PATIENT) VERIFIED     CONTACTED PT(PATIENT) PER PROVIDER'S INSTRUCTIONS    PT(PATIENT) STATES SHE NEEDS AN OLDER SCRIPT    · Refresh Plus 0.5 % ophthalmic (eye) dropperette   SIG: instill 2 drops by ophthalmic route 4 times a day   DISP: (3) Box with 3 refills    FOUND REQUESTED MEDICATION PER MORALES    PROVIDER PLEASE ADVISE

## 2022-03-10 NOTE — TELEPHONE ENCOUNTER
Printed and in signed folder. Can we ask which eye drops she is requesting. Looks like I last sent in a antibiotic eye drop, and I wouldn't recommend long-term use of that.

## 2022-03-15 NOTE — TELEPHONE ENCOUNTER
Caller: Susan Hernandez Art    Relationship: Self    Best call back number: [phone]    Requested Prescriptions:   Requested Prescriptions     Pending Prescriptions Disp Refills   • metFORMIN (GLUCOPHAGE) 500 MG tablet          Pharmacy where request should be sent: [facility] [place] [facility] [place] - [address] [facility] - [phone]  - [phone] FX     Additional details provided by patient:   PATIENT IS CURRENTLY OUT. SHE IS REQUESTING CALL WHEN SENT.     Does the patient have less than a 3 day supply:  [x] Yes  [] No    Chandu Noel Rep   03/15/22 16:33 EDT

## 2022-03-16 NOTE — TELEPHONE ENCOUNTER
PT(PATIENT) VERIFIED     CONTACTED PT(PATIENT) PER PROVIDER'S INSTRUCTIONS  Rigoberto Gomes Jr., MD  [facility] 15 hours ago (5:35 PM)     Please notify pt that scripts have been sent to [facility]. Thank you.      PT(PATIENT) STATES [facility] CALLED HER TO  MEDICATION    PT(PATIENT) STATES MEDICATION SHOULD GO TO [facility] [place]    MEDICATION RESENT TO CORRECT PHARMACY

## 2022-04-04 ENCOUNTER — TRANSCRIBE ORDERS (OUTPATIENT)
Dept: ADMINISTRATIVE | Facility: HOSPITAL | Age: 72
End: 2022-04-04

## 2022-04-04 DIAGNOSIS — Z12.31 VISIT FOR SCREENING MAMMOGRAM: Primary | ICD-10-CM

## 2022-04-11 ENCOUNTER — OFFICE VISIT (OUTPATIENT)
Dept: INTERNAL MEDICINE | Facility: CLINIC | Age: 72
End: 2022-04-11

## 2022-04-11 VITALS
HEART RATE: 82 BPM | TEMPERATURE: 97.1 F | SYSTOLIC BLOOD PRESSURE: 133 MMHG | OXYGEN SATURATION: 98 % | HEIGHT: 64 IN | WEIGHT: 149 LBS | DIASTOLIC BLOOD PRESSURE: 83 MMHG | BODY MASS INDEX: 25.44 KG/M2

## 2022-04-11 DIAGNOSIS — E78.49 OTHER HYPERLIPIDEMIA: ICD-10-CM

## 2022-04-11 DIAGNOSIS — K21.9 GASTROESOPHAGEAL REFLUX DISEASE WITHOUT ESOPHAGITIS: ICD-10-CM

## 2022-04-11 DIAGNOSIS — E11.9 TYPE 2 DIABETES MELLITUS WITHOUT COMPLICATION, WITHOUT LONG-TERM CURRENT USE OF INSULIN: Primary | ICD-10-CM

## 2022-04-11 DIAGNOSIS — E03.9 ACQUIRED HYPOTHYROIDISM: ICD-10-CM

## 2022-04-11 DIAGNOSIS — Z11.59 NEED FOR HEPATITIS C SCREENING TEST: ICD-10-CM

## 2022-04-11 DIAGNOSIS — M50.30 DDD (DEGENERATIVE DISC DISEASE), CERVICAL: ICD-10-CM

## 2022-04-11 PROBLEM — IMO0002 NUCLEAR CATARACT: Status: ACTIVE | Noted: 2021-06-24

## 2022-04-11 LAB
ALBUMIN SERPL-MCNC: 4.5 G/DL (ref 3.5–5.2)
ALBUMIN/GLOB SERPL: 1.5 G/DL
ALP SERPL-CCNC: 63 U/L (ref 39–117)
ALT SERPL W P-5'-P-CCNC: 83 U/L (ref 1–33)
ANION GAP SERPL CALCULATED.3IONS-SCNC: 14.5 MMOL/L (ref 5–15)
AST SERPL-CCNC: 80 U/L (ref 1–32)
BASOPHILS # BLD AUTO: 0.05 10*3/MM3 (ref 0–0.2)
BASOPHILS NFR BLD AUTO: 0.7 % (ref 0–1.5)
BILIRUB SERPL-MCNC: 0.8 MG/DL (ref 0–1.2)
BUN SERPL-MCNC: 17 MG/DL (ref 8–23)
BUN/CREAT SERPL: 14.2 (ref 7–25)
CALCIUM SPEC-SCNC: 10.2 MG/DL (ref 8.6–10.5)
CHLORIDE SERPL-SCNC: 101 MMOL/L (ref 98–107)
CHOLEST SERPL-MCNC: 173 MG/DL (ref 0–200)
CO2 SERPL-SCNC: 25.5 MMOL/L (ref 22–29)
CREAT SERPL-MCNC: 1.2 MG/DL (ref 0.57–1)
DEPRECATED RDW RBC AUTO: 46.9 FL (ref 37–54)
EGFRCR SERPLBLD CKD-EPI 2021: 48.5 ML/MIN/1.73
EOSINOPHIL # BLD AUTO: 0.04 10*3/MM3 (ref 0–0.4)
EOSINOPHIL NFR BLD AUTO: 0.6 % (ref 0.3–6.2)
ERYTHROCYTE [DISTWIDTH] IN BLOOD BY AUTOMATED COUNT: 13.5 % (ref 12.3–15.4)
GLOBULIN UR ELPH-MCNC: 3 GM/DL
GLUCOSE SERPL-MCNC: 183 MG/DL (ref 65–99)
HBA1C MFR BLD: 7.2 % (ref 4.8–5.6)
HCT VFR BLD AUTO: 43.6 % (ref 34–46.6)
HDLC SERPL-MCNC: 46 MG/DL (ref 40–60)
HGB BLD-MCNC: 14.4 G/DL (ref 12–15.9)
IMM GRANULOCYTES # BLD AUTO: 0.02 10*3/MM3 (ref 0–0.05)
IMM GRANULOCYTES NFR BLD AUTO: 0.3 % (ref 0–0.5)
LDLC SERPL CALC-MCNC: 103 MG/DL (ref 0–100)
LDLC/HDLC SERPL: 2.18 {RATIO}
LYMPHOCYTES # BLD AUTO: 2.43 10*3/MM3 (ref 0.7–3.1)
LYMPHOCYTES NFR BLD AUTO: 35.1 % (ref 19.6–45.3)
MCH RBC QN AUTO: 31.4 PG (ref 26.6–33)
MCHC RBC AUTO-ENTMCNC: 33 G/DL (ref 31.5–35.7)
MCV RBC AUTO: 95.2 FL (ref 79–97)
MONOCYTES # BLD AUTO: 0.5 10*3/MM3 (ref 0.1–0.9)
MONOCYTES NFR BLD AUTO: 7.2 % (ref 5–12)
NEUTROPHILS NFR BLD AUTO: 3.89 10*3/MM3 (ref 1.7–7)
NEUTROPHILS NFR BLD AUTO: 56.1 % (ref 42.7–76)
NRBC BLD AUTO-RTO: 0 /100 WBC (ref 0–0.2)
PLATELET # BLD AUTO: 240 10*3/MM3 (ref 140–450)
PMV BLD AUTO: 11 FL (ref 6–12)
POTASSIUM SERPL-SCNC: 5.2 MMOL/L (ref 3.5–5.2)
PROT SERPL-MCNC: 7.5 G/DL (ref 6–8.5)
RBC # BLD AUTO: 4.58 10*6/MM3 (ref 3.77–5.28)
SODIUM SERPL-SCNC: 141 MMOL/L (ref 136–145)
TRIGL SERPL-MCNC: 133 MG/DL (ref 0–150)
TSH SERPL DL<=0.05 MIU/L-ACNC: 2.09 UIU/ML (ref 0.27–4.2)
VLDLC SERPL-MCNC: 24 MG/DL (ref 5–40)
WBC NRBC COR # BLD: 6.93 10*3/MM3 (ref 3.4–10.8)

## 2022-04-11 PROCEDURE — 85025 COMPLETE CBC W/AUTO DIFF WBC: CPT | Performed by: INTERNAL MEDICINE

## 2022-04-11 PROCEDURE — 80061 LIPID PANEL: CPT | Performed by: INTERNAL MEDICINE

## 2022-04-11 PROCEDURE — 83036 HEMOGLOBIN GLYCOSYLATED A1C: CPT | Performed by: INTERNAL MEDICINE

## 2022-04-11 PROCEDURE — 80053 COMPREHEN METABOLIC PANEL: CPT | Performed by: INTERNAL MEDICINE

## 2022-04-11 PROCEDURE — 86803 HEPATITIS C AB TEST: CPT | Performed by: INTERNAL MEDICINE

## 2022-04-11 PROCEDURE — 84443 ASSAY THYROID STIM HORMONE: CPT | Performed by: INTERNAL MEDICINE

## 2022-04-11 PROCEDURE — 99214 OFFICE O/P EST MOD 30 MIN: CPT | Performed by: INTERNAL MEDICINE

## 2022-04-11 RX ORDER — OMEPRAZOLE 40 MG/1
40 CAPSULE, DELAYED RELEASE ORAL DAILY
Qty: 90 CAPSULE | Refills: 3 | Status: SHIPPED | OUTPATIENT
Start: 2022-04-11

## 2022-04-11 RX ORDER — LIDOCAINE 50 MG/G
1 PATCH TOPICAL EVERY 24 HOURS
Qty: 90 PATCH | Refills: 3 | Status: SHIPPED | OUTPATIENT
Start: 2022-04-11

## 2022-04-11 RX ORDER — CARBOXYMETHYLCELLULOSE SODIUM 5 MG/ML
2 SOLUTION/ DROPS OPHTHALMIC 3 TIMES DAILY PRN
Qty: 70 EACH | Refills: 3 | Status: SHIPPED | OUTPATIENT
Start: 2022-04-11

## 2022-04-11 RX ORDER — LEVOTHYROXINE SODIUM 0.05 MG/1
50 TABLET ORAL DAILY
Qty: 90 TABLET | Refills: 1 | Status: SHIPPED | OUTPATIENT
Start: 2022-04-11 | End: 2022-07-06 | Stop reason: SDUPTHER

## 2022-04-11 RX ORDER — PREGABALIN 300 MG/1
300 CAPSULE ORAL 2 TIMES DAILY PRN
Qty: 60 CAPSULE | Refills: 0 | Status: SHIPPED | OUTPATIENT
Start: 2022-04-11 | End: 2022-05-09 | Stop reason: SDUPTHER

## 2022-04-11 NOTE — PROGRESS NOTES
Chief Complaint  Diabetes (Type 2, 6 month follow-up) and Hypothyroidism (6 month follow-up)    Subjective          Sun Art Hernandez presents to [facility]  History of Present Illness  Diabetes Mellitus 2 - Patient interesting in considering dosage adjustment.   hyperlipidemia- doing well on statin  Hypothyroid- due for recheck  degenerative disc disease- patient would like to have lidoderm patches again. patient does not think lyrica is working as well as previously. Patient amenable to trying higher dosage.       Current Outpatient Medications   Medication Instructions   • calcium carbonate-cholecalciferol 500-400 MG-UNIT tablet tablet Oral, Daily   • carboxymethylcellulose (Refresh Plus) 0.5 % solution 2 drops, Both Eyes, 3 Times Daily PRN   • cholecalciferol (VITAMIN D3) 25 MCG (1000 UT) tablet No dose, route, or frequency recorded.   • docusate calcium (SURFAK) 240 mg, Oral, 2 Times Daily PRN   • DULoxetine (CYMBALTA) 30 mg, Oral, Daily   • empagliflozin (JARDIANCE) 10 mg, Oral, Daily   • levothyroxine (SYNTHROID) 50 mcg, Oral, Daily   • lidocaine (LIDODERM) 5 % 1 patch, Transdermal, Every 24 Hours, Remove & Discard patch within 12 hours or as directed by MD   • metFORMIN (GLUCOPHAGE) 500 mg, Oral, Daily With Breakfast   • Omega-3 Fatty Acids (fish oil) 1000 MG capsule capsule Oral, Daily With Breakfast   • omeprazole (PRILOSEC) 40 mg, Oral, Daily   • pregabalin (LYRICA) 300 mg, Oral, 2 Times Daily PRN   • simvastatin (ZOCOR) 20 mg, Oral, Nightly   • SITagliptin (JANUVIA) 100 mg, Oral, Daily       The following portions of the patient's history were reviewed and updated as appropriate: allergies, current medications, past family history, past medical history, past social history, past surgical history, and problem list.    Objective   Vital Signs:   /83 (BP Location: Left arm, Patient Position: Sitting, Cuff Size: Adult)   Pulse 82   Temp 97.1 °F (36.2 °C)  "(Temporal)   Ht 162.6 cm (64\")   Wt 67.6 kg (149 lb)   SpO2 98%   BMI 25.58 kg/m²     Wt Readings from Last 3 Encounters:   04/11/22 67.6 kg (149 lb)   12/15/21 69.4 kg (153 lb)   10/11/21 67.4 kg (148 lb 9.6 oz)     BP Readings from Last 3 Encounters:   04/11/22 133/83   03/09/22 162/97   12/15/21 141/88     Physical Exam   Appearance: No acute distress, well-nourished  Head: normocephalic, atraumatic  Eyes: extraocular movements intact, no scleral icterus, no conjunctival injection  Ears, Nose, and Throat: external ears normal, nares patent, moist mucous membranes  Cardiovascular: regular rate and rhythm. no murmurs, rubs, or gallops. no edema  Respiratory: breathing comfortably, symmetric chest rise, clear to auscultation bilaterally. No wheezes, rales, or rhonchi.  Neuro: alert and oriented to time, place, and person. Normal gait  Psych: normal mood and affect     Diabetic foot exam:   Left: Filament test present   Pulses Dorsalis Pedis:  present   Vibratory sensation normal   Proprioception normal   Sharp/dull discrimination normal       Right: Filament test present   Pulses Dorsalis Pedis:  present   Vibratory sensation normal   Proprioception normal   Sharp/dull discrimination normal        Result Review :   The following data was reviewed by: Rigoberto Gomes Jr, MD on 04/11/2022:  Common labs    Common Labsle 7/9/21 7/9/21 7/9/21 7/9/21 7/9/21 7/16/21 7/16/21 10/11/21 10/11/21 10/11/21 10/11/21    1426 1426 1426 1426 1426 1541 1541 1449 1449 1449 1449   Glucose    204 (A)   135 (A)    241 (A)   BUN    11   10    13   Creatinine    0.85   0.90    0.75   eGFR Non  Am    66   62    76   eGFR  Am    80   75    93   Sodium    138   137    137   Potassium    4.3   4.3    4.4   Chloride    101   100    100   Calcium    9.6   9.3    9.8   Albumin    4.60   4.50    4.50   Total Bilirubin    0.4   0.6    0.6   Alkaline Phosphatase    59   63    71   AST (SGOT)    34 (A)   38 (A)    71 (A) "   ALT (SGPT)    43 (A)   46 (A)    69 (A)   WBC 6.09     6.29  6.35      Hemoglobin 13.8     13.9  13.3      Hematocrit 42.5     41.2  40.3      Platelets 241     212  232      Total Cholesterol   175       195    Triglycerides   169 (A)       145    HDL Cholesterol   46       46    LDL Cholesterol    100       123 (A)    Hemoglobin A1C  6.50 (A)       6.77 (A)     Microalbumin, Urine     <1.2         (A) Abnormal value       Comments are available for some flowsheets but are not being displayed.             Lab Results   Component Value Date    BILIRUBINUR Negative 07/16/2021     Last Urine Toxicity     LAST URINE TOXICITY RESULTS Latest Ref Rng & Units 3/9/2022    AMPHETAMINES SCREEN, URINE Negative Negative    BARBITURATES SCREEN Negative Negative    BENZODIAZEPINE SCREEN, URINE Negative Negative    BUPRENORPHINEUR Negative Negative    COCAINE SCREEN, URINE Negative Negative    METHADONE SCREEN, URINE Negative Negative    METHAMPHETAMINEUR Negative Negative             Assessment and Plan    Diagnoses and all orders for this visit:    1. Type 2 diabetes mellitus without complication, without long-term current use of insulin (HCC) (Primary)  Comments:  check labs.   Orders:  -     Hemoglobin A1c  -     CBC & Differential    2. Need for hepatitis C screening test  -     Hepatitis C antibody    3. DDD (degenerative disc disease), cervical  Comments:  inc lyrica to 300mg po BID. also add lidoderma pacthes for pain control.  Orders:  -     pregabalin (Lyrica) 300 MG capsule; Take 1 capsule by mouth 2 (Two) Times a Day As Needed (pain).  Dispense: 60 capsule; Refill: 0  -     lidocaine (LIDODERM) 5 %; Place 1 patch on the skin as directed by provider Daily. Remove & Discard patch within 12 hours or as directed by MD  Dispense: 90 patch; Refill: 3    4. Gastroesophageal reflux disease without esophagitis  -     omeprazole (priLOSEC) 40 MG capsule; Take 1 capsule by mouth Daily.  Dispense: 90 capsule; Refill: 3    5.  Other hyperlipidemia  Comments:  goal LDL<70. cont statin  Orders:  -     Comprehensive Metabolic Panel  -     Lipid Panel    6. Acquired hypothyroidism  Comments:  check TSH and adjust accordingly.   Orders:  -     TSH  -     levothyroxine (Synthroid) 50 MCG tablet; Take 1 tablet by mouth Daily.  Dispense: 90 tablet; Refill: 1    Other orders  -     carboxymethylcellulose (Refresh Plus) 0.5 % solution; Administer 2 drops to both eyes 3 (Three) Times a Day As Needed for Dry Eyes.  Dispense: 70 each; Refill: 3          Medications Discontinued During This Encounter   Medication Reason   • diphenoxylate-atropine (LOMOTIL) 2.5-0.025 MG per tablet    • pravastatin (PRAVACHOL) 10 MG tablet    • omeprazole (priLOSEC) 40 MG capsule Reorder   • levothyroxine (Synthroid) 50 MCG tablet Reorder   • pregabalin (Lyrica) 150 MG capsule Reorder   • carboxymethylcellulose (Refresh Plus) 0.5 % solution Reorder        Follow Up   Return in about 4 months (around 8/11/2022) for Recheck.  Patient was given instructions and counseling regarding her condition or for health maintenance advice. Please see specific information pulled into the AVS if appropriate.       Rigoberto Gomes Jr, MD  04/11/22  13:57 EDT

## 2022-04-12 LAB — HCV AB SER DONR QL: NORMAL

## 2022-04-13 ENCOUNTER — TELEPHONE (OUTPATIENT)
Dept: INTERNAL MEDICINE | Facility: CLINIC | Age: 72
End: 2022-04-13

## 2022-04-13 RX ORDER — SIMVASTATIN 20 MG
20 TABLET ORAL NIGHTLY
Qty: 90 TABLET | Refills: 3 | Status: SHIPPED | OUTPATIENT
Start: 2022-04-13 | End: 2022-10-11 | Stop reason: ALTCHOICE

## 2022-04-13 NOTE — TELEPHONE ENCOUNTER
----- Message from Rigoberto Gomes Jr., MD sent at 4/13/2022 10:28 AM EDT -----  HgbA1c increasing. Would recommend increase jardiance to 25mg daily  LDL also above goal. Recommend in simvastatin to 40mg nightly.     Will send new Prescription.

## 2022-04-14 NOTE — TELEPHONE ENCOUNTER
Caller: Susan Hernandez Art    Relationship: Self    Best call back number: [phone]    Requested Prescriptions:   Requested Prescriptions     Pending Prescriptions Disp Refills   • metFORMIN (GLUCOPHAGE) 500 MG tablet 90 tablet 3     Sig: Take 1 tablet by mouth Daily With Breakfast.        Pharmacy where request should be sent: [facility] [place] [facility] [place]  [address] [facility] - [phone]  - [phone] FX     Additional details provided by patient: PATIENT WOULD LIKE TO SPEAK TO A NURSE ABOUT HER MEDICATION    Does the patient have less than a 3 day supply:  [] Yes  [x] No    Chandu Alexandre Rep   04/14/22 10:04 EDT

## 2022-04-14 NOTE — TELEPHONE ENCOUNTER
Antihyperglycemics Protocol Passed 04/14/2022 10:06 AM   Protocol Details  No active pregnancy on record    Lipid panel in past year    No positive pregnancy test in the past 12 months    GFR >30ml/min in past year    HgA1c in past 6 months    Recent or future visit with authorizing provider

## 2022-04-14 NOTE — TELEPHONE ENCOUNTER
PT(PATIENT) VERIFIED     PT(PATIENT) NOTIFIED MEDICATION HAS BEEN SENT TO [facility]    PT(PATIENT) STATES SHE ALSO RECEIVED empagliflozin (Jardiance) 25 MG tablet tablet (2022)    PT(PATIENT) STATES SHE RECEIVED THE ABOVE MEDICATION FOR THE FIRST TIME YESTERDAY    Hemoglobin A1c (2022 14:15)    Rigoberto Gomes Jr., MD   2022 10:28 AM EDT Back to Top        HgbA1c increasing. Would recommend increase jardiance to 25mg daily  LDL also above goal. Recommend in simvastatin to 40mg nightly.      Will send new Prescription.      PT(PATIENT) ALSO STATES HER THYROID MEDICATION NEEDS A REFILL    levothyroxine (Synthroid) 50 MCG tablet (2022)    PT(PATIENT) STATES SHE DID RECEIVE THE ABOVE REFILL    PT(PATIENT) REQUESTED A REFILL NUMBER, I ADVISED THE PT(PATIENT) SHE WILL NEED TO CONTACT [facility] REGARDING A REFILL NUMBER

## 2022-05-02 ENCOUNTER — CLINICAL SUPPORT (OUTPATIENT)
Dept: GASTROENTEROLOGY | Facility: CLINIC | Age: 72
End: 2022-05-02

## 2022-05-02 ENCOUNTER — TELEPHONE (OUTPATIENT)
Dept: GASTROENTEROLOGY | Facility: CLINIC | Age: 72
End: 2022-05-02

## 2022-05-02 ENCOUNTER — PREP FOR SURGERY (OUTPATIENT)
Dept: OTHER | Facility: HOSPITAL | Age: 72
End: 2022-05-02

## 2022-05-02 DIAGNOSIS — Z12.11 ENCOUNTER FOR SCREENING FOR MALIGNANT NEOPLASM OF COLON: ICD-10-CM

## 2022-05-02 DIAGNOSIS — C18.9 MALIGNANT NEOPLASM OF COLON, UNSPECIFIED PART OF COLON: Primary | ICD-10-CM

## 2022-05-02 NOTE — TELEPHONE ENCOUNTER
Susan Hernandez  REASON FOR CALL Colon screening  SENT IN PREP Nusrat   Past Medical History:   Diagnosis Date   • Arthritis    • Cancer (HCC)    • Colon cancer (HCC)    • Diabetes mellitus, type 2 (HCC)    • Disease of thyroid gland    • GERD (gastroesophageal reflux disease)    • Lumbar pain    • Small bowel obstruction (HCC)      Allergies   Allergen Reactions   • Aspirin Hives   • Penicillin V Potassium Rash     Past Surgical History:   Procedure Laterality Date   • APPENDECTOMY     • COLON SURGERY  2011,2006    CANCER, CHEMO   • COLONOSCOPY     • HYSTERECTOMY  2004     Social History     Socioeconomic History   • Marital status:    Tobacco Use   • Smoking status: Former Smoker   • Smokeless tobacco: Former User   • Tobacco comment: STARTED AGE 8   Vaping Use   • Vaping Use: Never used   Substance and Sexual Activity   • Alcohol use: Not Currently   • Drug use: Never   • Sexual activity: Defer     Family History   Problem Relation Age of Onset   • Breast cancer Brother    • Other Brother         RENAL CANCER   • Colon cancer Neg Hx        Current Outpatient Medications:   •  calcium carbonate-cholecalciferol 500-400 MG-UNIT tablet tablet, Take  by mouth Daily., Disp: , Rfl:   •  carboxymethylcellulose (Refresh Plus) 0.5 % solution, Administer 2 drops to both eyes 3 (Three) Times a Day As Needed for Dry Eyes., Disp: 70 each, Rfl: 3  •  cholecalciferol (VITAMIN D3) 25 MCG (1000 UT) tablet, , Disp: , Rfl:   •  docusate calcium (SURFAK) 240 MG capsule, Take 1 capsule by mouth 2 (Two) Times a Day As Needed for Constipation., Disp: 90 capsule, Rfl: 3  •  DULoxetine (CYMBALTA) 30 MG capsule, Take 1 capsule by mouth Daily., Disp: 90 capsule, Rfl: 3  •  empagliflozin (Jardiance) 25 MG tablet tablet, Take 1 tablet by mouth Daily., Disp: 90 tablet, Rfl: 3  •  levothyroxine (Synthroid) 50 MCG tablet, Take 1 tablet by mouth Daily., Disp: 90 tablet, Rfl: 1  •  lidocaine (LIDODERM) 5 %, Place 1 patch on the skin as  directed by provider Daily. Remove & Discard patch within 12 hours or as directed by MD, Disp: 90 patch, Rfl: 3  •  metFORMIN (GLUCOPHAGE) 500 MG tablet, Take 1 tablet by mouth Daily With Breakfast., Disp: 90 tablet, Rfl: 3  •  Omega-3 Fatty Acids (fish oil) 1000 MG capsule capsule, Take  by mouth Daily With Breakfast., Disp: , Rfl:   •  omeprazole (priLOSEC) 40 MG capsule, Take 1 capsule by mouth Daily., Disp: 90 capsule, Rfl: 3  •  pregabalin (Lyrica) 300 MG capsule, Take 1 capsule by mouth 2 (Two) Times a Day As Needed (pain)., Disp: 60 capsule, Rfl: 0  •  simvastatin (ZOCOR) 20 MG tablet, Take 1 tablet by mouth Every Night., Disp: 90 tablet, Rfl: 3  •  SITagliptin (Januvia) 100 MG tablet, Take 1 tablet by mouth Daily., Disp: 90 tablet, Rfl: 3

## 2022-05-09 DIAGNOSIS — M50.30 DDD (DEGENERATIVE DISC DISEASE), CERVICAL: ICD-10-CM

## 2022-05-09 NOTE — TELEPHONE ENCOUNTER
Caller: Susan Hernandez    Relationship: Self    Best call back number: [phone]    Requested Prescriptions:   Requested Prescriptions     Pending Prescriptions Disp Refills   • pregabalin (Lyrica) 300 MG capsule 60 capsule 0     Sig: Take 1 capsule by mouth 2 (Two) Times a Day As Needed (pain).        [facility]  [place] [place] [facility] [address] [phone] [facility] [phone] FX       Does the patient have less than a 3 day supply:  [x] Yes  [] No    Chandu Murillo Rep   05/09/22 13:04 EDT

## 2022-05-09 NOTE — TELEPHONE ENCOUNTER
CONTROLLED MEDICATION REFILL REQUEST    STATE REGULATION APPT EVERY 3 MONTHS    UDS(URINE DRUG SCREEN) EVERY 6 MONTHS    NEW NARC CONSENT EVERY YEAR     URINE DRUG SCREEN: POC Urine Drug Screen [facility] (03/09/2022 14:42)    LAST OFFICE VISIT: Office Visit with Rigoberto Gomes Jr., MD (04/11/2022)    NARC CONSENT: CONTROLLED SUBSTANCE AGREEMENT - SCAN - CONTROLLED SUBSTANCE AGREEMENT (03/09/2022)    NEXT OFFICE VISIT: Appointment with Rigoberto Gomes Jr., MD (08/11/2022)    MEDICATION: pregabalin (Lyrica) 300 MG capsule (04/11/2022)

## 2022-05-10 RX ORDER — PREGABALIN 300 MG/1
300 CAPSULE ORAL 2 TIMES DAILY PRN
Qty: 60 CAPSULE | Refills: 0 | Status: SHIPPED | OUTPATIENT
Start: 2022-05-10 | End: 2022-06-08 | Stop reason: SDUPTHER

## 2022-06-08 DIAGNOSIS — M50.30 DDD (DEGENERATIVE DISC DISEASE), CERVICAL: ICD-10-CM

## 2022-06-08 NOTE — TELEPHONE ENCOUNTER
CONTROLLED MEDICATION REFILL REQUEST    STATE REGULATION APPT EVERY 3 MONTHS    UDS(URINE DRUG SCREEN) EVERY 6 MONTHS    NEW NARC CONSENT EVERY YEAR     URINE DRUG SCREEN: POC Urine Drug Screen [facility] (03/09/2022 14:42)    LAST OFFICE VISIT: Office Visit with Rigoberto Gomes Jr., MD (04/11/2022)    NARC CONSENT: CONTROLLED SUBSTANCE AGREEMENT - SCAN - CONTROLLED SUBSTANCE AGREEMENT (03/09/2022)    NEXT OFFICE VISIT: Appointment with Rigoberto Gomes Jr., MD (08/11/2022)    MEDICATION: pregabalin (Lyrica) 300 MG capsule (05/10/2022)

## 2022-06-08 NOTE — TELEPHONE ENCOUNTER
Caller: Susan Hernandez    Relationship: Self    Best call back number: [phone]    Requested Prescriptions:   Requested Prescriptions     Pending Prescriptions Disp Refills   • pregabalin (Lyrica) 300 MG capsule 60 capsule 0     Sig: Take 1 capsule by mouth 2 (Two) Times a Day As Needed (pain).        [facility]arm[facility] where request [facility] be sent[facility]  [place] [facility] [address] [facility] [phone] [facility] [phone] FX     Additional details provided by patient:    Does the patient have less than a 3 day supply:  [x] Yes  [] No    Chandu Vuong Rep   06/08/22 12:58 EDT

## 2022-06-09 RX ORDER — PREGABALIN 300 MG/1
300 CAPSULE ORAL 2 TIMES DAILY PRN
Qty: 60 CAPSULE | Refills: 0 | Status: SHIPPED | OUTPATIENT
Start: 2022-06-09 | End: 2022-07-06 | Stop reason: SDUPTHER

## 2022-07-06 DIAGNOSIS — E03.9 ACQUIRED HYPOTHYROIDISM: ICD-10-CM

## 2022-07-06 DIAGNOSIS — M50.30 DDD (DEGENERATIVE DISC DISEASE), CERVICAL: ICD-10-CM

## 2022-07-06 NOTE — TELEPHONE ENCOUNTER
CONTROLLED MEDICATION REFILL REQUEST    STATE REGULATION APPT EVERY 3 MONTHS    UDS(URINE DRUG SCREEN) EVERY 6 MONTHS    NEW NARC CONSENT EVERY YEAR     URINE DRUG SCREEN: POC Urine Drug Screen [facility] (03/09/2022 14:42)    LAST OFFICE VISIT: Office Visit with Rigoberto Gomes Jr., MD (04/11/2022)    NARC CONSENT: CONTROLLED SUBSTANCE AGREEMENT - SCAN - CONTROLLED SUBSTANCE AGREEMENT (03/09/2022)    NEXT OFFICE VISIT: Appointment with Rigoberto Gomes Jr., MD (08/11/2022)    MEDICATION: pregabalin (Lyrica) 300 MG capsule (06/09/2022)

## 2022-07-06 NOTE — TELEPHONE ENCOUNTER
Caller: Susan Hernandez Art    Relationship: Self    Best call back number: [phone]    Requested Prescriptions:   Requested Prescriptions     Pending Prescriptions Disp Refills   • pregabalin (Lyrica) 300 MG capsule 60 capsule 0     Sig: Take 1 capsule by mouth 2 (Two) Times a Day As Needed (pain).        [facility]  [place] [facility] [address] [phone] [facility] [phone] FX     Additional details provided by patient: 3 DAYS LEFT OF MEDICATION     Does the patient have less than a 3 day supply:  [x] Yes  [] No    Chandu Lopez Rep   07/06/22 11:37 EDT

## 2022-07-07 RX ORDER — PREGABALIN 300 MG/1
300 CAPSULE ORAL 2 TIMES DAILY PRN
Qty: 60 CAPSULE | Refills: 0 | Status: SHIPPED | OUTPATIENT
Start: 2022-07-07 | End: 2022-08-05 | Stop reason: SDUPTHER

## 2022-07-07 RX ORDER — LEVOTHYROXINE SODIUM 0.05 MG/1
50 TABLET ORAL DAILY
Qty: 90 TABLET | Refills: 3 | Status: SHIPPED | OUTPATIENT
Start: 2022-07-07 | End: 2022-11-07 | Stop reason: SDUPTHER

## 2022-07-14 NOTE — PAT
Attempted to contact patient regarding PAT.  No answer.  Left message with arrival time of 0630 on 7/19/22.  Instructed to come to the [facility] entrance to check in. Reinforced NPO.    Rekha Sena RN 13:56 EDT 7/14/2022

## 2022-07-18 NOTE — PRE-PROCEDURE INSTRUCTIONS
PT INSTRUCTED ON CLEAR LIQ DIET AND PO SPLIT PREP LAST BM SHOULD BE CLEAR  PT CAN TAKE lyrica and synthroid    WITH A SIP OF WATER IN THE AM OF THE PROCEDURE ARRIVAL TIME IS 0630 am  ON 7/19/22 blood sugar ordered

## 2022-07-19 ENCOUNTER — HOSPITAL ENCOUNTER (OUTPATIENT)
Facility: HOSPITAL | Age: 72
Setting detail: HOSPITAL OUTPATIENT SURGERY
Discharge: HOME | End: 2022-07-19
Attending: INTERNAL MEDICINE | Admitting: INTERNAL MEDICINE

## 2022-07-19 ENCOUNTER — ANESTHESIA EVENT (OUTPATIENT)
Dept: GASTROENTEROLOGY | Facility: HOSPITAL | Age: 72
End: 2022-07-19

## 2022-07-19 ENCOUNTER — ANESTHESIA (OUTPATIENT)
Dept: GASTROENTEROLOGY | Facility: HOSPITAL | Age: 72
End: 2022-07-19

## 2022-07-19 VITALS
DIASTOLIC BLOOD PRESSURE: 64 MMHG | HEART RATE: 66 BPM | OXYGEN SATURATION: 94 % | RESPIRATION RATE: 15 BRPM | HEIGHT: 64 IN | TEMPERATURE: 97.3 F | WEIGHT: 145.5 LBS | SYSTOLIC BLOOD PRESSURE: 107 MMHG | BODY MASS INDEX: 24.84 KG/M2

## 2022-07-19 DIAGNOSIS — C18.9 MALIGNANT NEOPLASM OF COLON, UNSPECIFIED PART OF COLON: ICD-10-CM

## 2022-07-19 DIAGNOSIS — Z12.11 ENCOUNTER FOR SCREENING FOR MALIGNANT NEOPLASM OF COLON: ICD-10-CM

## 2022-07-19 LAB — GLUCOSE BLDC GLUCOMTR-MCNC: 186 MG/DL (ref 70–99)

## 2022-07-19 PROCEDURE — 88305 TISSUE EXAM BY PATHOLOGIST: CPT | Performed by: INTERNAL MEDICINE

## 2022-07-19 PROCEDURE — 45385 COLONOSCOPY W/LESION REMOVAL: CPT | Performed by: INTERNAL MEDICINE

## 2022-07-19 PROCEDURE — 25010000002 PROPOFOL 10 MG/ML EMULSION: Performed by: NURSE ANESTHETIST, CERTIFIED REGISTERED

## 2022-07-19 PROCEDURE — 82962 GLUCOSE BLOOD TEST: CPT

## 2022-07-19 RX ORDER — PROPOFOL 10 MG/ML
VIAL (ML) INTRAVENOUS AS NEEDED
Status: DISCONTINUED | OUTPATIENT
Start: 2022-07-19 | End: 2022-07-19 | Stop reason: SURG

## 2022-07-19 RX ORDER — SODIUM CHLORIDE, SODIUM LACTATE, POTASSIUM CHLORIDE, CALCIUM CHLORIDE 600; 310; 30; 20 MG/100ML; MG/100ML; MG/100ML; MG/100ML
30 INJECTION, SOLUTION INTRAVENOUS CONTINUOUS
Status: DISCONTINUED | OUTPATIENT
Start: 2022-07-19 | End: 2022-07-19 | Stop reason: HOSPADM

## 2022-07-19 RX ORDER — LIDOCAINE HYDROCHLORIDE 20 MG/ML
INJECTION, SOLUTION EPIDURAL; INFILTRATION; INTRACAUDAL; PERINEURAL AS NEEDED
Status: DISCONTINUED | OUTPATIENT
Start: 2022-07-19 | End: 2022-07-19 | Stop reason: SURG

## 2022-07-19 RX ADMIN — SODIUM CHLORIDE, POTASSIUM CHLORIDE, SODIUM LACTATE AND CALCIUM CHLORIDE 30 ML/HR: 600; 310; 30; 20 INJECTION, SOLUTION INTRAVENOUS at 06:59

## 2022-07-19 RX ADMIN — PROPOFOL 50 MG: 10 INJECTION, EMULSION INTRAVENOUS at 08:02

## 2022-07-19 RX ADMIN — LIDOCAINE HYDROCHLORIDE 50 MG: 20 INJECTION, SOLUTION EPIDURAL; INFILTRATION; INTRACAUDAL; PERINEURAL at 08:02

## 2022-07-19 RX ADMIN — PROPOFOL 100 MCG/KG/MIN: 10 INJECTION, EMULSION INTRAVENOUS at 08:02

## 2022-07-19 NOTE — ANESTHESIA PREPROCEDURE EVALUATION
Anesthesia Evaluation     Patient summary reviewed and Nursing notes reviewed   no history of anesthetic complications:  NPO Solid Status: > 8 hours  NPO Liquid Status: > 2 hours           Airway   Mallampati: III  TM distance: >3 FB  Neck ROM: full  No difficulty expected  Dental      Pulmonary - negative pulmonary ROS and normal exam    breath sounds clear to auscultation  Cardiovascular - normal exam  Exercise tolerance: good (4-7 METS)    Rhythm: regular  Rate: normal    (+) hyperlipidemia,       Neuro/Psych- negative ROS  GI/Hepatic/Renal/Endo    (+)  GERD,  diabetes mellitus, thyroid problem hypothyroidism    Musculoskeletal     Abdominal    Substance History - negative use     OB/GYN negative ob/gyn ROS         Other   arthritis,    history of cancer    ROS/Med Hx Other: fsbs 186                  Anesthesia Plan    ASA 2     general     (Total IV Anesthesia    Patient understands anesthesia not responsible for dental damage.  )  intravenous induction     Anesthetic plan, risks, benefits, and alternatives have been provided, discussed and informed consent has been obtained with: patient.    Plan discussed with CRNA.        CODE STATUS:

## 2022-07-19 NOTE — ANESTHESIA POSTPROCEDURE EVALUATION
Patient: Susan Hernandez    Procedure Summary     Date: 07/19/22 Room / Location: [facility] END[facility]COPY 4 / [facility]    Anesthesia Start: 0800 Anesthesia Stop: 0832    Procedure: COLONOSCOPY WITH POLYPECTOMIES (N/A ) Diagnosis:       Malignant neoplasm of colon, unspecified part of colon (HCC)      Encounter for screening for malignant neoplasm of colon      (Malignant neoplasm of colon, unspecified part of colon (HCC) [C18.9])      (Encounter for screening for malignant neoplasm of colon [Z12.11])    Surgeons: Manny Velazquez MD Provider: Himanshu Mancuso MD    Anesthesia Type: general ASA Status: 2          Anesthesia Type: general    Vitals  Vitals Value Taken Time   /64 07/19/22 0846   Temp 36.3 °C (97.3 °F) 07/19/22 0846   Pulse 66 07/19/22 0846   Resp 15 07/19/22 0846   SpO2 94 % 07/19/22 0846           Post Anesthesia Care and Evaluation    Patient location during evaluation: bedside  Patient participation: complete - patient participated  Level of consciousness: awake  Pain score: 0  Pain management: adequate    Airway patency: patent  Anesthetic complications: No anesthetic complications  PONV Status: none  Cardiovascular status: acceptable and stable  Respiratory status: acceptable and room air  Hydration status: acceptable    Comments: An Anesthesiologist personally participated in the most demanding procedures (including induction and emergence if applicable) in the anesthesia plan, monitored the course of anesthesia administration at frequent intervals and remained physically present and available for immediate diagnosis and treatment of emergencies.

## 2022-07-19 NOTE — H&P
Pre Procedure History & Physical    Chief Complaint:   Screening    Subjective     HPI:   Colon cancer screening    Past Medical History:   Past Medical History:   Diagnosis Date   • Arthritis    • Cancer (HCC)    • Colon cancer (HCC)    • Diabetes mellitus, type 2 (HCC)    • Disease of thyroid gland    • GERD (gastroesophageal reflux disease)    • Lumbar pain    • Small bowel obstruction (HCC)        Past Surgical History:  Past Surgical History:   Procedure Laterality Date   • APPENDECTOMY     • COLON SURGERY  2011,2006    CANCER, CHEMO   • COLONOSCOPY     • HYSTERECTOMY  2004       Family History:  Family History   Problem Relation Age of Onset   • Breast cancer Brother    • Other Brother         RENAL CANCER   • Colon cancer Neg Hx        Social History:   reports that she quit smoking about 12 years ago. She has a 76.00 pack-year smoking history. She has never used smokeless tobacco. She reports previous alcohol use. She reports that she does not use drugs.    Medications:   Medications Prior to Admission   Medication Sig Dispense Refill Last Dose   • lidocaine (LIDODERM) 5 % Place 1 patch on the skin as directed by provider Daily. Remove & Discard patch within 12 hours or as directed by MD 90 patch 3 Past Month at Unknown time   • pregabalin (Lyrica) 300 MG capsule Take 1 capsule by mouth 2 (Two) Times a Day As Needed (pain). 60 capsule 0 7/19/2022 at Unknown time   • calcium carbonate-cholecalciferol 500-400 MG-UNIT tablet tablet Take  by mouth Daily.   7/16/2022   • carboxymethylcellulose (Refresh Plus) 0.5 % solution Administer 2 drops to both eyes 3 (Three) Times a Day As Needed for Dry Eyes. 70 each 3 7/17/2022   • cholecalciferol (VITAMIN D3) 25 MCG (1000 UT) tablet    7/17/2022   • DULoxetine (CYMBALTA) 30 MG capsule Take 1 capsule by mouth Daily. 90 capsule 3 7/16/2022   • empagliflozin (Jardiance) 25 MG tablet tablet Take 1 tablet by mouth Daily. (Patient taking differently: Take 25 mg by mouth Daily.  "Last dose 7/17/22) 90 tablet 3 7/16/2022   • levothyroxine (Synthroid) 50 MCG tablet Take 1 tablet by mouth Daily. 90 tablet 3 7/16/2022   • metFORMIN (GLUCOPHAGE) 500 MG tablet Take 1 tablet by mouth Daily With Breakfast. (Patient taking differently: Take 500 mg by mouth Daily With Breakfast. Last dose 7/17/22) 90 tablet 3 7/16/2022   • Omega-3 Fatty Acids (fish oil) 1000 MG capsule capsule Take 1,000 mg by mouth Daily With Breakfast.   7/16/2022   • omeprazole (priLOSEC) 40 MG capsule Take 1 capsule by mouth Daily. 90 capsule 3 7/16/2022   • simvastatin (ZOCOR) 20 MG tablet Take 1 tablet by mouth Every Night. 90 tablet 3 7/16/2022   • SITagliptin (Januvia) 100 MG tablet Take 1 tablet by mouth Daily. (Patient taking differently: Take 100 mg by mouth Daily With Lunch. Last dose 7/17/22) 90 tablet 3 7/16/2022       Allergies:  Aspirin and Penicillin v potassium        Objective     Blood pressure 127/86, pulse 86, temperature 97.8 °F (36.6 °C), temperature source Temporal, resp. rate 16, height 162.6 cm (64.02\"), weight 66 kg (145 lb 8.1 oz), SpO2 98 %.    Physical Exam   Constitutional: Pt is oriented to person, place, and time and well-developed, well-nourished, and in no distress.   Mouth/Throat: Oropharynx is clear and moist.   Neck: Normal range of motion.   Cardiovascular: Normal rate, regular rhythm and normal heart sounds.    Pulmonary/Chest: Effort normal and breath sounds normal.   Abdominal: Soft. Nontender  Skin: Skin is warm and dry.   Psychiatric: Mood, memory, affect and judgment normal.     Assessment & Plan     Diagnosis:  Screening    Anticipated Surgical Procedure:  Colonoscopy    The risks, benefits, and alternatives of this procedure have been discussed with the patient or the responsible party- the patient understands and agrees to proceed.            "

## 2022-07-20 ENCOUNTER — TELEPHONE (OUTPATIENT)
Dept: INTERNAL MEDICINE | Facility: CLINIC | Age: 72
End: 2022-07-20

## 2022-07-20 LAB
CYTO UR: NORMAL
LAB AP CASE REPORT: NORMAL
LAB AP CLINICAL INFORMATION: NORMAL
PATH REPORT.FINAL DX SPEC: NORMAL
PATH REPORT.GROSS SPEC: NORMAL

## 2022-07-21 RX ORDER — MELATONIN
1000 DAILY
Qty: 90 TABLET | Refills: 1 | Status: SHIPPED | OUTPATIENT
Start: 2022-07-21 | End: 2022-09-26 | Stop reason: SDUPTHER

## 2022-07-21 NOTE — TELEPHONE ENCOUNTER
PT(PATIENT) VERIFIED     CONTACTED PT(PATIENT) PER PROVIDER'S INSTRUCTIONS    Rigoberto Gomes Jr., MD  [facility] Clinical Pool 2 minutes ago (1:05 PM)     Please notify pt that scripts have been sent to [facility]. Thank you.

## 2022-07-21 NOTE — TELEPHONE ENCOUNTER
Caller: Susan Hernandez    Relationship: Self    Best call back number: [phone]    Requested Prescriptions:   Requested Prescriptions     Pending Prescriptions Disp Refills   • cholecalciferol (VITAMIN D3) 25 MCG (1000 UT) tablet          Pharmacy where request should be sent: [facility] [place] [facility] [place] [facility] [address] [facility] - [phone]  - [phone] FX     Additional details provided by patient: PATIENT WOULD LIKE A CALL BACK TO CONFIRM THIS HAS BEEN SENT    Does the patient have less than a 3 day supply:  [x] Yes  [] No    Chandu Murillo Rep   07/21/22 12:46 EDT

## 2022-08-05 DIAGNOSIS — M50.30 DDD (DEGENERATIVE DISC DISEASE), CERVICAL: ICD-10-CM

## 2022-08-05 RX ORDER — PREGABALIN 300 MG/1
300 CAPSULE ORAL 2 TIMES DAILY PRN
Qty: 60 CAPSULE | Refills: 0 | Status: SHIPPED | OUTPATIENT
Start: 2022-08-05 | End: 2022-09-08 | Stop reason: SDUPTHER

## 2022-08-05 NOTE — TELEPHONE ENCOUNTER
CONTROLLED MEDICATION REFILL REQUEST    STATE REGULATION APPT EVERY 3 MONTHS     UDS(URINE DRUG SCREEN) EVERY 6 MONTHS     NEW NARC CONSENT EVERY YEAR      URINE DRUG SCREEN: POC Urine Drug Screen [facility] (03/09/2022 14:42)     LAST OFFICE VISIT: Office Visit with Rigoberto Gomes Jr., MD (04/11/2022)      NARC CONSENT: CONTROLLED SUBSTANCE AGREEMENT - SCAN - CONTROLLED SUBSTANCE AGREEMENT (03/09/2022)      NEXT OFFICE VISIT: Appointment with Rigoberto Gomes Jr., MD (08/11/2022)      MEDICATION: pregabalin (Lyrica) 300 MG capsule (07/07/2022)

## 2022-08-05 NOTE — TELEPHONE ENCOUNTER
Caller: Mary Susan Cordova    Relationship: Self    Best call back number: [phone]    Requested Prescriptions:   Requested Prescriptions     Pending Prescriptions Disp Refills   • pregabalin (Lyrica) 300 MG capsule 60 capsule 0     Sig: Take 1 capsule by mouth 2 (Two) Times a Day As Needed (pain).        [facility] be sent[facility]  [place] [facility] [address] [facility] [phone] [facility] [phone] FX     Does the patient have less than a 3 day supply:  [x] Yes  [] No    Chandu PIERCE Rep   08/05/22 09:06 EDT

## 2022-08-05 NOTE — TELEPHONE ENCOUNTER
ATTEMPTED TO CONTACT PT PER PROVIDER'S INSTRUCTIONS      NO ANSWER      LEFT VOICEMAIL WITH INSTRUCTIONS TO RETURN CALL TO OFFICE AT [phone]     OK FOR HUB TO ADVISE/READ   Rigoberto Gomes Jr., MD 8/5/22 12:51 PM  Prescription signed and in folder

## 2022-08-08 ENCOUNTER — CLINICAL SUPPORT (OUTPATIENT)
Dept: INTERNAL MEDICINE | Facility: CLINIC | Age: 72
End: 2022-08-08

## 2022-08-08 DIAGNOSIS — Z23 NEED FOR COVID-19 VACCINE: Primary | ICD-10-CM

## 2022-08-08 PROCEDURE — 0052A COVID-19 (PFIZER) 12+ YRS: CPT | Performed by: INTERNAL MEDICINE

## 2022-08-08 PROCEDURE — 91305 COVID-19 (PFIZER) 12+ YRS: CPT | Performed by: INTERNAL MEDICINE

## 2022-09-08 ENCOUNTER — TELEPHONE (OUTPATIENT)
Dept: INTERNAL MEDICINE | Facility: CLINIC | Age: 72
End: 2022-09-08

## 2022-09-08 DIAGNOSIS — M50.30 DDD (DEGENERATIVE DISC DISEASE), CERVICAL: ICD-10-CM

## 2022-09-08 NOTE — TELEPHONE ENCOUNTER
Refill request for  controlled substance.      Date of request: 9/8/2022   Medication requested: Lyrica   Last OV: 4/11/2022  Last UDS:03/09/2022  Contract signed: yes    Date:03/09/2022  Next office visit: 09/15/2022    Wendy Steele

## 2022-09-08 NOTE — TELEPHONE ENCOUNTER
Caller: Susan Hernandez    Relationship: Self    Best call back number: [phone]    Requested Prescriptions:   Requested Prescriptions     Pending Prescriptions Disp Refills   • pregabalin (Lyrica) 300 MG capsule 60 capsule 0     Sig: Take 1 capsule by mouth 2 (Two) Times a Day As Needed (pain).        [facility] be [facility]  [place] - [address] [facility] - [phone] [facility] [phone] FX     Does the patient have less than a 3 day supply:  [x] Yes  [] No    Chandu Couch Rep   09/08/22 12:41 EDT

## 2022-09-09 RX ORDER — PREGABALIN 300 MG/1
300 CAPSULE ORAL 2 TIMES DAILY PRN
Qty: 60 CAPSULE | Refills: 0 | Status: SHIPPED | OUTPATIENT
Start: 2022-09-09 | End: 2022-10-11 | Stop reason: SDUPTHER

## 2022-09-13 ENCOUNTER — TRANSCRIBE ORDERS (OUTPATIENT)
Dept: ADMINISTRATIVE | Facility: HOSPITAL | Age: 72
End: 2022-09-13

## 2022-09-26 RX ORDER — MELATONIN
1000 DAILY
Qty: 90 TABLET | Refills: 1 | Status: SHIPPED | OUTPATIENT
Start: 2022-09-26 | End: 2022-10-11 | Stop reason: SDUPTHER

## 2022-09-26 NOTE — TELEPHONE ENCOUNTER
Caller: Susan Hernandez    Relationship: SELF    Best call back number: [phone]         What was the call regarding:     THE PATIENT SAID THE JARDIANCE SHE CALLED IN IS NOT CORRECT. SHE SAID IT SHOULD BE THE  SITagliptin (Januvia) 100 MG tablet . SHE  SAID THERE WERE NO REFILLS ON THIS MEDICATION. THE PATIENT SAID SHE HAD TO WRONG BOTTLE WHEN CALLING IN THE PRESCRIPTION.     SHE SAID SHE IS NEEDING THE VITAMIN D 3.

## 2022-09-26 NOTE — TELEPHONE ENCOUNTER
Caller: Susan Hernandez Art    Relationship: Self    Best call back number: [phone]     Requested Prescriptions:   Requested Prescriptions     Pending Prescriptions Disp Refills   • empagliflozin (Jardiance) 25 MG tablet tablet 90 tablet 3     Sig: Take 1 tablet by mouth Daily.   • cholecalciferol (VITAMIN D3) 25 MCG (1000 UT) tablet 90 tablet 1     Sig: Take 1 tablet by mouth Daily.        Pharmacy where request should be sent: [facility] [place] [facility] [place]  [address] [facility] [phone] [facility] [phone] FX     Additional details provided by patient: OUT OF MEDICATION     Does the patient have less than a 3 day supply:  [x] Yes  [] No    Chandu Lopez Rep   09/26/22 15:07 EDT

## 2022-10-04 ENCOUNTER — HOSPITAL ENCOUNTER (OUTPATIENT)
Dept: MAMMOGRAPHY | Facility: HOSPITAL | Age: 72
Discharge: HOME | End: 2022-10-04
Admitting: INTERNAL MEDICINE

## 2022-10-04 DIAGNOSIS — Z12.31 VISIT FOR SCREENING MAMMOGRAM: ICD-10-CM

## 2022-10-04 PROCEDURE — 77067 SCR MAMMO BI INCL CAD: CPT

## 2022-10-04 PROCEDURE — 77063 BREAST TOMOSYNTHESIS BI: CPT

## 2022-10-11 ENCOUNTER — OFFICE VISIT (OUTPATIENT)
Dept: INTERNAL MEDICINE | Facility: CLINIC | Age: 72
End: 2022-10-11

## 2022-10-11 VITALS
OXYGEN SATURATION: 96 % | SYSTOLIC BLOOD PRESSURE: 129 MMHG | DIASTOLIC BLOOD PRESSURE: 89 MMHG | HEIGHT: 64 IN | BODY MASS INDEX: 25.4 KG/M2 | WEIGHT: 148.8 LBS | TEMPERATURE: 97.4 F | HEART RATE: 79 BPM

## 2022-10-11 DIAGNOSIS — E78.49 OTHER HYPERLIPIDEMIA: ICD-10-CM

## 2022-10-11 DIAGNOSIS — Z23 NEED FOR PNEUMOCOCCAL VACCINATION: ICD-10-CM

## 2022-10-11 DIAGNOSIS — M79.671 PAIN IN BOTH FEET: ICD-10-CM

## 2022-10-11 DIAGNOSIS — E11.9 TYPE 2 DIABETES MELLITUS WITHOUT COMPLICATION, WITHOUT LONG-TERM CURRENT USE OF INSULIN: ICD-10-CM

## 2022-10-11 DIAGNOSIS — Z79.899 ENCOUNTER FOR LONG-TERM (CURRENT) USE OF OTHER MEDICATIONS: ICD-10-CM

## 2022-10-11 DIAGNOSIS — M79.672 PAIN IN BOTH FEET: ICD-10-CM

## 2022-10-11 DIAGNOSIS — N95.1 POSTMENOPAUSAL SYNDROME: ICD-10-CM

## 2022-10-11 DIAGNOSIS — E55.9 VITAMIN D DEFICIENCY: ICD-10-CM

## 2022-10-11 DIAGNOSIS — E03.9 ACQUIRED HYPOTHYROIDISM: ICD-10-CM

## 2022-10-11 DIAGNOSIS — Z00.00 ANNUAL PHYSICAL EXAM: Primary | ICD-10-CM

## 2022-10-11 DIAGNOSIS — M50.30 DDD (DEGENERATIVE DISC DISEASE), CERVICAL: ICD-10-CM

## 2022-10-11 DIAGNOSIS — F17.211 CIGARETTE NICOTINE DEPENDENCE IN REMISSION: ICD-10-CM

## 2022-10-11 PROCEDURE — 82043 UR ALBUMIN QUANTITATIVE: CPT | Performed by: INTERNAL MEDICINE

## 2022-10-11 PROCEDURE — 82306 VITAMIN D 25 HYDROXY: CPT | Performed by: INTERNAL MEDICINE

## 2022-10-11 PROCEDURE — 1159F MED LIST DOCD IN RCRD: CPT | Performed by: INTERNAL MEDICINE

## 2022-10-11 PROCEDURE — 80061 LIPID PANEL: CPT | Performed by: INTERNAL MEDICINE

## 2022-10-11 PROCEDURE — 80053 COMPREHEN METABOLIC PANEL: CPT | Performed by: INTERNAL MEDICINE

## 2022-10-11 PROCEDURE — 1170F FXNL STATUS ASSESSED: CPT | Performed by: INTERNAL MEDICINE

## 2022-10-11 PROCEDURE — 80305 DRUG TEST PRSMV DIR OPT OBS: CPT | Performed by: INTERNAL MEDICINE

## 2022-10-11 PROCEDURE — 84443 ASSAY THYROID STIM HORMONE: CPT | Performed by: INTERNAL MEDICINE

## 2022-10-11 PROCEDURE — G0439 PPPS, SUBSEQ VISIT: HCPCS | Performed by: INTERNAL MEDICINE

## 2022-10-11 PROCEDURE — 85025 COMPLETE CBC W/AUTO DIFF WBC: CPT | Performed by: INTERNAL MEDICINE

## 2022-10-11 PROCEDURE — 83036 HEMOGLOBIN GLYCOSYLATED A1C: CPT | Performed by: INTERNAL MEDICINE

## 2022-10-11 PROCEDURE — 82570 ASSAY OF URINE CREATININE: CPT | Performed by: INTERNAL MEDICINE

## 2022-10-11 PROCEDURE — 99214 OFFICE O/P EST MOD 30 MIN: CPT | Performed by: INTERNAL MEDICINE

## 2022-10-11 RX ORDER — MELATONIN
1000 2 TIMES DAILY
Qty: 180 TABLET | Refills: 3 | Status: SHIPPED | OUTPATIENT
Start: 2022-10-11

## 2022-10-11 RX ORDER — PREGABALIN 300 MG/1
300 CAPSULE ORAL 2 TIMES DAILY PRN
Qty: 60 CAPSULE | Refills: 0 | Status: SHIPPED | OUTPATIENT
Start: 2022-10-11 | End: 2022-11-07 | Stop reason: SDUPTHER

## 2022-10-11 NOTE — PROGRESS NOTES
Chief Complaint  Medicare Wellness-subsequent and Med Refill (Patient state she takes vitamin d twice a day for some reason the prescription that was last sent in was for 1 time a day. )    Subjective          Sun Art Hernandez presents to [facility]  History of Present Illness  Patient reports elevated liver enzymes with zocor. She has stopped taking it for approx 3 months.   degenerative disc disease- patient reports lyrica is helping. Patient requests a refill.   DM2- patient reports compliance with medications. Weight is holding steady. Due for labs.     Current Outpatient Medications   Medication Instructions   • atorvastatin (LIPITOR) 10 mg, Oral, Nightly   • calcium carbonate-cholecalciferol 500-400 MG-UNIT tablet tablet Oral, Daily   • carboxymethylcellulose (Refresh Plus) 0.5 % solution 2 drops, Both Eyes, 3 Times Daily PRN   • cholecalciferol (VITAMIN D3) 1,000 Units, Oral, 2 Times Daily   • Diclofenac Sodium (VOLTAREN) 4 g, Topical, 4 Times Daily PRN   • DULoxetine (CYMBALTA) 30 mg, Oral, Daily   • empagliflozin (JARDIANCE) 25 mg, Oral, Daily   • fish oil 1,000 mg, Oral, Daily With Breakfast   • glipizide (GLUCOTROL) 5 mg, Oral, 2 Times Daily Before Meals   • levothyroxine (SYNTHROID) 50 mcg, Oral, Daily   • lidocaine (LIDODERM) 5 % 1 patch, Transdermal, Every 24 Hours, Remove & Discard patch within 12 hours or as directed by MD   • metFORMIN (GLUCOPHAGE) 500 mg, Oral, Daily With Breakfast   • omeprazole (PRILOSEC) 40 mg, Oral, Daily   • pregabalin (LYRICA) 300 mg, Oral, 2 Times Daily PRN   • SITagliptin (JANUVIA) 100 mg, Oral, Daily       The following portions of the patient's history were reviewed and updated as appropriate: allergies, current medications, past family history, past medical history, past social history, past surgical history, and problem list.    Objective   Vital Signs:   /89 (BP Location: Left arm)   Pulse 79   Temp 97.4 °F (36.3 °C)  "(Temporal)   Ht 162.6 cm (64\")   Wt 67.5 kg (148 lb 12.8 oz)   SpO2 96%   BMI 25.54 kg/m²     Wt Readings from Last 3 Encounters:   10/11/22 67.5 kg (148 lb 12.8 oz)   07/19/22 66 kg (145 lb 8.1 oz)   04/11/22 67.6 kg (149 lb)     BP Readings from Last 3 Encounters:   10/11/22 129/89   07/19/22 107/64   04/11/22 133/83     Physical Exam   Appearance: No acute distress, well-nourished  Head: normocephalic, atraumatic  Eyes: extraocular movements intact, no scleral icterus, no conjunctival injection  Ears, Nose, and Throat: external ears normal, nares patent, moist mucous membranes  Cardiovascular: regular rate and rhythm. no murmurs, rubs, or gallops. no edema  Respiratory: breathing comfortably, symmetric chest rise, clear to auscultation bilaterally. No wheezes, rales, or rhonchi.  Neuro: alert and oriented to time, place, and person. Normal gait  Psych: normal mood and affect     Result Review :   The following data was reviewed by: Rigoberto Gomes Jr, MD on 10/11/2022:  Common labs    Common Labs 4/11/22 4/11/22 4/11/22 4/11/22 10/11/22 10/11/22 10/11/22 10/11/22 10/11/22    1415 1415 1415 1415 1119 1119 1119 1119 1119   Glucose  183 (A)    184 (A)      BUN  17    15      Creatinine  1.20 (A)    0.99      Sodium  141    140      Potassium  5.2    4.2      Chloride  101    101      Calcium  10.2    9.7      Albumin  4.50    4.70      Total Bilirubin  0.8    0.6      Alkaline Phosphatase  63    98      AST (SGOT)  80 (A)    55 (A)      ALT (SGPT)  83 (A)    53 (A)      WBC 6.93       6.70    Hemoglobin 14.4       15.8    Hematocrit 43.6       46.6    Platelets 240       222    Total Cholesterol   173    257 (A)     Triglycerides   133    187 (A)     HDL Cholesterol   46    50     LDL Cholesterol    103 (A)    172 (A)     Hemoglobin A1C    7.20 (A)     8.10 (A)   Microalbumin, Urine     1.6       (A) Abnormal value              Lab Results   Component Value Date    BILIRUBINUR Negative 07/16/2021 "     Last Urine Toxicity     LAST URINE TOXICITY RESULTS Latest Ref Rng & Units 10/11/2022 3/9/2022    CREATININE UR mg/dL 90.0 -    AMPHETAMINES SCREEN, URINE Negative Negative Negative    BARBITURATES SCREEN Negative Negative Negative    BENZODIAZEPINE SCREEN, URINE Negative Negative Negative    BUPRENORPHINEUR Negative Negative Negative    COCAINE SCREEN, URINE Negative Negative Negative    METHADONE SCREEN, URINE Negative Negative Negative    METHAMPHETAMINEUR Negative Negative Negative            Assessment and Plan    Diagnoses and all orders for this visit:    1. Annual physical exam (Primary)    2. DDD (degenerative disc disease), cervical  Comments:  cont lyrica 300mg po BID. SERENA and luc reviewed.   Orders:  -     pregabalin (Lyrica) 300 MG capsule; Take 1 capsule by mouth 2 (Two) Times a Day As Needed (pain).  Dispense: 60 capsule; Refill: 0  -     Diclofenac Sodium (VOLTAREN) 1 % gel gel; Apply 4 g topically to the appropriate area as directed 4 (Four) Times a Day As Needed (pain).  Dispense: 350 g; Refill: 1    3. Encounter for long-term (current) use of other medications  -     POC Urine Drug Screen [facility]    4. Other hyperlipidemia  Comments:  off statin at this time. check labs. may consider restart based on numbers.   Orders:  -     Comprehensive Metabolic Panel  -     Lipid Panel    5. Type 2 diabetes mellitus without complication, without long-term current use of insulin (HCC)  Comments:  check labs. cont med regimen.   Orders:  -     SITagliptin (Januvia) 100 MG tablet; Take 1 tablet by mouth Daily.  Dispense: 90 tablet; Refill: 3  -     CBC & Differential  -     Hemoglobin A1c  -     Microalbumin / Creatinine Urine Ratio - Urine, Clean Catch  -     Ambulatory Referral for Diabetic Eye Exam-Ophthalmology  -     Cancel: Manual Differential    6. Acquired hypothyroidism  -     TSH    7. Postmenopausal syndrome  -     DEXA Bone Density Axial; Future    8. Need for pneumococcal  vaccination  -     Pneumococcal Conjugate Vaccine 20-Valent All    9. Vitamin D deficiency  -     cholecalciferol (VITAMIN D3) 25 MCG (1000 UT) tablet; Take 1 tablet by mouth 2 (Two) Times a Day.  Dispense: 180 tablet; Refill: 3  -     Vitamin D 25 Hydroxy    10. Pain in both feet  -     Ambulatory Referral to Podiatry    11. Cigarette nicotine dependence in remission  -      CT Chest Low Dose Cancer Screening WO; Future          Medications Discontinued During This Encounter   Medication Reason   • simvastatin (ZOCOR) 20 MG tablet Alternate therapy   • SITagliptin (Januvia) 100 MG tablet Reorder   • pregabalin (Lyrica) 300 MG capsule Reorder   • cholecalciferol (VITAMIN D3) 25 MCG (1000 UT) tablet Reorder        Follow Up   Return in about 3 months (around 1/11/2023) for Recheck, DM, HTN.  Patient was given instructions and counseling regarding her condition or for health maintenance advice. Please see specific information pulled into the AVS if appropriate.       Rigoberto Gomes Jr, MD  10/13/22  12:10 EDT

## 2022-10-11 NOTE — PROGRESS NOTES
The ABCs of the Annual Wellness Visit  Subsequent Medicare Wellness Visit    Chief Complaint   Patient presents with   • Medicare Wellness-subsequent   • Med Refill     Patient state she takes vitamin d twice a day for some reason the prescription that was last sent in was for 1 time a day.       Subjective    History of Present Illness:  Susan Hernandez is a 71 y.o. female who presents for a Subsequent Medicare Wellness Visit.    The following portions of the patient's history were reviewed and   updated as appropriate: allergies, current medications, past family history, past medical history, past social history, past surgical history and problem list.    Compared to one year ago, the patient feels her physical   health is the same.    Compared to one year ago, the patient feels her mental   health is the same.    Recent Hospitalizations:  She was not admitted to the hospital during the last year.       Current Medical Providers:  Patient Care Team:  Rigoberto Gomes Jr., MD as PCP - General (Internal Medicine)  Tonio Castillo as Medical Assistant    Outpatient Medications Prior to Visit   Medication Sig Dispense Refill   • carboxymethylcellulose (Refresh Plus) 0.5 % solution Administer 2 drops to both eyes 3 (Three) Times a Day As Needed for Dry Eyes. 70 each 3   • DULoxetine (CYMBALTA) 30 MG capsule Take 1 capsule by mouth Daily. 90 capsule 3   • empagliflozin (Jardiance) 25 MG tablet tablet Take 1 tablet by mouth Daily. 90 tablet 3   • levothyroxine (Synthroid) 50 MCG tablet Take 1 tablet by mouth Daily. 90 tablet 3   • lidocaine (LIDODERM) 5 % Place 1 patch on the skin as directed by provider Daily. Remove & Discard patch within 12 hours or as directed by MD 90 patch 3   • metFORMIN (GLUCOPHAGE) 500 MG tablet Take 1 tablet by mouth Daily With Breakfast. (Patient taking differently: Take 1 tablet by mouth Daily With Breakfast. Last dose 7/17/22) 90 tablet 3   • Omega-3 Fatty Acids (fish oil) 1000 MG  "capsule capsule Take 1,000 mg by mouth Daily With Breakfast.     • omeprazole (priLOSEC) 40 MG capsule Take 1 capsule by mouth Daily. 90 capsule 3   • cholecalciferol (VITAMIN D3) 25 MCG (1000 UT) tablet Take 1 tablet by mouth Daily. 90 tablet 1   • pregabalin (Lyrica) 300 MG capsule Take 1 capsule by mouth 2 (Two) Times a Day As Needed (pain). 60 capsule 0   • SITagliptin (Januvia) 100 MG tablet Take 1 tablet by mouth Daily. (Patient taking differently: Take 1 tablet by mouth Daily With Lunch. Last dose 7/17/22) 90 tablet 3   • calcium carbonate-cholecalciferol 500-400 MG-UNIT tablet tablet Take  by mouth Daily.     • simvastatin (ZOCOR) 20 MG tablet Take 1 tablet by mouth Every Night. 90 tablet 3     No facility-administered medications prior to visit.       No opioid medication identified on active medication list. I have reviewed chart for other potential  high risk medication/s and harmful drug interactions in the elderly.          Aspirin is not on active medication list.  Aspirin use is indicated based on review of current medical condition/s. Pros and cons of this therapy have been discussed with this patient. Benefits of this medication outweigh potential harm.  Patient has been instructed to start taking this medication..    Patient Active Problem List   Diagnosis   • Arthritis   • Cancer, colon (HCC)   • Diabetes mellitus, type II (HCC)   • Esophageal reflux   • Low back pain   • Small bowel obstruction (HCC)   • DDD (degenerative disc disease), cervical   • Other hyperlipidemia   • Acquired hypothyroidism   • Nuclear cataract   • Encounter for screening for malignant neoplasm of colon   • Vitamin D deficiency             Objective    Vitals:    10/11/22 1020   BP: 129/89   BP Location: Left arm   Pulse: 79   Temp: 97.4 °F (36.3 °C)   TempSrc: Temporal   SpO2: 96%   Weight: 67.5 kg (148 lb 12.8 oz)   Height: 162.6 cm (64\")     Estimated body mass index is 25.54 kg/m² as calculated from the following:    " "Height as of this encounter: 162.6 cm (64\").    Weight as of this encounter: 67.5 kg (148 lb 12.8 oz).      Does the patient have evidence of cognitive impairment? No      Lab Results   Component Value Date    TRIG 187 (H) 10/11/2022    HDL 50 10/11/2022     (H) 10/11/2022    VLDL 35 10/11/2022    HGBA1C 8.10 (H) 10/11/2022            HEALTH RISK ASSESSMENT    Smoking Status:  Social History     Tobacco Use   Smoking Status Former   • Packs/day: 2.00   • Years: 38.00   • Pack years: 76.00   • Types: Cigarettes   • Quit date:    • Years since quittin.7   Smokeless Tobacco Never     Alcohol Consumption:  Social History     Substance and Sexual Activity   Alcohol Use Not Currently    Comment: last drink      Fall Risk Screen:    [place] Fall Risk Assessment was completed, and patient is at HIGH risk for falls. Assessment completed on:10/11/2022    Depression Screening:  PHQ-2/PHQ-9 Depression Screening 10/11/2022   Retired PHQ-9 Total Score -   Retired Total Score -   Little Interest or Pleasure in Doing Things 0-->not at all   Feeling Down, Depressed or Hopeless 0-->not at all   Trouble Falling or Staying Asleep, or Sleeping Too Much -   Feeling Tired or Having Little Energy -   Poor Appetite or Overeating -   Feeling Bad about Yourself - or that You are a Failure or Have Let Yourself or Your Family Down -   Trouble Concentrating on Things, Such as Reading the Newspaper or Watching Television -   Moving or Speaking So Slowly that Other People Could Have Noticed? Or the Opposite - Being So Fidgety -   Thoughts that You Would be Better Off Dead or of Hurting Yourself in Some Way -   PHQ-9: Brief Depression Severity Measure Score 0   If You Checked Off Any Problems, How Difficult Have These Problems Made It For You to Do Your Work, Take Care of Things at Home, or Get Along with Other People? -       Health Habits and Functional and Cognitive Screening:  Functional & Cognitive Status 10/11/2022   Do you " have difficulty preparing food and eating? No   Do you have difficulty bathing yourself, getting dressed or grooming yourself? No   Do you have difficulty using the toilet? No   Do you have difficulty moving around from place to place? No   Do you have trouble with steps or getting out of a bed or a chair? No   Current Diet Well Balanced Diet   Dental Exam Up to date   Eye Exam Up to date   Exercise (times per week) 0 times per week   Current Exercises Include No Regular Exercise   Do you need help using the phone?  No   Are you deaf or do you have serious difficulty hearing?  Yes   Do you need help with transportation? No   Do you need help shopping? No   Do you need help preparing meals?  No   Do you need help with housework?  No   Do you need help with laundry? No   Do you need help taking your medications? No   Do you need help managing money? No   Do you ever drive or ride in a car without wearing a seat belt? No   Have you felt unusual stress, anger or loneliness in the last month? No   Who do you live with? Alone   If you need help, do you have trouble finding someone available to you? No   Have you been bothered in the last four weeks by sexual problems? No   Do you have difficulty concentrating, remembering or making decisions? No       Age-appropriate Screening Schedule:  Refer to the list below for future screening recommendations based on patient's age, sex and/or medical conditions. Orders for these recommended tests are listed in the plan section. The patient has been provided with a written plan.    Health Maintenance   Topic Date Due   • TDAP/TD VACCINES (1 - Tdap) Never done   • ZOSTER VACCINE (1 of 2) Never done   • DIABETIC EYE EXAM  06/24/2022   • MAMMOGRAM  07/06/2022   • DXA SCAN  08/13/2022   • INFLUENZA VACCINE  03/31/2023 (Originally 8/1/2022)   • DIABETIC FOOT EXAM  04/11/2023   • HEMOGLOBIN A1C  04/11/2023   • LIPID PANEL  10/11/2023   • URINE MICROALBUMIN  10/11/2023   • COLONOSCOPY   07/19/2025              Assessment & Plan   CMS Preventative Services Quick Reference  Risk Factors Identified During Encounter  Cardiovascular Disease  Chronic Pain   Fall Risk-High or Moderate  Immunizations Discussed/Encouraged (specific Immunizations; Influenza and Prevnar 20 (Pneumococcal 20-valent conjugate)  Tobacco Use/Dependance (use dotphrase .tobaccocessation for documentation)  The above risks/problems have been discussed with the patient.  Follow up actions/plans if indicated are seen below in the Assessment/Plan Section.  Pertinent information has been shared with the patient in the After Visit Summary.    Diagnoses and all orders for this visit:    1. Annual physical exam (Primary)    2. DDD (degenerative disc disease), cervical  Comments:  cont lyrica 300mg po BID. SERENA and luc reviewed.   Orders:  -     pregabalin (Lyrica) 300 MG capsule; Take 1 capsule by mouth 2 (Two) Times a Day As Needed (pain).  Dispense: 60 capsule; Refill: 0  -     Diclofenac Sodium (VOLTAREN) 1 % gel gel; Apply 4 g topically to the appropriate area as directed 4 (Four) Times a Day As Needed (pain).  Dispense: 350 g; Refill: 1    3. Encounter for long-term (current) use of other medications  -     POC Urine Drug Screen [facility]    4. Other hyperlipidemia  Comments:  off statin at this time. check labs. may consider restart based on numbers.   Orders:  -     Comprehensive Metabolic Panel  -     Lipid Panel    5. Type 2 diabetes mellitus without complication, without long-term current use of insulin (HCC)  Comments:  check labs. cont med regimen.   Orders:  -     SITagliptin (Januvia) 100 MG tablet; Take 1 tablet by mouth Daily.  Dispense: 90 tablet; Refill: 3  -     CBC & Differential  -     Hemoglobin A1c  -     Microalbumin / Creatinine Urine Ratio - Urine, Clean Catch  -     Ambulatory Referral for Diabetic Eye Exam-Ophthalmology  -     Cancel: Manual Differential    6. Acquired hypothyroidism  -     TSH    7.  Postmenopausal syndrome  -     DEXA Bone Density Axial; Future    8. Need for pneumococcal vaccination  -     Pneumococcal Conjugate Vaccine 20-Valent All    9. Vitamin D deficiency  -     cholecalciferol (VITAMIN D3) 25 MCG (1000 UT) tablet; Take 1 tablet by mouth 2 (Two) Times a Day.  Dispense: 180 tablet; Refill: 3  -     Vitamin D 25 Hydroxy    10. Pain in both feet  -     Ambulatory Referral to Podiatry    11. Cigarette nicotine dependence in remission  -      CT Chest Low Dose Cancer Screening WO; Future        Follow Up:   Return in about 3 months (around 1/11/2023) for Recheck, DM, HTN.     An After Visit Summary and PPPS were made available to the patient.

## 2022-10-12 LAB
25(OH)D3 SERPL-MCNC: 78 NG/ML (ref 30–100)
ALBUMIN SERPL-MCNC: 4.7 G/DL (ref 3.5–5.2)
ALBUMIN UR-MCNC: 1.6 MG/DL
ALBUMIN/GLOB SERPL: 1.8 G/DL
ALP SERPL-CCNC: 98 U/L (ref 39–117)
ALT SERPL W P-5'-P-CCNC: 53 U/L (ref 1–33)
ANION GAP SERPL CALCULATED.3IONS-SCNC: 11.2 MMOL/L (ref 5–15)
AST SERPL-CCNC: 55 U/L (ref 1–32)
BASOPHILS # BLD AUTO: 0.03 10*3/MM3 (ref 0–0.2)
BASOPHILS NFR BLD AUTO: 0.4 % (ref 0–1.5)
BILIRUB SERPL-MCNC: 0.6 MG/DL (ref 0–1.2)
BUN SERPL-MCNC: 15 MG/DL (ref 8–23)
BUN/CREAT SERPL: 15.2 (ref 7–25)
CALCIUM SPEC-SCNC: 9.7 MG/DL (ref 8.6–10.5)
CHLORIDE SERPL-SCNC: 101 MMOL/L (ref 98–107)
CHOLEST SERPL-MCNC: 257 MG/DL (ref 0–200)
CO2 SERPL-SCNC: 27.8 MMOL/L (ref 22–29)
CREAT SERPL-MCNC: 0.99 MG/DL (ref 0.57–1)
CREAT UR-MCNC: 90 MG/DL
DEPRECATED RDW RBC AUTO: 45.4 FL (ref 37–54)
EGFRCR SERPLBLD CKD-EPI 2021: 61.1 ML/MIN/1.73
EOSINOPHIL # BLD AUTO: 0.07 10*3/MM3 (ref 0–0.4)
EOSINOPHIL NFR BLD AUTO: 1 % (ref 0.3–6.2)
ERYTHROCYTE [DISTWIDTH] IN BLOOD BY AUTOMATED COUNT: 12.9 % (ref 12.3–15.4)
GLOBULIN UR ELPH-MCNC: 2.6 GM/DL
GLUCOSE SERPL-MCNC: 184 MG/DL (ref 65–99)
HBA1C MFR BLD: 8.1 % (ref 4.8–5.6)
HCT VFR BLD AUTO: 46.6 % (ref 34–46.6)
HDLC SERPL-MCNC: 50 MG/DL (ref 40–60)
HGB BLD-MCNC: 15.8 G/DL (ref 12–15.9)
LDLC SERPL CALC-MCNC: 172 MG/DL (ref 0–100)
LDLC/HDLC SERPL: 3.39 {RATIO}
LYMPHOCYTES # BLD AUTO: 2.2 10*3/MM3 (ref 0.7–3.1)
LYMPHOCYTES NFR BLD AUTO: 32.8 % (ref 19.6–45.3)
MCH RBC QN AUTO: 32.6 PG (ref 26.6–33)
MCHC RBC AUTO-ENTMCNC: 33.9 G/DL (ref 31.5–35.7)
MCV RBC AUTO: 96.3 FL (ref 79–97)
MICROALBUMIN/CREAT UR: 17.8 MG/G
MONOCYTES # BLD AUTO: 0.44 10*3/MM3 (ref 0.1–0.9)
MONOCYTES NFR BLD AUTO: 6.6 % (ref 5–12)
NEUTROPHILS NFR BLD AUTO: 3.93 10*3/MM3 (ref 1.7–7)
NEUTROPHILS NFR BLD AUTO: 58.8 % (ref 42.7–76)
PLATELET # BLD AUTO: 222 10*3/MM3 (ref 140–450)
PMV BLD AUTO: 10.7 FL (ref 6–12)
POTASSIUM SERPL-SCNC: 4.2 MMOL/L (ref 3.5–5.2)
PROT SERPL-MCNC: 7.3 G/DL (ref 6–8.5)
RBC # BLD AUTO: 4.84 10*6/MM3 (ref 3.77–5.28)
SODIUM SERPL-SCNC: 140 MMOL/L (ref 136–145)
TRIGL SERPL-MCNC: 187 MG/DL (ref 0–150)
TSH SERPL DL<=0.05 MIU/L-ACNC: 2.63 UIU/ML (ref 0.27–4.2)
VLDLC SERPL-MCNC: 35 MG/DL (ref 5–40)
WBC NRBC COR # BLD: 6.7 10*3/MM3 (ref 3.4–10.8)

## 2022-10-12 RX ORDER — GLIPIZIDE 5 MG/1
5 TABLET ORAL
Qty: 180 TABLET | Refills: 1 | Status: SHIPPED | OUTPATIENT
Start: 2022-10-12

## 2022-10-12 RX ORDER — ATORVASTATIN CALCIUM 10 MG/1
10 TABLET, FILM COATED ORAL NIGHTLY
Qty: 90 TABLET | Refills: 3 | Status: SHIPPED | OUTPATIENT
Start: 2022-10-12

## 2022-10-13 PROCEDURE — G0009 ADMIN PNEUMOCOCCAL VACCINE: HCPCS | Performed by: INTERNAL MEDICINE

## 2022-10-13 PROCEDURE — 90677 PCV20 VACCINE IM: CPT | Performed by: INTERNAL MEDICINE

## 2022-10-14 ENCOUNTER — TELEPHONE (OUTPATIENT)
Dept: INTERNAL MEDICINE | Facility: CLINIC | Age: 72
End: 2022-10-14

## 2022-10-14 NOTE — TELEPHONE ENCOUNTER
SPOKE WITH PATIENT. VERIFIED .     PATIENT IS AWARE OF LAB RESULTS.   PATIENT IS ALSO AWARE OF MEDICATION CHANGES.    HUB OK TO READ/ADVISE IF PATIENT CALLS BACK:  Uncontrolled HgbA1c. Need to control blood glucose better. Continue jardiance and januvia. Will send Prescription glipizide to take before 2 largest meals.      Elevated triglycerides, which are the storage form of sugar - recommend lower carbohydrate/sugar intake.      LDL is elevated - this has been associated with increased risk of cardiovascular disease including heart attacks and strokes. Would recommend low cholesterol diet to reduce. Would recommend start atorvastatin to reduce risk of cardiovascular disease. Will send new Prescription.

## 2022-10-14 NOTE — TELEPHONE ENCOUNTER
----- Message from Rigoberto Gomes Jr., MD sent at 10/12/2022  4:37 PM EDT -----  Uncontrolled HgbA1c. Need to control blood glucose better. Continue jardiance and januvia. Will send Prescription glipizide to take before 2 largest meals.     Elevated triglycerides, which are the storage form of sugar - recommend lower carbohydrate/sugar intake.     LDL is elevated - this has been associated with increased risk of cardiovascular disease including heart attacks and strokes. Would recommend low cholesterol diet to reduce. Would recommend start atorvastatin to reduce risk of cardiovascular disease. Will send new Prescription.

## 2022-11-01 ENCOUNTER — HOSPITAL ENCOUNTER (OUTPATIENT)
Dept: CT IMAGING | Facility: HOSPITAL | Age: 72
Discharge: HOME | End: 2022-11-01
Admitting: INTERNAL MEDICINE

## 2022-11-01 DIAGNOSIS — F17.211 CIGARETTE NICOTINE DEPENDENCE IN REMISSION: ICD-10-CM

## 2022-11-01 PROCEDURE — 71271 CT THORAX LUNG CANCER SCR C-: CPT

## 2022-11-02 ENCOUNTER — TELEPHONE (OUTPATIENT)
Dept: INTERNAL MEDICINE | Facility: CLINIC | Age: 72
End: 2022-11-02

## 2022-11-02 NOTE — TELEPHONE ENCOUNTER
----- Message from Rigoberto Gomes Jr., MD sent at 11/1/2022  5:34 PM EDT -----  Study with reassuring results

## 2022-11-04 ENCOUNTER — APPOINTMENT (OUTPATIENT)
Dept: MAMMOGRAPHY | Facility: HOSPITAL | Age: 72
End: 2022-11-04

## 2022-11-07 DIAGNOSIS — E03.9 ACQUIRED HYPOTHYROIDISM: ICD-10-CM

## 2022-11-07 DIAGNOSIS — M50.30 DDD (DEGENERATIVE DISC DISEASE), CERVICAL: ICD-10-CM

## 2022-11-07 NOTE — TELEPHONE ENCOUNTER
Last Office Visit: 10/11/2022  Next Office Visit: 01/09/2023  Last Date Prescribed: 10/11/2022  Last Urine Drug Screen: 10/11/2022  Date of Signed Controlled Contract: 10/11/2022

## 2022-11-07 NOTE — TELEPHONE ENCOUNTER
Caller: Susan Hernandez Art    Relationship: Self    Best call back number: [phone]     Requested Prescriptions:   Requested Prescriptions     Pending Prescriptions Disp Refills   • pregabalin (Lyrica) 300 MG capsule 60 capsule 0     Sig: Take 1 capsule by mouth 2 (Two) Times a Day As Needed (pain).   • levothyroxine (Synthroid) 50 MCG tablet 90 tablet 3     Sig: Take 1 tablet by mouth Daily.        Pharmacy where request should be sent: [facility] -  [place], [place] - [address] [phone] [facility] [phone] FX         Does the patient have less than a 3 day supply:  [x] Yes  [] No    Chandu Jones Rep   11/07/22 15:50 EST

## 2022-11-08 RX ORDER — PREGABALIN 300 MG/1
300 CAPSULE ORAL 2 TIMES DAILY PRN
Qty: 60 CAPSULE | Refills: 0 | Status: SHIPPED | OUTPATIENT
Start: 2022-11-08 | End: 2022-12-07 | Stop reason: SDUPTHER

## 2022-11-08 RX ORDER — LEVOTHYROXINE SODIUM 0.05 MG/1
50 TABLET ORAL DAILY
Qty: 90 TABLET | Refills: 1 | Status: SHIPPED | OUTPATIENT
Start: 2022-11-08

## 2022-11-08 NOTE — TELEPHONE ENCOUNTER
CONTACTED PT REGARDING LYRICA PRESCRIPTION. I INFORMED PATIENT IT WAS READY FOR PICKUP AT OUR OFFICE. PATIENT VOICED UNDERSTANDING NO FURTHER QUESTIONS.

## 2022-12-07 DIAGNOSIS — M50.30 DDD (DEGENERATIVE DISC DISEASE), CERVICAL: ICD-10-CM

## 2022-12-07 NOTE — TELEPHONE ENCOUNTER
Last Office Visit: 10/11/2022  Next Office Visit: 01/09/2023  Last Date Prescribed: 11/08/2022 QUANTITY 60 NRF  Last Urine Drug Screen: 10/11/2022  Date of Signed Controlled Contract: 10/11/2022

## 2022-12-07 NOTE — TELEPHONE ENCOUNTER
Caller: Susan Hernandez Art    Relationship: Self    Best call back number: [phone]     Requested Prescriptions:   Requested Prescriptions     Pending Prescriptions Disp Refills   • pregabalin (Lyrica) 300 MG capsule 60 capsule 0     Sig: Take 1 capsule by mouth 2 (Two) Times a Day As Needed (pain).        Pharmacy where request should be sent: [facility] - [place] - [address] [phone] [facility] [phone] FX       Does the patient have less than a 3 day supply:  [x] Yes  [] No    Would you like a call back once the refill request has been completed: [x] Yes [] No    If the office needs to give you a call back, can they leave a voicemail: [x] Yes [] No    Chandu Jones Rep   12/07/22 14:11 EST

## 2022-12-08 RX ORDER — PREGABALIN 300 MG/1
300 CAPSULE ORAL 2 TIMES DAILY PRN
Qty: 60 CAPSULE | Refills: 0 | Status: SHIPPED | OUTPATIENT
Start: 2022-12-08 | End: 2023-01-09 | Stop reason: SDUPTHER

## 2022-12-29 ENCOUNTER — HOSPITAL ENCOUNTER (OUTPATIENT)
Dept: BONE DENSITY | Facility: HOSPITAL | Age: 72
Discharge: HOME | End: 2022-12-29
Admitting: INTERNAL MEDICINE

## 2022-12-29 DIAGNOSIS — N95.1 POSTMENOPAUSAL SYNDROME: ICD-10-CM

## 2022-12-29 PROCEDURE — 77080 DXA BONE DENSITY AXIAL: CPT

## 2022-12-30 ENCOUNTER — TELEPHONE (OUTPATIENT)
Dept: INTERNAL MEDICINE | Facility: CLINIC | Age: 72
End: 2022-12-30

## 2022-12-30 NOTE — TELEPHONE ENCOUNTER
----- Message from Rigoberto Gomes Jr., MD sent at 12/29/2022  5:16 PM EST -----  Study with reassuring results

## 2023-01-09 ENCOUNTER — OFFICE VISIT (OUTPATIENT)
Dept: INTERNAL MEDICINE | Facility: CLINIC | Age: 73
End: 2023-01-09
Payer: MEDICARE

## 2023-01-09 VITALS
TEMPERATURE: 97.3 F | SYSTOLIC BLOOD PRESSURE: 105 MMHG | WEIGHT: 149.4 LBS | HEART RATE: 70 BPM | DIASTOLIC BLOOD PRESSURE: 68 MMHG | OXYGEN SATURATION: 97 % | HEIGHT: 64 IN | BODY MASS INDEX: 25.51 KG/M2

## 2023-01-09 DIAGNOSIS — E55.9 VITAMIN D DEFICIENCY: ICD-10-CM

## 2023-01-09 DIAGNOSIS — E78.49 OTHER HYPERLIPIDEMIA: ICD-10-CM

## 2023-01-09 DIAGNOSIS — H60.502 ACUTE OTITIS EXTERNA OF LEFT EAR, UNSPECIFIED TYPE: ICD-10-CM

## 2023-01-09 DIAGNOSIS — M50.30 DDD (DEGENERATIVE DISC DISEASE), CERVICAL: ICD-10-CM

## 2023-01-09 DIAGNOSIS — E11.65 TYPE 2 DIABETES MELLITUS WITH HYPERGLYCEMIA, WITHOUT LONG-TERM CURRENT USE OF INSULIN: Primary | ICD-10-CM

## 2023-01-09 DIAGNOSIS — E03.9 ACQUIRED HYPOTHYROIDISM: ICD-10-CM

## 2023-01-09 PROCEDURE — 82570 ASSAY OF URINE CREATININE: CPT | Performed by: INTERNAL MEDICINE

## 2023-01-09 PROCEDURE — 80061 LIPID PANEL: CPT | Performed by: INTERNAL MEDICINE

## 2023-01-09 PROCEDURE — 99214 OFFICE O/P EST MOD 30 MIN: CPT | Performed by: INTERNAL MEDICINE

## 2023-01-09 PROCEDURE — 82043 UR ALBUMIN QUANTITATIVE: CPT | Performed by: INTERNAL MEDICINE

## 2023-01-09 PROCEDURE — 84443 ASSAY THYROID STIM HORMONE: CPT | Performed by: INTERNAL MEDICINE

## 2023-01-09 PROCEDURE — 85025 COMPLETE CBC W/AUTO DIFF WBC: CPT | Performed by: INTERNAL MEDICINE

## 2023-01-09 PROCEDURE — 80053 COMPREHEN METABOLIC PANEL: CPT | Performed by: INTERNAL MEDICINE

## 2023-01-09 PROCEDURE — 82306 VITAMIN D 25 HYDROXY: CPT | Performed by: INTERNAL MEDICINE

## 2023-01-09 PROCEDURE — 83036 HEMOGLOBIN GLYCOSYLATED A1C: CPT | Performed by: INTERNAL MEDICINE

## 2023-01-09 RX ORDER — PREGABALIN 300 MG/1
300 CAPSULE ORAL 2 TIMES DAILY PRN
Qty: 60 CAPSULE | Refills: 0 | Status: SHIPPED | OUTPATIENT
Start: 2023-01-09 | End: 2023-02-07 | Stop reason: SDUPTHER

## 2023-01-09 NOTE — PROGRESS NOTES
Chief Complaint  Diabetes (Type 2 Follow up ) and Med Refill    Subjective          Sun Art Hernandez presents to [facility]  History of Present Illness  Patient reports feeling fatigued. She also reports left ear pain and sore throat x2 days. Patient reports right ear drainage as well.   DM2- patient reports some noncompliance with glipizide. Patient has gained weight over the last 6 months.   hyperlipidemia- doing well on statin  Hypothyroid- due for recheck.     Current Outpatient Medications   Medication Instructions   • atorvastatin (LIPITOR) 10 mg, Oral, Nightly   • calcium carbonate-cholecalciferol 500-400 MG-UNIT tablet tablet Oral, Daily   • carboxymethylcellulose (Refresh Plus) 0.5 % solution 2 drops, Both Eyes, 3 Times Daily PRN   • cholecalciferol (VITAMIN D3) 1,000 Units, Oral, 2 Times Daily   • ciprofloxacin-dexamethasone (Ciprodex) 0.3-0.1 % otic suspension 4 drops, Both Ears, 2 Times Daily   • Diclofenac Sodium (VOLTAREN) 4 g, Topical, 4 Times Daily PRN   • DULoxetine (CYMBALTA) 30 mg, Oral, Daily   • empagliflozin (JARDIANCE) 25 mg, Oral, Daily   • fish oil 1,000 mg, Oral, Daily With Breakfast   • glipizide (GLUCOTROL) 5 mg, Oral, 2 Times Daily Before Meals   • levothyroxine (SYNTHROID) 50 mcg, Oral, Daily   • lidocaine (LIDODERM) 5 % 1 patch, Transdermal, Every 24 Hours, Remove & Discard patch within 12 hours or as directed by MD   • metFORMIN (GLUCOPHAGE) 500 mg, Oral, Daily With Breakfast   • omeprazole (PRILOSEC) 40 mg, Oral, Daily   • pregabalin (LYRICA) 300 mg, Oral, 2 Times Daily PRN   • SITagliptin (JANUVIA) 100 mg, Oral, Daily   • Trulicity 0.75 mg, Subcutaneous, Weekly       The following portions of the patient's history were reviewed and updated as appropriate: allergies, current medications, past family history, past medical history, past social history, past surgical history, and problem list.    Objective   Vital Signs:   /68 (BP  "Location: Left arm)   Pulse 70   Temp 97.3 °F (36.3 °C) (Temporal)   Ht 162.6 cm (64\")   Wt 67.8 kg (149 lb 6.4 oz)   SpO2 97%   BMI 25.64 kg/m²     Wt Readings from Last 3 Encounters:   01/09/23 67.8 kg (149 lb 6.4 oz)   10/11/22 67.5 kg (148 lb 12.8 oz)   07/19/22 66 kg (145 lb 8.1 oz)     BP Readings from Last 3 Encounters:   01/09/23 105/68   10/11/22 129/89   07/19/22 107/64     Physical Exam   Appearance: No acute distress, well-nourished  Head: normocephalic, atraumatic  Eyes: extraocular movements intact, no scleral icterus, no conjunctival injection  Ears, Nose, and Throat: external ears normal, nares patent, moist mucous membranes. Left ear with erythema. Right ear normal. TMs clear lory.  Cardiovascular: regular rate and rhythm. no murmurs, rubs, or gallops. no edema  Respiratory: breathing comfortably, symmetric chest rise, clear to auscultation bilaterally. No wheezes, rales, or rhonchi.  Neuro: alert and oriented to time, place, and person. Normal gait  Psych: normal mood and affect     Result Review :   The following data was reviewed by: Rigoberto Gomes Jr, MD on 01/09/2023:  Common labs    Common Labs 4/11/22 4/11/22 4/11/22 4/11/22 10/11/22 10/11/22 10/11/22 10/11/22 10/11/22 1/9/23 1/9/23 1/9/23 1/9/23 1/9/23    1415 1415 1415 1415 1119 1119 1119 1119 1119 1440 1440 1440 1440 1440   Glucose  183 (A)    184 (A)       180 (A)    BUN  17    15       15    Creatinine  1.20 (A)    0.99       1.15 (A)    Sodium  141    140       142    Potassium  5.2    4.2       4.2    Chloride  101    101       104    Calcium  10.2    9.7       9.7    Albumin  4.50    4.70       4.0    Total Bilirubin  0.8    0.6       1.0    Alkaline Phosphatase  63    98       94    AST (SGOT)  80 (A)    55 (A)       35 (A)    ALT (SGPT)  83 (A)    53 (A)       35 (A)    WBC 6.93       6.70   6.25      Hemoglobin 14.4       15.8   14.8      Hematocrit 43.6       46.6   43.0      Platelets 240       222   182      Total " Cholesterol   173    257 (A)       138   Triglycerides   133    187 (A)       82   HDL Cholesterol   46    50       47   LDL Cholesterol    103 (A)    172 (A)       75   Hemoglobin A1C    7.20 (A)     8.10 (A)   9.10 (A)     Microalbumin, Urine     1.6     <1.2       (A) Abnormal value              Lab Results   Component Value Date    BILIRUBINUR Negative 07/16/2021       Last Urine Toxicity     LAST URINE TOXICITY RESULTS Latest Ref Rng & Units 1/9/2023 10/11/2022    CREATININE UR mg/dL 60.0 90.0    AMPHETAMINES SCREEN, URINE Negative - Negative    BARBITURATES SCREEN Negative - Negative    BENZODIAZEPINE SCREEN, URINE Negative - Negative    BUPRENORPHINEUR Negative - Negative    COCAINE SCREEN, URINE Negative - Negative    METHADONE SCREEN, URINE Negative - Negative    METHAMPHETAMINEUR Negative - Negative             Assessment and Plan    Diagnoses and all orders for this visit:    1. Type 2 diabetes mellitus with hyperglycemia, without long-term current use of insulin (HCC) (Primary)  Comments:  pt defers injectable therapy today until labs resulted. discussed use of GLP-1   Orders:  -     Ambulatory Referral for Diabetic Eye Exam-Ophthalmology  -     CBC & Differential  -     Hemoglobin A1c  -     Microalbumin / Creatinine Urine Ratio - Urine, Clean Catch    2. DDD (degenerative disc disease), cervical  Comments:  cont lyrica 300mg po BID. SERENA and luc reviewed.   Orders:  -     Diclofenac Sodium (VOLTAREN) 1 % gel gel; Apply 4 g topically to the appropriate area as directed 4 (Four) Times a Day As Needed (pain).  Dispense: 350 g; Refill: 1  -     pregabalin (Lyrica) 300 MG capsule; Take 1 capsule by mouth 2 (Two) Times a Day As Needed (pain).  Dispense: 60 capsule; Refill: 0  -     DULoxetine (CYMBALTA) 30 MG capsule; Take 1 capsule by mouth Daily.  Dispense: 90 capsule; Refill: 3    3. Acquired hypothyroidism  -     TSH    4. Vitamin D deficiency  -     Vitamin D,25-Hydroxy    5. Other hyperlipidemia  -      Comprehensive Metabolic Panel  -     Lipid Panel    6. Acute otitis externa of left ear, unspecified type  -     ciprofloxacin-dexamethasone (Ciprodex) 0.3-0.1 % otic suspension; Administer 4 drops into both ears 2 (Two) Times a Day for 10 days.  Dispense: 7.5 mL; Refill: 0          Medications Discontinued During This Encounter   Medication Reason   • Diclofenac Sodium (VOLTAREN) 1 % gel gel Reorder   • pregabalin (Lyrica) 300 MG capsule Reorder   • DULoxetine (CYMBALTA) 30 MG capsule Reorder        Follow Up   Return in about 3 months (around 4/9/2023) for Recheck, DM.  Patient was given instructions and counseling regarding her condition or for health maintenance advice. Please see specific information pulled into the AVS if appropriate.       Rigoberto Gomes Jr, MD  01/11/23  14:23 EST

## 2023-01-10 LAB
25(OH)D3 SERPL-MCNC: 78.1 NG/ML (ref 30–100)
ALBUMIN SERPL-MCNC: 4 G/DL (ref 3.5–5.2)
ALBUMIN UR-MCNC: <1.2 MG/DL
ALBUMIN/GLOB SERPL: 1.3 G/DL
ALP SERPL-CCNC: 94 U/L (ref 39–117)
ALT SERPL W P-5'-P-CCNC: 35 U/L (ref 1–33)
ANION GAP SERPL CALCULATED.3IONS-SCNC: 11.1 MMOL/L (ref 5–15)
AST SERPL-CCNC: 35 U/L (ref 1–32)
BASOPHILS # BLD AUTO: 0.04 10*3/MM3 (ref 0–0.2)
BASOPHILS NFR BLD AUTO: 0.6 % (ref 0–1.5)
BILIRUB SERPL-MCNC: 1 MG/DL (ref 0–1.2)
BUN SERPL-MCNC: 15 MG/DL (ref 8–23)
BUN/CREAT SERPL: 13 (ref 7–25)
CALCIUM SPEC-SCNC: 9.7 MG/DL (ref 8.6–10.5)
CHLORIDE SERPL-SCNC: 104 MMOL/L (ref 98–107)
CHOLEST SERPL-MCNC: 138 MG/DL (ref 0–200)
CO2 SERPL-SCNC: 26.9 MMOL/L (ref 22–29)
CREAT SERPL-MCNC: 1.15 MG/DL (ref 0.57–1)
CREAT UR-MCNC: 60 MG/DL
DEPRECATED RDW RBC AUTO: 40.1 FL (ref 37–54)
EGFRCR SERPLBLD CKD-EPI 2021: 50.7 ML/MIN/1.73
EOSINOPHIL # BLD AUTO: 0.05 10*3/MM3 (ref 0–0.4)
EOSINOPHIL NFR BLD AUTO: 0.8 % (ref 0.3–6.2)
ERYTHROCYTE [DISTWIDTH] IN BLOOD BY AUTOMATED COUNT: 12 % (ref 12.3–15.4)
GLOBULIN UR ELPH-MCNC: 3.2 GM/DL
GLUCOSE SERPL-MCNC: 180 MG/DL (ref 65–99)
HBA1C MFR BLD: 9.1 % (ref 4.8–5.6)
HCT VFR BLD AUTO: 43 % (ref 34–46.6)
HDLC SERPL-MCNC: 47 MG/DL (ref 40–60)
HGB BLD-MCNC: 14.8 G/DL (ref 12–15.9)
IMM GRANULOCYTES # BLD AUTO: 0.02 10*3/MM3 (ref 0–0.05)
IMM GRANULOCYTES NFR BLD AUTO: 0.3 % (ref 0–0.5)
LDLC SERPL CALC-MCNC: 75 MG/DL (ref 0–100)
LDLC/HDLC SERPL: 1.59 {RATIO}
LYMPHOCYTES # BLD AUTO: 2.23 10*3/MM3 (ref 0.7–3.1)
LYMPHOCYTES NFR BLD AUTO: 35.7 % (ref 19.6–45.3)
MCH RBC QN AUTO: 31.8 PG (ref 26.6–33)
MCHC RBC AUTO-ENTMCNC: 34.4 G/DL (ref 31.5–35.7)
MCV RBC AUTO: 92.3 FL (ref 79–97)
MICROALBUMIN/CREAT UR: NORMAL MG/G{CREAT}
MONOCYTES # BLD AUTO: 0.51 10*3/MM3 (ref 0.1–0.9)
MONOCYTES NFR BLD AUTO: 8.2 % (ref 5–12)
NEUTROPHILS NFR BLD AUTO: 3.4 10*3/MM3 (ref 1.7–7)
NEUTROPHILS NFR BLD AUTO: 54.4 % (ref 42.7–76)
NRBC BLD AUTO-RTO: 0 /100 WBC (ref 0–0.2)
PLATELET # BLD AUTO: 182 10*3/MM3 (ref 140–450)
PMV BLD AUTO: 11.1 FL (ref 6–12)
POTASSIUM SERPL-SCNC: 4.2 MMOL/L (ref 3.5–5.2)
PROT SERPL-MCNC: 7.2 G/DL (ref 6–8.5)
RBC # BLD AUTO: 4.66 10*6/MM3 (ref 3.77–5.28)
SODIUM SERPL-SCNC: 142 MMOL/L (ref 136–145)
TRIGL SERPL-MCNC: 82 MG/DL (ref 0–150)
TSH SERPL DL<=0.05 MIU/L-ACNC: 1.33 UIU/ML (ref 0.27–4.2)
VLDLC SERPL-MCNC: 16 MG/DL (ref 5–40)
WBC NRBC COR # BLD: 6.25 10*3/MM3 (ref 3.4–10.8)

## 2023-01-10 RX ORDER — DULOXETIN HYDROCHLORIDE 30 MG/1
30 CAPSULE, DELAYED RELEASE ORAL DAILY
Qty: 90 CAPSULE | Refills: 3 | Status: SHIPPED | OUTPATIENT
Start: 2023-01-10

## 2023-01-10 RX ORDER — CIPROFLOXACIN AND DEXAMETHASONE 3; 1 MG/ML; MG/ML
4 SUSPENSION/ DROPS AURICULAR (OTIC) 2 TIMES DAILY
Qty: 7.5 ML | Refills: 0 | Status: SHIPPED | OUTPATIENT
Start: 2023-01-10 | End: 2023-01-20

## 2023-01-11 ENCOUNTER — TELEPHONE (OUTPATIENT)
Dept: INTERNAL MEDICINE | Facility: CLINIC | Age: 73
End: 2023-01-11
Payer: MEDICARE

## 2023-01-11 RX ORDER — DULAGLUTIDE 0.75 MG/.5ML
0.75 INJECTION, SOLUTION SUBCUTANEOUS WEEKLY
Qty: 6 ML | Refills: 1 | Status: SHIPPED | OUTPATIENT
Start: 2023-01-11

## 2023-01-11 NOTE — TELEPHONE ENCOUNTER
----- Message from Rigoberto Gomes Jr., MD sent at 1/11/2023 10:21 AM EST -----  Sugars are very high. We need to work on getting these down. Patient needs to start trulicity 0.75mg subQ weekly as we discussed. Will send Prescription.

## 2023-01-16 ENCOUNTER — OFFICE VISIT (OUTPATIENT)
Dept: PODIATRY | Facility: CLINIC | Age: 73
End: 2023-01-16
Payer: MEDICARE

## 2023-01-16 VITALS
TEMPERATURE: 96.9 F | DIASTOLIC BLOOD PRESSURE: 79 MMHG | WEIGHT: 150 LBS | BODY MASS INDEX: 25.75 KG/M2 | SYSTOLIC BLOOD PRESSURE: 143 MMHG | HEART RATE: 72 BPM | OXYGEN SATURATION: 99 %

## 2023-01-16 DIAGNOSIS — E11.9 NON-INSULIN DEPENDENT TYPE 2 DIABETES MELLITUS: ICD-10-CM

## 2023-01-16 DIAGNOSIS — M79.672 FOOT PAIN, BILATERAL: Primary | ICD-10-CM

## 2023-01-16 DIAGNOSIS — G62.0 CHEMOTHERAPY-INDUCED NEUROPATHY: ICD-10-CM

## 2023-01-16 DIAGNOSIS — G62.9 NEUROPATHY: ICD-10-CM

## 2023-01-16 DIAGNOSIS — T45.1X5A CHEMOTHERAPY-INDUCED NEUROPATHY: ICD-10-CM

## 2023-01-16 DIAGNOSIS — E11.8 DIABETIC FOOT: ICD-10-CM

## 2023-01-16 DIAGNOSIS — G57.63 MORTON'S NEUROMA OF BOTH FEET: ICD-10-CM

## 2023-01-16 DIAGNOSIS — M79.671 FOOT PAIN, BILATERAL: Primary | ICD-10-CM

## 2023-01-16 PROCEDURE — 99204 OFFICE O/P NEW MOD 45 MIN: CPT | Performed by: PODIATRIST

## 2023-01-16 PROCEDURE — 64455 NJX AA&/STRD PLTR COM DG NRV: CPT | Performed by: PODIATRIST

## 2023-01-16 RX ORDER — DEXAMETHASONE SODIUM PHOSPHATE 4 MG/ML
2 INJECTION, SOLUTION INTRA-ARTICULAR; INTRALESIONAL; INTRAMUSCULAR; INTRAVENOUS; SOFT TISSUE ONCE
Status: COMPLETED | OUTPATIENT
Start: 2023-01-16 | End: 2023-01-16

## 2023-01-16 RX ORDER — TRIAMCINOLONE ACETONIDE 40 MG/ML
20 INJECTION, SUSPENSION INTRA-ARTICULAR; INTRAMUSCULAR ONCE
Status: COMPLETED | OUTPATIENT
Start: 2023-01-16 | End: 2023-01-16

## 2023-01-16 RX ORDER — BUPIVACAINE HYDROCHLORIDE 2.5 MG/ML
0.5 INJECTION, SOLUTION INFILTRATION; PERINEURAL ONCE
Status: COMPLETED | OUTPATIENT
Start: 2023-01-16 | End: 2023-01-16

## 2023-01-16 RX ADMIN — DEXAMETHASONE SODIUM PHOSPHATE 2 MG: 4 INJECTION, SOLUTION INTRA-ARTICULAR; INTRALESIONAL; INTRAMUSCULAR; INTRAVENOUS; SOFT TISSUE at 14:47

## 2023-01-16 RX ADMIN — BUPIVACAINE HYDROCHLORIDE 0.5 ML: 2.5 INJECTION, SOLUTION INFILTRATION; PERINEURAL at 14:45

## 2023-01-16 RX ADMIN — TRIAMCINOLONE ACETONIDE 20 MG: 40 INJECTION, SUSPENSION INTRA-ARTICULAR; INTRAMUSCULAR at 14:49

## 2023-01-16 RX ADMIN — DEXAMETHASONE SODIUM PHOSPHATE 2 MG: 4 INJECTION, SOLUTION INTRA-ARTICULAR; INTRALESIONAL; INTRAMUSCULAR; INTRAVENOUS; SOFT TISSUE at 14:46

## 2023-01-16 RX ADMIN — TRIAMCINOLONE ACETONIDE 20 MG: 40 INJECTION, SUSPENSION INTRA-ARTICULAR; INTRAMUSCULAR at 14:48

## 2023-01-16 RX ADMIN — BUPIVACAINE HYDROCHLORIDE 0.5 ML: 2.5 INJECTION, SOLUTION INFILTRATION; PERINEURAL at 14:46

## 2023-01-16 NOTE — PROGRESS NOTES
[facility]    Today's Date: 01/16/23    Patient Name: Susan Hernandez  MRN: [id number]  CSN: [id number]  PCP: Rigoberto Gomes Jr., MD, Last PCP Visit: 9 January 2023  Referring Provider: Rigoberto Gomes*    SUBJECTIVE     Chief Complaint   Patient presents with   • Left Foot - Establish Care, Plantar Fasciitis, Pain     Possible bilateral plantar fascitis pain present since 2004. Patient reports having burning sensations with pain sometimes over a 10.   • Right Foot - Establish Care, Plantar Fasciitis, Pain     HPI: Susan Hernandez, a 72 y.o.female, comes to clinic.    New, Established, New Problem: New    Location: Lateral second intermetatarsal spaces    Duration: Greater than 1 month    Onset:  gradual    Nature:  achy, sharp, shooting    Stable, worsening, improving: Worsening    Aggravating factors:  Patient describes pain as stabbing, burning, or aching. This pain is in the bilateral second intermetatarsal spaces.  This is aggravated with shoe gear and ambulation.    Previous Treatment: Change in shoe gear and activities    Patient denies any fevers, chills, nausea, vomiting, shortness of breath, nor any other constitutional signs nor symptoms.    Patient states their most recent blood glucose reading was 156.    Patient relates peripheral neuropathy in her feet and lower legs from chemotherapy in distant past.    Past Medical History:   Diagnosis Date   • Arthritis    • Cancer (HCC)    • Colon cancer (HCC)    • Diabetes mellitus, type 2 (HCC)    • Disease of thyroid gland    • GERD (gastroesophageal reflux disease)    • Lumbar pain    • Small bowel obstruction (HCC)      Past Surgical History:   Procedure Laterality Date   • APPENDECTOMY     • COLON SURGERY  2011,2006    CANCER, CHEMO   • COLONOSCOPY     • COLONOSCOPY N/A 7/19/2022    Procedure: COLONOSCOPY WITH POLYPECTOMIES;  Surgeon: Manny Velazquez MD;  Location: [facility];  Service: Gastroenterology;   Laterality: N/A;  COLON POLYPS   • HYSTERECTOMY       Family History   Adopted: Yes   Problem Relation Age of Onset   • Breast cancer Brother    • Other Brother         RENAL CANCER   • Colon cancer Neg Hx      Social History     Socioeconomic History   • Marital status:    Tobacco Use   • Smoking status: Former     Packs/day: 2.00     Years: 38.00     Pack years: 76.00     Types: Cigarettes     Quit date:      Years since quittin.0   • Smokeless tobacco: Never   Vaping Use   • Vaping Use: Never used   Substance and Sexual Activity   • Alcohol use: Not Currently     Comment: last drink    • Drug use: Never   • Sexual activity: Defer     Allergies   Allergen Reactions   • Aspirin Hives   • Penicillin V Potassium Rash     Current Outpatient Medications   Medication Sig Dispense Refill   • carboxymethylcellulose (Refresh Plus) 0.5 % solution Administer 2 drops to both eyes 3 (Three) Times a Day As Needed for Dry Eyes. 70 each 3   • cholecalciferol (VITAMIN D3) 25 MCG (1000 UT) tablet Take 1 tablet by mouth 2 (Two) Times a Day. 180 tablet 3   • ciprofloxacin-dexamethasone (Ciprodex) 0.3-0.1 % otic suspension Administer 4 drops into both ears 2 (Two) Times a Day for 10 days. 7.5 mL 0   • Diclofenac Sodium (VOLTAREN) 1 % gel gel Apply 4 g topically to the appropriate area as directed 4 (Four) Times a Day As Needed (pain). 350 g 1   • DULoxetine (CYMBALTA) 30 MG capsule Take 1 capsule by mouth Daily. 90 capsule 3   • empagliflozin (Jardiance) 25 MG tablet tablet Take 1 tablet by mouth Daily. 90 tablet 3   • glipizide (Glucotrol) 5 MG tablet Take 1 tablet by mouth 2 (Two) Times a Day Before Meals. 180 tablet 1   • levothyroxine (Synthroid) 50 MCG tablet Take 1 tablet by mouth Daily. 90 tablet 1   • lidocaine (LIDODERM) 5 % Place 1 patch on the skin as directed by provider Daily. Remove & Discard patch within 12 hours or as directed by MD 90 patch 3   • metFORMIN (GLUCOPHAGE) 500 MG tablet Take 1  tablet by mouth Daily With Breakfast. (Patient taking differently: Take 500 mg by mouth Daily With Breakfast. Last dose 7/17/22) 90 tablet 3   • Omega-3 Fatty Acids (fish oil) 1000 MG capsule capsule Take 1,000 mg by mouth Daily With Breakfast.     • omeprazole (priLOSEC) 40 MG capsule Take 1 capsule by mouth Daily. 90 capsule 3   • pregabalin (Lyrica) 300 MG capsule Take 1 capsule by mouth 2 (Two) Times a Day As Needed (pain). 60 capsule 0   • SITagliptin (Januvia) 100 MG tablet Take 1 tablet by mouth Daily. 90 tablet 3   • atorvastatin (LIPITOR) 10 MG tablet Take 1 tablet by mouth Every Night. 90 tablet 3   • calcium carbonate-cholecalciferol 500-400 MG-UNIT tablet tablet Take  by mouth Daily.     • Dulaglutide (Trulicity) 0.75 MG/0.5ML solution pen-injector Inject 0.75 mg under the skin into the appropriate area as directed 1 (One) Time Per Week. 6 mL 1     Current Facility-Administered Medications   Medication Dose Route Frequency Provider Last Rate Last Admin   • bupivacaine (MARCAINE) 0.25 % injection 0.5 mL  0.5 mL Injection Once Jonh Juárez DPM       • bupivacaine (MARCAINE) 0.25 % injection 0.5 mL  0.5 mL Injection Once Jonh Juárez DPM       • dexamethasone sodium phosphate injection 2 mg  2 mg Intramuscular Once Jonh Juárez DPM       • dexamethasone sodium phosphate injection 2 mg  2 mg Intramuscular Once Jonh Juárez DPM       • triamcinolone acetonide (KENALOG-40) injection 20 mg  20 mg Intramuscular Once Jonh Juárez DPM       • triamcinolone acetonide (KENALOG-40) injection 20 mg  20 mg Intramuscular Once Jonh Juárez DPM         Review of Systems   Constitutional: Negative.    Neurological: Positive for numbness.        Focused neuro pain in plantar second intermetatarsal spaces bilaterally.   All other systems reviewed and are negative.      OBJECTIVE     Vitals:    01/16/23 1349   BP: 143/79   Pulse: 72   Temp: 96.9 °F (36.1 °C)    SpO2: 99%       PHYSICAL EXAM     Foot/Ankle Exam:       General:   Diabetic Foot Exam Performed    Appearance comment:  Chronically ill  Orientation: AAOx3    Affect: appropriate    Gait: unimpaired    Shoe Gear:  Casual shoes    VASCULAR      Right Foot Vascularity   Normal vascular exam    Dorsalis pedis:  1+  Posterior tibial:  1+  Skin Temperature: warm    Edema Grading:  None  CFT:  < 3 seconds  Pedal Hair Growth:  Absent  Varicosities: mild varicosities       Left Foot Vascularity   Normal vascular exam    Dorsalis pedis:  1+  Posterior tibial:  1+  Skin Temperature: warm    Edema Grading:  None  CFT:  < 3 seconds  Pedal Hair Growth:  Absent  Varicosities: mild varicosities        NEUROLOGIC     Right Foot Neurologic   Light touch sensation:  Diminished  Vibratory sensation:  Diminished  Hot/Cold sensation: diminished    Protective Sensation using Seiling-Nereida Monofilament:  1     Left Foot Neurologic   Light touch sensation:  Diminished  Vibratory sensation:  Diminished  Hot/cold sensation: diminished    Protective Sensation using Seiling-Nereida Monofilament:  1     MUSCULOSKELETAL      Right Foot Musculoskeletal   Tenderness: neuroma and 2nd MTPJ       Left Foot Musculoskeletal   Tenderness: neuroma and 2nd MTJP       MUSCLE STRENGTH     Right Foot Muscle Strength   Foot dorsiflexion:  4  Foot plantar flexion:  4  Foot inversion:  4  Foot eversion:  4     Left Foot Muscle Strength   Foot dorsiflexion:  4  Foot plantar flexion:  4  Foot inversion:  4  Foot eversion:  4     RANGE OF MOTION      Right Foot Range of Motion   Foot and ankle ROM within normal limits       Left Foot Range of Motion   Foot and ankle ROM within normal limits       DERMATOLOGIC     Right Foot Dermatologic   Skin: skin intact    Nails: normal       Left Foot Dermatologic   Skin: skin intact    Nails: normal        RADIOLOGY:    XR Foot 3+ View Left    Result Date: 1/16/2023  Narrative: IN-OFFICE IMAGING:  Standing,  weightbearing, 3 view, AP, MO, Lateral, Left foot Indication: heel pain Findings: Anterior cyma line break seen.  Osteopenia changes seen throughout consistent with the patient's age.  No periosteal reactions nor osteolytic changes seen.  No occult fractures seen. Comparison: No comparison views available.     XR Foot 3+ View Right    Result Date: 1/16/2023  Narrative: IN-OFFICE IMAGING:  Standing, weightbearing, 3 view, AP, MO, Lateral, Right foot Indication: heel pain Findings: Anterior cyma line break seen.  Osteopenia changes seen throughout consistent with the patient's age.  No periosteal reactions nor osteolytic changes seen.  No occult fractures seen. Comparison: No comparison views available.    DEXA Bone Density Axial    Result Date: 12/29/2022  Narrative: PROCEDURE: DEXA BONE DENSITY AXIAL  COMPARISON: [facility], OS, BONE DENSITY SCAN => 1 SITE, 8/13/2020, 14:33.  TECHNIQUE: Lumbar vertebral and proximal femoral dual-energy X-ray absorptiometry (DXA) was performed on a Skiipi device.  INDICATIONS: screen  FINDINGS:  Lumbar Spine The BMD measured in the L1-L4 region is 1.352 g/cm2 with a T-score of 1.3. This patient is considered normal according to World Health Organization (WHO) criteria. When compared to the previous examination dated 08/13/2020, the bone mineral density of the L1-L4 spine has apparently decreased 2.7%.  Femoral Neck The BMD measured at the left femoral neck is 0.937 g/cm2 with a T-score of -0.7. The BMD measured at the right femoral neck is 0.936 g/cm2 with a T-score of -0.7. The BMD of the mean femoral neck is 0.936 g/cm2 with a T score of  -0.7. This patient is considered normal according to World Health Organization (WHO) criteria. When compared to the previous examination dated 08/13/2020, the bone mineral density of the femoral neck mean has apparently decreased 3.1%.  Total Hip The BMD of the total left hip is 1.059 g/cm2 with a T-score of 0.4. The BMD of the  "total right hip is 1.063 g/cm2 with a T-score of 0.4. The BMD of the mean total hip is 1.061 g/cm2 with a T-score of  0.4. This patient is considered normal according to World Health Organization (WHO) criteria.  FRAX Score: The estimated 10 year risk of a major osteoporotic fracture to be 4.8% and a hip fracture of 0.5%.    CONCLUSION Normal bone mineral density    DREW SANCHEZ       Electronically Signed and Approved By: ESTRELLA GONZALEZ MD on 12/29/2022 at 14:08             Diabetic Foot Exam Performed     ASSESSMENT/PLAN     Diagnoses and all orders for this visit:    1. Foot pain, bilateral (Primary)    2. Non-insulin dependent type 2 diabetes mellitus (HCC)    3. Diabetic foot (HCC)    4. Neuropathy    5. Chemotherapy-induced neuropathy (HCC)    6. Arenas's neuroma of both feet  -     dexamethasone sodium phosphate injection 2 mg  -     bupivacaine (MARCAINE) 0.25 % injection 0.5 mL  -     triamcinolone acetonide (KENALOG-40) injection 20 mg  -     bupivacaine (MARCAINE) 0.25 % injection 0.5 mL  -     dexamethasone sodium phosphate injection 2 mg  -     triamcinolone acetonide (KENALOG-40) injection 20 mg      Comprehensive lower extremity examination and evaluation was performed.    Discussed findings and treatment plan including risks, benefits, and treatment options with patient in detail. Patient agreed with treatment plan.    Prescriptions written for diabetic shoes.    Arenas's neuroma injection:    Date/Time: 01/16/2023  Performed by: Jonh Juárez DPM  Authorized by: Jonh Juárez DPM     Consent: Verbal consent obtained. Written consent obtained.  Risks and benefits: risks, benefits and alternatives were discussed  Consent given by: patient  Patient identity confirmed: verbally with patient  Indications: pain relief    Injection site: Bilateral second intermetatarsal spaces.    Sedation:  none    Patient position: sitting  Needle size: 27 G, 1 1/4\" in length  Injection medications:  0.5 " ml 0.25% Marcaine plain, 0.5 ml Kenalog 40 mg/ml, and 0.5 ml Decadron 4 mg/mL.   Outcome: pain improved    Patient tolerated the procedure well with no immediate complications.    Diabetic foot exam performed and documented this date, compliant with CQM required standards. Detail of findings as noted in physical exam.  Lower extremity Neurologic exam for diabetic patient performed and documented this date, compliant with PQRS required standards. Detail of findings as noted in physical exam.  Advised patient importance of good routine lower extremity hygiene. Advised patient importance of evaluating for intact skin and pain free nail borders.  Advised patient to use mirror to evaluate plantar/ soles of feet for better visualization. Advised patient monitor and phone office to be seen if any cracking to skin, open lesions, painful nail borders or if nails become elongated prior to next visit. Advised patient importance of daily cleansing of lower extremities, followed by good skin cream to maintain normal hydration of skin. Also advised patient importance of close daily monitoring of blood sugar. Advised to regulate diet and medications to maintain control of blood sugar in optimal range. Contact primary care provider if difficulties maintaining blood sugar levels.  Advised Patient of presence of Diabetes Mellitus condition.  Advised Patient risk of progression and worsening or improvement, then return of condition.  Will monitor condition for any change in future. Treat with most appropriate treatment pending status of condition.  Counseled and advised patient extensively on nature and ramifications of diabetes. Standard instructions given to patient for good diabetic foot care and maintenance. Advised importance of careful monitoring to avoid break down and complications secondary to diabetes. Advised patient importance of strict maintenance of blood sugar control. Advised patient of possible ominous results from  neglect of condition, i.e.: amputation/ loss of digits, feet and legs, or even death.  Patient states understands counseling, will monitor closely, continue good hygiene and routine diabetic foot care. Patient will contact office is questions or problems.      Patient is to monitor for recurrence and any new symptoms and to contact Dr. Juárez's office for a follow-up appointment.      The patient states understanding and agreement with this plan.    An After Visit Summary was printed and given to the patient at discharge, including (if requested) any available informative/educational handouts regarding diagnosis, treatment, or medications. All questions were answered to patient/family satisfaction. Should symptoms fail to improve or worsen they agree to call or return to clinic or to go to the Emergency Department. Discussed the importance of following up with any needed screening tests/labs/specialist appointments and any requested follow-up recommended by me today. Importance of maintaining follow-up discussed and patient accepts that missed appointments can delay diagnosis and potentially lead to worsening of conditions.    Return in about 1 year (around 1/16/2024) for Podiatry Diabetic Foot Exam., or sooner if acute issues arise.    This document has been electronically signed by Jonh Juárez DPM on January 16, 2023 14:43 EST

## 2023-01-17 ENCOUNTER — OFFICE VISIT (OUTPATIENT)
Dept: INTERNAL MEDICINE | Facility: CLINIC | Age: 73
End: 2023-01-17
Payer: MEDICARE

## 2023-01-17 ENCOUNTER — TELEPHONE (OUTPATIENT)
Dept: INTERNAL MEDICINE | Facility: CLINIC | Age: 73
End: 2023-01-17

## 2023-01-17 VITALS
DIASTOLIC BLOOD PRESSURE: 76 MMHG | SYSTOLIC BLOOD PRESSURE: 121 MMHG | BODY MASS INDEX: 25.3 KG/M2 | HEART RATE: 76 BPM | OXYGEN SATURATION: 97 % | TEMPERATURE: 97.7 F | HEIGHT: 64 IN | WEIGHT: 148.2 LBS

## 2023-01-17 DIAGNOSIS — M54.50 ACUTE BILATERAL LOW BACK PAIN WITHOUT SCIATICA: ICD-10-CM

## 2023-01-17 DIAGNOSIS — E11.65 TYPE 2 DIABETES MELLITUS WITH HYPERGLYCEMIA, WITHOUT LONG-TERM CURRENT USE OF INSULIN: ICD-10-CM

## 2023-01-17 DIAGNOSIS — R10.9 FLANK PAIN: Primary | ICD-10-CM

## 2023-01-17 LAB
BACTERIA UR QL AUTO: ABNORMAL /HPF
BILIRUB BLD-MCNC: NEGATIVE MG/DL
BILIRUB UR QL STRIP: NEGATIVE
CLARITY UR: CLEAR
CLARITY, POC: ABNORMAL
COLOR UR: YELLOW
COLOR UR: YELLOW
EXPIRATION DATE: ABNORMAL
GLUCOSE UR STRIP-MCNC: ABNORMAL MG/DL
GLUCOSE UR STRIP-MCNC: ABNORMAL MG/DL
HGB UR QL STRIP.AUTO: NEGATIVE
HYALINE CASTS UR QL AUTO: ABNORMAL /LPF
KETONES UR QL STRIP: NEGATIVE
KETONES UR QL: NEGATIVE
LEUKOCYTE EST, POC: NEGATIVE
LEUKOCYTE ESTERASE UR QL STRIP.AUTO: NEGATIVE
Lab: ABNORMAL
NITRITE UR QL STRIP: NEGATIVE
NITRITE UR-MCNC: NEGATIVE MG/ML
PH UR STRIP.AUTO: 5.5 [PH] (ref 5–8)
PH UR: 5.5 [PH] (ref 5–8)
PROT UR QL STRIP: ABNORMAL
PROT UR STRIP-MCNC: NEGATIVE MG/DL
RBC # UR STRIP: ABNORMAL /HPF
RBC # UR STRIP: NEGATIVE /UL
REF LAB TEST METHOD: ABNORMAL
SP GR UR STRIP: >=1.03 (ref 1–1.03)
SP GR UR: 1.02 (ref 1–1.03)
SQUAMOUS #/AREA URNS HPF: ABNORMAL /HPF
UROBILINOGEN UR QL STRIP: ABNORMAL
UROBILINOGEN UR QL: NORMAL
WBC # UR STRIP: ABNORMAL /HPF

## 2023-01-17 PROCEDURE — 81003 URINALYSIS AUTO W/O SCOPE: CPT | Performed by: INTERNAL MEDICINE

## 2023-01-17 PROCEDURE — 99213 OFFICE O/P EST LOW 20 MIN: CPT | Performed by: INTERNAL MEDICINE

## 2023-01-17 PROCEDURE — 81001 URINALYSIS AUTO W/SCOPE: CPT | Performed by: INTERNAL MEDICINE

## 2023-01-17 PROCEDURE — 87086 URINE CULTURE/COLONY COUNT: CPT | Performed by: INTERNAL MEDICINE

## 2023-01-17 NOTE — PROGRESS NOTES
Chief Complaint  Diabetes (Patient brought in paperwork yesterday for diabetic shoes for her foot doctor ), Back Pain (Lower back pain ), and Medication Problem (Patient states that she gave herself the Trulicity shot Thursday night at 8:00 and 10-15 mins later her back started hurting )    Subjective          Sun Art Hernandez presents to [facility]  History of Present Illness  DM2- patient reports she started trulicity injection approx 1 week ago. Since then, she has had some pains throughout her body. Patient report she was unable to do much for next few days. Patient had follow-up with podiatry yesterday. Patient received steroid injections and Prescription for diabetic shoes. Patient reports her back pain was better after foot steroid injection. Patient also take lyrica to help with neuropathy. Patient is amenbale to try trulicity 1 more time prior to determining if side effects to trulicity.     Current Outpatient Medications   Medication Instructions   • atorvastatin (LIPITOR) 10 mg, Oral, Nightly   • calcium carbonate-cholecalciferol 500-400 MG-UNIT tablet tablet Oral, Daily   • carboxymethylcellulose (Refresh Plus) 0.5 % solution 2 drops, Both Eyes, 3 Times Daily PRN   • cholecalciferol (VITAMIN D3) 1,000 Units, Oral, 2 Times Daily   • ciprofloxacin-dexamethasone (Ciprodex) 0.3-0.1 % otic suspension 4 drops, Both Ears, 2 Times Daily   • Diclofenac Sodium (VOLTAREN) 4 g, Topical, 4 Times Daily PRN   • DULoxetine (CYMBALTA) 30 mg, Oral, Daily   • empagliflozin (JARDIANCE) 25 mg, Oral, Daily   • fish oil 1,000 mg, Oral, Daily With Breakfast   • glipizide (GLUCOTROL) 5 mg, Oral, 2 Times Daily Before Meals   • levothyroxine (SYNTHROID) 50 mcg, Oral, Daily   • lidocaine (LIDODERM) 5 % 1 patch, Transdermal, Every 24 Hours, Remove & Discard patch within 12 hours or as directed by MD   • metFORMIN (GLUCOPHAGE) 500 mg, Oral, Daily With Breakfast   • omeprazole (PRILOSEC) 40  "mg, Oral, Daily   • pregabalin (LYRICA) 300 mg, Oral, 2 Times Daily PRN   • SITagliptin (JANUVIA) 100 mg, Oral, Daily   • Trulicity 0.75 mg, Subcutaneous, Weekly       The following portions of the patient's history were reviewed and updated as appropriate: allergies, current medications, past family history, past medical history, past social history, past surgical history, and problem list.    Objective   Vital Signs:   /76 (BP Location: Left arm)   Pulse 76   Temp 97.7 °F (36.5 °C) (Temporal)   Ht 162.6 cm (64\")   Wt 67.2 kg (148 lb 3.2 oz)   SpO2 97%   BMI 25.44 kg/m²     Wt Readings from Last 3 Encounters:   01/17/23 67.2 kg (148 lb 3.2 oz)   01/16/23 68 kg (150 lb)   01/09/23 67.8 kg (149 lb 6.4 oz)     BP Readings from Last 3 Encounters:   01/17/23 121/76   01/16/23 143/79   01/09/23 105/68     Physical Exam   Appearance: No acute distress, well-nourished  Head: normocephalic, atraumatic  Eyes: extraocular movements intact, no scleral icterus, no conjunctival injection  Ears, Nose, and Throat: external ears normal, nares patent, moist mucous membranes, tympanic membranes clear bilaterally. Tonsils within normal limits. Oropharynx clear.   Cardiovascular: regular rate and rhythm. no murmurs, rubs, or gallops. no edema  Respiratory: breathing comfortably, symmetric chest rise, clear to auscultation bilaterally. No wheezes, rales, or rhonchi.  Neuro: alert and moves all extremities equally  Lymph: no occipital, cervical, submandibular,or supraclavicular lymphadenopathy.      Result Review :   The following data was reviewed by: Rigoberto Gomes Jr, MD on 01/17/2023:  Common labs    Common Labs 4/11/22 4/11/22 4/11/22 4/11/22 10/11/22 10/11/22 10/11/22 10/11/22 10/11/22 1/9/23 1/9/23 1/9/23 1/9/23 1/9/23    1415 1415 1415 1415 1119 1119 1119 1119 1119 1440 1440 1440 1440 1440   Glucose  183 (A)    184 (A)       180 (A)    BUN  17    15       15    Creatinine  1.20 (A)    0.99       1.15 (A)  "   Sodium  141    140       142    Potassium  5.2    4.2       4.2    Chloride  101    101       104    Calcium  10.2    9.7       9.7    Albumin  4.50    4.70       4.0    Total Bilirubin  0.8    0.6       1.0    Alkaline Phosphatase  63    98       94    AST (SGOT)  80 (A)    55 (A)       35 (A)    ALT (SGPT)  83 (A)    53 (A)       35 (A)    WBC 6.93       6.70   6.25      Hemoglobin 14.4       15.8   14.8      Hematocrit 43.6       46.6   43.0      Platelets 240       222   182      Total Cholesterol   173    257 (A)       138   Triglycerides   133    187 (A)       82   HDL Cholesterol   46    50       47   LDL Cholesterol    103 (A)    172 (A)       75   Hemoglobin A1C    7.20 (A)     8.10 (A)   9.10 (A)     Microalbumin, Urine     1.6     <1.2       (A) Abnormal value              Lab Results   Component Value Date    BILIRUBINUR Negative 07/16/2021        Assessment and Plan    Diagnoses and all orders for this visit:    1. Acute bilateral low back pain without sciatica (Primary)  Comments:  unknown etiology. impaorving at this time. UA neg. cont NSAIDs and lyrica as needed.     2. Type 2 diabetes mellitus with hyperglycemia, without long-term current use of insulin (HCC)  Comments:  try trulicity again for 1 more dose. If back pain persists, will switch to bydureon.         There are no discontinued medications.     Follow Up   Return if symptoms worsen or fail to improve.  Patient was given instructions and counseling regarding her condition or for health maintenance advice. Please see specific information pulled into the AVS if appropriate.       Rigoberto Gomes Jr, MD  01/17/23  13:02 EST

## 2023-01-18 LAB — BACTERIA SPEC AEROBE CULT: NO GROWTH

## 2023-01-18 NOTE — TELEPHONE ENCOUNTER
FORMS HAS BEEN FILLED OUT AND HAS BEEN PICKED UP BY THE PATIENT AND IS ALSO SCANNED INTO PATIENT CHART

## 2023-01-19 ENCOUNTER — TELEPHONE (OUTPATIENT)
Dept: INTERNAL MEDICINE | Facility: CLINIC | Age: 73
End: 2023-01-19
Payer: MEDICARE

## 2023-01-19 NOTE — TELEPHONE ENCOUNTER
----- Message from Rigoberto Gomes Jr., MD sent at 1/19/2023  8:25 AM EST -----  All labs reassuring

## 2023-02-07 DIAGNOSIS — M50.30 DDD (DEGENERATIVE DISC DISEASE), CERVICAL: ICD-10-CM

## 2023-02-07 RX ORDER — PREGABALIN 300 MG/1
300 CAPSULE ORAL 2 TIMES DAILY PRN
Qty: 60 CAPSULE | Refills: 0 | Status: SHIPPED | OUTPATIENT
Start: 2023-02-07 | End: 2023-03-08 | Stop reason: SDUPTHER

## 2023-02-07 NOTE — TELEPHONE ENCOUNTER
Caller: Suasn Hernandez Art    Relationship: Self    Best call back number: [phone]    Requested Prescriptions:   Requested Prescriptions     Pending Prescriptions Disp Refills   • pregabalin (Lyrica) 300 MG capsule 60 capsule 0     Sig: Take 1 capsule by mouth 2 (Two) Times a Day As Needed (pain).        Pharm[facility] request [facility] be sent[facility]  [place] [place] [facility] [address] [phone] [facility] [phone] FX     Additional details provided by patient: PATIENT HAS 1 DAY LEFT OF MEDICATION    Does the patient have less than a 3 day supply:  [x] Yes  [] No    Would you like a call back once the refill request has been completed: [x] Yes [] No    If the office needs to give you a call back, can they leave a voicemail: [x] Yes [] No OR TEXT    Chandu Pinon Rep   02/07/23 13:01 EST

## 2023-02-10 ENCOUNTER — TELEPHONE (OUTPATIENT)
Dept: INTERNAL MEDICINE | Facility: CLINIC | Age: 73
End: 2023-02-10
Payer: MEDICARE

## 2023-02-20 ENCOUNTER — TRANSCRIBE ORDERS (OUTPATIENT)
Dept: ADMINISTRATIVE | Facility: HOSPITAL | Age: 73
End: 2023-02-20
Payer: MEDICARE

## 2023-02-20 DIAGNOSIS — C18.9 MALIGNANT NEOPLASM OF COLON, UNSPECIFIED PART OF COLON: Primary | ICD-10-CM

## 2023-03-08 DIAGNOSIS — M50.30 DDD (DEGENERATIVE DISC DISEASE), CERVICAL: ICD-10-CM

## 2023-03-08 RX ORDER — PREGABALIN 300 MG/1
300 CAPSULE ORAL 2 TIMES DAILY PRN
Qty: 60 CAPSULE | Refills: 0 | Status: SHIPPED | OUTPATIENT
Start: 2023-03-08

## 2023-03-10 NOTE — TELEPHONE ENCOUNTER
Caller: Susan Hernandez Art    Relationship: Self    Best call back number: [phone]     Requested Prescriptions:   Requested Prescriptions     Pending Prescriptions Disp Refills   • pregabalin (Lyrica) 300 MG capsule 60 capsule 0     Sig: Take 1 capsule by mouth 2 (Two) Times a Day As Needed (pain).        [facility] be sent[facility]  [place] [place] - [address] [phone] [facility] [phone] FX     Additional details provided by patient: PATIENT IS NEEDING A REFILL.     Does the patient have less than a 3 day supply:  [x] Yes  [] No    Would you like a call back once the refill request has been completed: [x] Yes [] No    If the office needs to give you a call back, can they leave a voicemail: [x] Yes [] No    Chandu Low Rep   03/08/23 14:01 EST         
Control Refill Request:  Medication: Lyrica  Last Office Visit:1/17/2023  Next Office Visit: 4/27/2023  Last UDS: 10/11/202  Last Contract: 10/11/2022    SCRIPT IS TO BE PRINTED AND SIGNED.  
Pt has been contacted and notified of rx being printed and ready for pickup at our office.   
Signed.  Please let her know it's printed and ready to be filled out.  
no

## 2023-03-13 NOTE — TELEPHONE ENCOUNTER
Caller: Susan Hernandez Art    Relationship: Self    Best call back number: [phone]    Requested Prescriptions:   Requested Prescriptions     Pending Prescriptions Disp Refills   • metFORMIN (GLUCOPHAGE) 500 MG tablet 90 tablet 3     Sig: Take 1 tablet by mouth Daily With Breakfast.        [facility] be sent[facility] [place] [place]  [address] - [phone]  - [phone] FX     Additional details provided by patient: PATIENT ONLY HAS ENOUGH MEDICATION FOR TWO DAYS    Does the patient have less than a 3 day supply:  [x] Yes  [] No    Would you like a call back once the refill request has been completed: [] Yes [x] No    If the office needs to give you a call back, can they leave a voicemail: [] Yes [x] No    Chandu Huerta Rep   03/13/23 10:55 EDT

## 2023-03-30 ENCOUNTER — TRANSCRIBE ORDERS (OUTPATIENT)
Dept: ADMINISTRATIVE | Facility: HOSPITAL | Age: 73
End: 2023-03-30
Payer: MEDICARE

## 2023-03-30 DIAGNOSIS — R94.5 ABNORMAL LIVER FUNCTION: Primary | ICD-10-CM

## 2023-03-30 DIAGNOSIS — R16.0 HEPATOMEGALY, NOT ELSEWHERE CLASSIFIED: Primary | ICD-10-CM

## 2023-03-30 DIAGNOSIS — C18.9 MALIGNANT NEOPLASM OF COLON, UNSPECIFIED PART OF COLON: ICD-10-CM

## 2023-03-30 DIAGNOSIS — R94.5 ABNORMAL RESULTS OF LIVER FUNCTION STUDIES: ICD-10-CM

## 2023-04-10 DIAGNOSIS — M50.30 DDD (DEGENERATIVE DISC DISEASE), CERVICAL: ICD-10-CM

## 2023-04-10 NOTE — TELEPHONE ENCOUNTER
Caller: Susan Hernandez Art    Relationship: Self    Best call back number: [phone]     Requested Prescriptions:   Requested Prescriptions     Pending Prescriptions Disp Refills   • empagliflozin (Jardiance) 25 MG tablet tablet 90 tablet 3     Sig: Take 1 tablet by mouth Daily.   • pregabalin (Lyrica) 300 MG capsule 60 capsule 0     Sig: Take 1 capsule by mouth 2 (Two) Times a Day As Needed (pain).        [facility] [place] [place] [facility] [address] [facility] [phone] [facility] [phone]      Last office visit with prescribing clinician: 1/17/2023   Last telemedicine visit with prescribing clinician: 4/27/2023   Next office visit with prescribing clinician: 4/27/2023     Additional details provided by patient:     Does the patient have less than a 3 day supply:  [x] Yes  [] No    Would you like a call back once the refill request has been completed: [x] Yes [] No    If the office needs to give you a call back, can they leave a voicemail: [x] Yes [] No    Chandu Rizo Rep   04/10/23 12:22 EDT

## 2023-04-10 NOTE — TELEPHONE ENCOUNTER
Control Refill Request:  Medication: Lyrica  Last Office Visit: 1/17/2023  Next Office Visit:4/27/2023  Last UDS:10/11/2022  Last Contract:10/11/2022    Per pharmacy, last filled: 3/14/2023

## 2023-04-11 RX ORDER — PREGABALIN 300 MG/1
300 CAPSULE ORAL 2 TIMES DAILY PRN
Qty: 60 CAPSULE | Refills: 0 | Status: SHIPPED | OUTPATIENT
Start: 2023-04-11

## 2023-04-12 ENCOUNTER — CLINICAL SUPPORT (OUTPATIENT)
Dept: INTERNAL MEDICINE | Facility: CLINIC | Age: 73
End: 2023-04-12
Payer: MEDICARE

## 2023-04-12 DIAGNOSIS — Z09 NEED FOR IMMUNIZATION FOLLOW-UP: ICD-10-CM

## 2023-04-26 ENCOUNTER — HOSPITAL ENCOUNTER (OUTPATIENT)
Dept: CT IMAGING | Facility: HOSPITAL | Age: 73
Discharge: HOME | End: 2023-04-26
Payer: MEDICARE

## 2023-04-26 DIAGNOSIS — R94.5 ABNORMAL RESULTS OF LIVER FUNCTION STUDIES: ICD-10-CM

## 2023-04-26 DIAGNOSIS — C18.9 MALIGNANT NEOPLASM OF COLON, UNSPECIFIED PART OF COLON: ICD-10-CM

## 2023-04-26 DIAGNOSIS — R16.0 HEPATOMEGALY, NOT ELSEWHERE CLASSIFIED: ICD-10-CM

## 2023-04-26 LAB
CREAT BLDA-MCNC: 1 MG/DL
EGFRCR SERPLBLD CKD-EPI 2021: 60 ML/MIN/1.73

## 2023-04-26 PROCEDURE — 74160 CT ABDOMEN W/CONTRAST: CPT

## 2023-04-26 PROCEDURE — 25510000001 IOPAMIDOL PER 1 ML: Performed by: INTERNAL MEDICINE

## 2023-04-26 PROCEDURE — 82565 ASSAY OF CREATININE: CPT

## 2023-04-26 RX ADMIN — IOPAMIDOL 100 ML: 755 INJECTION, SOLUTION INTRAVENOUS at 15:34

## 2023-04-27 ENCOUNTER — OFFICE VISIT (OUTPATIENT)
Dept: INTERNAL MEDICINE | Facility: CLINIC | Age: 73
End: 2023-04-27
Payer: MEDICARE

## 2023-04-27 VITALS
HEART RATE: 77 BPM | BODY MASS INDEX: 25.2 KG/M2 | DIASTOLIC BLOOD PRESSURE: 82 MMHG | HEIGHT: 64 IN | OXYGEN SATURATION: 99 % | SYSTOLIC BLOOD PRESSURE: 134 MMHG | WEIGHT: 147.6 LBS | TEMPERATURE: 97.1 F

## 2023-04-27 DIAGNOSIS — E78.49 OTHER HYPERLIPIDEMIA: ICD-10-CM

## 2023-04-27 DIAGNOSIS — E55.9 VITAMIN D DEFICIENCY: ICD-10-CM

## 2023-04-27 DIAGNOSIS — Z79.899 ENCOUNTER FOR LONG-TERM (CURRENT) USE OF OTHER MEDICATIONS: ICD-10-CM

## 2023-04-27 DIAGNOSIS — E11.65 TYPE 2 DIABETES MELLITUS WITH HYPERGLYCEMIA, WITHOUT LONG-TERM CURRENT USE OF INSULIN: Primary | ICD-10-CM

## 2023-04-27 DIAGNOSIS — M50.30 DDD (DEGENERATIVE DISC DISEASE), CERVICAL: ICD-10-CM

## 2023-04-27 DIAGNOSIS — Z23 NEED FOR COVID-19 VACCINE: ICD-10-CM

## 2023-04-27 DIAGNOSIS — E03.9 ACQUIRED HYPOTHYROIDISM: ICD-10-CM

## 2023-04-27 LAB
25(OH)D3 SERPL-MCNC: 61 NG/ML (ref 30–100)
ALBUMIN SERPL-MCNC: 4.2 G/DL (ref 3.5–5.2)
ALBUMIN/GLOB SERPL: 1.7 G/DL
ALP SERPL-CCNC: 106 U/L (ref 39–117)
ALT SERPL W P-5'-P-CCNC: 37 U/L (ref 1–33)
AMPHET+METHAMPHET UR QL: NEGATIVE
AMPHETAMINE INTERNAL CONTROL: NORMAL
AMPHETAMINES UR QL: NEGATIVE
ANION GAP SERPL CALCULATED.3IONS-SCNC: 11.9 MMOL/L (ref 5–15)
AST SERPL-CCNC: 34 U/L (ref 1–32)
BARBITURATE INTERNAL CONTROL: NORMAL
BARBITURATES UR QL SCN: NEGATIVE
BASOPHILS # BLD AUTO: 0.03 10*3/MM3 (ref 0–0.2)
BASOPHILS NFR BLD AUTO: 0.5 % (ref 0–1.5)
BENZODIAZ UR QL SCN: NEGATIVE
BENZODIAZEPINE INTERNAL CONTROL: NORMAL
BILIRUB SERPL-MCNC: 0.6 MG/DL (ref 0–1.2)
BUN SERPL-MCNC: 11 MG/DL (ref 8–23)
BUN/CREAT SERPL: 10.2 (ref 7–25)
BUPRENORPHINE INTERNAL CONTROL: NORMAL
BUPRENORPHINE SERPL-MCNC: NEGATIVE NG/ML
CALCIUM SPEC-SCNC: 9.4 MG/DL (ref 8.6–10.5)
CANNABINOIDS SERPL QL: NEGATIVE
CHLORIDE SERPL-SCNC: 101 MMOL/L (ref 98–107)
CHOLEST SERPL-MCNC: 150 MG/DL (ref 0–200)
CO2 SERPL-SCNC: 23.1 MMOL/L (ref 22–29)
COCAINE INTERNAL CONTROL: NORMAL
COCAINE UR QL: NEGATIVE
CREAT SERPL-MCNC: 1.08 MG/DL (ref 0.57–1)
DEPRECATED RDW RBC AUTO: 42.5 FL (ref 37–54)
EGFRCR SERPLBLD CKD-EPI 2021: 54.7 ML/MIN/1.73
EOSINOPHIL # BLD AUTO: 0.05 10*3/MM3 (ref 0–0.4)
EOSINOPHIL NFR BLD AUTO: 0.9 % (ref 0.3–6.2)
ERYTHROCYTE [DISTWIDTH] IN BLOOD BY AUTOMATED COUNT: 11.9 % (ref 12.3–15.4)
EXPIRATION DATE: NORMAL
GLOBULIN UR ELPH-MCNC: 2.5 GM/DL
GLUCOSE SERPL-MCNC: 315 MG/DL (ref 65–99)
HBA1C MFR BLD: 8.5 % (ref 4.8–5.6)
HCT VFR BLD AUTO: 45.9 % (ref 34–46.6)
HDLC SERPL-MCNC: 45 MG/DL (ref 40–60)
HGB BLD-MCNC: 15.3 G/DL (ref 12–15.9)
IMM GRANULOCYTES # BLD AUTO: 0.02 10*3/MM3 (ref 0–0.05)
IMM GRANULOCYTES NFR BLD AUTO: 0.4 % (ref 0–0.5)
LDLC SERPL CALC-MCNC: 70 MG/DL (ref 0–100)
LDLC/HDLC SERPL: 1.4 {RATIO}
LYMPHOCYTES # BLD AUTO: 1.93 10*3/MM3 (ref 0.7–3.1)
LYMPHOCYTES NFR BLD AUTO: 34.8 % (ref 19.6–45.3)
Lab: NORMAL
MCH RBC QN AUTO: 32.2 PG (ref 26.6–33)
MCHC RBC AUTO-ENTMCNC: 33.3 G/DL (ref 31.5–35.7)
MCV RBC AUTO: 96.6 FL (ref 79–97)
MDMA (ECSTASY) INTERNAL CONTROL: NORMAL
MDMA UR QL SCN: NEGATIVE
METHADONE INTERNAL CONTROL: NORMAL
METHADONE UR QL SCN: NEGATIVE
METHAMPHETAMINE INTERNAL CONTROL: NORMAL
MONOCYTES # BLD AUTO: 0.35 10*3/MM3 (ref 0.1–0.9)
MONOCYTES NFR BLD AUTO: 6.3 % (ref 5–12)
NEUTROPHILS NFR BLD AUTO: 3.16 10*3/MM3 (ref 1.7–7)
NEUTROPHILS NFR BLD AUTO: 57.1 % (ref 42.7–76)
NRBC BLD AUTO-RTO: 0 /100 WBC (ref 0–0.2)
OPIATES INTERNAL CONTROL: NORMAL
OPIATES UR QL: NEGATIVE
OXYCODONE INTERNAL CONTROL: NORMAL
OXYCODONE UR QL SCN: NEGATIVE
PCP UR QL SCN: NEGATIVE
PHENCYCLIDINE INTERNAL CONTROL: NORMAL
PLATELET # BLD AUTO: 208 10*3/MM3 (ref 140–450)
PMV BLD AUTO: 10.9 FL (ref 6–12)
POTASSIUM SERPL-SCNC: 3.6 MMOL/L (ref 3.5–5.2)
PROT SERPL-MCNC: 6.7 G/DL (ref 6–8.5)
RBC # BLD AUTO: 4.75 10*6/MM3 (ref 3.77–5.28)
SODIUM SERPL-SCNC: 136 MMOL/L (ref 136–145)
THC INTERNAL CONTROL: NORMAL
TRIGL SERPL-MCNC: 210 MG/DL (ref 0–150)
TSH SERPL DL<=0.05 MIU/L-ACNC: 2.05 UIU/ML (ref 0.27–4.2)
VLDLC SERPL-MCNC: 35 MG/DL (ref 5–40)
WBC NRBC COR # BLD: 5.54 10*3/MM3 (ref 3.4–10.8)

## 2023-04-27 PROCEDURE — 80053 COMPREHEN METABOLIC PANEL: CPT | Performed by: INTERNAL MEDICINE

## 2023-04-27 PROCEDURE — 83036 HEMOGLOBIN GLYCOSYLATED A1C: CPT | Performed by: INTERNAL MEDICINE

## 2023-04-27 PROCEDURE — 80061 LIPID PANEL: CPT | Performed by: INTERNAL MEDICINE

## 2023-04-27 PROCEDURE — 84443 ASSAY THYROID STIM HORMONE: CPT | Performed by: INTERNAL MEDICINE

## 2023-04-27 PROCEDURE — 82306 VITAMIN D 25 HYDROXY: CPT | Performed by: INTERNAL MEDICINE

## 2023-04-27 PROCEDURE — 85025 COMPLETE CBC W/AUTO DIFF WBC: CPT | Performed by: INTERNAL MEDICINE

## 2023-04-27 NOTE — PROGRESS NOTES
"Chief Complaint  Diabetes (Follow up )    Subjective          Sun Art Hernandez presents to [facility]  History of Present Illness  DM2- patient reports having back pain with trulicity. Patient is losing weight. Patient denies hypoglycemic events. Patient without home blood glucose readings.   hyperlipidemia- doing well on statin.   Hypothyroid- due for recheck.   degenerative disc disease- lyrica does help with ongoing chronic pains.     Current Outpatient Medications   Medication Instructions   • atorvastatin (LIPITOR) 10 mg, Oral, Nightly   • carboxymethylcellulose (Refresh Plus) 0.5 % solution 2 drops, Both Eyes, 3 Times Daily PRN   • cholecalciferol (VITAMIN D3) 1,000 Units, Oral, 2 Times Daily   • Diclofenac Sodium (VOLTAREN) 4 g, Topical, 4 Times Daily PRN   • DULoxetine (CYMBALTA) 30 mg, Oral, Daily   • empagliflozin (JARDIANCE) 25 mg, Oral, Daily   • fish oil 1,000 mg, Oral, Daily With Breakfast   • glipizide (GLUCOTROL) 5 mg, Oral, 2 Times Daily Before Meals   • levothyroxine (SYNTHROID) 50 mcg, Oral, Daily   • lidocaine (LIDODERM) 5 % 1 patch, Transdermal, Every 24 Hours, Remove & Discard patch within 12 hours or as directed by MD   • metFORMIN (GLUCOPHAGE) 500 mg, Oral, Daily With Breakfast   • omeprazole (PRILOSEC) 40 mg, Oral, Daily   • pregabalin (LYRICA) 300 mg, Oral, 2 Times Daily PRN   • SITagliptin (JANUVIA) 100 mg, Oral, Daily       The following portions of the patient's history were reviewed and updated as appropriate: allergies, current medications, past family history, past medical history, past social history, past surgical history, and problem list.    Objective   Vital Signs:   /82 (BP Location: Left arm, Patient Position: Sitting, Cuff Size: Adult)   Pulse 77   Temp 97.1 °F (36.2 °C) (Temporal)   Ht 162.6 cm (64\")   Wt 67 kg (147 lb 9.6 oz)   SpO2 99%   BMI 25.34 kg/m²     Wt Readings from Last 3 Encounters:   04/27/23 67 kg (147 lb " 9.6 oz)   01/17/23 67.2 kg (148 lb 3.2 oz)   01/16/23 68 kg (150 lb)     BP Readings from Last 3 Encounters:   04/27/23 134/82   01/17/23 121/76   01/16/23 143/79     Physical Exam   Appearance: No acute distress, well-nourished  Head: normocephalic, atraumatic  Eyes: extraocular movements intact, no scleral icterus, no conjunctival injection  Ears, Nose, and Throat: external ears normal, nares patent, moist mucous membranes  Cardiovascular: regular rate and rhythm. no murmurs, rubs, or gallops. no edema  Respiratory: breathing comfortably, symmetric chest rise, clear to auscultation bilaterally. No wheezes, rales, or rhonchi.  Neuro: alert and oriented to time, place, and person. Normal gait  Psych: normal mood and affect     Result Review :   The following data was reviewed by: Rigoberto Gomes Jr, MD on 04/27/2023:  Common labs        10/11/2022    11:19 1/9/2023    14:40 4/26/2023    15:07   Common Labs   Glucose 184   180      BUN 15   15      Creatinine 0.99   1.15   1.00     Sodium 140   142      Potassium 4.2   4.2      Chloride 101   104      Calcium 9.7   9.7      Albumin 4.70   4.0      Total Bilirubin 0.6   1.0      Alkaline Phosphatase 98   94      AST (SGOT) 55   35      ALT (SGPT) 53   35      WBC 6.70   6.25      Hemoglobin 15.8   14.8      Hematocrit 46.6   43.0      Platelets 222   182      Total Cholesterol 257   138      Triglycerides 187   82      HDL Cholesterol 50   47      LDL Cholesterol  172   75      Hemoglobin A1C 8.10   9.10      Microalbumin, Urine 1.6   <1.2            Lab Results   Component Value Date    BILIRUBINUR Negative 01/17/2023         Last Urine Toxicity         Latest Ref Rng & Units 4/27/2023 1/9/2023   LAST URINE TOXICITY RESULTS   Creatinine, Urine mg/dL  60.0     Amphetamine, Urine Qual Negative Negative      Barbiturates Screen, Urine Negative Negative      Benzodiazepine Screen, Urine Negative Negative      Buprenorphine, Screen, Urine Negative Negative       Cocaine Screen, Urine Negative Negative      Methadone Screen , Urine Negative Negative      Methamphetamine, Ur Negative Negative                    Assessment and Plan    Diagnoses and all orders for this visit:    1. Type 2 diabetes mellitus with hyperglycemia, without long-term current use of insulin (Primary)  Comments:  stopped GLP-1. may need to retry with bydureon or higher dose of glipizide. check labs. f/u in 3 months.   Orders:  -     CBC & Differential  -     Comprehensive Metabolic Panel  -     Hemoglobin A1c  -     Ambulatory Referral for Diabetic Eye Exam-Ophthalmology    2. Encounter for long-term (current) use of other medications  -     POC Urine Drug Screen [facility]    3. Other hyperlipidemia  -     Lipid Panel    4. Acquired hypothyroidism  -     TSH    5. Vitamin D deficiency  -     Vitamin D,25-Hydroxy    6. Need for COVID-19 vaccine  Comments:  pt thinks she had bivalent booster recently.     7. DDD (degenerative disc disease), cervical  Comments:  SERENA and luc reviewed. cont lyrica.         Medications Discontinued During This Encounter   Medication Reason   • calcium carbonate-cholecalciferol 500-400 MG-UNIT tablet tablet *Therapy completed   • Dulaglutide (Trulicity) 0.75 MG/0.5ML solution pen-injector Alternate therapy     Follow Up   Return in about 3 months (around 7/27/2023) for Recheck, HTN, DM.  Patient was given instructions and counseling regarding her condition or for health maintenance advice. Please see specific information pulled into the AVS if appropriate.       Rigoberto Gomes Jr, MD  04/27/23  13:50 EDT

## 2023-04-28 RX ORDER — GLIPIZIDE 10 MG/1
10 TABLET ORAL
Qty: 180 TABLET | Refills: 1 | Status: SHIPPED | OUTPATIENT
Start: 2023-04-28

## 2023-05-01 ENCOUNTER — TELEPHONE (OUTPATIENT)
Dept: INTERNAL MEDICINE | Facility: CLINIC | Age: 73
End: 2023-05-01
Payer: MEDICARE

## 2023-05-01 NOTE — TELEPHONE ENCOUNTER
Called patient no answer left vm to call us back     OKAY FOR HUB TO READ/ADVISE       ----- Message from Rigoberto Gomes Jr., MD sent at 4/28/2023  8:08 AM EDT -----  HgbA1c better, but still not at goal. Would recommend increase glipizide to 10mg BID before meals. Will send new Prescription.      Elevated triglycerides, which are the storage form of sugar - recommend lower carbohydrate/sugar intake.

## 2023-05-01 NOTE — TELEPHONE ENCOUNTER
----- Message from Rigoberto Gomes Jr., MD sent at 4/28/2023  8:08 AM EDT -----  HgbA1c better, but still not at goal. Would recommend increase glipizide to 10mg BID before meals. Will send new Prescription.     Elevated triglycerides, which are the storage form of sugar - recommend lower carbohydrate/sugar intake.

## 2023-05-11 ENCOUNTER — TELEPHONE (OUTPATIENT)
Dept: INTERNAL MEDICINE | Facility: CLINIC | Age: 73
End: 2023-05-11
Payer: MEDICARE

## 2023-05-11 DIAGNOSIS — E03.9 ACQUIRED HYPOTHYROIDISM: ICD-10-CM

## 2023-05-11 DIAGNOSIS — M50.30 DDD (DEGENERATIVE DISC DISEASE), CERVICAL: ICD-10-CM

## 2023-05-11 RX ORDER — PREGABALIN 300 MG/1
300 CAPSULE ORAL 2 TIMES DAILY PRN
Qty: 60 CAPSULE | Refills: 0 | Status: SHIPPED | OUTPATIENT
Start: 2023-05-11

## 2023-05-11 RX ORDER — LEVOTHYROXINE SODIUM 0.05 MG/1
50 TABLET ORAL DAILY
Qty: 90 TABLET | Refills: 1 | Status: SHIPPED | OUTPATIENT
Start: 2023-05-11

## 2023-05-11 RX ORDER — ATORVASTATIN CALCIUM 10 MG/1
10 TABLET, FILM COATED ORAL NIGHTLY
Qty: 90 TABLET | Refills: 3 | Status: SHIPPED | OUTPATIENT
Start: 2023-05-11

## 2023-05-11 NOTE — TELEPHONE ENCOUNTER
Caller: Susan Hernandez Art    Relationship: Self    Best call back number: [phone]    Requested Prescriptions:   Requested Prescriptions     Pending Prescriptions Disp Refills   • pregabalin (Lyrica) 300 MG capsule 60 capsule 0     Sig: Take 1 capsule by mouth 2 (Two) Times a Day As Needed (pain).   • levothyroxine (Synthroid) 50 MCG tablet 90 tablet 1     Sig: Take 1 tablet by mouth Daily.        Pharmacy where request should be sent: [facility] [place] [facility] [place] - [address] [facility] [phone] [facility] [phone]      Last office visit with prescribing clinician: 4/27/2023   Last telemedicine visit with prescribing clinician: 5/1/2023   Next office visit with prescribing clinician: 7/27/2023     Does the patient have less than a 3 day supply:  [x] Yes  [] No    Would you like a call back once the refill request has been completed: [x] Yes [] No    If the office needs to give you a call back, can they leave a voicemail: [x] Yes [] No    Chandu Wallace Rep   05/11/23 13:14 EDT

## 2023-05-11 NOTE — TELEPHONE ENCOUNTER
Last Office Visit: 04/27/2023  Next Office Visit: 07/27/2023  Last Date Prescribed: 04/11/2023  Last Urine Drug Screen: 04/27/2023  Date of Signed Controlled Contract: 10/11/2022

## 2023-05-11 NOTE — TELEPHONE ENCOUNTER
Caller: Susan Hernandez Art    Relationship: Self    Best call back number: [phone]     Requested Prescriptions:   Requested Prescriptions     Pending Prescriptions Disp Refills   • atorvastatin (LIPITOR) 10 MG tablet 90 tablet 3     Sig: Take 1 tablet by mouth Every Night.        [place]arm[facility] where request should be sent[place] [facility] [place]  [address] [facility] [address] [address] [phone] [facility] [phone]      Last office visit with prescribing clinician: 4/27/2023   Last telemedicine visit with prescribing clinician: 5/11/2023   Next office visit with prescribing clinician: 7/27/2023     Additional details provided by patient: PATIENT IS NEEDING A REFILL    Does the patient have less than a 3 day supply:  [x] Yes  [] No    Would you like a call back once the refill request has been completed: [x] Yes [] No    If the office needs to give you a call back, can they leave a voicemail: [x] Yes [] No    Chadnu Low Rep   05/11/23 13:19 EDT

## 2023-05-30 ENCOUNTER — TELEPHONE (OUTPATIENT)
Dept: INTERNAL MEDICINE | Facility: CLINIC | Age: 73
End: 2023-05-30

## 2023-05-30 DIAGNOSIS — K21.9 GASTROESOPHAGEAL REFLUX DISEASE WITHOUT ESOPHAGITIS: ICD-10-CM

## 2023-05-30 RX ORDER — OMEPRAZOLE 40 MG/1
40 CAPSULE, DELAYED RELEASE ORAL DAILY
Qty: 90 CAPSULE | Refills: 3 | Status: SHIPPED | OUTPATIENT
Start: 2023-05-30

## 2023-05-30 NOTE — TELEPHONE ENCOUNTER
Rx Refill Note  Requested Prescriptions     Pending Prescriptions Disp Refills   • omeprazole (priLOSEC) 40 MG capsule 90 capsule 3     Sig: Take 1 capsule by mouth Daily.      Last office visit with prescribing clinician: 4/27/2023   Last telemedicine visit with prescribing clinician: Visit date not found   Next office visit with prescribing clinician: 7/27/2023                         Would you like a call back once the refill request has been completed: [] Yes [] No    If the office needs to give you a call back, can they leave a voicemail: [] Yes [] No    Tonio Johnson  05/30/23, 10:13 EDT

## 2023-05-30 NOTE — TELEPHONE ENCOUNTER
Caller: Susan Hernandez Art    Relationship: Self    Best call back number: [phone]    Requested Prescriptions:   Requested Prescriptions     Pending Prescriptions Disp Refills   • omeprazole (priLOSEC) 40 MG capsule 90 capsule 3     Sig: Take 1 capsule by mouth Daily.        Pharmacy where request should be sent: [facility] [place] [facility] [place]  [address] [facility] [phone]  - [phone] [place]     Last office visit with prescribing clinician: 4/27/2023   Last telemedicine visit with prescribing clinician: Visit date not found   Next office visit with prescribing clinician: 7/27/2023     Additional details provided by patient:PATIENT HAS LESS THAN A THREE DAY SUPPLY OF MEDICATION. PLEASE SEND NEW PRESCRIPTION WITH REFILLS TO PHARMACY ASAP TODAY.    Does the patient have less than a 3 day supply:  [x] Yes  [] No    Would you like a call back once the refill request has been completed: [] Yes [] No    If the office needs to give you a call back, can they leave a voicemail: [] Yes [] No    Chandu Walton   05/30/23 10:00 EDT

## 2023-06-12 DIAGNOSIS — M50.30 DDD (DEGENERATIVE DISC DISEASE), CERVICAL: ICD-10-CM

## 2023-06-12 RX ORDER — LIDOCAINE 50 MG/G
1 PATCH TOPICAL EVERY 24 HOURS
Qty: 90 PATCH | Refills: 3 | Status: SHIPPED | OUTPATIENT
Start: 2023-06-12

## 2023-06-12 RX ORDER — PREGABALIN 300 MG/1
300 CAPSULE ORAL 2 TIMES DAILY PRN
Qty: 60 CAPSULE | Refills: 0 | Status: SHIPPED | OUTPATIENT
Start: 2023-06-12

## 2023-06-12 NOTE — TELEPHONE ENCOUNTER
Refill request for  controlled substance.      Date of request: 6/12/2023   Medication requested: Gabapentin  Last OV: 4/27/23  Last UDS: 4/27/23  Contract signed: yes    Date:10/11/22  Next office visit: 7/27/23    Wendy Hernández

## 2023-06-12 NOTE — TELEPHONE ENCOUNTER
Caller: Susan Hernandez Art    Relationship: Self    Best call back number: [phone]     Requested Prescriptions:   Requested Prescriptions     Pending Prescriptions Disp Refills    pregabalin (Lyrica) 300 MG capsule 60 capsule 0     Sig: Take 1 capsule by mouth 2 (Two) Times a Day As Needed (pain).    lidocaine (LIDODERM) 5 % 90 patch 3     Sig: Place 1 patch on the skin as directed by provider Daily. Remove & Discard patch within 12 hours or as directed by MD        Pharmacy where request should be sent: [facility] - [address] - [phone]  - [phone] [place]     Last office visit with prescribing clinician: 4/27/2023   Last telemedicine visit with prescribing clinician: Visit date not found   Next office visit with prescribing clinician: 7/27/2023     Additional details provided by patient: PATIENT ONLY HAS ONE DAY LEFT OF MEDICATION - PATIENT STATES THAT A TEXT MESSAGE CAN BE SENT IF SHE IS UNABLE TO ANSWER.     Does the patient have less than a 3 day supply:  [x] Yes  [] No    Would you like a call back once the refill request has been completed: [x] Yes [] No    If the office needs to give you a call back, can they leave a voicemail: [] Yes [x] No    Chandu Huerta Rep   06/12/23 12:46 EDT

## 2023-06-13 NOTE — TELEPHONE ENCOUNTER
Patient is aware of medication being printed and ready for . No further questions or concerns noted.

## 2023-07-21 PROBLEM — E78.2 MIXED HYPERLIPIDEMIA: Status: ACTIVE | Noted: 2021-10-11

## 2023-07-21 PROCEDURE — 84443 ASSAY THYROID STIM HORMONE: CPT | Performed by: INTERNAL MEDICINE

## 2023-07-21 PROCEDURE — 80061 LIPID PANEL: CPT | Performed by: INTERNAL MEDICINE

## 2023-07-21 PROCEDURE — 80053 COMPREHEN METABOLIC PANEL: CPT | Performed by: INTERNAL MEDICINE

## 2023-07-21 PROCEDURE — 83036 HEMOGLOBIN GLYCOSYLATED A1C: CPT | Performed by: INTERNAL MEDICINE

## 2023-07-21 PROCEDURE — 85025 COMPLETE CBC W/AUTO DIFF WBC: CPT | Performed by: INTERNAL MEDICINE

## 2023-07-21 PROCEDURE — 82306 VITAMIN D 25 HYDROXY: CPT | Performed by: INTERNAL MEDICINE

## 2023-07-24 ENCOUNTER — TELEPHONE (OUTPATIENT)
Dept: INTERNAL MEDICINE | Facility: CLINIC | Age: 73
End: 2023-07-24

## 2023-07-26 ENCOUNTER — TELEPHONE (OUTPATIENT)
Dept: INTERNAL MEDICINE | Facility: CLINIC | Age: 73
End: 2023-07-26
Payer: COMMERCIAL

## 2023-07-26 NOTE — TELEPHONE ENCOUNTER
Called patient she is aware of these results     She only takes once a day Metformin- she has never taken it any other way     She stated that she doesn't take Crestor   But she does taker Lipitor I explained to her that [facility] will be sending in a new prescription.   I will let her know when she sent  it in

## 2023-07-26 NOTE — TELEPHONE ENCOUNTER
----- Message from KEITH Andino sent at 7/25/2023  4:35 PM EDT -----    A1c is uncontrolled at 8.4.  Patient only tolerates metformin once daily?    Liver function elevated, but appears stable from previous labs.    GFR has improved    The 10-year ASCVD risk score (Gabi HERNANDEZ, et al., 2019) is: 24.7%  Cholesterol levels are elevated.  Was patient fasting when she took these labs?  And is she taking her Lipitor daily?  If the answer is yes it looks like we need to increase the dosing of the Lipitor if not, let me know    Vitamin D levels reassuring.    Thyroid reassuring

## 2023-08-10 DIAGNOSIS — M50.30 DDD (DEGENERATIVE DISC DISEASE), CERVICAL: ICD-10-CM

## 2023-08-10 NOTE — TELEPHONE ENCOUNTER
Caller: Susan Hernandez Art    Relationship: Self    Best call back number: [phone]     Requested Prescriptions:   Requested Prescriptions     Pending Prescriptions Disp Refills    pregabalin (Lyrica) 300 MG capsule 60 capsule 0     Sig: Take 1 capsule by mouth 2 (Two) Times a Day As Needed (pain).        Pharmacy where request should be sent: [place] [phone] [place] [phone]      Last office visit with prescribing clinician: 7/21/2023   Last telemedicine visit with prescribing clinician: Visit date not found   Next office visit with prescribing clinician: 10/23/2023     Additional details provided by patient: PATIENT HAS 2 DAYS LEFT OF MEDICATION.    Does the patient have less than a 3 day supply:  [x] Yes  [] No    Would you like a call back once the refill request has been completed: [x] Yes [] No    If the office needs to give you a call back, can they leave a voicemail: [] Yes [] No    Chandu Huerta Rep   08/10/23 13:12 EDT

## 2023-08-10 NOTE — TELEPHONE ENCOUNTER
CONTROLLED MEDICATION. PLEASE ADVISE.     Lyrica 300mg     Last Filled: 7/10/23 #60-no refills   Last Visit: 7/21/23  Next Appt: 10/23/23  Last UDS: 4/27/23  Last Controlled Contract: 10/11/22

## 2023-08-11 RX ORDER — PREGABALIN 300 MG/1
300 CAPSULE ORAL 2 TIMES DAILY PRN
Qty: 60 CAPSULE | Refills: 0 | Status: SHIPPED | OUTPATIENT
Start: 2023-08-11

## 2023-08-29 ENCOUNTER — HOSPITAL ENCOUNTER (OUTPATIENT)
Dept: MRI IMAGING | Facility: HOSPITAL | Age: 73
Discharge: HOME | End: 2023-08-29
Payer: MEDICARE

## 2023-08-29 ENCOUNTER — HOSPITAL ENCOUNTER (OUTPATIENT)
Dept: CT IMAGING | Facility: HOSPITAL | Age: 73
Discharge: HOME | End: 2023-08-29
Admitting: INTERNAL MEDICINE
Payer: MEDICARE

## 2023-08-29 ENCOUNTER — HOSPITAL ENCOUNTER (OUTPATIENT)
Dept: GENERAL RADIOLOGY | Facility: HOSPITAL | Age: 73
Discharge: HOME | End: 2023-08-29
Payer: MEDICARE
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
## 2023-08-29 DIAGNOSIS — G44.52 NEW DAILY PERSISTENT HEADACHE: ICD-10-CM

## 2023-08-29 DIAGNOSIS — C18.9 MALIGNANT NEOPLASM OF COLON, UNSPECIFIED PART OF COLON: ICD-10-CM

## 2023-08-29 LAB
CREAT BLDA-MCNC: 0.9 MG/DL
EGFRCR SERPLBLD CKD-EPI 2021: 68.1 ML/MIN/1.73

## 2023-08-29 PROCEDURE — A9577 INJ MULTIHANCE: HCPCS | Performed by: INTERNAL MEDICINE

## 2023-08-29 PROCEDURE — 82565 ASSAY OF CREATININE: CPT

## 2023-08-29 PROCEDURE — 74176 CT ABD & PELVIS W/O CONTRAST: CPT

## 2023-08-29 PROCEDURE — 70553 MRI BRAIN STEM W/O & W/DYE: CPT

## 2023-08-29 PROCEDURE — 0 GADOBENATE DIMEGLUMINE 529 MG/ML SOLUTION: Performed by: INTERNAL MEDICINE

## 2023-08-29 PROCEDURE — 72050 X-RAY EXAM NECK SPINE 4/5VWS: CPT

## 2023-08-29 PROCEDURE — 72131 CT LUMBAR SPINE W/O DYE: CPT

## 2023-08-29 PROCEDURE — 72110 X-RAY EXAM L-2 SPINE 4/>VWS: CPT

## 2023-08-29 PROCEDURE — 72125 CT NECK SPINE W/O DYE: CPT

## 2023-08-29 RX ADMIN — GADOBENATE DIMEGLUMINE 15 ML: 529 INJECTION, SOLUTION INTRAVENOUS at 16:29

## 2023-08-31 ENCOUNTER — TELEPHONE (OUTPATIENT)
Dept: INTERNAL MEDICINE | Facility: CLINIC | Age: 73
End: 2023-08-31
Payer: COMMERCIAL

## 2023-08-31 NOTE — TELEPHONE ENCOUNTER
----- Message from Rigoberto Gomes Jr., MD sent at 8/31/2023  8:19 AM EDT -----  Degenerative disc disease noted, which is osteoarthritis of the spine.

## 2023-08-31 NOTE — TELEPHONE ENCOUNTER
----- Message from Rigoberto Gomes Jr., MD sent at 8/31/2023  8:24 AM EDT -----  Degenerative disc disease noted, which is osteoarthritis of the spine

## 2023-08-31 NOTE — TELEPHONE ENCOUNTER
----- Message from Rigoberto Gomes Jr., MD sent at 8/31/2023  8:22 AM EDT -----  Degenerative disc disease of the spine, which is like osteoarthritis of the spine

## 2023-08-31 NOTE — TELEPHONE ENCOUNTER
----- Message from Rigoberto Gomes Jr., MD sent at 8/31/2023  8:23 AM EDT -----  Mild chronic small vessel disease noted, which is common continue atorvastatin and asa 81mg daily to help prevent progression.

## 2023-09-11 ENCOUNTER — TELEPHONE (OUTPATIENT)
Dept: INTERNAL MEDICINE | Facility: CLINIC | Age: 73
End: 2023-09-11
Payer: COMMERCIAL

## 2023-09-11 DIAGNOSIS — M50.30 DDD (DEGENERATIVE DISC DISEASE), CERVICAL: ICD-10-CM

## 2023-09-11 NOTE — TELEPHONE ENCOUNTER
Caller: Susan Hernandez Art    Relationship: Self    Best call back number: [phone]   Requested Prescriptions:   [facility]     Pharmacy where request should be sent:  [facility]    Last office visit with prescribing clinician: 7/21/2023   Last telemedicine visit with prescribing clinician: Visit date not found   Next office visit with prescribing clinician: 9/29/2023     Additional details provided by patient: PATIENT STATED THAT SHE HAS 1 DAY OF MEDICATION REMAINING.     Does the patient have less than a 3 day supply:  [x] Yes  [] No    Would you like a call back once the refill request has been completed: [x] Yes [] No    If the office needs to give you a call back, can they leave a voicemail: [x] Yes [] No    Chandu Palaico Rep   09/11/23 14:18 EDT

## 2023-09-11 NOTE — TELEPHONE ENCOUNTER
Refill request for  controlled substance.      Date of request: 9/11/2023    Medication requested: Lyrica   Last OV: 7/21/23  Last UDS: 4/27/23  Contract signed: yes    Date:10/11/22  Next office visit: 9/29/23    Wendy Hernández

## 2023-09-12 RX ORDER — PREGABALIN 300 MG/1
300 CAPSULE ORAL 2 TIMES DAILY PRN
Qty: 60 CAPSULE | Refills: 0 | Status: SHIPPED | OUTPATIENT
Start: 2023-09-12

## 2023-09-29 ENCOUNTER — OFFICE VISIT (OUTPATIENT)
Dept: INTERNAL MEDICINE | Facility: CLINIC | Age: 73
End: 2023-09-29
Payer: MEDICARE

## 2023-09-29 VITALS
TEMPERATURE: 97.1 F | HEART RATE: 75 BPM | OXYGEN SATURATION: 94 % | DIASTOLIC BLOOD PRESSURE: 77 MMHG | HEIGHT: 64 IN | SYSTOLIC BLOOD PRESSURE: 132 MMHG | BODY MASS INDEX: 25.27 KG/M2 | WEIGHT: 148 LBS

## 2023-09-29 DIAGNOSIS — Z23 NEED FOR INFLUENZA VACCINATION: ICD-10-CM

## 2023-09-29 DIAGNOSIS — E11.65 TYPE 2 DIABETES MELLITUS WITH HYPERGLYCEMIA, WITHOUT LONG-TERM CURRENT USE OF INSULIN: ICD-10-CM

## 2023-09-29 DIAGNOSIS — E03.9 ACQUIRED HYPOTHYROIDISM: ICD-10-CM

## 2023-09-29 DIAGNOSIS — E78.2 MIXED HYPERLIPIDEMIA: ICD-10-CM

## 2023-09-29 DIAGNOSIS — M48.02 CERVICAL SPINAL STENOSIS: Primary | ICD-10-CM

## 2023-09-29 DIAGNOSIS — K76.0 NAFLD (NONALCOHOLIC FATTY LIVER DISEASE): ICD-10-CM

## 2023-09-29 LAB — GLUCOSE BLDC GLUCOMTR-MCNC: 187 MG/DL (ref 70–130)

## 2023-09-29 PROCEDURE — 99214 OFFICE O/P EST MOD 30 MIN: CPT | Performed by: INTERNAL MEDICINE

## 2023-09-29 PROCEDURE — 3052F HG A1C>EQUAL 8.0%<EQUAL 9.0%: CPT | Performed by: INTERNAL MEDICINE

## 2023-09-29 PROCEDURE — 82948 REAGENT STRIP/BLOOD GLUCOSE: CPT | Performed by: INTERNAL MEDICINE

## 2023-09-29 RX ORDER — ANTIOX #8/OM3/DHA/EPA/LUT/ZEAX 250-2.5 MG
CAPSULE ORAL
COMMUNITY
Start: 2023-09-12

## 2023-09-29 RX ORDER — TRAMADOL HYDROCHLORIDE 50 MG/1
TABLET ORAL
COMMUNITY
Start: 2023-07-25

## 2023-09-29 NOTE — PROGRESS NOTES
Chief Complaint  Diabetes (Type 2 ) and Results (MRI and CT results )    Subjective          Sun Art Hernandez presents to [facility]  History of Present Illness  Neck pain - pain radiates down her arm. Patient with recent imaging studies. Patient amenable to discuss about possible procedural intervention.   DM2- patient reports difficulty remembering to take glipizide. She reports compliance with metformin, januvia, and jardiance. Patient amenable ot higher dose of metformin.   Hyperlipidemia- patient had not been taking atorvastatin due to concerns for NAFLD.    Hypothyroid- due for recheck.     Current Outpatient Medications   Medication Instructions    atorvastatin (LIPITOR) 10 mg, Oral, Nightly    carboxymethylcellulose (Refresh Plus) 0.5 % solution 2 drops, Both Eyes, 3 Times Daily PRN    cholecalciferol (VITAMIN D3) 1,000 Units, Oral, 2 Times Daily    Diclofenac Sodium (VOLTAREN) 4 g, Topical, 4 Times Daily PRN    DULoxetine (CYMBALTA) 30 mg, Oral, Daily    empagliflozin (JARDIANCE) 25 mg, Oral, Daily    fish oil 1,000 mg, Oral, Daily With Breakfast    glipizide (GLUCOTROL) 10 mg, Oral, 2 Times Daily Before Meals    levothyroxine (SYNTHROID) 50 mcg, Oral, Daily    lidocaine (LIDODERM) 5 % 1 patch, Transdermal, Every 24 Hours, Remove & Discard patch within 12 hours or as directed by MD    metFORMIN (GLUCOPHAGE) 500 mg, Oral, 2 Times Daily With Meals    multivitamins-minerals (PRESERVISION AREDS 2) capsule capsule     omeprazole (PRILOSEC) 40 mg, Oral, Daily    pregabalin (LYRICA) 300 mg, Oral, 2 Times Daily PRN    SITagliptin (JANUVIA) 100 mg, Oral, Daily    traMADol (ULTRAM) 50 MG tablet        The following portions of the patient's history were reviewed and updated as appropriate: allergies, current medications, past family history, past medical history, past social history, past surgical history, and problem list.    Objective   Vital Signs:   /77 (BP  "Location: Left arm)   Pulse 75   Temp 97.1 °F (36.2 °C) (Temporal)   Ht 162.6 cm (64\")   Wt 67.1 kg (148 lb)   SpO2 94%   BMI 25.40 kg/m²     BP Readings from Last 3 Encounters:   09/29/23 132/77   07/21/23 135/83   04/27/23 134/82     Wt Readings from Last 3 Encounters:   09/29/23 67.1 kg (148 lb)   07/21/23 66.8 kg (147 lb 3.2 oz)   04/27/23 67 kg (147 lb 9.6 oz)           Physical Exam   Appearance: No acute distress, well-nourished  Head: normocephalic, atraumatic  Eyes: extraocular movements intact, no scleral icterus, no conjunctival injection  Ears, Nose, and Throat: external ears normal, nares patent, moist mucous membranes  Cardiovascular: regular rate and rhythm. no murmurs, rubs, or gallops. no edema  Respiratory: breathing comfortably, symmetric chest rise, clear to auscultation bilaterally. No wheezes, rales, or rhonchi.  Neuro: alert and oriented to time, place, and person. Normal gait  Psych: normal mood and affect     Result Review :   The following data was reviewed by: Rigoberto Gomes Jr, MD on 09/29/2023:  Common labs          4/27/2023    13:36 7/21/2023    10:10 8/29/2023    16:15   Common Labs   Glucose 315  147     BUN 11  19     Creatinine 1.08  0.93  0.90    Sodium 136  141     Potassium 3.6  4.4     Chloride 101  103     Calcium 9.4  9.9     Albumin 4.2  4.6     Total Bilirubin 0.6  0.7     Alkaline Phosphatase 106  96     AST (SGOT) 34  45     ALT (SGPT) 37  48     WBC 5.54  6.27     Hemoglobin 15.3  15.7     Hematocrit 45.9  46.2     Platelets 208  219     Total Cholesterol 150  232     Triglycerides 210  157     HDL Cholesterol 45  45     LDL Cholesterol  70  158     Hemoglobin A1C 8.50  8.40              Lab Results   Component Value Date    BILIRUBINUR Negative 01/17/2023            Assessment and Plan    Diagnoses and all orders for this visit:    1. Cervical spinal stenosis (Primary)  Comments:  imaging studies reviewed. refer to [facility] to discuss treatment " options  Orders:  -     Ambulatory Referral to Neurosurgery    2. Type 2 diabetes mellitus with hyperglycemia, without long-term current use of insulin  Comments:  labs reviewed. inc metformin to BID. labs in 1 month.  Orders:  -     POCT Glucose  -     metFORMIN (GLUCOPHAGE) 500 MG tablet; Take 1 tablet by mouth 2 (Two) Times a Day With Meals.  Dispense: 180 tablet; Refill: 3  -     CBC & Differential; Future  -     Hemoglobin A1c; Future    3. NAFLD (nonalcoholic fatty liver disease)  Comments:  labs and imaging reviewed. encouraged restart statin.  Orders:  -     Comprehensive Metabolic Panel; Future    4. Need for influenza vaccination  Comments:  pt defers today    5. Mixed hyperlipidemia  Comments:  lipi panel reviewed. encouraged to restart statin. goal LDL<70  Orders:  -     Lipid Panel; Future    6. Acquired hypothyroidism  -     TSH; Future          Medications Discontinued During This Encounter   Medication Reason    metFORMIN (GLUCOPHAGE) 500 MG tablet Reorder          Follow Up   Return for Next scheduled follow up.  Patient was given instructions and counseling regarding her condition or for health maintenance advice. Please see specific information pulled into the AVS if appropriate.       Rigoberto Gomes Jr, MD  09/29/23  13:29 EDT

## 2023-10-10 ENCOUNTER — OFFICE VISIT (OUTPATIENT)
Dept: NEUROSURGERY | Facility: CLINIC | Age: 73
End: 2023-10-10
Payer: MEDICARE

## 2023-10-10 VITALS
BODY MASS INDEX: 25.1 KG/M2 | HEART RATE: 74 BPM | HEIGHT: 64 IN | WEIGHT: 147 LBS | DIASTOLIC BLOOD PRESSURE: 85 MMHG | SYSTOLIC BLOOD PRESSURE: 138 MMHG

## 2023-10-10 DIAGNOSIS — M47.812 FACET ARTHROPATHY, CERVICAL: ICD-10-CM

## 2023-10-10 DIAGNOSIS — M50.30 DDD (DEGENERATIVE DISC DISEASE), CERVICAL: ICD-10-CM

## 2023-10-10 DIAGNOSIS — M54.2 CERVICALGIA: ICD-10-CM

## 2023-10-10 DIAGNOSIS — M50.222 HERNIATED NUCLEUS PULPOSUS, C5-6: Primary | ICD-10-CM

## 2023-10-10 PROCEDURE — 1159F MED LIST DOCD IN RCRD: CPT | Performed by: PHYSICIAN ASSISTANT

## 2023-10-10 PROCEDURE — 99213 OFFICE O/P EST LOW 20 MIN: CPT | Performed by: PHYSICIAN ASSISTANT

## 2023-10-10 PROCEDURE — 1160F RVW MEDS BY RX/DR IN RCRD: CPT | Performed by: PHYSICIAN ASSISTANT

## 2023-10-10 NOTE — TELEPHONE ENCOUNTER
Caller: Susan Hernandez Art    Relationship: Self    Best call back number:[phone]     Requested Prescriptions:   Requested Prescriptions     Pending Prescriptions Disp Refills    pregabalin (Lyrica) 300 MG capsule 60 capsule 0     Sig: Take 1 capsule by mouth 2 (Two) Times a Day As Needed (pain).        Pharmacy where request should be sent: [place] [phone] [place] [phone]      Last office visit with prescribing clinician: 9/29/2023   Last telemedicine visit with prescribing clinician: Visit date not found   Next office visit with prescribing clinician: 10/17/2023     Additional details provided by patient: TWO DAYS LEFT OF MEDICATION     Does the patient have less than a 3 day supply:  [x] Yes  [] No    Would you like a call back once the refill request has been completed: [x] Yes [] No    If the office needs to give you a call back, can they leave a voicemail: [] Yes [x] No    Chandu Lopez Rep   10/10/23 14:53 EDT

## 2023-10-10 NOTE — PROGRESS NOTES
"Chief Complaint  Neck Pain, Arm Pain (Right shoulder to hand ), and Shoulder Pain (Right )    Subjective          Susan Hernandez who is a 72 y.o. year old female who presents to [facility] for Evaluation of the Spine.     Previously: Last seen on 9/16/2021 by Dr. Costa for neck pain, which was a lot better at that visit.  She was complaining of some lower back and leg pain into the left leg with leg pain being worse than the back pain.  At that time Dr. Costa extended her physical therapy to include the lower back and leg in addition to her cervical spine.  There is plan to contact us for further follow-up if there is no improvement with physical therapy.    Today: Most of her pain is in the neck and the right arm to the forearm. The pain is intermittent. The pain is rated 5/10 today.    She has done PT previously which has not been very helpful.     reports that she quit smoking about 13 years ago. Her smoking use included cigarettes. She has a 76.00 pack-year smoking history. She has been exposed to tobacco smoke. She has never used smokeless tobacco.    Review of Systems   Musculoskeletal:  Positive for neck pain.        Objective   Vital Signs:   /85   Pulse 74   Ht 162.6 cm (64\")   Wt 66.7 kg (147 lb)   BMI 25.23 kg/mý       Physical Exam   Neurologic Exam     Result Review     I have personally reviewed the CT of cervical spine without contrast from 8/29/2023 which shows again mild to moderate multilevel spondylosis and facet arthritis.  There is no high-grade central canal or foraminal stenosis.  There is moderate central canal and bilateral foraminal narrowing at C5-C6.     Assessment and Plan    Diagnoses and all orders for this visit:    1. Herniated nucleus pulposus, C5-6 (Primary)  -     Ambulatory Referral to Physical Therapy Evaluate and treat; Stretching (cervical), ROM, Strengthening    2. Facet arthropathy, cervical  -     Ambulatory Referral to " Physical Therapy Evaluate and treat; Stretching (cervical), ROM, Strengthening    3. Cervicalgia  -     Ambulatory Referral to Physical Therapy Evaluate and treat; Stretching (cervical), ROM, Strengthening    Her pain is mostly in the neck and the right arm to the forearm, but it is intermittent.    She does have at least moderate stenosis at C5-C6. We discussed that surgery could help arm pain, but not likely to help the neck pain. More specifically, it is likely to help pain in a C6 pattern as the worse changes at at C5-C6. She is hopeful to avoid surgery if at all possible.    We discussed CESB trial as a treatment option. She is deferring this today as well.    She is most interested in physical therapy as a treatment option, although previously this has not been significantly helpful. I will refer her to [facility] in [place] at her request to continue physical therapy.    She will follow-up here PRN for failure to improve or worsening symptoms.    Follow Up   Return if symptoms worsen or fail to improve.  Patient was given instructions and counseling regarding her condition or for health maintenance advice. Please see specific information pulled into the AVS if appropriate.

## 2023-10-12 NOTE — TELEPHONE ENCOUNTER
Last office visit with prescribing clinician: 9/29/2023   Next office visit with prescribing clinician: 10/17/2023

## 2023-10-12 NOTE — TELEPHONE ENCOUNTER
Caller: Susan Hernandez    Relationship: Self    Best call back number: [phone]    What was the call regarding: PATIENT IS CHECKING ON THIS. SHE IS REQUESTING CALL WHEN SENT SO SHE CAN GO TO [place] TO GET THIS FILLED FOR HER. SHE IS WANTING TO GET THIS TOMORROW, 10/13/2023.

## 2023-10-13 RX ORDER — PREGABALIN 300 MG/1
300 CAPSULE ORAL 2 TIMES DAILY PRN
Qty: 60 CAPSULE | Refills: 0 | Status: SHIPPED | OUTPATIENT
Start: 2023-10-13

## 2023-10-13 NOTE — TELEPHONE ENCOUNTER
Rx Refill Note  Requested Prescriptions     Pending Prescriptions Disp Refills    pregabalin (Lyrica) 300 MG capsule 60 capsule 0     Sig: Take 1 capsule by mouth 2 (Two) Times a Day As Needed (pain).      Last office visit with prescribing clinician: 9/29/2023   Last telemedicine visit with prescribing clinician: Visit date not found   Next office visit with prescribing clinician: 10/17/2023                         Would you like a call back once the refill request has been completed: [] Yes [] No    If the office needs to give you a call back, can they leave a voicemail: [] Yes [] No    Tonio Johnson  10/13/23, 11:14 EDT      Refill request for  controlled substance.      Date of request: 10/13/2023  (Please change date if request date is different than today's)  Medication requested: Lyrica  Last OV: 9/29/2023  Last UDS: 4/27/2023  Contract signed: yes    Date: 10/10/2023  Next office visit: 10/17/2023    Tonio Johnson

## 2023-10-17 ENCOUNTER — OFFICE VISIT (OUTPATIENT)
Dept: INTERNAL MEDICINE | Facility: CLINIC | Age: 73
End: 2023-10-17
Payer: MEDICARE

## 2023-10-17 VITALS
OXYGEN SATURATION: 93 % | TEMPERATURE: 97.3 F | BODY MASS INDEX: 25.85 KG/M2 | HEART RATE: 81 BPM | SYSTOLIC BLOOD PRESSURE: 113 MMHG | HEIGHT: 64 IN | DIASTOLIC BLOOD PRESSURE: 74 MMHG | WEIGHT: 151.4 LBS

## 2023-10-17 DIAGNOSIS — Z29.11 NEED FOR RSV VACCINATION: ICD-10-CM

## 2023-10-17 DIAGNOSIS — E11.65 TYPE 2 DIABETES MELLITUS WITH HYPERGLYCEMIA, WITHOUT LONG-TERM CURRENT USE OF INSULIN: ICD-10-CM

## 2023-10-17 DIAGNOSIS — E55.9 VITAMIN D DEFICIENCY: ICD-10-CM

## 2023-10-17 DIAGNOSIS — Z00.00 ANNUAL PHYSICAL EXAM: Primary | ICD-10-CM

## 2023-10-17 DIAGNOSIS — Z12.31 BREAST CANCER SCREENING BY MAMMOGRAM: ICD-10-CM

## 2023-10-17 DIAGNOSIS — E78.2 MIXED HYPERLIPIDEMIA: ICD-10-CM

## 2023-10-17 DIAGNOSIS — F17.211 CIGARETTE NICOTINE DEPENDENCE IN REMISSION: ICD-10-CM

## 2023-10-17 DIAGNOSIS — Z23 NEED FOR INFLUENZA VACCINATION: ICD-10-CM

## 2023-10-17 DIAGNOSIS — E03.9 ACQUIRED HYPOTHYROIDISM: ICD-10-CM

## 2023-10-17 LAB
25(OH)D3 SERPL-MCNC: 67 NG/ML (ref 30–100)
ALBUMIN SERPL-MCNC: 4.4 G/DL (ref 3.5–5.2)
ALBUMIN/GLOB SERPL: 1.7 G/DL
ALP SERPL-CCNC: 76 U/L (ref 39–117)
ALT SERPL W P-5'-P-CCNC: 51 U/L (ref 1–33)
ANION GAP SERPL CALCULATED.3IONS-SCNC: 11.7 MMOL/L (ref 5–15)
AST SERPL-CCNC: 44 U/L (ref 1–32)
BASOPHILS # BLD AUTO: 0.03 10*3/MM3 (ref 0–0.2)
BASOPHILS NFR BLD AUTO: 0.5 % (ref 0–1.5)
BILIRUB SERPL-MCNC: 0.5 MG/DL (ref 0–1.2)
BUN SERPL-MCNC: 16 MG/DL (ref 8–23)
BUN/CREAT SERPL: 16.7 (ref 7–25)
CALCIUM SPEC-SCNC: 10 MG/DL (ref 8.6–10.5)
CHLORIDE SERPL-SCNC: 104 MMOL/L (ref 98–107)
CHOLEST SERPL-MCNC: 162 MG/DL (ref 0–200)
CO2 SERPL-SCNC: 26.3 MMOL/L (ref 22–29)
CREAT SERPL-MCNC: 0.96 MG/DL (ref 0.57–1)
DEPRECATED RDW RBC AUTO: 42.9 FL (ref 37–54)
EGFRCR SERPLBLD CKD-EPI 2021: 63 ML/MIN/1.73
EOSINOPHIL # BLD AUTO: 0.06 10*3/MM3 (ref 0–0.4)
EOSINOPHIL NFR BLD AUTO: 1 % (ref 0.3–6.2)
ERYTHROCYTE [DISTWIDTH] IN BLOOD BY AUTOMATED COUNT: 12.2 % (ref 12.3–15.4)
GLOBULIN UR ELPH-MCNC: 2.6 GM/DL
GLUCOSE SERPL-MCNC: 139 MG/DL (ref 65–99)
HBA1C MFR BLD: 8.5 % (ref 4.8–5.6)
HCT VFR BLD AUTO: 44.8 % (ref 34–46.6)
HDLC SERPL-MCNC: 49 MG/DL (ref 40–60)
HGB BLD-MCNC: 15.6 G/DL (ref 12–15.9)
IMM GRANULOCYTES # BLD AUTO: 0.02 10*3/MM3 (ref 0–0.05)
IMM GRANULOCYTES NFR BLD AUTO: 0.3 % (ref 0–0.5)
LDLC SERPL CALC-MCNC: 94 MG/DL (ref 0–100)
LDLC/HDLC SERPL: 1.89 {RATIO}
LYMPHOCYTES # BLD AUTO: 2.14 10*3/MM3 (ref 0.7–3.1)
LYMPHOCYTES NFR BLD AUTO: 34 % (ref 19.6–45.3)
MCH RBC QN AUTO: 33.2 PG (ref 26.6–33)
MCHC RBC AUTO-ENTMCNC: 34.8 G/DL (ref 31.5–35.7)
MCV RBC AUTO: 95.3 FL (ref 79–97)
MONOCYTES # BLD AUTO: 0.49 10*3/MM3 (ref 0.1–0.9)
MONOCYTES NFR BLD AUTO: 7.8 % (ref 5–12)
NEUTROPHILS NFR BLD AUTO: 3.55 10*3/MM3 (ref 1.7–7)
NEUTROPHILS NFR BLD AUTO: 56.4 % (ref 42.7–76)
NRBC BLD AUTO-RTO: 0 /100 WBC (ref 0–0.2)
PLATELET # BLD AUTO: 210 10*3/MM3 (ref 140–450)
PMV BLD AUTO: 11.3 FL (ref 6–12)
POTASSIUM SERPL-SCNC: 4.4 MMOL/L (ref 3.5–5.2)
PROT SERPL-MCNC: 7 G/DL (ref 6–8.5)
RBC # BLD AUTO: 4.7 10*6/MM3 (ref 3.77–5.28)
SODIUM SERPL-SCNC: 142 MMOL/L (ref 136–145)
TRIGL SERPL-MCNC: 101 MG/DL (ref 0–150)
TSH SERPL DL<=0.05 MIU/L-ACNC: 1.58 UIU/ML (ref 0.27–4.2)
VLDLC SERPL-MCNC: 19 MG/DL (ref 5–40)
WBC NRBC COR # BLD: 6.29 10*3/MM3 (ref 3.4–10.8)

## 2023-10-17 PROCEDURE — G0439 PPPS, SUBSEQ VISIT: HCPCS | Performed by: INTERNAL MEDICINE

## 2023-10-17 PROCEDURE — 80061 LIPID PANEL: CPT | Performed by: INTERNAL MEDICINE

## 2023-10-17 PROCEDURE — 3052F HG A1C>EQUAL 8.0%<EQUAL 9.0%: CPT | Performed by: INTERNAL MEDICINE

## 2023-10-17 PROCEDURE — 83036 HEMOGLOBIN GLYCOSYLATED A1C: CPT | Performed by: INTERNAL MEDICINE

## 2023-10-17 PROCEDURE — 82306 VITAMIN D 25 HYDROXY: CPT | Performed by: INTERNAL MEDICINE

## 2023-10-17 PROCEDURE — 1170F FXNL STATUS ASSESSED: CPT | Performed by: INTERNAL MEDICINE

## 2023-10-17 PROCEDURE — 80053 COMPREHEN METABOLIC PANEL: CPT | Performed by: INTERNAL MEDICINE

## 2023-10-17 PROCEDURE — 84443 ASSAY THYROID STIM HORMONE: CPT | Performed by: INTERNAL MEDICINE

## 2023-10-17 PROCEDURE — 85025 COMPLETE CBC W/AUTO DIFF WBC: CPT | Performed by: INTERNAL MEDICINE

## 2023-10-17 NOTE — PROGRESS NOTES
The ABCs of the Annual Wellness Visit  Initial Medicare Wellness Visit    Subjective     Susan Hernandez is a 72 y.o. female who presents for an Initial Medicare Wellness Visit.    The following portions of the patient's history were reviewed and   updated as appropriate: allergies, current medications, past family history, past medical history, past social history, past surgical history, and problem list.     Compared to one year ago, the patient feels her physical   health is the same.    Compared to one year ago, the patient feels her mental   health is the same.    Recent Hospitalizations:  She was not admitted to the hospital during the last year.       Current Medical Providers:  Patient Care Team:  Rigoberto Gomes Jr., MD as PCP - General (Internal Medicine)  Tonio Johnson as Medical Assistant    Outpatient Medications Prior to Visit   Medication Sig Dispense Refill    atorvastatin (LIPITOR) 10 MG tablet Take 1 tablet by mouth Every Night. 90 tablet 3    carboxymethylcellulose (Refresh Plus) 0.5 % solution Administer 2 drops to both eyes 3 (Three) Times a Day As Needed for Dry Eyes. 70 each 3    cholecalciferol (VITAMIN D3) 25 MCG (1000 UT) tablet Take 1 tablet by mouth 2 (Two) Times a Day. 180 tablet 3    Diclofenac Sodium (VOLTAREN) 1 % gel gel Apply 4 g topically to the appropriate area as directed 4 (Four) Times a Day As Needed (pain). 350 g 1    DULoxetine (CYMBALTA) 30 MG capsule Take 1 capsule by mouth Daily. 90 capsule 3    empagliflozin (Jardiance) 25 MG tablet tablet Take 1 tablet by mouth Daily. 90 tablet 3    glipizide (Glucotrol) 10 MG tablet Take 1 tablet by mouth 2 (Two) Times a Day Before Meals. 180 tablet 1    levothyroxine (Synthroid) 50 MCG tablet Take 1 tablet by mouth Daily. 90 tablet 1    lidocaine (LIDODERM) 5 % Place 1 patch on the skin as directed by provider Daily. Remove & Discard patch within 12 hours or as directed by MD Tellez patch 3    metFORMIN (GLUCOPHAGE) 500 MG tablet  Take 1 tablet by mouth 2 (Two) Times a Day With Meals. 180 tablet 3    multivitamins-minerals (PRESERVISION AREDS 2) capsule capsule       Omega-3 Fatty Acids (fish oil) 1000 MG capsule capsule Take 1 capsule by mouth Daily With Breakfast.      omeprazole (priLOSEC) 40 MG capsule Take 1 capsule by mouth Daily. 90 capsule 3    pregabalin (Lyrica) 300 MG capsule Take 1 capsule by mouth 2 (Two) Times a Day As Needed (pain). 60 capsule 0    SITagliptin (Januvia) 100 MG tablet Take 1 tablet by mouth Daily. 90 tablet 3    traMADol (ULTRAM) 50 MG tablet        No facility-administered medications prior to visit.       Opioid medication/s are on active medication list.  and I have evaluated her active treatment plan and pain score trends (see table).  There were no vitals filed for this visit.  I have reviewed the chart for potential of high risk medication and harmful drug interactions in the elderly.          Aspirin is not on active medication list.  Aspirin use is not indicated based on review of current medical condition/s. Risk of harm outweighs potential benefits.  .    Patient Active Problem List   Diagnosis    Arthritis    Cancer, colon    Type 2 diabetes mellitus with hyperglycemia, without long-term current use of insulin    Esophageal reflux    Low back pain    Small bowel obstruction    DDD (degenerative disc disease), cervical    Mixed hyperlipidemia    Acquired hypothyroidism    Nuclear cataract    Encounter for screening for malignant neoplasm of colon    Vitamin D deficiency    NAFLD (nonalcoholic fatty liver disease)     Advance Care Planning   Advance Care Planning     Advance Directive is not on file.  ACP discussion was held with the patient during this visit. Patient does not have an advance directive, information provided.       Objective    Vitals:    10/17/23 1438   BP: 113/74   BP Location: Right arm   Patient Position: Sitting   Cuff Size: Adult   Pulse: 81   Temp: 97.3 °F (36.3 °C)   TempSrc:  "Temporal   SpO2: 93%   Weight: 68.7 kg (151 lb 6.4 oz)   Height: 162.6 cm (64\")     Estimated body mass index is 25.99 kg/m² as calculated from the following:    Height as of this encounter: 162.6 cm (64\").    Weight as of this encounter: 68.7 kg (151 lb 6.4 oz).      Wt Readings from Last 3 Encounters:   10/17/23 68.7 kg (151 lb 6.4 oz)   10/10/23 66.7 kg (147 lb)   23 67.1 kg (148 lb)          Does the patient have evidence of cognitive impairment?   No    Lab Results   Component Value Date    TRIG 157 (H) 2023    HDL 45 2023     (H) 2023    VLDL 29 2023    HGBA1C 8.40 (H) 2023        HEALTH RISK ASSESSMENT    Smoking Status:  Social History     Tobacco Use   Smoking Status Former    Packs/day: 2.00    Years: 38.00    Additional pack years: 0.00    Total pack years: 76.00    Types: Cigarettes    Quit date:     Years since quittin.8    Passive exposure: Past   Smokeless Tobacco Never     Alcohol Consumption:  Social History     Substance and Sexual Activity   Alcohol Use Not Currently    Comment: last drink      Fall Risk Screen:    [place] Fall Risk Assessment was completed, and patient is at MODERATE risk for falls. Assessment completed on:10/17/2023    Depression Screen:       10/17/2023     2:00 PM   PHQ-2/PHQ-9 Depression Screening   Little Interest or Pleasure in Doing Things 0-->not at all   Feeling Down, Depressed or Hopeless 0-->not at all   PHQ-9: Brief Depression Severity Measure Score 0       Health Habits and Functional and Cognitive Screening:      10/17/2023     2:43 PM   Functional & Cognitive Status   Do you have difficulty preparing food and eating? No   Do you have difficulty bathing yourself, getting dressed or grooming yourself? No   Do you have difficulty using the toilet? No   Do you have difficulty moving around from place to place? No   Do you have trouble with steps or getting out of a bed or a chair? No   Current Diet Well Balanced " Diet   Dental Exam Up to date   Eye Exam Up to date   Exercise (times per week) 0 times per week   Current Exercises Include No Regular Exercise   Do you need help using the phone?  No   Are you deaf or do you have serious difficulty hearing?  No   Do you need help to go to places out of walking distance? No   Do you need help shopping? No   Do you need help preparing meals?  No   Do you need help with housework?  No   Do you need help with laundry? No   Do you need help taking your medications? No   Do you need help managing money? No   Do you ever drive or ride in a car without wearing a seat belt? No   Have you felt unusual stress, anger or loneliness in the last month? Yes   Who do you live with? Alone   If you need help, do you have trouble finding someone available to you? No   Do you have difficulty concentrating, remembering or making decisions? No       Age-appropriate Screening Schedule:  Refer to the list below for future screening recommendations based on patient's age, sex and/or medical conditions. Orders for these recommended tests are listed in the plan section. The patient has been provided with a written plan.    Health Maintenance   Topic Date Due    TDAP/TD VACCINES (1 - Tdap) Never done    ZOSTER VACCINE (1 of 2) Never done    COVID-19 Vaccine (6 - 2023-24 season) 09/15/2023    ANNUAL WELLNESS VISIT  10/11/2023    INFLUENZA VACCINE  03/31/2024 (Originally 8/1/2023)    LUNG CANCER SCREENING  11/01/2023    URINE MICROALBUMIN  01/09/2024    DIABETIC FOOT EXAM  01/16/2024    HEMOGLOBIN A1C  01/21/2024    LIPID PANEL  07/21/2024    DIABETIC EYE EXAM  09/26/2024    MAMMOGRAM  10/04/2024    BMI FOLLOWUP  10/10/2024    DXA SCAN  12/29/2024    COLONOSCOPY  07/19/2025    HEPATITIS C SCREENING  Completed    Pneumococcal Vaccine 65+  Completed          CMS Preventative Services Quick Reference  Risk Factors Identified During Encounter    Fall Risk-High or Moderate: Discussed Fall Prevention in the  home  Immunizations Discussed/Encouraged: Influenza and COVID19    The above risks/problems have been discussed with the patient.  Pertinent information has been shared with the patient in the After Visit Summary.  An After Visit Summary and PPPS were made available to the patient.  Diagnoses and all orders for this visit:    1. Annual physical exam (Primary)    2. Cigarette nicotine dependence in remission  -      CT Chest Low Dose Cancer Screening WO; Future    3. Mixed hyperlipidemia  -     Comprehensive Metabolic Panel  -     Lipid Panel    4. Acquired hypothyroidism  -     TSH    5. Type 2 diabetes mellitus with hyperglycemia, without long-term current use of insulin  -     CBC & Differential  -     Hemoglobin A1c    6. Vitamin D deficiency  -     Vitamin D,25-Hydroxy    7. Breast cancer screening by mammogram  -     Mammo Screening Digital Tomosynthesis Bilateral With CAD; Future    8. Need for influenza vaccination  Comments:  pt defers today    9. Need for RSV vaccination      Follow Up:  Next Medicare Wellness visit to be scheduled in 1 year.

## 2023-10-20 DIAGNOSIS — R74.8 ELEVATED LIVER ENZYMES: Primary | ICD-10-CM

## 2023-11-10 ENCOUNTER — TELEPHONE (OUTPATIENT)
Dept: INTERNAL MEDICINE | Facility: CLINIC | Age: 73
End: 2023-11-10
Payer: COMMERCIAL

## 2023-11-10 ENCOUNTER — TREATMENT (OUTPATIENT)
Dept: PHYSICAL THERAPY | Facility: CLINIC | Age: 73
End: 2023-11-10
Payer: MEDICARE

## 2023-11-10 DIAGNOSIS — M54.2 PAIN, NECK: Primary | ICD-10-CM

## 2023-11-10 DIAGNOSIS — M50.222 HERNIATED NUCLEUS PULPOSUS, C5-6: ICD-10-CM

## 2023-11-10 DIAGNOSIS — E55.9 VITAMIN D DEFICIENCY: ICD-10-CM

## 2023-11-10 DIAGNOSIS — M50.30 DDD (DEGENERATIVE DISC DISEASE), CERVICAL: ICD-10-CM

## 2023-11-10 DIAGNOSIS — M54.12 RADICULOPATHY, CERVICAL: ICD-10-CM

## 2023-11-10 PROCEDURE — 97161 PT EVAL LOW COMPLEX 20 MIN: CPT | Performed by: PHYSICAL THERAPIST

## 2023-11-10 PROCEDURE — 97140 MANUAL THERAPY 1/> REGIONS: CPT | Performed by: PHYSICAL THERAPIST

## 2023-11-10 PROCEDURE — 97110 THERAPEUTIC EXERCISES: CPT | Performed by: PHYSICAL THERAPIST

## 2023-11-10 NOTE — TELEPHONE ENCOUNTER
Caller: Susan Hernandez Art    Relationship: Self    Best call back number: [phone]     Requested Prescriptions:   Requested Prescriptions     Pending Prescriptions Disp Refills    pregabalin (Lyrica) 300 MG capsule 60 capsule 0     Sig: Take 1 capsule by mouth 2 (Two) Times a Day As Needed (pain).    cholecalciferol (VITAMIN D3) 25 MCG (1000 UT) tablet 180 tablet 3     Sig: Take 1 tablet by mouth 2 (Two) Times a Day.        Pharmacy where request should be sent: [place] [phone] [place], [place] [phone]      Last office visit with prescribing clinician: 10/17/2023   Last telemedicine visit with prescribing clinician: Visit date not found   Next office visit with prescribing clinician: 2/20/2024     Does the patient have less than a 3 day supply:  [x] Yes  [] No    Would you like a call back once the refill request has been completed: [] Yes [x] No    If the office needs to give you a call back, can they leave a voicemail: [] Yes [x] No    Ann Marie Cruz, PCT   11/10/23 13:28 EST

## 2023-11-10 NOTE — TELEPHONE ENCOUNTER
Refill Request for Controlled Substance    Date of Request: 11/10/2023  Medication Requested: Lyrica  Last UDS: 4/27/23  Last Consent: 10/11/22  Last OV: 10/17/23  Next OV: 2/20/24

## 2023-11-10 NOTE — TELEPHONE ENCOUNTER
Caller: Susan Hernandez Art    Relationship: Self    Best call back number: [phone]     Requested Prescriptions:   Requested Prescriptions     Pending Prescriptions Disp Refills    pregabalin (Lyrica) 300 MG capsule 60 capsule 0     Sig: Take 1 capsule by mouth 2 (Two) Times a Day As Needed (pain).        Pharmacy where request should be sent: [place] [phone] [place] [phone]      Last office visit with prescribing clinician: 10/17/2023   Last telemedicine visit with prescribing clinician: Visit date not found   Next office visit with prescribing clinician: 2/20/2024       Does the patient have less than a 3 day supply:  [x] Yes  [] No    Would you like a call back once the refill request has been completed: [x] Yes [] No    If the office needs to give you a call back, can they leave a voicemail: [x] Yes [] No    Ann Marie Cruz, PCT   11/10/23 13:12 EST

## 2023-11-13 RX ORDER — MELATONIN
1000 2 TIMES DAILY
Qty: 180 TABLET | Refills: 3 | Status: SHIPPED | OUTPATIENT
Start: 2023-11-13

## 2023-11-13 RX ORDER — PREGABALIN 300 MG/1
300 CAPSULE ORAL 2 TIMES DAILY PRN
Qty: 60 CAPSULE | Refills: 0 | Status: SHIPPED | OUTPATIENT
Start: 2023-11-13

## 2023-11-15 ENCOUNTER — TREATMENT (OUTPATIENT)
Dept: PHYSICAL THERAPY | Facility: CLINIC | Age: 73
End: 2023-11-15
Payer: MEDICARE

## 2023-11-15 DIAGNOSIS — M54.2 PAIN, NECK: Primary | ICD-10-CM

## 2023-11-15 DIAGNOSIS — M54.12 RADICULOPATHY, CERVICAL: ICD-10-CM

## 2023-11-15 DIAGNOSIS — M50.222 HERNIATED NUCLEUS PULPOSUS, C5-6: ICD-10-CM

## 2023-11-15 PROCEDURE — 97110 THERAPEUTIC EXERCISES: CPT | Performed by: PHYSICAL THERAPIST

## 2023-11-15 PROCEDURE — 97530 THERAPEUTIC ACTIVITIES: CPT | Performed by: PHYSICAL THERAPIST

## 2023-11-15 PROCEDURE — 97140 MANUAL THERAPY 1/> REGIONS: CPT | Performed by: PHYSICAL THERAPIST

## 2023-11-15 NOTE — PROGRESS NOTES
Physical Therapy Daily Treatment Note  [address], [address], [place] [address]      Patient: Susan Hernandez   : 1950  Diagnosis/ICD-10 Code:  Pain, neck [M54.2]  Referring practitioner: Jeancarlos Woodson PA-C  Date of Initial Visit: Type: THERAPY  Noted: 11/10/2023  Today's Date: 11/15/2023  Patient seen for 2 sessions             Subjective   Susan Hernandez reports: 7/10 R shoulder pain at beginning of session today.     Objective   See Exercise, Manual, and Modality Logs for complete treatment.       Assessment/Plan  Patient tolerated all exercise progressions and manual therapy well today but continues to demonstrate bilateral scapular strength and cervical ROM deficits limiting function. Heat tolerated well.  Continue to progress per patient tolerance.    Progress per Plan of Care           Timed:  Manual Therapy:    20     mins  25838;  Therapeutic Exercise:    10     mins  07303;     Neuromuscular Karl:    0    mins  14023;    Therapeutic Activity:     8     mins  16950;     Gait Trainin     mins  86125;     Ultrasound:     0     mins  89797;    Electrical Stimulation:    0     mins  88465;  Iontophoresis     0     mins  60642    Untimed:  Electrical Stimulation:    0     mins  10151 ( );  Mechanical Traction:    0     mins  88735;   Fluidotherapy     0     mins  59756  Hot pack     15     Mins    Cold pack                       0     Mins     Timed Treatment:   38   mins   Total Treatment:     53   mins        Nayana Palencia PT    Electronically signed [unfilled]  [place] License: [id number]  NPI number: [id number]

## 2023-11-20 ENCOUNTER — TREATMENT (OUTPATIENT)
Dept: PHYSICAL THERAPY | Facility: CLINIC | Age: 73
End: 2023-11-20
Payer: MEDICARE

## 2023-11-20 DIAGNOSIS — M50.222 HERNIATED NUCLEUS PULPOSUS, C5-6: ICD-10-CM

## 2023-11-20 DIAGNOSIS — M54.12 RADICULOPATHY, CERVICAL: ICD-10-CM

## 2023-11-20 DIAGNOSIS — M54.2 PAIN, NECK: Primary | ICD-10-CM

## 2023-11-20 PROCEDURE — 97110 THERAPEUTIC EXERCISES: CPT | Performed by: PHYSICAL THERAPIST

## 2023-11-20 PROCEDURE — 97140 MANUAL THERAPY 1/> REGIONS: CPT | Performed by: PHYSICAL THERAPIST

## 2023-11-20 PROCEDURE — 97530 THERAPEUTIC ACTIVITIES: CPT | Performed by: PHYSICAL THERAPIST

## 2023-11-21 ENCOUNTER — HOSPITAL ENCOUNTER (OUTPATIENT)
Dept: CT IMAGING | Facility: HOSPITAL | Age: 73
Discharge: HOME | End: 2023-11-21
Payer: MEDICARE

## 2023-11-21 ENCOUNTER — HOSPITAL ENCOUNTER (OUTPATIENT)
Dept: ULTRASOUND IMAGING | Facility: HOSPITAL | Age: 73
Discharge: HOME | End: 2023-11-21
Admitting: INTERNAL MEDICINE
Payer: MEDICARE

## 2023-11-21 DIAGNOSIS — R74.8 ELEVATED LIVER ENZYMES: ICD-10-CM

## 2023-11-21 DIAGNOSIS — F17.211 CIGARETTE NICOTINE DEPENDENCE IN REMISSION: ICD-10-CM

## 2023-11-21 PROCEDURE — 71271 CT THORAX LUNG CANCER SCR C-: CPT

## 2023-11-21 PROCEDURE — 76705 ECHO EXAM OF ABDOMEN: CPT

## 2023-11-22 ENCOUNTER — TELEPHONE (OUTPATIENT)
Dept: INTERNAL MEDICINE | Facility: CLINIC | Age: 73
End: 2023-11-22
Payer: COMMERCIAL

## 2023-11-22 NOTE — TELEPHONE ENCOUNTER
----- Message from Rigoberto Gomes Jr., MD sent at 11/21/2023  6:31 PM EST -----  Reassuring study. Recommend follow-up chest CT in 1 year.

## 2023-11-22 NOTE — TELEPHONE ENCOUNTER
----- Message from Rigoberto Gomes Jr., MD sent at 11/21/2023  5:59 PM EST -----  Fatty liver noted. Otherwise normal ultrasound. Would not be concerned about noted cyst.

## 2023-11-27 ENCOUNTER — TELEPHONE (OUTPATIENT)
Dept: INTERNAL MEDICINE | Facility: CLINIC | Age: 73
End: 2023-11-27
Payer: COMMERCIAL

## 2023-11-27 DIAGNOSIS — E03.9 ACQUIRED HYPOTHYROIDISM: ICD-10-CM

## 2023-11-27 DIAGNOSIS — E11.9 TYPE 2 DIABETES MELLITUS WITHOUT COMPLICATION, WITHOUT LONG-TERM CURRENT USE OF INSULIN: ICD-10-CM

## 2023-11-27 NOTE — TELEPHONE ENCOUNTER
Caller: Susan Hernandez Art    Relationship: Self    Best call back number: [phone]    Requested Prescriptions:   Requested Prescriptions     Pending Prescriptions Disp Refills    SITagliptin (Januvia) 100 MG tablet 90 tablet 3     Sig: Take 1 tablet by mouth Daily.    levothyroxine (Synthroid) 50 MCG tablet 90 tablet 1     Sig: Take 1 tablet by mouth Daily.        Pharmacy where request should be sent: [place] [phone] [place] [phone]      Last office visit with prescribing clinician: 10/17/2023   Last telemedicine visit with prescribing clinician: Visit date not found   Next office visit with prescribing clinician: 2/20/2024     Does the patient have less than a 3 day supply:  [x] Yes  [] No    Would you like a call back once the refill request has been completed: [x] Yes [] No    If the office needs to give you a call back, can they leave a voicemail: [] Yes [x] No    Chandu Tracy Rep   11/27/23 16:27 EST

## 2023-11-28 ENCOUNTER — TREATMENT (OUTPATIENT)
Dept: PHYSICAL THERAPY | Facility: CLINIC | Age: 73
End: 2023-11-28
Payer: MEDICARE

## 2023-11-28 DIAGNOSIS — M50.222 HERNIATED NUCLEUS PULPOSUS, C5-6: ICD-10-CM

## 2023-11-28 DIAGNOSIS — M54.12 RADICULOPATHY, CERVICAL: ICD-10-CM

## 2023-11-28 DIAGNOSIS — M54.2 PAIN, NECK: Primary | ICD-10-CM

## 2023-11-28 PROCEDURE — 97140 MANUAL THERAPY 1/> REGIONS: CPT | Performed by: PHYSICAL THERAPIST

## 2023-11-28 PROCEDURE — 97110 THERAPEUTIC EXERCISES: CPT | Performed by: PHYSICAL THERAPIST

## 2023-11-28 PROCEDURE — 97530 THERAPEUTIC ACTIVITIES: CPT | Performed by: PHYSICAL THERAPIST

## 2023-11-28 NOTE — PROGRESS NOTES
"Physical Therapy Daily Treatment Note  [address] [facility], [address] [place] [address]        Patient: Susan Hernandez   : 1950  Diagnosis/ICD-10 Code:  Pain, neck [M54.2]  Referring practitioner: Jeancarlos Woodson PA-C  Date of Initial Visit: Type: THERAPY  Noted: 11/10/2023  Today's Date: 2023  Patient seen for 4 sessions             Subjective   Susan Hernandez reports that she has felt a little better.  She rates her right neck and arm pain at 3-4/10 upon arrival today.  She states, \"Today is a good day\".    Objective     + Radicular symptoms reported in right upper extremity. - (Less reported today)      See Exercise, Manual, and Modality Logs for complete treatment.       Assessment/Plan    Pt tolerated therapy session well - with progression of therapeutic exercises, CKC-Functional activities, and Manual therapy. She has improved, but continues to have deficits in Her Cervical and Right Upper extremity ROM,  Strength, and Stability; limiting function and ability to perform ADLs at this time.  Pt responds well to trials of Manual cervical traction- reporting temporary  reduction in pain post session.  She does however, continue to report that symptoms are easily exacerbated with normal daily movements of cervical and Right upper extremity.  She denied having any radicular symptoms into Right Upper extremity today throughout session today.     Progress per Plan of Care - as tolerated.             Timed:  Manual Therapy:    15     mins  86848;  Therapeutic Exercise:    15     mins  94630;     Neuromuscular Karl:    0    mins  72036;    Therapeutic Activity:     8     mins  43724;     Gait Trainin     mins  10517;     Ultrasound:     0     mins  65912;    Electrical Stimulation:    0     mins  29578;  Iontophoresis     0     mins  55045    Untimed:  Electrical Stimulation:    0     mins  99981 ( );  Mechanical Traction:    0     mins  73386;         Timed Treatment:   38   mins   Total " Treatment:     53   mins        Chloé Quintanilla PTA  Physical Therapist Assistant

## 2023-11-29 RX ORDER — LEVOTHYROXINE SODIUM 0.05 MG/1
50 TABLET ORAL DAILY
Qty: 90 TABLET | Refills: 1 | Status: SHIPPED | OUTPATIENT
Start: 2023-11-29

## 2023-11-30 ENCOUNTER — TELEPHONE (OUTPATIENT)
Dept: ORTHOPEDICS | Facility: OTHER | Age: 73
End: 2023-11-30
Payer: COMMERCIAL

## 2023-12-05 ENCOUNTER — TREATMENT (OUTPATIENT)
Dept: PHYSICAL THERAPY | Facility: CLINIC | Age: 73
End: 2023-12-05
Payer: MEDICARE

## 2023-12-05 DIAGNOSIS — M54.12 RADICULOPATHY, CERVICAL: ICD-10-CM

## 2023-12-05 DIAGNOSIS — M50.222 HERNIATED NUCLEUS PULPOSUS, C5-6: ICD-10-CM

## 2023-12-05 DIAGNOSIS — M54.2 PAIN, NECK: Primary | ICD-10-CM

## 2023-12-05 PROCEDURE — 97530 THERAPEUTIC ACTIVITIES: CPT | Performed by: PHYSICAL THERAPIST

## 2023-12-05 PROCEDURE — 97110 THERAPEUTIC EXERCISES: CPT | Performed by: PHYSICAL THERAPIST

## 2023-12-05 PROCEDURE — 97140 MANUAL THERAPY 1/> REGIONS: CPT | Performed by: PHYSICAL THERAPIST

## 2023-12-05 NOTE — PROGRESS NOTES
"Physical Therapy Daily Treatment Note  [address] [facility], [address] [place] [address]        Patient: Susan Hernandez   : 1950  Diagnosis/ICD-10 Code:  Pain, neck [M54.2]  Referring practitioner: Jeancarlos Woodson PA-C  Date of Initial Visit: Type: THERAPY  Noted: 11/10/2023  Today's Date: 2023  Patient seen for 5 sessions             Subjective   Susan Hernandez reports that she has been \"Aching\" - \"All over\" today.  She states that she almost cancelled appointment today.     Objective     Active Range of Motion   Cervical     Flexion: WFL and with pain  Extension: 35 degrees   Left rotation: 50 degrees   Right rotation: 45 degrees with pain right proximal shoulder.  Left Side bend:  25 degrees  Right Side bend:  25 degrees      See Exercise, Manual, and Modality Logs for complete treatment.       Assessment/Plan    Pt tolerated therapy session well - with progression of therapeutic exercises, CKC-Functional activities, and Manual therapy. She has improved, but continues to have deficits in Her Cervical and Right Upper extremity ROM,  Strength, and Stability; limiting function and ability to perform ADLs at this time.  Pt responds well to trials of Manual cervical traction- reporting temporary  reduction in pain post session.  She does however, continue to report that symptoms are easily exacerbated with normal daily movements of cervical and Right upper extremity.  She denied having any radicular symptoms into Right Upper extremity today throughout session today.  Increase in cervical AROM today as compared to initial evaluation.     Progress per Plan of Care - as tolerated.                     Timed:  Manual Therapy:    18     mins  19822;  Therapeutic Exercise:    20     mins  48439;     Neuromuscular Karl:    0    mins  01626;    Therapeutic Activity:     10     mins  05662;     Gait Trainin     mins  24539;     Ultrasound:     0     mins  10469;    Electrical Stimulation:    0     mins  " 76209;  Iontophoresis     0     mins  07157    Untimed:  Electrical Stimulation:    0     mins  81201 ( );  Mechanical Traction:    0     mins  91778;   Fluidotherapy     0     mins  77752  Hot pack     0     mins  05433  Cold pack     0     mins  74746    Timed Treatment:   48   mins   Total Treatment:     58   mins        Chloé Quintanilla PTA  Physical Therapist Assistant

## 2023-12-07 ENCOUNTER — TREATMENT (OUTPATIENT)
Dept: PHYSICAL THERAPY | Facility: CLINIC | Age: 73
End: 2023-12-07
Payer: MEDICARE

## 2023-12-07 DIAGNOSIS — M50.222 HERNIATED NUCLEUS PULPOSUS, C5-6: ICD-10-CM

## 2023-12-07 DIAGNOSIS — M54.12 RADICULOPATHY, CERVICAL: ICD-10-CM

## 2023-12-07 DIAGNOSIS — M54.2 PAIN, NECK: Primary | ICD-10-CM

## 2023-12-07 PROCEDURE — 97530 THERAPEUTIC ACTIVITIES: CPT | Performed by: PHYSICAL THERAPIST

## 2023-12-07 PROCEDURE — 97110 THERAPEUTIC EXERCISES: CPT | Performed by: PHYSICAL THERAPIST

## 2023-12-07 PROCEDURE — 97140 MANUAL THERAPY 1/> REGIONS: CPT | Performed by: PHYSICAL THERAPIST

## 2023-12-07 NOTE — PROGRESS NOTES
"Physical Therapy Daily Treatment Note  [address] [employer], [address] [place] [address]        Patient: Susan Hernandez   : 1950  Diagnosis/ICD-10 Code:  Pain, neck [M54.2]  Referring practitioner: Jeancarlos Woodson PA-C  Date of Initial Visit: Type: THERAPY  Noted: 11/10/2023  Today's Date: 2023  Patient seen for 6 sessions             Subjective     Susan Hernandez reports that she had fallen at home last night.  She states - \"I think I just lost balance\".  She reports that she landed on her knees and right arm.  She also reports that she hit her right cheekbone.  She reports having increased pain in her knees and her right arm- rating 5/10 upon arrival today.  She denies having any increased pain in her neck at this time.  She reports that her neck feels much better as compared to initial therapy session.    Objective       See Exercise, Manual, and Modality Logs for complete treatment.       Assessment/Plan      Pt tolerated therapy session well today- with progression of therapeutic exercises, CKC-Functional activities, and Manual therapy.  She has improved, but continues to have deficits in Her Cervical and Right Upper extremity ROM,  Strength, and Stability; limiting function and ability to perform ADLs at this time.  Pt responds well to trials of Manual cervical traction- reporting temporary reduction in pain post session and between therapy sessions.  She does however, continue to report that symptoms are easily exacerbated with normal daily movements of cervical and Right upper extremity.       Progress per Plan of Care - as tolerated.                Timed:  Manual Therapy:    15     mins  32235;  Therapeutic Exercise:    20     mins  08416;     Neuromuscular Karl:    0    mins  94940;    Therapeutic Activity:     8     mins  85375;     Gait Trainin     mins  19480;     Ultrasound:     0     mins  99659;    Electrical Stimulation:    0     mins  32536;  Iontophoresis     0     mins  " 43225    Untimed:  Electrical Stimulation:    0     mins  26655 ( );  Mechanical Traction:    0     mins  52902;   Fluidotherapy     0     mins  21405      Timed Treatment:   43   mins   Total Treatment:     53   mins        Chloé Quintanilla PTA  Physical Therapist Assistant

## 2023-12-08 DIAGNOSIS — M50.30 DDD (DEGENERATIVE DISC DISEASE), CERVICAL: ICD-10-CM

## 2023-12-08 NOTE — TELEPHONE ENCOUNTER
Caller: Susan Hernandez Art    Relationship: Self    Best call back number: [phone]     Requested Prescriptions:   Requested Prescriptions     Pending Prescriptions Disp Refills    pregabalin (Lyrica) 300 MG capsule 60 capsule 0     Sig: Take 1 capsule by mouth 2 (Two) Times a Day As Needed (pain).        Pharmacy where request should be sent: [place] [facility] [phone] [place] [phone]      Last office visit with prescribing clinician: 10/17/2023   Last telemedicine visit with prescribing clinician: Visit date not found   Next office visit with prescribing clinician: 2/20/2024     Does the patient have less than a 3 day supply:  [x] Yes  [] No    Would you like a call back once the refill request has been completed: [x] Yes [] No    If the office needs to give you a call back, can they leave a voicemail: [] Yes [x] No    Chandu Palacio Rep   12/08/23 15:53 EST

## 2023-12-11 RX ORDER — ATORVASTATIN CALCIUM 10 MG/1
10 TABLET, FILM COATED ORAL NIGHTLY
Qty: 90 TABLET | Refills: 3 | Status: CANCELLED | OUTPATIENT
Start: 2023-12-11

## 2023-12-11 RX ORDER — ATORVASTATIN CALCIUM 10 MG/1
10 TABLET, FILM COATED ORAL NIGHTLY
Qty: 90 TABLET | Refills: 3 | Status: SHIPPED | OUTPATIENT
Start: 2023-12-11

## 2023-12-11 NOTE — TELEPHONE ENCOUNTER
Refill request for  controlled substance.      Date of request: 12/11/2023    Medication requested: Lyrica   Last OV: 10/17/23  Last UDS: 4/27/23  Contract signed: yes    Date:10/11/22  Next office visit: 2/20/24    Wendy Hernández

## 2023-12-11 NOTE — TELEPHONE ENCOUNTER
Caller: Susan Hernandez Art    Relationship: Self    Best call back number: [phone]    Requested Prescriptions:   Requested Prescriptions     Pending Prescriptions Disp Refills    pregabalin (Lyrica) 300 MG capsule 60 capsule 0     Sig: Take 1 capsule by mouth 2 (Two) Times a Day As Needed (pain).    atorvastatin (LIPITOR) 10 MG tablet 90 tablet 3     Sig: Take 1 tablet by mouth Every Night.        Pharmacy where request should be sent: [place] [facility] [phone] [place], [place] [phone]      Last office visit with prescribing clinician: 10/17/2023   Last telemedicine visit with prescribing clinician: Visit date not found   Next office visit with prescribing clinician: 2/20/2024     Additional details provided by patient: PATIENT IS NEEDING REFILLS FOR BOTH MEDICATIONS AND WOULD LIKE CALL WHEN BOTH MEDICATIONS ARE SENT.     Does the patient have less than a 3 day supply:  [x] Yes  [] No    Would you like a call back once the refill request has been completed: [x] Yes [] No    If the office needs to give you a call back, can they leave a voicemail: [] Yes [x] No    Chandu Noel Rep   12/11/23 12:26 EST

## 2023-12-12 ENCOUNTER — TREATMENT (OUTPATIENT)
Dept: PHYSICAL THERAPY | Facility: CLINIC | Age: 73
End: 2023-12-12
Payer: MEDICARE

## 2023-12-12 DIAGNOSIS — M54.12 RADICULOPATHY, CERVICAL: ICD-10-CM

## 2023-12-12 DIAGNOSIS — M54.2 PAIN, NECK: Primary | ICD-10-CM

## 2023-12-12 DIAGNOSIS — M50.222 HERNIATED NUCLEUS PULPOSUS, C5-6: ICD-10-CM

## 2023-12-12 PROCEDURE — 97530 THERAPEUTIC ACTIVITIES: CPT | Performed by: PHYSICAL THERAPIST

## 2023-12-12 PROCEDURE — 97140 MANUAL THERAPY 1/> REGIONS: CPT | Performed by: PHYSICAL THERAPIST

## 2023-12-12 PROCEDURE — 97110 THERAPEUTIC EXERCISES: CPT | Performed by: PHYSICAL THERAPIST

## 2023-12-12 RX ORDER — PREGABALIN 300 MG/1
300 CAPSULE ORAL 2 TIMES DAILY PRN
Qty: 60 CAPSULE | Refills: 0 | Status: SHIPPED | OUTPATIENT
Start: 2023-12-12

## 2023-12-12 NOTE — PROGRESS NOTES
Progress note  [address] [facility], [address] [place] [address]      Patient: Susan Hernandez   : 1950  Diagnosis/ICD-10 Code:  Pain, neck [M54.2]  Referring practitioner: Jeancarlos Woodson PA-C  Date of Initial Visit: Type: THERAPY  Noted: 11/10/2023  Today's Date: 2023  Patient seen for 7 sessions        Subjective:   Susan Hernandez reports: no significant neck pain today, just generalized all over body aching.  Pt reports that since starting PT she has noticed decreased overall neck pain and R UE pain.  Pt reports that she has returned to all ADLs with decreased pain/difficulty.    Subjective Questionnaire: NDI:      Subjective   Objective   Active Range of Motion   Cervical/Thoracic Spine   Cervical     Flexion: WFL  Extension: 25 degrees  Left rotation: 50 degrees  Right rotation: 40 degrees     Strength/Myotome Testing      Left Shoulder      Planes of Motion   Flexion: 5  Extension: 5   Abduction: 5   External rotation at 0°: 5   Internal rotation at 0°: 5      Right Shoulder      Planes of Motion   Flexion: 5   Extension: 5  Abduction: 5  External rotation at 0°:5   Internal rotation at 0°: 5      Left Elbow   Flexion: 4+  Extension: 4+     Right Elbow   Flexion: 4+  Extension: 4+     Left Wrist/Hand   Wrist extension: 5  Wrist flexion: 5     Right Wrist/Hand   Wrist extension: 5  Wrist flexion: 5     Assessment & Plan       Assessment  Assessment details: Patient demonstrates improvements in bilateral shoulder strength and cervical ROM and reports improvements in overall pain/function and radicular symptoms compared to initial evaluation.  Due to significant improvements in pain/function and meeting most PT goals the pt will be discharged today.  Pt is to continue independent HEP.  Pt is comfortable with this plan.    Goals  Plan Goals: 1. The patient has limited ROM.               LTG 1: 12 weeks:  The patient will demonstrate 65° of bilateral cervical spine rotation,  and 30° of cervical extension  in order to adequately monitor blind spots while driving and improve ability to perform activities of daily living.                          STATUS:  partially met  STG 1a: 6 weeks:  The patient will demonstrate 50° of bilateral cervical spine rotation and 25° of cervical extension in order to adequately monitor blind spots while driving and improve ability to perform activities of daily living.                                      STATUS:  partially met                2. The patient has complaints of pain.              LTG 2: 12 weeks:  The patient will report 2/10 pain in order to more easily tolerate activities of daily living and improve sleep quality.                          STATUS:  not met consistently              STG 2a: 6 weeks:  The patient will report 4/10 pain.                          STATUS:  met     3. The patient reports radicular symptoms in the bilateral upper extremities.              LTG 3: 12 weeks:  The patient will report a decrease in radicular symptoms in bilateral upper extremities by 75%.                          STATUS:  met              STG 3a: 6 weeks:  The patient will report a decrease in radicular symptoms in bilateral upper extremities by 50%.                          STATUS:  met     4. Carrying, Moving, and Handling Objects Functional Limitation                               LTG 4: 12 weeks:  The patient will demonstrate 16% limitation by achieving a score of 8/50 on the Neck Disability Index.                          STATUS:  met              STG 4a: 6 weeks:  The patient will demonstrate 30% limitation by achieving a score of 15/50 on the Neck Disability Index.                            STATUS:  met      Plan  Therapy options: will not be seen for skilled therapy services      Progress toward previous goals: Partially Met      Recommendations: Discharge    PT SIGNATURE: Nayana Palencia PT     Electronically signed 12/12/2023  DATE TREATMENT INITIATED: 12/12/2023  [place] License:  [id number]      Based upon review of the patient's progress and continued therapy plan, it is my medical opinion that Susan Hernandez should continue physical therapy treatment at [facility]  [address] [place] [id number] [phone].      Timed:  Manual Therapy:    15     mins  99025;  Therapeutic Exercise:    15     mins  77627;     Neuromuscular Karl:    0    mins  09944;    Therapeutic Activity:     8     mins  30212;     Gait Trainin     mins  85877;     Ultrasound:     0     mins  71316;    Electrical Stimulation:    0     mins  08446 ( );    Untimed:  Electrical Stimulation:    0     mins  12839 ( );  Mechanical Traction:    0     mins  32866;     Timed Treatment:   38   mins   Total Treatment:     43   mins

## 2024-01-09 ENCOUNTER — HOSPITAL ENCOUNTER (OUTPATIENT)
Dept: MAMMOGRAPHY | Facility: HOSPITAL | Age: 74
Discharge: HOME | End: 2024-01-09
Admitting: INTERNAL MEDICINE
Payer: MEDICARE

## 2024-01-09 DIAGNOSIS — Z12.31 BREAST CANCER SCREENING BY MAMMOGRAM: ICD-10-CM

## 2024-01-09 PROCEDURE — 77067 SCR MAMMO BI INCL CAD: CPT

## 2024-01-09 PROCEDURE — 77063 BREAST TOMOSYNTHESIS BI: CPT

## 2024-01-10 DIAGNOSIS — M50.30 DDD (DEGENERATIVE DISC DISEASE), CERVICAL: ICD-10-CM

## 2024-01-10 RX ORDER — PREGABALIN 300 MG/1
300 CAPSULE ORAL 2 TIMES DAILY PRN
Qty: 60 CAPSULE | Refills: 0 | Status: SHIPPED | OUTPATIENT
Start: 2024-01-10

## 2024-01-10 RX ORDER — DULOXETIN HYDROCHLORIDE 30 MG/1
30 CAPSULE, DELAYED RELEASE ORAL DAILY
Qty: 90 CAPSULE | Refills: 3 | Status: SHIPPED | OUTPATIENT
Start: 2024-01-10

## 2024-01-10 NOTE — TELEPHONE ENCOUNTER
"    [facility] Medicine Services  DISCHARGE SUMMARY    Patient Name: Valarie Camara  : 1944  MRN: [id number]    Date of Admission: 3/3/2018  Date of Discharge: 3/7/2018  Primary Care Physician: Jeny Neri    Consults     Date and Time Order Name Status Description    3/3/2018 1038 Inpatient Neurology Consult          Hospital Course     Presenting Problem:   Stroke [I63.9]    Active Hospital Problems (** Indicates Principal Problem)    Diagnosis Date Noted   • **Lacunar stroke [I63.9] 2018   • DM (diabetes mellitus), type 2, uncontrolled w/neurologic complication [E11.49, E11.65] 2018   • HTN (hypertension) [I10] 2018   • Multiple thyroid nodules [E04.2] 2018   • Schatzki's ring [K22.2] 2017   • Hyperlipidemia [E78.5]    • Multiple-type hyperlipidemia [E78.4] 2016      Resolved Hospital Problems    Diagnosis Date Noted Date Resolved   • Heartburn [R12] 10/19/2017 2018          Hospital Course:  Valarie Camara is a 73 y.o. female  female with htn, uncontrolled dm, gerd/shatzki ring who presented with right sided leg >arm weakness, slurred speech and ataxia \"dragging right leg\". Last known well last night 11pm. OSH imaging was negative (other than multiple thyroid nodules which she should follow up with PCP for) but transferred to our facility for further neuro work-up.  MRI confirmed left thalamus infarct.  ECHO/carotids were unremarkable.  Neurology was consulted and recommended asa/plavix/statin.  Her symptoms have continued to improve but is still not at baseline.  Her BP remains slightly elevated and meds have been adjusted.  It is unclear whether patient was compliant with medications at home as her home med list included 3 ARBs and an ace inhibitor.  She is felt to be stable and ready for transfer to [facility] for continued rehab.  Patient is in agreement with plan.            Day of Discharge     HPI:   Hospital follow up for "
Caller: Susan Hernandez Art    Relationship: Self    Best call back number: [phone] REQUESTING CALL AFTER THIS HAS BEEN SENT TO THE PHARMACY     Requested Prescriptions:   Requested Prescriptions     Pending Prescriptions Disp Refills    pregabalin (Lyrica) 300 MG capsule 60 capsule 0     Sig: Take 1 capsule by mouth 2 (Two) Times a Day As Needed (pain).    DULoxetine (CYMBALTA) 30 MG capsule 90 capsule 3     Sig: Take 1 capsule by mouth Daily.        Pharmacy where request should be sent: [place] [phone] [place], [place] [phone]      Last office visit with prescribing clinician: 10/17/2023   Last telemedicine visit with prescribing clinician: Visit date not found   Next office visit with prescribing clinician: 2/20/2024     Additional details provided by patient: CALLER STATED THAT SHE IS OUT OF DULOXETINE AND HAS 2 DAYS OF LYRICA    Does the patient have less than a 3 day supply:  [x] Yes  [] No      Chandu Palacio Rep   01/10/24 12:27 EST         
Refill request for controlled substance.      Date of request: 1/10/2024    Medication requested: Lyrica   Last OV: 10/17/23  Last UDS: 4/27/23  Contract signed: yes    Date:10/11/22  Next office visit: 2/20/24    Wendy Hernández    
stroke  Reports she feels her symptoms are improving but still having trouble recognizing familiar voices on the phone.      Review of Systems  Gen- No fevers, chills  CV- No chest pain, palpitations  Resp- No cough, dyspnea  GI- No N/V/D, abd pain      Otherwise ROS is negative except as mentioned in the HPI.    Vital Signs:   Temp:  [97.8 °F (36.6 °C)-98.9 °F (37.2 °C)] 98.5 °F (36.9 °C)  Heart Rate:  [71-91] 71  Resp:  [16-18] 17  BP: (144-214)/() 186/76     Physical Exam:  Constitutional: No acute distress, awake, alert, up in bed  HENT: NCAT, mucous membranes moist  Respiratory: Clear to auscultation bilaterally, respiratory effort normal   Cardiovascular: RRR, no murmurs, rubs, or gallops, palpable pedal pulses bilaterally  Gastrointestinal: Positive bowel sounds, soft, nontender, nondistended  Musculoskeletal: No bilateral ankle edema  Psychiatric: Appropriate affect, cooperative  Neurologic: Oriented x 3, minimal right sided weakness, mild dysarthria  Skin: No rashes      Pertinent  and/or Most Recent Results       Results from last 7 days  Lab Units 03/03/18  1050   WBC 10*3/mm3 5.63   HEMOGLOBIN g/dL 13.4   HEMATOCRIT % 40.6   PLATELETS 10*3/mm3 261   SODIUM mmol/L 135   POTASSIUM mmol/L 4.3   CHLORIDE mmol/L 100   CO2 mmol/L 30.0   BUN mg/dL 19   CREATININE mg/dL 1.10   GLUCOSE mg/dL 301*   CALCIUM mg/dL 9.5       Results from last 7 days  Lab Units 03/03/18  1050   BILIRUBIN mg/dL 0.4   ALK PHOS U/L 58   ALT (SGPT) U/L 23   AST (SGOT) U/L 18   PROTIME Seconds 9.3*   INR  0.89*   APTT seconds 24.7       Results from last 7 days  Lab Units 03/04/18  0527   CHOLESTEROL mg/dL 170   TRIGLYCERIDES mg/dL 146   HDL CHOL mg/dL 41       Results from last 7 days  Lab Units 03/03/18  1050   TSH mIU/mL 0.765   HEMOGLOBIN A1C % 12.30*       Imaging Results (all)     Procedure Component Value Units Date/Time    MRI Brain Without Contrast [[id number]] Collected:  03/03/18 1218     Updated:  03/03/18 1955    
Addenda:        THIS REPORT CONTAINS FINDINGS THAT MAY BE CRITICAL TO PATIENT CARE. The   findings were verbally communicated via telephone conference with Dr. Julian at   7:55 PM EST on 3/3/2018. The findings were acknowledged and understood.    THIS DOCUMENT HAS BEEN ELECTRONICALLY SIGNED BY ALEKSEY MONTALVO MD  Signed:  03/03/18 1955 by Aleksey Montalvo MD    Narrative:       EXAM:    MR Head Without Intravenous Contrast    CLINICAL HISTORY:    73 years old, female; Dysarthria and anarthria; Signs and symptoms;   Hemiplegia and hemiparesis; Right side, dominance unknown; Patient HX: Stroke   suspected stroke. She was well upon going to bed last evening at about 11 pm.   Upon awakening this morning she felt clumsy on the right side. She has noted   associated dysarthria, but no other associated symptoms, pain or discomfort.    TECHNIQUE:    Magnetic resonance images of the head/brain without intravenous contrast in   multiple planes.    COMPARISON:    No relevant prior studies available.    FINDINGS:    Brain:  7 mm acute infarct left thalamus.  No hemorrhage.    Ventricles:  Unremarkable.  No ventriculomegaly.    Bones/joints:  Unremarkable.    Sinuses:  Unremarkable as visualized.  No acute sinusitis.    Mastoid air cells:  Unremarkable as visualized.  No mastoid effusion.    Orbits:  Unremarkable as visualized.    Other findings:  Mild age-related atrophy.      Impression:         7 mm acute infarct left thalamus.    THIS DOCUMENT HAS BEEN ELECTRONICALLY SIGNED BY ALEKSEY MONTALVO MD          Results for orders placed during the hospital encounter of 03/03/18   Duplex Carotid Ultrasound CAR    Narrative · Right internal carotid artery stenosis of 0-49%.  · Proximal right internal carotid artery plaque without significant   stenosis.  · Left internal carotid artery stenosis of 0-49%.  · Proximal left internal carotid artery plaque without significant   stenosis.          Results for orders placed during the 
hospital encounter of 03/03/18   Duplex Carotid Ultrasound CAR    Narrative · Right internal carotid artery stenosis of 0-49%.  · Proximal right internal carotid artery plaque without significant   stenosis.  · Left internal carotid artery stenosis of 0-49%.  · Proximal left internal carotid artery plaque without significant   stenosis.          Results for orders placed during the hospital encounter of 03/03/18   Adult Transthoracic Echo Complete W/ Cont if Necessary Per Protocol (With Agitated Saline)    Narrative · Left ventricular systolic function is hyperdynamic (EF > 70).  · Left ventricular wall thickness is consistent with moderate concentric   hypertrophy.  · The cardiac valves are anatomically and functionally normal.            Discharge Details      Valarie Camara   Home Medication Instructions VIGNESH:[id number]    Printed on:03/07/18 1126   Medication Information                      amLODIPine (NORVASC) 10 MG tablet  Take 1 tablet by mouth Daily.             aspirin 81 MG EC tablet  Take 81 mg by mouth Daily.             atorvastatin (LIPITOR) 80 MG tablet  Take 1 tablet by mouth Every Night.             carvedilol (COREG) 6.25 MG tablet  Take 1 tablet by mouth Every 12 (Twelve) Hours.             clopidogrel (PLAVIX) 75 MG tablet  Take 1 tablet by mouth Daily.             docusate sodium 100 MG capsule  Take 100 mg by mouth 2 (Two) Times a Day.             fluticasone-salmeterol (ADVAIR HFA) 115-21 MCG/ACT inhaler  2 (two) times a day.             hydrochlorothiazide (HYDRODIURIL) 12.5 MG tablet  Take 1 tablet by mouth daily.             insulin detemir (LEVEMIR) 100 UNIT/ML injection  Inject 25 Units under the skin Daily.             insulin lispro (humaLOG) 100 UNIT/ML injection  Inject 10 Units under the skin 3 (Three) Times a Day With Meals.             insulin lispro (humaLOG) 100 UNIT/ML injection  Inject 0-9 Units under the skin 3 (Three) Times a Day With Meals.             Insulin Pen 
Needle 31G X 5 MM misc  Inject insulin three times daily             Insulin Pen Needle 31G X 8 MM misc  5 shots daily             mometasone (NASONEX) 50 MCG/ACT nasal spray  into each nostril daily.             pantoprazole (PROTONIX) 40 MG EC tablet  Take 1 tablet by mouth Daily.             sertraline (ZOLOFT) 50 MG tablet  TAKE ONE TABLET EVERY DAY             valsartan (DIOVAN) 80 MG tablet  Take 3 tablets by mouth Daily.             vitamin D (ERGOCALCIFEROL) 35050 units capsule capsule  TAKE ONE CAPSULE BY MOUTH ONCE A WEEK                   Discharge Disposition:  Rehab Facility or Unit (DC - External)    Discharge Diet:  Diet Instructions     Diet: Regular, Consistent Carbohydrate, Renal; Thin       Discharge Diet:   Regular  Consistent Carbohydrate  Renal      Fluid Consistency:  Thin                 Discharge Activity:   Activity Instructions     Activity as Tolerated                       No future appointments.    Additional Instructions for the Follow-ups that You Need to Schedule     Ambulatory Referral to Home Health    As directed    Face to Face Visit Date:  3/5/2018    Follow-up Provider for Plan of Care?:  I treated the patient in an acute care facility and will not continue treatment after discharge.    Follow-up Provider:  CARTER RODGERS [[id number]]    Reason/Clinical Findings:  s/p CVA    Describe mobility limitations that make leaving home difficult:  Impaired functional mobility, balance, gait and endurance    Nursing/Therapeutic Services Requested:  Physical Therapy    PT orders:  Therapeutic exercise Gait Training Transfer training Strengthening Home safety assessment    Weight Bearing Status:  As Tolerated    Frequency:  Other           Discharge Follow-up with PCP    As directed    Follow Up Details:  after dc from rehab           Discharge Follow-up with Specified Provider: leah Melendrez; 3 Weeks    As directed    To:  leah Melendrez    Follow Up:  3 Weeks            
         Time Spent on Discharge:  40 minutes    Electronically signed by JOSEE Kimble, 03/07/18, 11:26 AM.    
There are no Wet Read(s) to document.

## 2024-01-11 ENCOUNTER — TELEPHONE (OUTPATIENT)
Dept: INTERNAL MEDICINE | Facility: CLINIC | Age: 74
End: 2024-01-11
Payer: COMMERCIAL

## 2024-01-11 NOTE — TELEPHONE ENCOUNTER
Called pt no answer left Vm     OKAY FOR HUB TO READ/ADVISE     ----- Message from Rigoberto Gomes Jr., MD sent at 1/10/2024  7:41 AM EST -----  Normal mammogram

## 2024-01-11 NOTE — TELEPHONE ENCOUNTER
----- Message from Rigoberto Gomes Jr., MD sent at 1/10/2024  7:41 AM EST -----  Normal mammogram

## 2024-01-15 ENCOUNTER — TELEPHONE (OUTPATIENT)
Dept: URGENT CARE | Facility: CLINIC | Age: 74
End: 2024-01-15

## 2024-01-15 PROCEDURE — 87086 URINE CULTURE/COLONY COUNT: CPT

## 2024-01-15 PROCEDURE — 87510 GARDNER VAG DNA DIR PROBE: CPT

## 2024-01-15 PROCEDURE — 87660 TRICHOMONAS VAGIN DIR PROBE: CPT

## 2024-01-15 PROCEDURE — 87480 CANDIDA DNA DIR PROBE: CPT

## 2024-01-16 ENCOUNTER — TELEPHONE (OUTPATIENT)
Dept: URGENT CARE | Facility: CLINIC | Age: 74
End: 2024-01-16
Payer: MEDICARE

## 2024-01-17 ENCOUNTER — TELEPHONE (OUTPATIENT)
Dept: URGENT CARE | Facility: CLINIC | Age: 74
End: 2024-01-17
Payer: MEDICARE

## 2024-02-01 ENCOUNTER — OFFICE VISIT (OUTPATIENT)
Dept: GASTROENTEROLOGY | Facility: CLINIC | Age: 74
End: 2024-02-01
Payer: MEDICARE

## 2024-02-01 VITALS
DIASTOLIC BLOOD PRESSURE: 78 MMHG | SYSTOLIC BLOOD PRESSURE: 129 MMHG | HEART RATE: 89 BPM | HEIGHT: 64 IN | BODY MASS INDEX: 25.47 KG/M2 | WEIGHT: 149.2 LBS | OXYGEN SATURATION: 100 %

## 2024-02-01 DIAGNOSIS — R93.2 ABNORMAL FINDINGS ON DIAGNOSTIC IMAGING OF LIVER AND BILIARY TRACT: ICD-10-CM

## 2024-02-01 DIAGNOSIS — Z85.038 PERSONAL HISTORY OF COLON CANCER: Primary | ICD-10-CM

## 2024-02-01 DIAGNOSIS — K76.0 FATTY LIVER: ICD-10-CM

## 2024-02-01 DIAGNOSIS — Z86.010 HISTORY OF COLON POLYPS: ICD-10-CM

## 2024-02-01 DIAGNOSIS — R74.8 ELEVATED LIVER ENZYMES: ICD-10-CM

## 2024-02-01 DIAGNOSIS — R94.5 ABNORMAL RESULTS OF LIVER FUNCTION STUDIES: ICD-10-CM

## 2024-02-01 DIAGNOSIS — K21.9 GASTROESOPHAGEAL REFLUX DISEASE, UNSPECIFIED WHETHER ESOPHAGITIS PRESENT: ICD-10-CM

## 2024-02-01 PROBLEM — Z86.0100 HISTORY OF COLON POLYPS: Status: ACTIVE | Noted: 2024-02-01

## 2024-02-01 RX ORDER — POLYETHYLENE GLYCOL 3350, SODIUM CHLORIDE, SODIUM BICARBONATE, POTASSIUM CHLORIDE 420; 11.2; 5.72; 1.48 G/4L; G/4L; G/4L; G/4L
4000 POWDER, FOR SOLUTION ORAL ONCE
Qty: 4000 ML | Refills: 0 | Status: SHIPPED | OUTPATIENT
Start: 2024-02-01 | End: 2024-02-01

## 2024-02-01 NOTE — PROGRESS NOTES
Chief Complaint  Elevated Hepatic Enzymes, Colon Cancer Screening, and Gas    Sun Art Hernandez is a 73 y.o. female who presents to [facility] for colon polyps, history of colon cancer, and elevated liver enzymes    History of present Illness  Patient presents to the office for colon polyps, history of colon cancer, and elevated liver enzymes. She has history of colon cancer in 2006 and 2011. 2006 was very early and did not require surgery, cancer returned in 2011 where she had resection and chemotherapy. Reports normal bowel habits. Denies constipation, diarrhea, abdominal pain, melena, and hematochezia. Heartburn controlled with omeprazole 40 mg daily. Denies nausea, vomiting, epigastric pain, and dysphagia. Denies unintentional weight loss. Denies RUQ pain, alcohol use, IV drug use, unprofessional tattoos, history of or exposure to hepatitis, history of blood transfusions, and family history of liver disease.     US Liver 11/21/23 - hepatic steatosis, small liver cyst, CBD 4mm    Colonoscopy 7/19/2022 by Dr. Velazquez - evidence of end to end anastomosis, 2 polyps in descending and transverse colon.  Polyps - tubular adenoma. Repeat 3 years.     Past Medical History:   Diagnosis Date    Arthritis     Cancer     Colon cancer     Diabetes mellitus, type 2     Disease of thyroid gland     GERD (gastroesophageal reflux disease)     Lumbar pain     Small bowel obstruction        Past Surgical History:   Procedure Laterality Date    APPENDECTOMY      COLON SURGERY  2011,2006    CANCER, CHEMO    COLONOSCOPY      COLONOSCOPY N/A 7/19/2022    Procedure: COLONOSCOPY WITH POLYPECTOMIES;  Surgeon: Manny Velazquez MD;  Location: [facility] ENDOSCOPY;  Service: Gastroenterology;  Laterality: N/A;  COLON POLYPS    HYSTERECTOMY  2004         Current Outpatient Medications:     atorvastatin (LIPITOR) 10 MG tablet, Take 1 tablet by mouth Every Night., Disp: 90 tablet, Rfl: 3     carboxymethylcellulose (Refresh Plus) 0.5 % solution, Administer 2 drops to both eyes 3 (Three) Times a Day As Needed for Dry Eyes., Disp: 70 each, Rfl: 3    cholecalciferol (VITAMIN D3) 25 MCG (1000 UT) tablet, Take 1 tablet by mouth 2 (Two) Times a Day., Disp: 180 tablet, Rfl: 3    Diclofenac Sodium (VOLTAREN) 1 % gel gel, Apply 4 g topically to the appropriate area as directed 4 (Four) Times a Day As Needed (pain)., Disp: 350 g, Rfl: 1    DULoxetine (CYMBALTA) 30 MG capsule, Take 1 capsule by mouth Daily., Disp: 90 capsule, Rfl: 3    empagliflozin (Jardiance) 25 MG tablet tablet, Take 1 tablet by mouth Daily., Disp: 90 tablet, Rfl: 3    glipizide (Glucotrol) 10 MG tablet, Take 1 tablet by mouth 2 (Two) Times a Day Before Meals., Disp: 180 tablet, Rfl: 1    levothyroxine (Synthroid) 50 MCG tablet, Take 1 tablet by mouth Daily., Disp: 90 tablet, Rfl: 1    lidocaine (LIDODERM) 5 %, Place 1 patch on the skin as directed by provider Daily. Remove & Discard patch within 12 hours or as directed by MD, Disp: 90 patch, Rfl: 3    metFORMIN (GLUCOPHAGE) 500 MG tablet, Take 1 tablet by mouth 2 (Two) Times a Day With Meals., Disp: 180 tablet, Rfl: 3    multivitamins-minerals (PRESERVISION AREDS 2) capsule capsule, , Disp: , Rfl:     nystatin (MYCOSTATIN) 790436 UNIT/GM ointment, Apply 1 Application topically to the appropriate area as directed 2 (Two) Times a Day As Needed (Vaginal itching)., Disp: 30 g, Rfl: 0    Omega-3 Fatty Acids (fish oil) 1000 MG capsule capsule, Take 1 capsule by mouth Daily With Breakfast., Disp: , Rfl:     omeprazole (priLOSEC) 40 MG capsule, Take 1 capsule by mouth Daily., Disp: 90 capsule, Rfl: 3    pregabalin (Lyrica) 300 MG capsule, Take 1 capsule by mouth 2 (Two) Times a Day As Needed (pain)., Disp: 60 capsule, Rfl: 0    SITagliptin (Januvia) 100 MG tablet, Take 1 tablet by mouth Daily., Disp: 90 tablet, Rfl: 3    traMADol (ULTRAM) 50 MG tablet, , Disp: , Rfl:     polyethylene  "glycol-electrolytes (Nulytely with Flavor Packs) 420 g solution, Take 4,000 mL by mouth 1 (One) Time for 1 dose., Disp: 4000 mL, Rfl: 0     Allergies   Allergen Reactions    Aspirin Hives    Penicillin V Potassium Rash       Family History   Adopted: Yes   Problem Relation Age of Onset    Breast cancer Brother     Other Brother         RENAL CANCER        Social History     Social History Narrative    Not on file       Objective       Vital Signs:   /78 (BP Location: Left arm, Patient Position: Sitting, Cuff Size: Adult)   Pulse 89   Ht 162.6 cm (64.02\")   Wt 67.7 kg (149 lb 3.2 oz)   SpO2 100%   BMI 25.60 kg/m²       Physical Exam  Constitutional:       Appearance: Normal appearance. She is normal weight.   HENT:      Head: Normocephalic and atraumatic.      Nose: Nose normal.   Pulmonary:      Effort: Pulmonary effort is normal.   Skin:     General: Skin is warm and dry.   Neurological:      Mental Status: She is alert and oriented to person, place, and time. Mental status is at baseline.   Psychiatric:         Mood and Affect: Mood normal.         Behavior: Behavior normal.         Thought Content: Thought content normal.         Judgment: Judgment normal.         Result Review :       CBC w/diff          4/27/2023    13:36 7/21/2023    10:10 10/17/2023    16:05   CBC w/Diff   WBC 5.54  6.27  6.29    RBC 4.75  4.84  4.70    Hemoglobin 15.3  15.7  15.6    Hematocrit 45.9  46.2  44.8    MCV 96.6  95.5  95.3    MCH 32.2  32.4  33.2    MCHC 33.3  34.0  34.8    RDW 11.9  12.4  12.2    Platelets 208  219  210    Neutrophil Rel % 57.1  48.5  56.4    Immature Granulocyte Rel % 0.4  0.3  0.3    Lymphocyte Rel % 34.8  41.6  34.0    Monocyte Rel % 6.3  8.0  7.8    Eosinophil Rel % 0.9  1.0  1.0    Basophil Rel % 0.5  0.6  0.5      CMP          7/21/2023    10:10 8/29/2023    16:15 10/17/2023    16:05   CMP   Glucose 147   139    BUN 19   16    Creatinine 0.93  0.90  0.96    EGFR 65.4  68.1  63.0    Sodium 141   " 142    Potassium 4.4   4.4    Chloride 103   104    Calcium 9.9   10.0    Total Protein 7.1   7.0    Albumin 4.6   4.4    Globulin 2.5   2.6    Total Bilirubin 0.7   0.5    Alkaline Phosphatase 96   76    AST (SGOT) 45   44    ALT (SGPT) 48   51    Albumin/Globulin Ratio 1.8   1.7    BUN/Creatinine Ratio 20.4   16.7    Anion Gap 12.0   11.7                    Assessment and Plan    Diagnoses and all orders for this visit:    1. Personal history of colon cancer (Primary)  -     Case Request; Standing  -     Implement Anesthesia orders day of procedure.; Standing  -     Verify NPO; Standing  -     Verify bowel prep was successful; Standing  -     Give tap water enema if bowel prep was insufficient; Standing  -     Obtain Informed Consent; Standing    2. History of colon polyps  -     Case Request; Standing  -     Implement Anesthesia orders day of procedure.; Standing  -     Verify NPO; Standing  -     Verify bowel prep was successful; Standing  -     Give tap water enema if bowel prep was insufficient; Standing  -     Obtain Informed Consent; Standing    3. Gastroesophageal reflux disease, unspecified whether esophagitis present  -     Case Request; Standing  -     Implement Anesthesia orders day of procedure.; Standing  -     Verify NPO; Standing  -     Verify bowel prep was successful; Standing  -     Give tap water enema if bowel prep was insufficient; Standing  -     Obtain Informed Consent; Standing    4. Elevated liver enzymes  -     AFP Tumor Marker; Future  -     Alpha - 1 - Antitrypsin; Future  -     MIO; Future  -     Hepatic Function Panel; Future  -     Hepatitis Panel, Acute; Future  -     Iron Profile; Future  -     Ferritin; Future  -     Copper, Serum; Future  -     Protime-INR; Future  -     Ceruloplasmin; Future  -     Mitochondrial Antibodies, M2; Future  -     Anti-Smooth Muscle Antibody Titer; Future  -     Immunofixation, Serum; Future    5. Fatty liver    6. Abnormal findings on diagnostic  imaging of liver and biliary tract  -     AFP Tumor Marker; Future    7. Abnormal results of liver function studies  -     Hepatitis Panel, Acute; Future  -     Protime-INR; Future    Other orders  -     polyethylene glycol-electrolytes (Nulytely with Flavor Packs) 420 g solution; Take 4,000 mL by mouth 1 (One) Time for 1 dose.  Dispense: 4000 mL; Refill: 0      Liver work up.  Advise patient to maintain a healthy lifestyle: cutting back on carbs, sugars, and fried fatty foods, limiting intake of soda and sugary drinks, and abstain from alcohol.   Continue Omeprazole 40mg daily.   Denies cardiopulmonary history  EGD/COLONOSCOPY Surgical Risk and Benefits: Possible risk/complications, benefits, and alternatives to surgical or invasive procedure have been explained to patient and/or legal guardian. Risks include bleeding, infection, and perforation. Patient has been evaluated and can tolerate anesthesia and/or sedation. Risk, benefits, and alternatives to anesthesia and sedation have been explained to patient and/or legal guardian.     Follow Up   No follow-ups on file.  Patient was given instructions and counseling regarding her condition or for health maintenance advice. Please see specific information pulled into the AVS if appropriate.

## 2024-02-05 DIAGNOSIS — E55.9 VITAMIN D DEFICIENCY: ICD-10-CM

## 2024-02-05 DIAGNOSIS — E11.65 TYPE 2 DIABETES MELLITUS WITH HYPERGLYCEMIA, WITHOUT LONG-TERM CURRENT USE OF INSULIN: ICD-10-CM

## 2024-02-05 DIAGNOSIS — M50.30 DDD (DEGENERATIVE DISC DISEASE), CERVICAL: ICD-10-CM

## 2024-02-05 RX ORDER — MELATONIN
1000 2 TIMES DAILY
Qty: 180 TABLET | Refills: 3 | Status: SHIPPED | OUTPATIENT
Start: 2024-02-05

## 2024-02-05 NOTE — TELEPHONE ENCOUNTER
LAST APPOINTMENT: 10/17/2023  NEXT APPOINTMENT: 02/20/2024  LAST UDS: 04/27/2023  LAST CONTROLLED SUBSTANCE AGREEMENT: 10/10/2023

## 2024-02-05 NOTE — TELEPHONE ENCOUNTER
Caller: Susan Hernandez Art    Relationship: Self    Best call back number: [phone]     Requested Prescriptions:   Requested Prescriptions     Pending Prescriptions Disp Refills    metFORMIN (GLUCOPHAGE) 500 MG tablet 180 tablet 3     Sig: Take 1 tablet by mouth 2 (Two) Times a Day With Meals.    cholecalciferol (VITAMIN D3) 25 MCG (1000 UT) tablet 180 tablet 3     Sig: Take 1 tablet by mouth 2 (Two) Times a Day.    pregabalin (Lyrica) 300 MG capsule 60 capsule 0     Sig: Take 1 capsule by mouth 2 (Two) Times a Day As Needed (pain).        Pharmacy where request should be sent: [place] [phone] [facility] [phone]      Last office visit with prescribing clinician: 10/17/2023   Last telemedicine visit with prescribing clinician: Visit date not found   Next office visit with prescribing clinician: 2/20/2024     Additional details provided by patient:     Does the patient have less than a 3 day supply:  [x] Yes  [] No    Would you like a call back once the refill request has been completed: [x] Yes [] No    If the office needs to give you a call back, can they leave a voicemail: [x] Yes [] No    Chandu Rizo Rep   02/05/24 14:50 EST

## 2024-02-06 RX ORDER — PREGABALIN 300 MG/1
300 CAPSULE ORAL 2 TIMES DAILY PRN
Qty: 60 CAPSULE | Refills: 0 | Status: SHIPPED | OUTPATIENT
Start: 2024-02-06

## 2024-02-08 ENCOUNTER — LAB (OUTPATIENT)
Dept: LAB | Facility: HOSPITAL | Age: 74
End: 2024-02-08
Payer: MEDICARE

## 2024-02-08 DIAGNOSIS — R94.5 ABNORMAL RESULTS OF LIVER FUNCTION STUDIES: ICD-10-CM

## 2024-02-08 DIAGNOSIS — R74.8 ELEVATED LIVER ENZYMES: ICD-10-CM

## 2024-02-08 DIAGNOSIS — R93.2 ABNORMAL FINDINGS ON DIAGNOSTIC IMAGING OF LIVER AND BILIARY TRACT: ICD-10-CM

## 2024-02-08 LAB
ALBUMIN SERPL-MCNC: 4.6 G/DL (ref 3.5–5.2)
ALP SERPL-CCNC: 79 U/L (ref 39–117)
ALPHA-FETOPROTEIN: <2 NG/ML (ref 0–8.3)
ALPHA1 GLOB MFR UR ELPH: 121 MG/DL (ref 90–200)
ALT SERPL W P-5'-P-CCNC: 72 U/L (ref 1–33)
AST SERPL-CCNC: 55 U/L (ref 1–32)
BILIRUB CONJ SERPL-MCNC: 0.3 MG/DL (ref 0–0.3)
BILIRUB INDIRECT SERPL-MCNC: 0.8 MG/DL
BILIRUB SERPL-MCNC: 1.1 MG/DL (ref 0–1.2)
CERULOPLASMIN SERPL-MCNC: 22 MG/DL (ref 19–39)
FERRITIN SERPL-MCNC: 183 NG/ML (ref 13–150)
HAV IGM SERPL QL IA: NORMAL
HBV CORE IGM SERPL QL IA: NORMAL
HBV SURFACE AG SERPL QL IA: NORMAL
HCV AB SER DONR QL: NORMAL
INR PPP: 0.88 (ref 0.86–1.15)
IRON 24H UR-MRATE: 128 MCG/DL (ref 37–145)
IRON SATN MFR SERPL: 32 % (ref 20–50)
PROT SERPL-MCNC: 7.5 G/DL (ref 6–8.5)
PROTHROMBIN TIME: 12.1 SECONDS (ref 11.8–14.9)
TIBC SERPL-MCNC: 399 MCG/DL (ref 298–536)
TRANSFERRIN SERPL-MCNC: 268 MG/DL (ref 200–360)

## 2024-02-08 PROCEDURE — 80074 ACUTE HEPATITIS PANEL: CPT

## 2024-02-08 PROCEDURE — 86381 MITOCHONDRIAL ANTIBODY EACH: CPT

## 2024-02-08 PROCEDURE — 82728 ASSAY OF FERRITIN: CPT

## 2024-02-08 PROCEDURE — 84466 ASSAY OF TRANSFERRIN: CPT

## 2024-02-08 PROCEDURE — 80076 HEPATIC FUNCTION PANEL: CPT

## 2024-02-08 PROCEDURE — 83540 ASSAY OF IRON: CPT

## 2024-02-08 PROCEDURE — 36415 COLL VENOUS BLD VENIPUNCTURE: CPT

## 2024-02-08 PROCEDURE — 82105 ALPHA-FETOPROTEIN SERUM: CPT

## 2024-02-08 PROCEDURE — 82525 ASSAY OF COPPER: CPT

## 2024-02-08 PROCEDURE — 86015 ACTIN ANTIBODY EACH: CPT

## 2024-02-08 PROCEDURE — 82103 ALPHA-1-ANTITRYPSIN TOTAL: CPT

## 2024-02-08 PROCEDURE — 86334 IMMUNOFIX E-PHORESIS SERUM: CPT

## 2024-02-08 PROCEDURE — 85610 PROTHROMBIN TIME: CPT

## 2024-02-08 PROCEDURE — 82784 ASSAY IGA/IGD/IGG/IGM EACH: CPT

## 2024-02-08 PROCEDURE — 82390 ASSAY OF CERULOPLASMIN: CPT

## 2024-02-08 PROCEDURE — 86038 ANTINUCLEAR ANTIBODIES: CPT

## 2024-02-10 LAB
MITOCHONDRIA M2 IGG SER-ACNC: <20 UNITS (ref 0–20)
SMA IGG SER-ACNC: 8 UNITS (ref 0–19)

## 2024-02-12 LAB
ANA SER QL: NEGATIVE
IGA SERPL-MCNC: 279 MG/DL (ref 64–422)
IGG SERPL-MCNC: 1063 MG/DL (ref 586–1602)
IGM SERPL-MCNC: 79 MG/DL (ref 26–217)
PROT PATTERN SERPL IFE-IMP: NORMAL

## 2024-02-16 ENCOUNTER — TELEPHONE (OUTPATIENT)
Dept: GASTROENTEROLOGY | Facility: CLINIC | Age: 74
End: 2024-02-16
Payer: MEDICARE

## 2024-02-17 LAB — COPPER SERPL-MCNC: 100 UG/DL (ref 80–158)

## 2024-02-19 ENCOUNTER — TELEPHONE (OUTPATIENT)
Dept: GASTROENTEROLOGY | Facility: CLINIC | Age: 74
End: 2024-02-19
Payer: MEDICARE

## 2024-02-19 NOTE — PROGRESS NOTES
"Chief Complaint  Diabetes (Type 2 follow up ) and Vaginal Itching (Patient states there us little bumps beside it and its itching on the outside (skin area) )    Subjective          Sun Art Hernandez presents to [facility]  History of Present Illness  Patient reports having itching in groin area. Patient has tried \"itch cream,\" but it has not helped. Patient denies vaginal discharge, fevers. Patient reports intermittent dysuria.   DM2- patient with some weight loss. Patient reports compliance with medications but needs refills.   Hypertension- patient denies headaches, dizziness, chest pain.   Hyperlipidemia- doing well on statin.   Hypothyroid- due for recheck.     Current Outpatient Medications   Medication Instructions    atorvastatin (LIPITOR) 10 mg, Oral, Nightly    carboxymethylcellulose (Refresh Plus) 0.5 % solution 2 drops, Both Eyes, 3 Times Daily PRN    cholecalciferol (VITAMIN D3) 1,000 Units, Oral, 2 Times Daily    clotrimazole-betamethasone (Lotrisone) 1-0.05 % cream 1 Application, Topical, 2 Times Daily    Diclofenac Sodium (VOLTAREN) 4 g, Topical, 4 Times Daily PRN    DULoxetine (CYMBALTA) 30 mg, Oral, Daily    empagliflozin (JARDIANCE) 25 mg, Oral, Daily    fish oil 1,000 mg, Oral, Daily With Breakfast    glipizide (GLUCOTROL) 10 mg, Oral, 2 Times Daily Before Meals    levothyroxine (SYNTHROID) 50 mcg, Oral, Daily    lidocaine (LIDODERM) 5 % 1 patch, Transdermal, Every 24 Hours, Remove & Discard patch within 12 hours or as directed by MD    metFORMIN (GLUCOPHAGE) 500 mg, Oral, 2 Times Daily With Meals    multivitamins-minerals (PRESERVISION AREDS 2) capsule capsule     nystatin (MYCOSTATIN) 692924 UNIT/GM powder Topical, 3 Times Daily    omeprazole (PRILOSEC) 40 mg, Oral, Daily    [START ON 3/4/2024] pregabalin (LYRICA) 300 mg, Oral, 2 Times Daily PRN    SITagliptin (JANUVIA) 100 mg, Oral, Daily    traMADol (ULTRAM) 50 MG tablet        The following " "portions of the patient's history were reviewed and updated as appropriate: allergies, current medications, past family history, past medical history, past social history, past surgical history, and problem list.    Objective   Vital Signs:   /86 (BP Location: Left arm)   Pulse 75   Temp 96.8 °F (36 °C) (Temporal)   Ht 162.6 cm (64\")   Wt 66.3 kg (146 lb 3.2 oz)   SpO2 95%   BMI 25.10 kg/m²     BP Readings from Last 3 Encounters:   02/20/24 135/86   02/01/24 129/78   01/15/24 114/80     Wt Readings from Last 3 Encounters:   02/20/24 66.3 kg (146 lb 3.2 oz)   02/01/24 67.7 kg (149 lb 3.2 oz)   01/15/24 66.6 kg (146 lb 12.8 oz)         Physical Exam   Appearance: No acute distress, well-nourished  Head: normocephalic, atraumatic  Eyes: extraocular movements intact, no scleral icterus, no conjunctival injection  Ears, Nose, and Throat: external ears normal, nares patent, moist mucous membranes  Cardiovascular: regular rate and rhythm. no murmurs, rubs, or gallops. no edema  Respiratory: breathing comfortably, symmetric chest rise, clear to auscultation bilaterally. No wheezes, rales, or rhonchi.  Neuro: alert and oriented to time, place, and person. Normal gait  Psych: normal mood and affect     Result Review :   The following data was reviewed by: Rigoberto Gomes Jr, MD on 02/20/2024:  Common labs          8/29/2023    16:15 10/17/2023    16:05 2/8/2024    13:08   Common Labs   Glucose  139     BUN  16     Creatinine 0.90  0.96     Sodium  142     Potassium  4.4     Chloride  104     Calcium  10.0     Albumin  4.4  4.6    Total Bilirubin  0.5  1.1    Alkaline Phosphatase  76  79    AST (SGOT)  44  55    ALT (SGPT)  51  72    WBC  6.29     Hemoglobin  15.6     Hematocrit  44.8     Platelets  210     Total Cholesterol  162     Triglycerides  101     HDL Cholesterol  49     LDL Cholesterol   94     Hemoglobin A1C  8.50              Lab Results   Component Value Date    INR 0.88 02/08/2024    " BILIRUBINUR Negative 02/20/2024          Assessment and Plan    Diagnoses and all orders for this visit:    1. Type 2 diabetes mellitus with hyperglycemia, without long-term current use of insulin (Primary)  Comments:  pt with weight loss. check labs. cont regimen. may consider GLP-1 if needed. goal HgbA1c <7%  Orders:  -     Microalbumin / Creatinine Urine Ratio - Urine, Clean Catch  -     CBC & Differential  -     Hemoglobin A1c  -     empagliflozin (Jardiance) 25 MG tablet tablet; Take 1 tablet by mouth Daily.  Dispense: 90 tablet; Refill: 3  -     glipizide (Glucotrol) 10 MG tablet; Take 1 tablet by mouth 2 (Two) Times a Day Before Meals.  Dispense: 180 tablet; Refill: 3    2. Vagina itching  -     POCT urinalysis dipstick, automated  -     Gardnerella vaginalis, Trichomonas vaginalis, Candida albicans, DNA - Swab, Vagina  -     Urine Culture - Urine, Urine, Clean Catch    3. Encounter for long-term (current) use of other medications  -     POC Urine Drug Screen [facility]    4. Dysuria  Comments:  urine dip neg. mcghee end for UA and Cx.  Orders:  -     Urine Culture - Urine, Urine, Clean Catch    5. Mixed hyperlipidemia  Comments:  cont statin. goal LDL<70  Orders:  -     Comprehensive Metabolic Panel  -     Lipid Panel    6. Acquired hypothyroidism  -     TSH    7. Tinea cruris  Comments:  will Rx nystatin and lotrisone to help.  Orders:  -     clotrimazole-betamethasone (Lotrisone) 1-0.05 % cream; Apply 1 Application topically to the appropriate area as directed 2 (Two) Times a Day for 14 days.  Dispense: 45 g; Refill: 0  -     nystatin (MYCOSTATIN) 651468 UNIT/GM powder; Apply  topically to the appropriate area as directed 3 (Three) Times a Day for 14 days.  Dispense: 60 g; Refill: 0    8. Need for RSV vaccination    9. Need for COVID-19 vaccine  -     COVID-19 F23 (Pfizer) 12yrs+ (COMIRNATY)    10. DDD (degenerative disc disease), cervical  Comments:  cont lyrica 300mg po BID. SERENA and luc reviewed.    Orders:  -     pregabalin (Lyrica) 300 MG capsule; Take 1 capsule by mouth 2 (Two) Times a Day As Needed (pain).  Dispense: 60 capsule; Refill: 0    11. Gastroesophageal reflux disease without esophagitis  -     omeprazole (priLOSEC) 40 MG capsule; Take 1 capsule by mouth Daily.  Dispense: 90 capsule; Refill: 3          Medications Discontinued During This Encounter   Medication Reason    nystatin (MYCOSTATIN) 005581 UNIT/GM ointment     pregabalin (Lyrica) 300 MG capsule Reorder    empagliflozin (Jardiance) 25 MG tablet tablet Reorder    glipizide (Glucotrol) 10 MG tablet Reorder    omeprazole (priLOSEC) 40 MG capsule Reorder          Follow Up   Return in about 4 months (around 6/20/2024) for Recheck, DM, HTN.  Patient was given instructions and counseling regarding her condition or for health maintenance advice. Please see specific information pulled into the AVS if appropriate.       Rigoberto Gomes Jr, MD  02/20/24  12:42 EST

## 2024-02-19 NOTE — TELEPHONE ENCOUNTER
----- Message from KEITH Rabago sent at 2/13/2024 10:52 AM EST -----  I have reviewed the patient's most recent liver work-up which is normal ruling out underlying hepatitis, autoimmune, and genetic cause for elevated liver enzymes.  Repeat liver enzymes remain elevated. Elevated liver enzyme and fatty liver on ultrasound is likely due to diet. Advise patient to maintain a healthy lifestyle: weight loss of 10%, cutting back on carbs, sugars, and fried fatty foods, limiting intake of soda and sugary drinks, and abstain from alcohol.

## 2024-02-20 ENCOUNTER — OFFICE VISIT (OUTPATIENT)
Dept: INTERNAL MEDICINE | Facility: CLINIC | Age: 74
End: 2024-02-20
Payer: MEDICARE

## 2024-02-20 ENCOUNTER — TELEPHONE (OUTPATIENT)
Dept: GASTROENTEROLOGY | Facility: CLINIC | Age: 74
End: 2024-02-20
Payer: MEDICARE

## 2024-02-20 VITALS
HEART RATE: 75 BPM | OXYGEN SATURATION: 95 % | TEMPERATURE: 96.8 F | WEIGHT: 146.2 LBS | HEIGHT: 64 IN | DIASTOLIC BLOOD PRESSURE: 86 MMHG | BODY MASS INDEX: 24.96 KG/M2 | SYSTOLIC BLOOD PRESSURE: 135 MMHG

## 2024-02-20 DIAGNOSIS — E78.2 MIXED HYPERLIPIDEMIA: ICD-10-CM

## 2024-02-20 DIAGNOSIS — B35.6 TINEA CRURIS: ICD-10-CM

## 2024-02-20 DIAGNOSIS — K21.9 GASTROESOPHAGEAL REFLUX DISEASE WITHOUT ESOPHAGITIS: ICD-10-CM

## 2024-02-20 DIAGNOSIS — R30.0 DYSURIA: ICD-10-CM

## 2024-02-20 DIAGNOSIS — M50.30 DDD (DEGENERATIVE DISC DISEASE), CERVICAL: ICD-10-CM

## 2024-02-20 DIAGNOSIS — Z79.899 ENCOUNTER FOR LONG-TERM (CURRENT) USE OF OTHER MEDICATIONS: ICD-10-CM

## 2024-02-20 DIAGNOSIS — E03.9 ACQUIRED HYPOTHYROIDISM: ICD-10-CM

## 2024-02-20 DIAGNOSIS — Z23 NEED FOR COVID-19 VACCINE: ICD-10-CM

## 2024-02-20 DIAGNOSIS — E11.65 TYPE 2 DIABETES MELLITUS WITH HYPERGLYCEMIA, WITHOUT LONG-TERM CURRENT USE OF INSULIN: Primary | ICD-10-CM

## 2024-02-20 DIAGNOSIS — Z29.11 NEED FOR RSV VACCINATION: ICD-10-CM

## 2024-02-20 DIAGNOSIS — N89.8 VAGINA ITCHING: ICD-10-CM

## 2024-02-20 LAB
ALBUMIN SERPL-MCNC: 4.7 G/DL (ref 3.5–5.2)
ALBUMIN UR-MCNC: <1.2 MG/DL
ALBUMIN/GLOB SERPL: 2 G/DL
ALP SERPL-CCNC: 80 U/L (ref 39–117)
ALT SERPL W P-5'-P-CCNC: 49 U/L (ref 1–33)
AMPHET+METHAMPHET UR QL: NEGATIVE
AMPHETAMINE INTERNAL CONTROL: NORMAL
AMPHETAMINES UR QL: NEGATIVE
ANION GAP SERPL CALCULATED.3IONS-SCNC: 13 MMOL/L (ref 5–15)
AST SERPL-CCNC: 42 U/L (ref 1–32)
BARBITURATE INTERNAL CONTROL: NORMAL
BARBITURATES UR QL SCN: NEGATIVE
BASOPHILS # BLD AUTO: 0.02 10*3/MM3 (ref 0–0.2)
BASOPHILS NFR BLD AUTO: 0.3 % (ref 0–1.5)
BENZODIAZ UR QL SCN: NEGATIVE
BENZODIAZEPINE INTERNAL CONTROL: NORMAL
BILIRUB BLD-MCNC: NEGATIVE MG/DL
BILIRUB SERPL-MCNC: 0.5 MG/DL (ref 0–1.2)
BUN SERPL-MCNC: 18 MG/DL (ref 8–23)
BUN/CREAT SERPL: 15 (ref 7–25)
BUPRENORPHINE INTERNAL CONTROL: NORMAL
BUPRENORPHINE SERPL-MCNC: NEGATIVE NG/ML
CALCIUM SPEC-SCNC: 10 MG/DL (ref 8.6–10.5)
CANDIDA SPECIES: NEGATIVE
CANNABINOIDS SERPL QL: NEGATIVE
CHLORIDE SERPL-SCNC: 100 MMOL/L (ref 98–107)
CHOLEST SERPL-MCNC: 152 MG/DL (ref 0–200)
CLARITY, POC: CLEAR
CO2 SERPL-SCNC: 25 MMOL/L (ref 22–29)
COCAINE INTERNAL CONTROL: NORMAL
COCAINE UR QL: NEGATIVE
COLOR UR: YELLOW
CREAT SERPL-MCNC: 1.2 MG/DL (ref 0.57–1)
CREAT UR-MCNC: 87.7 MG/DL
DEPRECATED RDW RBC AUTO: 44.9 FL (ref 37–54)
EGFRCR SERPLBLD CKD-EPI 2021: 47.9 ML/MIN/1.73
EOSINOPHIL # BLD AUTO: 0.05 10*3/MM3 (ref 0–0.4)
EOSINOPHIL NFR BLD AUTO: 0.6 % (ref 0.3–6.2)
ERYTHROCYTE [DISTWIDTH] IN BLOOD BY AUTOMATED COUNT: 12.5 % (ref 12.3–15.4)
EXPIRATION DATE: ABNORMAL
EXPIRATION DATE: NORMAL
GARDNERELLA VAGINALIS: NEGATIVE
GLOBULIN UR ELPH-MCNC: 2.3 GM/DL
GLUCOSE SERPL-MCNC: 185 MG/DL (ref 65–99)
GLUCOSE UR STRIP-MCNC: ABNORMAL MG/DL
HBA1C MFR BLD: 8.9 % (ref 4.8–5.6)
HCT VFR BLD AUTO: 46.4 % (ref 34–46.6)
HDLC SERPL-MCNC: 40 MG/DL (ref 40–60)
HGB BLD-MCNC: 15.2 G/DL (ref 12–15.9)
IMM GRANULOCYTES # BLD AUTO: 0.04 10*3/MM3 (ref 0–0.05)
IMM GRANULOCYTES NFR BLD AUTO: 0.5 % (ref 0–0.5)
KETONES UR QL: NEGATIVE
LDLC SERPL CALC-MCNC: 79 MG/DL (ref 0–100)
LDLC/HDLC SERPL: 1.81 {RATIO}
LEUKOCYTE EST, POC: NEGATIVE
LYMPHOCYTES # BLD AUTO: 2.56 10*3/MM3 (ref 0.7–3.1)
LYMPHOCYTES NFR BLD AUTO: 32.6 % (ref 19.6–45.3)
Lab: ABNORMAL
Lab: NORMAL
MCH RBC QN AUTO: 32.1 PG (ref 26.6–33)
MCHC RBC AUTO-ENTMCNC: 32.8 G/DL (ref 31.5–35.7)
MCV RBC AUTO: 97.9 FL (ref 79–97)
MDMA (ECSTASY) INTERNAL CONTROL: NORMAL
MDMA UR QL SCN: NEGATIVE
METHADONE INTERNAL CONTROL: NORMAL
METHADONE UR QL SCN: NEGATIVE
METHAMPHETAMINE INTERNAL CONTROL: NORMAL
MICROALBUMIN/CREAT UR: NORMAL MG/G{CREAT}
MONOCYTES # BLD AUTO: 0.52 10*3/MM3 (ref 0.1–0.9)
MONOCYTES NFR BLD AUTO: 6.6 % (ref 5–12)
NEUTROPHILS NFR BLD AUTO: 4.66 10*3/MM3 (ref 1.7–7)
NEUTROPHILS NFR BLD AUTO: 59.4 % (ref 42.7–76)
NITRITE UR-MCNC: NEGATIVE MG/ML
NRBC BLD AUTO-RTO: 0 /100 WBC (ref 0–0.2)
OPIATES INTERNAL CONTROL: NORMAL
OPIATES UR QL: NEGATIVE
OXYCODONE INTERNAL CONTROL: NORMAL
OXYCODONE UR QL SCN: NEGATIVE
PCP UR QL SCN: NEGATIVE
PH UR: 5.5 [PH] (ref 5–8)
PHENCYCLIDINE INTERNAL CONTROL: NORMAL
PLATELET # BLD AUTO: 185 10*3/MM3 (ref 140–450)
PMV BLD AUTO: 11.2 FL (ref 6–12)
POTASSIUM SERPL-SCNC: 4.3 MMOL/L (ref 3.5–5.2)
PROT SERPL-MCNC: 7 G/DL (ref 6–8.5)
PROT UR STRIP-MCNC: NEGATIVE MG/DL
RBC # BLD AUTO: 4.74 10*6/MM3 (ref 3.77–5.28)
RBC # UR STRIP: NEGATIVE /UL
SODIUM SERPL-SCNC: 138 MMOL/L (ref 136–145)
SP GR UR: 1.01 (ref 1–1.03)
T VAGINALIS DNA VAG QL PROBE+SIG AMP: NEGATIVE
THC INTERNAL CONTROL: NORMAL
TRIGL SERPL-MCNC: 198 MG/DL (ref 0–150)
TSH SERPL DL<=0.05 MIU/L-ACNC: 2.47 UIU/ML (ref 0.27–4.2)
UROBILINOGEN UR QL: NORMAL
VLDLC SERPL-MCNC: 33 MG/DL (ref 5–40)
WBC NRBC COR # BLD AUTO: 7.85 10*3/MM3 (ref 3.4–10.8)

## 2024-02-20 PROCEDURE — 85025 COMPLETE CBC W/AUTO DIFF WBC: CPT | Performed by: INTERNAL MEDICINE

## 2024-02-20 PROCEDURE — 87660 TRICHOMONAS VAGIN DIR PROBE: CPT | Performed by: INTERNAL MEDICINE

## 2024-02-20 PROCEDURE — 80053 COMPREHEN METABOLIC PANEL: CPT | Performed by: INTERNAL MEDICINE

## 2024-02-20 PROCEDURE — 83036 HEMOGLOBIN GLYCOSYLATED A1C: CPT | Performed by: INTERNAL MEDICINE

## 2024-02-20 PROCEDURE — 82570 ASSAY OF URINE CREATININE: CPT | Performed by: INTERNAL MEDICINE

## 2024-02-20 PROCEDURE — 80061 LIPID PANEL: CPT | Performed by: INTERNAL MEDICINE

## 2024-02-20 PROCEDURE — 82043 UR ALBUMIN QUANTITATIVE: CPT | Performed by: INTERNAL MEDICINE

## 2024-02-20 PROCEDURE — 87480 CANDIDA DNA DIR PROBE: CPT | Performed by: INTERNAL MEDICINE

## 2024-02-20 PROCEDURE — 84443 ASSAY THYROID STIM HORMONE: CPT | Performed by: INTERNAL MEDICINE

## 2024-02-20 PROCEDURE — 87510 GARDNER VAG DNA DIR PROBE: CPT | Performed by: INTERNAL MEDICINE

## 2024-02-20 PROCEDURE — 87086 URINE CULTURE/COLONY COUNT: CPT | Performed by: INTERNAL MEDICINE

## 2024-02-20 RX ORDER — NYSTATIN 100000 [USP'U]/G
POWDER TOPICAL 3 TIMES DAILY
Qty: 60 G | Refills: 0 | Status: SHIPPED | OUTPATIENT
Start: 2024-02-20 | End: 2024-03-05

## 2024-02-20 RX ORDER — CLOTRIMAZOLE AND BETAMETHASONE DIPROPIONATE 10; .64 MG/G; MG/G
1 CREAM TOPICAL 2 TIMES DAILY
Qty: 45 G | Refills: 0 | Status: SHIPPED | OUTPATIENT
Start: 2024-02-20 | End: 2024-03-05

## 2024-02-20 RX ORDER — PREGABALIN 300 MG/1
300 CAPSULE ORAL 2 TIMES DAILY PRN
Qty: 60 CAPSULE | Refills: 0 | Status: SHIPPED | OUTPATIENT
Start: 2024-03-04

## 2024-02-20 RX ORDER — GLIPIZIDE 10 MG/1
10 TABLET ORAL
Qty: 180 TABLET | Refills: 3 | Status: SHIPPED | OUTPATIENT
Start: 2024-02-20

## 2024-02-20 RX ORDER — OMEPRAZOLE 40 MG/1
40 CAPSULE, DELAYED RELEASE ORAL DAILY
Qty: 90 CAPSULE | Refills: 3 | Status: SHIPPED | OUTPATIENT
Start: 2024-02-20

## 2024-02-21 ENCOUNTER — TELEPHONE (OUTPATIENT)
Dept: GASTROENTEROLOGY | Facility: CLINIC | Age: 74
End: 2024-02-21
Payer: MEDICARE

## 2024-02-21 NOTE — TELEPHONE ENCOUNTER
Called patient, no answer. Left vm and call back number.   Letter sent to patient to call the office due to not being able to contact

## 2024-02-22 LAB — BACTERIA SPEC AEROBE CULT: NO GROWTH

## 2024-02-26 ENCOUNTER — TELEPHONE (OUTPATIENT)
Dept: INTERNAL MEDICINE | Facility: CLINIC | Age: 74
End: 2024-02-26
Payer: MEDICARE

## 2024-02-26 NOTE — TELEPHONE ENCOUNTER
Patient needs a PA for her Mounjaro, I have reached out to [employer] and completed the PA.    This has been approved and is good from 02/26/2024 good through 12/31/2099    Case ID: [id number].    Patient advised to reach out to pharmacy and .

## 2024-02-26 NOTE — TELEPHONE ENCOUNTER
Caller: Susan Hernandez    Relationship to patient: Self    Best call back number: [phone]     Patient is needing: STATED HER PHARMACY ADVISED HER TO HAVE HER PCP READOUT TO [facility] FOR THE NEW MEDICATION SHE WAS PRESCRIBED. SHE DID NOT KNOW THE NAME OF THE MEDICATION.

## 2024-03-26 NOTE — PRE-PROCEDURE INSTRUCTIONS
"PAT call attempted.  No answer.  Detailed message with date and arrival time of 0930 given.  Come to entrance \"C\"; must have adult  for transportation home; may have two visitors; however, children under 12 must remain in waiting area; instructed on diet/clear liquids/NPO/bowel prep, if needed; may take normal meds two hours prior to arrival time except for blood thinners, antidiabetics, diuretics, and weight loss meds.  Instructed to return call to confirm receipt of instructions and for any questions.  Hold Mounjaro for one week prior to procedure.  "

## 2024-03-27 NOTE — PRE-PROCEDURE INSTRUCTIONS
"Instructed on date and arrival time of 0930.Come to entrance \"C\". Must have  over age 18 to drive home.  May have two visitors; however, children under 12 must stay in waiting room.  Discussed clear liquid diet (no red or purple), bowel prep, and NPO.  May take medications as usual except for blood thinners, diabetic medications, and weight loss medications.  Verbalized understanding of instructions given.  Instructed to call for questions or concerns.  Not taking Mounjaro.  "

## 2024-04-01 ENCOUNTER — ANESTHESIA EVENT (OUTPATIENT)
Dept: GASTROENTEROLOGY | Facility: HOSPITAL | Age: 74
End: 2024-04-01
Payer: MEDICARE

## 2024-04-01 NOTE — ANESTHESIA PREPROCEDURE EVALUATION
Anesthesia Evaluation     NPO Solid Status: > 8 hours  NPO Liquid Status: > 6 hours           Airway   Mallampati: III  TM distance: >3 FB  Neck ROM: full  Possible difficult intubation  Dental - normal exam     Pulmonary - normal exam   Cardiovascular - normal exam    (+) hyperlipidemia      Neuro/Psych  GI/Hepatic/Renal/Endo    (+) GERD, liver disease (NAFLD- patient stated no issues), diabetes mellitus type 2, thyroid problem hypothyroidism    Musculoskeletal     Abdominal    Substance History      OB/GYN          Other   arthritis,   history of cancer (colon cancer)    ROS/Med Hx Other: Last dose Tirzepatide- patient stated she is no longer taking                        Anesthesia Plan    ASA 2     general   total IV anesthesia  (Total IV Anesthesia    Patient understands anesthesia not responsible for dental damage.  )  intravenous induction     Anesthetic plan, risks, benefits, and alternatives have been provided, discussed and informed consent has been obtained with: patient.    Plan discussed with CRNA.        CODE STATUS:

## 2024-04-02 ENCOUNTER — HOSPITAL ENCOUNTER (OUTPATIENT)
Facility: HOSPITAL | Age: 74
Setting detail: HOSPITAL OUTPATIENT SURGERY
Discharge: HOME | End: 2024-04-02
Attending: INTERNAL MEDICINE | Admitting: INTERNAL MEDICINE
Payer: MEDICARE

## 2024-04-02 ENCOUNTER — ANESTHESIA (OUTPATIENT)
Dept: GASTROENTEROLOGY | Facility: HOSPITAL | Age: 74
End: 2024-04-02
Payer: MEDICARE

## 2024-04-02 VITALS
OXYGEN SATURATION: 94 % | HEART RATE: 66 BPM | RESPIRATION RATE: 15 BRPM | TEMPERATURE: 97.2 F | HEIGHT: 64 IN | BODY MASS INDEX: 23.86 KG/M2 | WEIGHT: 139.77 LBS | SYSTOLIC BLOOD PRESSURE: 109 MMHG | DIASTOLIC BLOOD PRESSURE: 75 MMHG

## 2024-04-02 DIAGNOSIS — Z85.038 PERSONAL HISTORY OF COLON CANCER: ICD-10-CM

## 2024-04-02 DIAGNOSIS — Z86.010 HISTORY OF COLON POLYPS: ICD-10-CM

## 2024-04-02 DIAGNOSIS — K21.9 GASTROESOPHAGEAL REFLUX DISEASE, UNSPECIFIED WHETHER ESOPHAGITIS PRESENT: ICD-10-CM

## 2024-04-02 LAB — GLUCOSE BLDC GLUCOMTR-MCNC: 206 MG/DL (ref 70–99)

## 2024-04-02 PROCEDURE — 25010000002 PROPOFOL 10 MG/ML EMULSION: Performed by: NURSE ANESTHETIST, CERTIFIED REGISTERED

## 2024-04-02 PROCEDURE — 25810000003 LACTATED RINGERS PER 1000 ML

## 2024-04-02 PROCEDURE — 88305 TISSUE EXAM BY PATHOLOGIST: CPT | Performed by: INTERNAL MEDICINE

## 2024-04-02 PROCEDURE — 82948 REAGENT STRIP/BLOOD GLUCOSE: CPT

## 2024-04-02 RX ORDER — PROPOFOL 10 MG/ML
VIAL (ML) INTRAVENOUS AS NEEDED
Status: DISCONTINUED | OUTPATIENT
Start: 2024-04-02 | End: 2024-04-02 | Stop reason: SURG

## 2024-04-02 RX ORDER — PHENYLEPHRINE HCL IN 0.9% NACL 1 MG/10 ML
SYRINGE (ML) INTRAVENOUS AS NEEDED
Status: DISCONTINUED | OUTPATIENT
Start: 2024-04-02 | End: 2024-04-02 | Stop reason: SURG

## 2024-04-02 RX ORDER — SODIUM CHLORIDE, SODIUM LACTATE, POTASSIUM CHLORIDE, CALCIUM CHLORIDE 600; 310; 30; 20 MG/100ML; MG/100ML; MG/100ML; MG/100ML
30 INJECTION, SOLUTION INTRAVENOUS CONTINUOUS
Status: DISCONTINUED | OUTPATIENT
Start: 2024-04-02 | End: 2024-04-02 | Stop reason: HOSPADM

## 2024-04-02 RX ORDER — LIDOCAINE HYDROCHLORIDE 20 MG/ML
INJECTION, SOLUTION EPIDURAL; INFILTRATION; INTRACAUDAL; PERINEURAL AS NEEDED
Status: DISCONTINUED | OUTPATIENT
Start: 2024-04-02 | End: 2024-04-02 | Stop reason: SURG

## 2024-04-02 RX ADMIN — SODIUM CHLORIDE, POTASSIUM CHLORIDE, SODIUM LACTATE AND CALCIUM CHLORIDE 30 ML/HR: 600; 310; 30; 20 INJECTION, SOLUTION INTRAVENOUS at 09:57

## 2024-04-02 RX ADMIN — PROPOFOL 200 MCG/KG/MIN: 10 INJECTION, EMULSION INTRAVENOUS at 10:48

## 2024-04-02 RX ADMIN — PROPOFOL 100 MG: 10 INJECTION, EMULSION INTRAVENOUS at 10:48

## 2024-04-02 RX ADMIN — Medication 100 MCG: at 11:15

## 2024-04-02 RX ADMIN — LIDOCAINE HYDROCHLORIDE 100 MG: 20 INJECTION, SOLUTION EPIDURAL; INFILTRATION; INTRACAUDAL; PERINEURAL at 10:48

## 2024-04-02 NOTE — ANESTHESIA POSTPROCEDURE EVALUATION
Patient: Susan Hernandez    Procedure Summary       Date: 04/02/24 Room / Location: [facility] ENDOSCOPY 4 / [facility]    Anesthesia Start: 1046 Anesthesia Stop: 1132    Procedures:       ESOPHAGOGASTRODUODENOSCOPY WITH BIOPSIES      COLONOSCOPY COLD SNARE POLYPECTOMY Diagnosis:       Personal history of colon cancer      History of colon polyps      Gastroesophageal reflux disease, unspecified whether esophagitis present      (Personal history of colon cancer [Z85.038])      (History of colon polyps [Z86.010])      (Gastroesophageal reflux disease, unspecified whether esophagitis present [K21.9])    Surgeons: Manny Velazquez MD Provider: Tremaine Kelley CRNA    Anesthesia Type: general ASA Status: 2            Anesthesia Type: general    Vitals  Vitals Value Taken Time   /75 04/02/24 1146   Temp 36.2 °C (97.2 °F) 04/02/24 1146   Pulse 66 04/02/24 1147   Resp 15 04/02/24 1146   SpO2 93 % 04/02/24 1147   Vitals shown include unfiled device data.        Post Anesthesia Care and Evaluation    Post-procedure mental status: acceptable.  Pain management: satisfactory to patient    Airway patency: patent  Anesthetic complications: No anesthetic complications    Cardiovascular status: acceptable  Respiratory status: acceptable    Comments: Per chart review

## 2024-04-02 NOTE — H&P
Pre Procedure History & Physical    Chief Complaint:   Past history colon polyps and persistent heartburn    Subjective     HPI:   History of colon polyps and heartburn    Past Medical History:   Past Medical History:   Diagnosis Date    Arthritis     Cancer     Colon cancer     Diabetes mellitus, type 2     Disease of thyroid gland     GERD (gastroesophageal reflux disease)     Lumbar pain     Small bowel obstruction        Past Surgical History:  Past Surgical History:   Procedure Laterality Date    APPENDECTOMY      COLON SURGERY  2011,2006    CANCER, CHEMO    COLONOSCOPY      COLONOSCOPY N/A 7/19/2022    Procedure: COLONOSCOPY WITH POLYPECTOMIES;  Surgeon: Manny Velazquez MD;  Location: [facility] ENDOSCOPY;  Service: Gastroenterology;  Laterality: N/A;  COLON POLYPS    HYSTERECTOMY  2004       Family History:  Family History   Adopted: Yes   Problem Relation Age of Onset    Breast cancer Brother     Other Brother         RENAL CANCER       Social History:   reports that she quit smoking about 14 years ago. Her smoking use included cigarettes. She started smoking about 52 years ago. She has a 76 pack-year smoking history. She has been exposed to tobacco smoke. She has never used smokeless tobacco. She reports that she does not currently use alcohol. She reports that she does not use drugs.    Medications:   Medications Prior to Admission   Medication Sig Dispense Refill Last Dose    atorvastatin (LIPITOR) 10 MG tablet Take 1 tablet by mouth Every Night. 90 tablet 3 4/1/2024    carboxymethylcellulose (Refresh Plus) 0.5 % solution Administer 2 drops to both eyes 3 (Three) Times a Day As Needed for Dry Eyes. 70 each 3 Past Week    cholecalciferol (VITAMIN D3) 25 MCG (1000 UT) tablet Take 1 tablet by mouth 2 (Two) Times a Day. 180 tablet 3 4/1/2024    DULoxetine (CYMBALTA) 30 MG capsule Take 1 capsule by mouth Daily. 90 capsule 3 4/1/2024    empagliflozin (Jardiance) 25 MG tablet tablet Take 1 tablet by mouth  "Daily. 90 tablet 3 4/1/2024    glipizide (Glucotrol) 10 MG tablet Take 1 tablet by mouth 2 (Two) Times a Day Before Meals. 180 tablet 3 4/1/2024    levothyroxine (Synthroid) 50 MCG tablet Take 1 tablet by mouth Daily. 90 tablet 1 4/1/2024    lidocaine (LIDODERM) 5 % Place 1 patch on the skin as directed by provider Daily. Remove & Discard patch within 12 hours or as directed by MD 90 patch 3 Past Month    metFORMIN (GLUCOPHAGE) 500 MG tablet Take 1 tablet by mouth 2 (Two) Times a Day With Meals. 180 tablet 3 4/1/2024    Omega-3 Fatty Acids (fish oil) 1000 MG capsule capsule Take 1 capsule by mouth Daily With Breakfast.   4/1/2024    omeprazole (priLOSEC) 40 MG capsule Take 1 capsule by mouth Daily. 90 capsule 3 4/1/2024    pregabalin (Lyrica) 300 MG capsule Take 1 capsule by mouth 2 (Two) Times a Day As Needed (pain). 60 capsule 0 4/1/2024    SITagliptin (Januvia) 100 MG tablet Take 1 tablet by mouth Daily. 90 tablet 3 4/1/2024    traMADol (ULTRAM) 50 MG tablet    Past Week    Diclofenac Sodium (VOLTAREN) 1 % gel gel Apply 4 g topically to the appropriate area as directed 4 (Four) Times a Day As Needed (pain). 350 g 1     multivitamins-minerals (PRESERVISION AREDS 2) capsule capsule        Tirzepatide (Mounjaro) 2.5 MG/0.5ML solution pen-injector pen Inject 0.5 mL under the skin into the appropriate area as directed 1 (One) Time Per Week. 2 mL 0 More than a month       Allergies:  Aspirin and Penicillin v potassium        Objective     Blood pressure 134/81, pulse 92, temperature 97.1 °F (36.2 °C), temperature source Temporal, resp. rate 16, height 162.6 cm (64\"), weight 63.4 kg (139 lb 12.4 oz), SpO2 96%.    Physical Exam   Constitutional: Pt is oriented to person, place, and time and well-developed, well-nourished, and in no distress.   Mouth/Throat: Oropharynx is clear and moist.   Neck: Normal range of motion.   Cardiovascular: Normal rate, regular rhythm and normal heart sounds.    Pulmonary/Chest: Effort " normal and breath sounds normal.   Abdominal: Soft. Nontender  Skin: Skin is warm and dry.   Psychiatric: Mood, memory, affect and judgment normal.     Assessment & Plan     Diagnosis:  Surveillance for colon polyps and screening for Oneal's    Anticipated Surgical Procedure:  EGD and colonoscopy    The risks, benefits, and alternatives of this procedure have been discussed with the patient or the responsible party- the patient understands and agrees to proceed.

## 2024-04-08 ENCOUNTER — PREP FOR SURGERY (OUTPATIENT)
Dept: OTHER | Facility: HOSPITAL | Age: 74
End: 2024-04-08
Payer: MEDICARE

## 2024-04-08 ENCOUNTER — TELEPHONE (OUTPATIENT)
Dept: GASTROENTEROLOGY | Facility: CLINIC | Age: 74
End: 2024-04-08
Payer: MEDICARE

## 2024-04-08 DIAGNOSIS — Z86.010 ENCOUNTER FOR COLONOSCOPY FOLLOWING COLON POLYP REMOVAL: Primary | ICD-10-CM

## 2024-04-08 DIAGNOSIS — Z09 ENCOUNTER FOR COLONOSCOPY FOLLOWING COLON POLYP REMOVAL: Primary | ICD-10-CM

## 2024-04-08 DIAGNOSIS — Z86.010 HISTORY OF COLON POLYPS: Primary | ICD-10-CM

## 2024-04-08 PROBLEM — Z86.0100 ENCOUNTER FOR COLONOSCOPY FOLLOWING COLON POLYP REMOVAL: Status: ACTIVE | Noted: 2024-04-08

## 2024-04-08 PROBLEM — Z12.11 ENCOUNTER FOR COLONOSCOPY FOLLOWING COLON POLYP REMOVAL: Status: ACTIVE | Noted: 2024-04-08

## 2024-04-08 RX ORDER — POLYETHYLENE GLYCOL 3350, SODIUM SULFATE, POTASSIUM CHLORIDE, MAGNESIUM SULFATE, AND SODIUM CHLORIDE FOR ORAL SOLUTION 178.7-7.3G
1 KIT ORAL TAKE AS DIRECTED
Qty: 1 EACH | Refills: 0 | Status: SHIPPED | OUTPATIENT
Start: 2024-04-08

## 2024-04-08 NOTE — TELEPHONE ENCOUNTER
Spoke with patient and informed of scheduled colonoscopy:    Colonoscopy scheduled for Monday, 05.20.24 at 1100.     Advised patient on lesser volume prep (Suflave) prescription being sent to [facility] in [place].  Educated on extended bowel prep including low residue diet, 1 week of MiraLax daily and 2 days of clear liquids.  Patient verbalized understanding.    Packet created for patient and ready at  of Dr. Velazquez's office:  low residue diet information, extended bowel prep instructions and bowel prep brochure.    Advised patient to reach out to the office if she has an difficulty obtaining prescribed bowel prep from pharmacy.    Follow up appointment scheduled with KEITH Day on 05.31.24 at 1000.

## 2024-04-08 NOTE — TELEPHONE ENCOUNTER
Spoke to patient and informed of KEITH Day result note and recommendations. Verified patient understanding.    Confirmed patient is taking omeprazole. Educated on why and how best to take PPI medications.      Patient denies NSAID use.    Prep review:  States she was on clear liquid diet the day prior and that she did complete the bowel prep in two doses.  States she held Mounjaro for 1 week prior.    Patient states she is willing to repeat colonoscopy, however will not do the 4L prep again.    Please advise on any other recommended prep.  Patient would like new prep sent to [facility] in [place].  Or states she can stop by office to  sample.

## 2024-04-08 NOTE — TELEPHONE ENCOUNTER
----- Message from KEITH Rabago sent at 4/3/2024  1:02 PM EDT -----  I have reviewed the patients upper endoscopy and pathology.  Stomach biopsies show mild reactive gastropathy.  Biopsies are negative for H. Pylori, dysplasia, metaplasia, and malignancy.  Commend continue omeprazole 40 mg daily.  Please discontinue any NSAID use.    I reviewed the patient's most recent colonoscopy report.  Patient had complete removal of 1 tubular adenoma polyp in the transverse colon.  Patient had poor prep therefore will need repeat colonoscopy in 6 weeks.  Please schedule with patient and troubleshoot any prep issues.    Please arrange follow-up appointment after repeat colonoscopy

## 2024-04-16 DIAGNOSIS — M50.30 DDD (DEGENERATIVE DISC DISEASE), CERVICAL: ICD-10-CM

## 2024-04-16 RX ORDER — PREGABALIN 300 MG/1
300 CAPSULE ORAL 2 TIMES DAILY PRN
Qty: 60 CAPSULE | Refills: 0 | Status: SHIPPED | OUTPATIENT
Start: 2024-04-16

## 2024-04-16 NOTE — TELEPHONE ENCOUNTER
Refill request for controlled substance.      Date of request: 4/16/2024   Medication requested: Lyrica   Last OV: 2/20/24  Last UDS: 2/20/24  Contract signed: yes    Date:10/11/22  Next office visit: 6/24/24    Wendy Hernández

## 2024-04-16 NOTE — TELEPHONE ENCOUNTER
Caller: Susan Hernandez Art    Relationship: Self    Best call back number: [phone]     Requested Prescriptions:   Requested Prescriptions     Pending Prescriptions Disp Refills    pregabalin (Lyrica) 300 MG capsule 60 capsule 0     Sig: Take 1 capsule by mouth 2 (Two) Times a Day As Needed (pain).        Pharmacy where request should be sent: [place] [phone] [place] [phone]      Last office visit with prescribing clinician: 2/20/2024   Last telemedicine visit with prescribing clinician: Visit date not found   Next office visit with prescribing clinician: 6/24/2024     Additional details provided by patient: HAS 1 LEFT    Does the patient have less than a 3 day supply:  [x] Yes  [] No    Would you like a call back once the refill request has been completed: [x] Yes [] No    If the office needs to give you a call back, can they leave a voicemail: [x] Yes [] No    Chandu Rizo Rep   04/16/24 09:38 EDT

## 2024-05-10 DIAGNOSIS — M50.30 DDD (DEGENERATIVE DISC DISEASE), CERVICAL: ICD-10-CM

## 2024-05-10 RX ORDER — PREGABALIN 300 MG/1
300 CAPSULE ORAL 2 TIMES DAILY PRN
Qty: 60 CAPSULE | Refills: 0 | Status: SHIPPED | OUTPATIENT
Start: 2024-05-10

## 2024-05-10 NOTE — TELEPHONE ENCOUNTER
Caller: Susan Hernandez Art    Relationship: Self    Best call back number: [phone]     Requested Prescriptions:   Requested Prescriptions     Pending Prescriptions Disp Refills    pregabalin (Lyrica) 300 MG capsule 60 capsule 0     Sig: Take 1 capsule by mouth 2 (Two) Times a Day As Needed (pain).        Pharmacy where request should be sent: [place] [phone] [place] [phone]      Last office visit with prescribing clinician: 2/20/2024   Last telemedicine visit with prescribing clinician: Visit date not found   Next office visit with prescribing clinician: 6/24/2024     Additional details provided by patient:     Does the patient have less than a 3 day supply:  [x] Yes  [] No    Would you like a call back once the refill request has been completed: [x] Yes [] No    If the office needs to give you a call back, can they leave a voicemail: [x] Yes [] No    Chandu Rizo Rep   05/10/24 14:07 EDT

## 2024-05-10 NOTE — TELEPHONE ENCOUNTER
Please notify pt that scripts have been sent to [facility]. Thank you.

## 2024-05-13 ENCOUNTER — TELEPHONE (OUTPATIENT)
Dept: INTERNAL MEDICINE | Facility: CLINIC | Age: 74
End: 2024-05-13
Payer: MEDICARE

## 2024-05-13 DIAGNOSIS — E11.65 TYPE 2 DIABETES MELLITUS WITH HYPERGLYCEMIA, WITHOUT LONG-TERM CURRENT USE OF INSULIN: Primary | ICD-10-CM

## 2024-05-13 NOTE — TELEPHONE ENCOUNTER
Spoke with patient. Verified .     Made aware of results - HgbA1c is worsening which means blood glucose control is worsening. Please ensure patient is taking metfomrin, jardiance, januvia, and mounjaro as previously recommended. If so, would recommend increase dose of mounjaro to 5mg weekly.       Patient said she is taking all of her medications.   Mounjaro should be sent to [facility].

## 2024-05-14 NOTE — PAT
Attempted to reach patient to go over instructions for upcoming colonoscopy this coming Monday 5/20/24. Patients voicemail box is full. Will try to call back and reach patient

## 2024-05-14 NOTE — PRE-PROCEDURE INSTRUCTIONS
"Instructed on date and arrival time of 1100. Come to entrance \"C\". Must have  over age 18 to drive home.  May have two visitors; however, children under 12 must stay in waiting room.  Discussed clear liquid diet (no red or purple), bowel prep, and NPO.  May take medications as usual except for blood thinners, diabetic medications, and weight loss medications.  Verbalized understanding of instructions given.  Instructed to call for questions or concerns.  "

## 2024-05-17 ENCOUNTER — ANESTHESIA EVENT (OUTPATIENT)
Dept: GASTROENTEROLOGY | Facility: HOSPITAL | Age: 74
End: 2024-05-17
Payer: MEDICARE

## 2024-05-17 NOTE — ANESTHESIA PREPROCEDURE EVALUATION
Anesthesia Evaluation     Patient summary reviewed and Nursing notes reviewed   NPO Solid Status: > 8 hours  NPO Liquid Status: > 8 hours           Airway   Mallampati: II  TM distance: >3 FB  Small opening, Narrow palate, Possible difficult intubation and Anterior  Dental - normal exam     Pulmonary - normal exam   (+) a smoker (QUIT IN 2009) Former, cigarettes,  Cardiovascular - normal exam    Rhythm: regular    (+) hyperlipidemia      Neuro/Psych  GI/Hepatic/Renal/Endo    (+) GERD, liver disease, diabetes mellitus type 2 well controlled, thyroid problem hypothyroidism    Musculoskeletal     Abdominal    Substance History      OB/GYN          Other   arthritis,   history of cancer    ROS/Med Hx Other: Has not started Mounjaro yet, as verified by phone conversation.                 Anesthesia Plan    ASA 2     general   total IV anesthesia  (Total IV Anesthesia    Patient understands anesthesia not responsible for dental damage.  )  intravenous induction     Anesthetic plan, risks, benefits, and alternatives have been provided, discussed and informed consent has been obtained with: patient.  Pre-procedure education provided  Plan discussed with CRNA.    CODE STATUS:

## 2024-05-20 ENCOUNTER — ANESTHESIA (OUTPATIENT)
Dept: GASTROENTEROLOGY | Facility: HOSPITAL | Age: 74
End: 2024-05-20
Payer: MEDICARE

## 2024-05-20 ENCOUNTER — HOSPITAL ENCOUNTER (OUTPATIENT)
Facility: HOSPITAL | Age: 74
Setting detail: HOSPITAL OUTPATIENT SURGERY
Discharge: HOME | End: 2024-05-20
Attending: INTERNAL MEDICINE | Admitting: INTERNAL MEDICINE
Payer: MEDICARE

## 2024-05-20 VITALS
HEIGHT: 64 IN | TEMPERATURE: 98.6 F | DIASTOLIC BLOOD PRESSURE: 79 MMHG | SYSTOLIC BLOOD PRESSURE: 129 MMHG | RESPIRATION RATE: 20 BRPM | WEIGHT: 138.45 LBS | HEART RATE: 63 BPM | BODY MASS INDEX: 23.64 KG/M2 | OXYGEN SATURATION: 95 %

## 2024-05-20 LAB — GLUCOSE BLDC GLUCOMTR-MCNC: 217 MG/DL (ref 70–99)

## 2024-05-20 PROCEDURE — 82948 REAGENT STRIP/BLOOD GLUCOSE: CPT | Performed by: NURSE ANESTHETIST, CERTIFIED REGISTERED

## 2024-05-20 PROCEDURE — 25810000003 LACTATED RINGERS PER 1000 ML: Performed by: NURSE ANESTHETIST, CERTIFIED REGISTERED

## 2024-05-20 PROCEDURE — 25010000002 PROPOFOL 10 MG/ML EMULSION: Performed by: NURSE ANESTHETIST, CERTIFIED REGISTERED

## 2024-05-20 RX ORDER — PROPOFOL 10 MG/ML
VIAL (ML) INTRAVENOUS AS NEEDED
Status: DISCONTINUED | OUTPATIENT
Start: 2024-05-20 | End: 2024-05-20 | Stop reason: SURG

## 2024-05-20 RX ORDER — PHENYLEPHRINE HCL IN 0.9% NACL 1 MG/10 ML
SYRINGE (ML) INTRAVENOUS AS NEEDED
Status: DISCONTINUED | OUTPATIENT
Start: 2024-05-20 | End: 2024-05-20 | Stop reason: SURG

## 2024-05-20 RX ORDER — SODIUM CHLORIDE, SODIUM LACTATE, POTASSIUM CHLORIDE, CALCIUM CHLORIDE 600; 310; 30; 20 MG/100ML; MG/100ML; MG/100ML; MG/100ML
30 INJECTION, SOLUTION INTRAVENOUS CONTINUOUS
Status: DISCONTINUED | OUTPATIENT
Start: 2024-05-20 | End: 2024-05-20 | Stop reason: HOSPADM

## 2024-05-20 RX ORDER — LIDOCAINE HYDROCHLORIDE 20 MG/ML
INJECTION, SOLUTION EPIDURAL; INFILTRATION; INTRACAUDAL; PERINEURAL AS NEEDED
Status: DISCONTINUED | OUTPATIENT
Start: 2024-05-20 | End: 2024-05-20 | Stop reason: SURG

## 2024-05-20 RX ADMIN — Medication 50 MCG: at 13:37

## 2024-05-20 RX ADMIN — Medication 100 MCG: at 13:29

## 2024-05-20 RX ADMIN — SODIUM CHLORIDE, POTASSIUM CHLORIDE, SODIUM LACTATE AND CALCIUM CHLORIDE 30 ML/HR: 600; 310; 30; 20 INJECTION, SOLUTION INTRAVENOUS at 12:17

## 2024-05-20 RX ADMIN — LIDOCAINE HYDROCHLORIDE 100 MG: 20 INJECTION, SOLUTION EPIDURAL; INFILTRATION; INTRACAUDAL; PERINEURAL at 13:23

## 2024-05-20 RX ADMIN — PROPOFOL 100 MG: 10 INJECTION, EMULSION INTRAVENOUS at 13:23

## 2024-05-20 RX ADMIN — PROPOFOL 225 MCG/KG/MIN: 10 INJECTION, EMULSION INTRAVENOUS at 13:23

## 2024-05-20 NOTE — H&P
Pre Procedure History & Physical    Chief Complaint:   Colon cancer surveillance    Subjective     HPI:   Past history colon cancer    Past Medical History:   Past Medical History:   Diagnosis Date    Arthritis     Cancer     Colon cancer     Diabetes mellitus, type 2     Disease of thyroid gland     GERD (gastroesophageal reflux disease)     Lumbar pain     Small bowel obstruction        Past Surgical History:  Past Surgical History:   Procedure Laterality Date    APPENDECTOMY      COLON SURGERY  2011,2006    CANCER, CHEMO    COLONOSCOPY      COLONOSCOPY N/A 07/19/2022    Procedure: COLONOSCOPY WITH POLYPECTOMIES;  Surgeon: Manny Velazquez MD;  Location: [facility] ENDOSCOPY;  Service: Gastroenterology;  Laterality: N/A;  COLON POLYPS    COLONOSCOPY N/A 04/02/2024    Procedure: COLONOSCOPY COLD SNARE POLYPECTOMY;  Surgeon: Manny Velazquez MD;  Location: [facility] ENDOSCOPY;  Service: Gastroenterology;  Laterality: N/A;  POOR PREP, COLON POLYP, PREVIOUS SURGERY    ENDOSCOPY N/A 04/02/2024    Procedure: ESOPHAGOGASTRODUODENOSCOPY WITH BIOPSIES;  Surgeon: Manny Velazquez MD;  Location: [facility] ENDOSCOPY;  Service: Gastroenterology;  Laterality: N/A;  ERROSIVE GASTRITIS    HYSTERECTOMY  2004    LYSIS OF ABDOMINAL ADHESIONS         Family History:  Family History   Adopted: Yes   Problem Relation Age of Onset    Breast cancer Brother     Other Brother         RENAL CANCER       Social History:   reports that she quit smoking about 14 years ago. Her smoking use included cigarettes. She started smoking about 52 years ago. She has a 76 pack-year smoking history. She has been exposed to tobacco smoke. She has never used smokeless tobacco. She reports that she does not currently use alcohol. She reports that she does not use drugs.    Medications:   Medications Prior to Admission   Medication Sig Dispense Refill Last Dose    atorvastatin (LIPITOR) 10 MG tablet Take 1 tablet by mouth Every Night. 90 tablet 3 Past Week  "   carboxymethylcellulose (Refresh Plus) 0.5 % solution Administer 2 drops to both eyes 3 (Three) Times a Day As Needed for Dry Eyes. 70 each 3 5/19/2024    cholecalciferol (VITAMIN D3) 25 MCG (1000 UT) tablet Take 1 tablet by mouth 2 (Two) Times a Day. 180 tablet 3 5/19/2024    Diclofenac Sodium (VOLTAREN) 1 % gel gel Apply 4 g topically to the appropriate area as directed 4 (Four) Times a Day As Needed (pain). 350 g 1 Past Week    DULoxetine (CYMBALTA) 30 MG capsule Take 1 capsule by mouth Daily. 90 capsule 3 Past Week    empagliflozin (Jardiance) 25 MG tablet tablet Take 1 tablet by mouth Daily. 90 tablet 3 Past Week    glipizide (Glucotrol) 10 MG tablet Take 1 tablet by mouth 2 (Two) Times a Day Before Meals. 180 tablet 3 Past Week    levothyroxine (Synthroid) 50 MCG tablet Take 1 tablet by mouth Daily. 90 tablet 1 5/20/2024    lidocaine (LIDODERM) 5 % Place 1 patch on the skin as directed by provider Daily. Remove & Discard patch within 12 hours or as directed by MD 90 patch 3 Past Week    metFORMIN (GLUCOPHAGE) 500 MG tablet Take 1 tablet by mouth 2 (Two) Times a Day With Meals. 180 tablet 3 Past Week    multivitamins-minerals (PRESERVISION AREDS 2) capsule capsule    Past Week    Omega-3 Fatty Acids (fish oil) 1000 MG capsule capsule Take 1 capsule by mouth Daily With Breakfast.   Past Week    omeprazole (priLOSEC) 40 MG capsule Take 1 capsule by mouth Daily. 90 capsule 3 Past Week    pregabalin (Lyrica) 300 MG capsule Take 1 capsule by mouth 2 (Two) Times a Day As Needed (pain). 60 capsule 0 5/20/2024 at 0300    SITagliptin (Januvia) 100 MG tablet Take 1 tablet by mouth Daily. 90 tablet 3 Past Week    traMADol (ULTRAM) 50 MG tablet    Past Week       Allergies:  Aspirin and Penicillin v potassium        Objective     Blood pressure 119/81, pulse 77, temperature 97.6 °F (36.4 °C), temperature source Temporal, resp. rate 18, height 162.6 cm (64\"), weight 62.8 kg (138 lb 7.2 oz), SpO2 97%.    Physical Exam "   Constitutional: Pt is oriented to person, place, and time and well-developed, well-nourished, and in no distress.   Mouth/Throat: Oropharynx is clear and moist.   Neck: Normal range of motion.   Cardiovascular: Normal rate, regular rhythm and normal heart sounds.    Pulmonary/Chest: Effort normal and breath sounds normal.   Abdominal: Soft. Nontender  Skin: Skin is warm and dry.   Psychiatric: Mood, memory, affect and judgment normal.     Assessment & Plan     Diagnosis:  Colon cancer surveillance    Anticipated Surgical Procedure:  Colonoscopy    The risks, benefits, and alternatives of this procedure have been discussed with the patient or the responsible party- the patient understands and agrees to proceed.

## 2024-05-20 NOTE — ANESTHESIA POSTPROCEDURE EVALUATION
Patient: Susan Hernandez    Procedure Summary       Date: 05/20/24 Room / Location: [facility] ENDOSCOPY 4 / [facility] ENDOSCOPY    Anesthesia Start: 1319 Anesthesia Stop: 1349    Procedure: COLONOSCOPY Diagnosis:       Encounter for colonoscopy following colon polyp removal      (Encounter for colonoscopy following colon polyp removal [Z09, Z86.010])    Surgeons: Manny Velazquez MD Provider: Tremaine Kelley CRNA    Anesthesia Type: general ASA Status: 2            Anesthesia Type: general    Vitals  Vitals Value Taken Time   /79 05/20/24 1407   Temp 37 °C (98.6 °F) 05/20/24 1348   Pulse 65 05/20/24 1409   Resp 20 05/20/24 1407   SpO2 93 % 05/20/24 1409   Vitals shown include unfiled device data.        Post Anesthesia Care and Evaluation    Post-procedure mental status: acceptable.  Pain management: satisfactory to patient    Airway patency: patent  Anesthetic complications: No anesthetic complications    Cardiovascular status: acceptable  Respiratory status: acceptable    Comments: Per chart review

## 2024-05-21 ENCOUNTER — TELEPHONE (OUTPATIENT)
Dept: GASTROENTEROLOGY | Facility: CLINIC | Age: 74
End: 2024-05-21
Payer: MEDICARE

## 2024-05-21 NOTE — TELEPHONE ENCOUNTER
I have reviewed the patients colonoscopy report. The report showed a normal colonoscopy with healthy appearing anastomosis.  No polyps removed or specimens collected.  Repeat colonoscopy in 3 years due to personal history of colon cancer. Please place in recall. Send letter.

## 2024-05-31 ENCOUNTER — OFFICE VISIT (OUTPATIENT)
Dept: GASTROENTEROLOGY | Facility: CLINIC | Age: 74
End: 2024-05-31
Payer: MEDICARE

## 2024-05-31 VITALS
WEIGHT: 145.6 LBS | HEART RATE: 69 BPM | SYSTOLIC BLOOD PRESSURE: 129 MMHG | HEIGHT: 64 IN | DIASTOLIC BLOOD PRESSURE: 83 MMHG | BODY MASS INDEX: 24.86 KG/M2

## 2024-05-31 DIAGNOSIS — Z85.038 HISTORY OF COLON CANCER: ICD-10-CM

## 2024-05-31 DIAGNOSIS — R74.8 ELEVATED LIVER ENZYMES: ICD-10-CM

## 2024-05-31 DIAGNOSIS — K76.0 FATTY LIVER: Primary | ICD-10-CM

## 2024-05-31 DIAGNOSIS — Z86.010 HISTORY OF COLON POLYPS: ICD-10-CM

## 2024-05-31 RX ORDER — DOCUSATE CALCIUM 240 MG
CAPSULE ORAL
COMMUNITY

## 2024-05-31 RX ORDER — SIMVASTATIN 20 MG
TABLET ORAL EVERY 24 HOURS
COMMUNITY

## 2024-05-31 RX ORDER — PANTOPRAZOLE SODIUM 40 MG/1
1 TABLET, DELAYED RELEASE ORAL DAILY
COMMUNITY

## 2024-05-31 NOTE — PATIENT INSTRUCTIONS
Fatty Liver Disease    The liver converts food into energy, removes toxic material from the blood, makes important proteins, and absorbs necessary vitamins from food. Fatty liver disease occurs when too much fat has built up in your liver cells. Fatty liver disease is also called hepatic steatosis.  In many cases, fatty liver disease does not cause symptoms or problems. It is often diagnosed when tests are being done for other reasons. However, over time, fatty liver can cause inflammation that may lead to more serious liver problems, such as scarring of the liver (cirrhosis) and liver failure.  Fatty liver is associated with insulin resistance, increased body fat, high blood pressure (hypertension), and high cholesterol. These are features of metabolic syndrome and increase your risk for stroke, diabetes, and heart disease.  What are the causes?  This condition may be caused by components of metabolic syndrome:  Obesity.  Insulin resistance.  High cholesterol.  Other causes:  Alcohol abuse.  Poor nutrition.  Cushing syndrome.  Pregnancy.  Certain drugs.  Poisons.  Some viral infections.  What increases the risk?  You are more likely to develop this condition if you:  Abuse alcohol.  Are overweight.  Have diabetes.  Have hepatitis.  Have a high triglyceride level.  Are pregnant.  What are the signs or symptoms?  Fatty liver disease often does not cause symptoms. If symptoms do develop, they can include:  Fatigue and weakness.  Weight loss.  Confusion.  Nausea, vomiting, or abdominal pain.  Yellowing of your skin and the white parts of your eyes (jaundice).  Itchy skin.  How is this diagnosed?  This condition may be diagnosed by:  A physical exam and your medical history.  Blood tests.  Imaging tests, such as an ultrasound, CT scan, or MRI.  A liver biopsy. A small sample of liver tissue is removed using a needle. The sample is then looked at under a microscope.  How is this treated?  Fatty liver disease is often  caused by other health conditions. Treatment for fatty liver may involve medicines and lifestyle changes to manage conditions such as:  Alcoholism.  High cholesterol.  Diabetes.  Being overweight or obese.  Follow these instructions at home:    Do not drink alcohol. If you have trouble quitting, ask your health care provider how to safely quit with the help of medicine or a supervised program. This is important to keep your condition from getting worse.  Eat a healthy diet as told by your health care provider. Ask your health care provider about working with a dietitian to develop an eating plan.  Exercise regularly. This can help you lose weight and control your cholesterol and diabetes. Talk to your health care provider about an exercise plan and which activities are best for you.  Take over-the-counter and prescription medicines only as told by your health care provider.  Keep all follow-up visits. This is important.  Contact a health care provider if:  You have trouble controlling your:  Blood sugar. This is especially important if you have diabetes.  Cholesterol.  Drinking of alcohol.  Get help right away if:  You have abdominal pain.  You have jaundice.  You have nausea and are vomiting.  You vomit blood or material that looks like coffee grounds.  You have stools that are black, tar-like, or bloody.  Summary  Fatty liver disease develops when too much fat builds up in the cells of your liver.  Fatty liver disease often causes no symptoms or problems. However, over time, fatty liver can cause inflammation that may lead to more serious liver problems, such as scarring of the liver (cirrhosis).  You are more likely to develop this condition if you abuse alcohol, are pregnant, are overweight, have diabetes, have hepatitis, or have high triglyceride or cholesterol levels.  Contact your health care provider if you have trouble controlling your blood sugar, cholesterol, or drinking of alcohol.  This information is  not intended to replace advice given to you by your health care provider. Make sure you discuss any questions you have with your health care provider.  Document Revised: 09/30/2021 Document Reviewed: 09/30/2021  Elsevier Patient Education © 2024 [employer]

## 2024-05-31 NOTE — PROGRESS NOTES
Chief Complaint  Endo follw up     Susan Hernandez is a 73 y.o. female who presents to [facility]- [place] for follow up.     History of present Illness  Established patient dating back to 2016. She has history of colon cancer in 2006 and 2011. 2006 was very early and did not require surgery, cancer returned in 2011 where she had resection and chemotherapy.    Last office visit 2/01/24 - elevated liver enzymes noted. Denies alcohol use.   US liver 11/21/23 - hepatic steatosis, small liver cyst  Liver workup 2/8/2024 - normal.     EGD/colonoscopy 4/2/2024 by Dr. Velazquez-normal esophagus, mild gastritis, and normal duodenum.  Stomach biopsies-mild reactive gastropathy.  Small tubular adenoma polyp in the transverse colon, poor prep with moderate stool throughout precluding visualization.  Repeat colonoscopy 5/20/2024 by Dr. Velazquez-evidence of end-to-side anastomosis and proximal transverse colon otherwise normal mucosa throughout.  Repeat 3 years.    Patient presents to the office for follow up.  Heartburn continues to be controlled with Protonix 40 mg daily.  Denies breakthrough heartburn, nausea, vomiting, epigastric pain, dysphagia.  Denies lower GI symptoms such as change in bowel habits, constipation, diarrhea, melena, and hematochezia.  Denies unintentional weight loss.    Past Medical History:   Diagnosis Date    Arthritis     Cancer     Colon cancer     Diabetes mellitus, type 2     Disease of thyroid gland     GERD (gastroesophageal reflux disease)     Lumbar pain     Small bowel obstruction        Past Surgical History:   Procedure Laterality Date    APPENDECTOMY      COLON SURGERY  2011,2006    CANCER, CHEMO    COLONOSCOPY      COLONOSCOPY N/A 07/19/2022    Procedure: COLONOSCOPY WITH POLYPECTOMIES;  Surgeon: Manny Velazquez MD;  Location: [facility] ENDOSCOPY;  Service: Gastroenterology;  Laterality: N/A;  COLON POLYPS    COLONOSCOPY N/A 04/02/2024    Procedure: COLONOSCOPY  COLD SNARE POLYPECTOMY;  Surgeon: Manny Velazquez MD;  Location: [facility] ENDOSCOPY;  Service: Gastroenterology;  Laterality: N/A;  POOR PREP, COLON POLYP, PREVIOUS SURGERY    COLONOSCOPY N/A 5/20/2024    Procedure: COLONOSCOPY;  Surgeon: Manny Velazquez MD;  Location: [facility] ENDOSCOPY;  Service: Gastroenterology;  Laterality: N/A;  PREVIOUS SURGERY    ENDOSCOPY N/A 04/02/2024    Procedure: ESOPHAGOGASTRODUODENOSCOPY WITH BIOPSIES;  Surgeon: Manny Velazquez MD;  Location: [facility] ENDOSCOPY;  Service: Gastroenterology;  Laterality: N/A;  ERROSIVE GASTRITIS    HYSTERECTOMY  2004    LYSIS OF ABDOMINAL ADHESIONS           Current Outpatient Medications:     atorvastatin (LIPITOR) 10 MG tablet, Take 1 tablet by mouth Every Night., Disp: 90 tablet, Rfl: 3    carboxymethylcellulose (Refresh Plus) 0.5 % solution, Administer 2 drops to both eyes 3 (Three) Times a Day As Needed for Dry Eyes., Disp: 70 each, Rfl: 3    Diclofenac Sodium (VOLTAREN) 1 % gel gel, Apply 4 g topically to the appropriate area as directed 4 (Four) Times a Day As Needed (pain)., Disp: 350 g, Rfl: 1    docusate calcium (Surfak) 240 MG capsule, 1 capsule as needed Orally AS NEEDED for 30 day(s), Disp: , Rfl:     DULoxetine (CYMBALTA) 30 MG capsule, Take 1 capsule by mouth Daily., Disp: 90 capsule, Rfl: 3    empagliflozin (Jardiance) 25 MG tablet tablet, Take 1 tablet by mouth Daily., Disp: 90 tablet, Rfl: 3    glipizide (Glucotrol) 10 MG tablet, Take 1 tablet by mouth 2 (Two) Times a Day Before Meals., Disp: 180 tablet, Rfl: 3    levothyroxine (Synthroid) 50 MCG tablet, Take 1 tablet by mouth Daily., Disp: 90 tablet, Rfl: 1    lidocaine (LIDODERM) 5 %, Place 1 patch on the skin as directed by provider Daily. Remove & Discard patch within 12 hours or as directed by MD, Disp: 90 patch, Rfl: 3    Melatonin 1 MG capsule, Daily., Disp: , Rfl:     metFORMIN (GLUCOPHAGE) 500 MG tablet, Take 1 tablet by mouth 2 (Two) Times a Day With Meals., Disp:  "180 tablet, Rfl: 3    multivitamins-minerals (PRESERVISION AREDS 2) capsule capsule, , Disp: , Rfl:     Omega-3 Fatty Acids (fish oil) 1000 MG capsule capsule, Take 1 capsule by mouth Daily With Breakfast., Disp: , Rfl:     omeprazole (priLOSEC) 40 MG capsule, Take 1 capsule by mouth Daily., Disp: 90 capsule, Rfl: 3    pantoprazole (PROTONIX) 40 MG EC tablet, Take 1 tablet by mouth Daily., Disp: , Rfl:     pregabalin (Lyrica) 300 MG capsule, Take 1 capsule by mouth 2 (Two) Times a Day As Needed (pain)., Disp: 60 capsule, Rfl: 0    simvastatin (ZOCOR) 20 MG tablet, Daily., Disp: , Rfl:     SITagliptin (Januvia) 100 MG tablet, Take 1 tablet by mouth Daily., Disp: 90 tablet, Rfl: 3    traMADol (ULTRAM) 50 MG tablet, , Disp: , Rfl:     cholecalciferol (VITAMIN D3) 25 MCG (1000 UT) tablet, Take 1 tablet by mouth 2 (Two) Times a Day., Disp: 180 tablet, Rfl: 3    Cholecalciferol 741182 UNIT/GM powder, Take 1 tablet by mouth Daily., Disp: , Rfl:     Cholecalciferol 50 MCG (2000 UT) tablet dispersible, Take 1 tablet by mouth Daily., Disp: , Rfl:      Allergies   Allergen Reactions    Aspirin Hives and Unknown - High Severity    Penicillins Unknown - High Severity    Penicillin V Potassium Rash       Family History   Adopted: Yes   Problem Relation Age of Onset    Breast cancer Brother     Other Brother         RENAL CANCER    Colon cancer Neg Hx         Social History     Social History Narrative    Not on file       Objective       Vital Signs:   /83 (BP Location: Left arm, Patient Position: Sitting, Cuff Size: Adult)   Pulse 69   Ht 162.6 cm (64\")   Wt 66 kg (145 lb 9.6 oz)   BMI 24.99 kg/m²       Physical Exam  Constitutional:       Appearance: Normal appearance. She is normal weight.   HENT:      Head: Normocephalic and atraumatic.      Nose: Nose normal.   Pulmonary:      Effort: Pulmonary effort is normal.   Skin:     General: Skin is warm and dry.   Neurological:      Mental Status: She is alert and oriented " to person, place, and time. Mental status is at baseline.   Psychiatric:         Mood and Affect: Mood normal.         Behavior: Behavior normal.         Thought Content: Thought content normal.         Judgment: Judgment normal.         Result Review :       CBC w/diff          7/21/2023    10:10 10/17/2023    16:05 2/20/2024    12:00   CBC w/Diff   WBC 6.27  6.29  7.85    RBC 4.84  4.70  4.74    Hemoglobin 15.7  15.6  15.2    Hematocrit 46.2  44.8  46.4    MCV 95.5  95.3  97.9    MCH 32.4  33.2  32.1    MCHC 34.0  34.8  32.8    RDW 12.4  12.2  12.5    Platelets 219  210  185    Neutrophil Rel % 48.5  56.4  59.4    Immature Granulocyte Rel % 0.3  0.3  0.5    Lymphocyte Rel % 41.6  34.0  32.6    Monocyte Rel % 8.0  7.8  6.6    Eosinophil Rel % 1.0  1.0  0.6    Basophil Rel % 0.6  0.5  0.3      CMP          10/17/2023    16:05 2/8/2024    13:08 2/20/2024    12:00   CMP   Glucose 139   185    BUN 16   18    Creatinine 0.96   1.20    EGFR 63.0   47.9    Sodium 142   138    Potassium 4.4   4.3    Chloride 104   100    Calcium 10.0   10.0    Total Protein 7.0  7.5  7.0    Albumin 4.4  4.6  4.7    Globulin 2.6   2.3    Total Bilirubin 0.5  1.1  0.5    Alkaline Phosphatase 76  79  80    AST (SGOT) 44  55  42    ALT (SGPT) 51  72  49    Albumin/Globulin Ratio 1.7   2.0    BUN/Creatinine Ratio 16.7   15.0    Anion Gap 11.7   13.0        Liver Workup   ALPHA -1 ANTITRYPSIN   Date Value Ref Range Status   02/08/2024 121 90 - 200 mg/dL Final     Smooth Muscle Ab   Date Value Ref Range Status   02/08/2024 8 0 - 19 Units Final     Comment:                      Negative                     0 - 19                   Weak positive               20 - 30                   Moderate to strong positive     >30   Actin Antibodies are found in 52-85% of patients with   autoimmune hepatitis or chronic active hepatitis and   in 22% of patients with primary biliary cirrhosis.     Ceruloplasmin   Date Value Ref Range Status   02/08/2024 22 19 -  39 mg/dL Final     Ferritin   Date Value Ref Range Status   02/08/2024 183.00 (H) 13.00 - 150.00 ng/mL Final     Immunofixation Result, Serum   Date Value Ref Range Status   02/08/2024 Comment  Final     Comment:     No monoclonality detected.     IgG   Date Value Ref Range Status   02/08/2024 1063 586 - 1602 mg/dL Final     IgA   Date Value Ref Range Status   02/08/2024 279 64 - 422 mg/dL Final     IgM   Date Value Ref Range Status   02/08/2024 79 26 - 217 mg/dL Final     Iron   Date Value Ref Range Status   02/08/2024 128 37 - 145 mcg/dL Final     TIBC   Date Value Ref Range Status   02/08/2024 399 298 - 536 mcg/dL Final     Iron Saturation (TSAT)   Date Value Ref Range Status   02/08/2024 32 20 - 50 % Final     Transferrin   Date Value Ref Range Status   02/08/2024 268 200 - 360 mg/dL Final     Mitochondrial Ab   Date Value Ref Range Status   02/08/2024 <20.0 0.0 - 20.0 Units Final     Comment:                                     Negative    0.0 - 20.0                                  Equivocal  20.1 - 24.9                                  Positive         >24.9  Mitochondrial (M2) Antibodies are found in 90-96% of  patients with primary biliary cirrhosis.     Protime   Date Value Ref Range Status   02/08/2024 12.1 11.8 - 14.9 Seconds Final     INR   Date Value Ref Range Status   02/08/2024 0.88 0.86 - 1.15 Final     ALPHA-FETOPROTEIN   Date Value Ref Range Status   02/08/2024 <2 0 - 8.3 ng/mL Final           Assessment and Plan    Diagnoses and all orders for this visit:    1. Fatty liver (Primary)  -     Liver Elastography; Future    2. Elevated liver enzymes  -     Liver Elastography; Future    3. History of colon polyps    4. History of colon cancer    Reviewed most recent EGD/Colonoscopy reports and pathology with patient. Patient is in 3 year colonoscopy recall due to personal history of colon cancer  Reviewed most recent liver work up with patient  Discussed fatty liver. Advise patient to maintain a  healthy lifestyle: cutting back on carbs, sugars, and fried fatty foods, limiting intake of soda and sugary drinks, and abstain from alcohol.   Plan to proceed with fibroscan    Follow Up   Return in about 6 months (around 11/30/2024).  Patient was given instructions and counseling regarding her condition or for health maintenance advice. Please see specific information pulled into the AVS if appropriate.

## 2024-06-10 DIAGNOSIS — M50.30 DDD (DEGENERATIVE DISC DISEASE), CERVICAL: ICD-10-CM

## 2024-06-10 NOTE — TELEPHONE ENCOUNTER
Caller: Susan Hernandez Art    Relationship: Self    Best call back number: [phone]     Requested Prescriptions:   Requested Prescriptions     Pending Prescriptions Disp Refills    pregabalin (Lyrica) 300 MG capsule 60 capsule 0     Sig: Take 1 capsule by mouth 2 (Two) Times a Day As Needed (pain).        Pharmacy where request should be sent: [place] [phone] [place] [phone]      Last office visit with prescribing clinician: 2/20/2024   Last telemedicine visit with prescribing clinician: Visit date not found   Next office visit with prescribing clinician: 6/24/2024     Does the patient have less than a 3 day supply:  [] Yes  [x] No    Chandu Palacio Rep   06/10/24 10:33 EDT     ”

## 2024-06-10 NOTE — TELEPHONE ENCOUNTER
Refill request for controlled substance.      Date of request: 6/10/2024    Medication requested: Lyrica   Last OV: 2/20/24  Last UDS: 2/20/24  Contract signed: yes    Date:10/10/23  Next office visit: 6/24/24    Wendy Hernández

## 2024-06-11 RX ORDER — PREGABALIN 300 MG/1
300 CAPSULE ORAL 2 TIMES DAILY PRN
Qty: 60 CAPSULE | Refills: 0 | Status: SHIPPED | OUTPATIENT
Start: 2024-06-11

## 2024-06-18 ENCOUNTER — TELEPHONE (OUTPATIENT)
Dept: ORTHOPEDIC SURGERY | Facility: CLINIC | Age: 74
End: 2024-06-18
Payer: MEDICARE

## 2024-06-18 NOTE — TELEPHONE ENCOUNTER
Comments (most recent listed first): 1X - SENDING FOR REVIEW. PATIENT STATED THEY RECEIVED INJECTION IN LEFT WRIST BY [facility] (IN [place]).  PLEASE ADVISE SCHEDULING    , HUB READY TO SCHEDULE , , OK TO SCHEDULE NEXT OPENING W/ EITHER DR CABRERA OR DR IZQUIERDO    , SENDING FOR SCHEDULE REVIEW DUE TO DR CABRERA BEING ON CALL - NEW PT - Tenosynovitis of left wrist - PROG NOTE UC 6.15.24 - XR 6.15.24 - NO KNOWN SX

## 2024-06-19 ENCOUNTER — TELEPHONE (OUTPATIENT)
Dept: GASTROENTEROLOGY | Facility: CLINIC | Age: 74
End: 2024-06-19
Payer: MEDICARE

## 2024-06-24 ENCOUNTER — OFFICE VISIT (OUTPATIENT)
Dept: INTERNAL MEDICINE | Facility: CLINIC | Age: 74
End: 2024-06-24
Payer: MEDICARE

## 2024-06-24 ENCOUNTER — PROCEDURE VISIT (OUTPATIENT)
Dept: OTHER | Facility: HOSPITAL | Age: 74
End: 2024-06-24
Payer: MEDICARE

## 2024-06-24 VITALS
TEMPERATURE: 97.5 F | SYSTOLIC BLOOD PRESSURE: 121 MMHG | WEIGHT: 138.2 LBS | DIASTOLIC BLOOD PRESSURE: 80 MMHG | HEART RATE: 71 BPM | OXYGEN SATURATION: 95 % | BODY MASS INDEX: 23.6 KG/M2 | HEIGHT: 64 IN

## 2024-06-24 DIAGNOSIS — B35.9 TINEA: ICD-10-CM

## 2024-06-24 DIAGNOSIS — L98.9 SCALP LESION: ICD-10-CM

## 2024-06-24 DIAGNOSIS — K76.0 FATTY LIVER: ICD-10-CM

## 2024-06-24 DIAGNOSIS — E78.2 MIXED HYPERLIPIDEMIA: ICD-10-CM

## 2024-06-24 DIAGNOSIS — E11.65 TYPE 2 DIABETES MELLITUS WITH HYPERGLYCEMIA, WITHOUT LONG-TERM CURRENT USE OF INSULIN: Primary | ICD-10-CM

## 2024-06-24 DIAGNOSIS — Z79.899 ENCOUNTER FOR LONG-TERM (CURRENT) USE OF OTHER MEDICATIONS: ICD-10-CM

## 2024-06-24 DIAGNOSIS — E03.9 ACQUIRED HYPOTHYROIDISM: ICD-10-CM

## 2024-06-24 DIAGNOSIS — M50.30 DDD (DEGENERATIVE DISC DISEASE), CERVICAL: ICD-10-CM

## 2024-06-24 DIAGNOSIS — E55.9 VITAMIN D DEFICIENCY: ICD-10-CM

## 2024-06-24 DIAGNOSIS — R74.8 ELEVATED LIVER ENZYMES: ICD-10-CM

## 2024-06-24 LAB
25(OH)D3 SERPL-MCNC: 102 NG/ML (ref 30–100)
ALBUMIN SERPL-MCNC: 4.6 G/DL (ref 3.5–5.2)
ALBUMIN/GLOB SERPL: 1.8 G/DL
ALP SERPL-CCNC: 71 U/L (ref 39–117)
ALT SERPL W P-5'-P-CCNC: 59 U/L (ref 1–33)
AMPHET+METHAMPHET UR QL: NEGATIVE
AMPHETAMINE INTERNAL CONTROL: NORMAL
AMPHETAMINES UR QL: NEGATIVE
ANION GAP SERPL CALCULATED.3IONS-SCNC: 11 MMOL/L (ref 5–15)
AST SERPL-CCNC: 61 U/L (ref 1–32)
BARBITURATE INTERNAL CONTROL: NORMAL
BARBITURATES UR QL SCN: NEGATIVE
BASOPHILS # BLD AUTO: 0.03 10*3/MM3 (ref 0–0.2)
BASOPHILS NFR BLD AUTO: 0.4 % (ref 0–1.5)
BENZODIAZ UR QL SCN: NEGATIVE
BENZODIAZEPINE INTERNAL CONTROL: NORMAL
BILIRUB SERPL-MCNC: 0.7 MG/DL (ref 0–1.2)
BUN SERPL-MCNC: 23 MG/DL (ref 8–23)
BUN/CREAT SERPL: 24 (ref 7–25)
BUPRENORPHINE INTERNAL CONTROL: NORMAL
BUPRENORPHINE SERPL-MCNC: NEGATIVE NG/ML
CALCIUM SPEC-SCNC: 9.9 MG/DL (ref 8.6–10.5)
CANNABINOIDS SERPL QL: NEGATIVE
CHLORIDE SERPL-SCNC: 102 MMOL/L (ref 98–107)
CHOLEST SERPL-MCNC: 153 MG/DL (ref 0–200)
CO2 SERPL-SCNC: 27 MMOL/L (ref 22–29)
COCAINE INTERNAL CONTROL: NORMAL
COCAINE UR QL: NEGATIVE
CREAT SERPL-MCNC: 0.96 MG/DL (ref 0.57–1)
DEPRECATED RDW RBC AUTO: 45.8 FL (ref 37–54)
EGFRCR SERPLBLD CKD-EPI 2021: 62.6 ML/MIN/1.73
EOSINOPHIL # BLD AUTO: 0.08 10*3/MM3 (ref 0–0.4)
EOSINOPHIL NFR BLD AUTO: 1.1 % (ref 0.3–6.2)
ERYTHROCYTE [DISTWIDTH] IN BLOOD BY AUTOMATED COUNT: 12.4 % (ref 12.3–15.4)
EXPIRATION DATE: NORMAL
GLOBULIN UR ELPH-MCNC: 2.6 GM/DL
GLUCOSE SERPL-MCNC: 170 MG/DL (ref 65–99)
HBA1C MFR BLD: 8.4 % (ref 4.8–5.6)
HCT VFR BLD AUTO: 48.1 % (ref 34–46.6)
HDLC SERPL-MCNC: 44 MG/DL (ref 40–60)
HGB BLD-MCNC: 15.6 G/DL (ref 12–15.9)
IMM GRANULOCYTES # BLD AUTO: 0.03 10*3/MM3 (ref 0–0.05)
IMM GRANULOCYTES NFR BLD AUTO: 0.4 % (ref 0–0.5)
LDLC SERPL CALC-MCNC: 87 MG/DL (ref 0–100)
LDLC/HDLC SERPL: 1.92 {RATIO}
LYMPHOCYTES # BLD AUTO: 2.28 10*3/MM3 (ref 0.7–3.1)
LYMPHOCYTES NFR BLD AUTO: 31.7 % (ref 19.6–45.3)
Lab: NORMAL
MCH RBC QN AUTO: 32.1 PG (ref 26.6–33)
MCHC RBC AUTO-ENTMCNC: 32.4 G/DL (ref 31.5–35.7)
MCV RBC AUTO: 99 FL (ref 79–97)
MDMA (ECSTASY) INTERNAL CONTROL: NORMAL
MDMA UR QL SCN: NEGATIVE
METHADONE INTERNAL CONTROL: NORMAL
METHADONE UR QL SCN: NEGATIVE
METHAMPHETAMINE INTERNAL CONTROL: NORMAL
MONOCYTES # BLD AUTO: 0.43 10*3/MM3 (ref 0.1–0.9)
MONOCYTES NFR BLD AUTO: 6 % (ref 5–12)
MORPHINE INTERNAL CONTROL: NORMAL
MORPHINE/OPIATES SCREEN, URINE: NEGATIVE
NEUTROPHILS NFR BLD AUTO: 4.34 10*3/MM3 (ref 1.7–7)
NEUTROPHILS NFR BLD AUTO: 60.4 % (ref 42.7–76)
NRBC BLD AUTO-RTO: 0 /100 WBC (ref 0–0.2)
OXYCODONE INTERNAL CONTROL: NORMAL
OXYCODONE UR QL SCN: NEGATIVE
PCP UR QL SCN: NEGATIVE
PHENCYCLIDINE INTERNAL CONTROL: NORMAL
PLATELET # BLD AUTO: 199 10*3/MM3 (ref 140–450)
PMV BLD AUTO: 11.6 FL (ref 6–12)
POTASSIUM SERPL-SCNC: 4.5 MMOL/L (ref 3.5–5.2)
PROT SERPL-MCNC: 7.2 G/DL (ref 6–8.5)
RBC # BLD AUTO: 4.86 10*6/MM3 (ref 3.77–5.28)
SODIUM SERPL-SCNC: 140 MMOL/L (ref 136–145)
T4 FREE SERPL-MCNC: 1.56 NG/DL (ref 0.92–1.68)
THC INTERNAL CONTROL: NORMAL
TRIGL SERPL-MCNC: 122 MG/DL (ref 0–150)
TSH SERPL DL<=0.05 MIU/L-ACNC: 1.21 UIU/ML (ref 0.27–4.2)
VLDLC SERPL-MCNC: 22 MG/DL (ref 5–40)
WBC NRBC COR # BLD AUTO: 7.19 10*3/MM3 (ref 3.4–10.8)

## 2024-06-24 PROCEDURE — 84439 ASSAY OF FREE THYROXINE: CPT | Performed by: INTERNAL MEDICINE

## 2024-06-24 PROCEDURE — 80053 COMPREHEN METABOLIC PANEL: CPT | Performed by: INTERNAL MEDICINE

## 2024-06-24 PROCEDURE — 80061 LIPID PANEL: CPT | Performed by: INTERNAL MEDICINE

## 2024-06-24 PROCEDURE — 91200 LIVER ELASTOGRAPHY: CPT | Performed by: NURSE PRACTITIONER

## 2024-06-24 PROCEDURE — 82306 VITAMIN D 25 HYDROXY: CPT | Performed by: INTERNAL MEDICINE

## 2024-06-24 PROCEDURE — 80305 DRUG TEST PRSMV DIR OPT OBS: CPT | Performed by: INTERNAL MEDICINE

## 2024-06-24 PROCEDURE — 99214 OFFICE O/P EST MOD 30 MIN: CPT | Performed by: INTERNAL MEDICINE

## 2024-06-24 PROCEDURE — 83036 HEMOGLOBIN GLYCOSYLATED A1C: CPT | Performed by: INTERNAL MEDICINE

## 2024-06-24 PROCEDURE — 3052F HG A1C>EQUAL 8.0%<EQUAL 9.0%: CPT | Performed by: INTERNAL MEDICINE

## 2024-06-24 PROCEDURE — 1125F AMNT PAIN NOTED PAIN PRSNT: CPT | Performed by: INTERNAL MEDICINE

## 2024-06-24 PROCEDURE — 85025 COMPLETE CBC W/AUTO DIFF WBC: CPT | Performed by: INTERNAL MEDICINE

## 2024-06-24 PROCEDURE — 84443 ASSAY THYROID STIM HORMONE: CPT | Performed by: INTERNAL MEDICINE

## 2024-06-24 RX ORDER — CLOTRIMAZOLE AND BETAMETHASONE DIPROPIONATE 10; .64 MG/G; MG/G
1 CREAM TOPICAL 2 TIMES DAILY
Qty: 45 G | Refills: 0 | Status: SHIPPED | OUTPATIENT
Start: 2024-06-24 | End: 2024-07-04

## 2024-06-24 RX ORDER — CARBOXYMETHYLCELLULOSE SODIUM 5 MG/ML
2 SOLUTION/ DROPS OPHTHALMIC 3 TIMES DAILY PRN
Qty: 70 EACH | Refills: 3 | Status: SHIPPED | OUTPATIENT
Start: 2024-06-24

## 2024-06-24 NOTE — PROGRESS NOTES
Chief Complaint  Diabetes, Hyperlipidemia, Hypothyroidism, Weight Loss (Patient states she keeps losing weight and would like to figure out why ), Alopecia, and Hand Pain (Patient states she is having left hand pain )    Subjective          Sun Art Hernandez presents to [facility]  History of Present Illness  DM2- patient reports blood glucose on avg approx 150. Patient has been losing weight.   Hypothyroid- patient is due for recheck. Patient reports feeling weak, tired, and she is losing weight. Patient does not want to lose weight and has not been trying. Patient is concerned with dose of thyroid medication. Patient is not staying active. She reports staying at home a lot. Patient reports increasing anxiety. She reports being less social. She watches TV. Patient reports playing cars 1-2 times per week.   Patient reports several non-healing lesions on scalp. They are scaly and itchy. She reports some hair loss as well.     Current Outpatient Medications   Medication Instructions    atorvastatin (LIPITOR) 10 mg, Oral, Nightly    carboxymethylcellulose (Refresh Plus) 0.5 % solution 2 drops, Both Eyes, 3 Times Daily PRN    cholecalciferol (VITAMIN D3) 1,000 Units, Oral, 2 Times Daily    Diclofenac Sodium (VOLTAREN) 4 g, Topical, 4 Times Daily PRN    docusate calcium (Surfak) 240 MG capsule 1 capsule as needed Orally AS NEEDED for 30 day(s)    DULoxetine (CYMBALTA) 30 mg, Oral, Daily    empagliflozin (JARDIANCE) 25 mg, Oral, Daily    fish oil 1,000 mg, Oral, Daily With Breakfast    glipizide (GLUCOTROL) 10 mg, Oral, 2 Times Daily Before Meals    levothyroxine (SYNTHROID) 25 mcg, Oral, Daily    lidocaine (LIDODERM) 5 % 1 patch, Transdermal, Every 24 Hours, Remove & Discard patch within 12 hours or as directed by MD    Melatonin 1 MG capsule Every 24 Hours    metFORMIN (GLUCOPHAGE) 500 mg, Oral, 2 Times Daily With Meals    multivitamins-minerals (PRESERVISION AREDS 2) capsule  "capsule     omeprazole (PRILOSEC) 40 mg, Oral, Daily    pantoprazole (PROTONIX) 40 MG EC tablet 1 tablet, Oral, Daily    pregabalin (LYRICA) 300 mg, Oral, 2 Times Daily PRN    simvastatin (ZOCOR) 20 MG tablet Every 24 Hours    SITagliptin (JANUVIA) 100 mg, Oral, Daily    traMADol (ULTRAM) 50 MG tablet        The following portions of the patient's history were reviewed and updated as appropriate: allergies, current medications, past family history, past medical history, past social history, past surgical history, and problem list.    Objective   Vital Signs:   /80 (BP Location: Right arm, Patient Position: Sitting, Cuff Size: Adult)   Pulse 71   Temp 97.5 °F (36.4 °C) (Temporal)   Ht 162.6 cm (64\")   Wt 62.7 kg (138 lb 3.2 oz)   SpO2 95%   BMI 23.72 kg/m²     BP Readings from Last 3 Encounters:   07/01/24 120/79   06/24/24 121/80   06/15/24 134/77     Wt Readings from Last 3 Encounters:   07/01/24 62.6 kg (138 lb)   06/24/24 62.7 kg (138 lb 3.2 oz)   06/15/24 64.9 kg (143 lb)     BMI is within normal parameters. No other follow-up for BMI required.     Physical Exam   Appearance: No acute distress, well-nourished  Head: normocephalic, atraumatic  Eyes: extraocular movements intact, no scleral icterus, no conjunctival injection  Ears, Nose, and Throat: external ears normal, nares patent, moist mucous membranes  Cardiovascular: regular rate and rhythm. no murmurs, rubs, or gallops. no edema  Respiratory: breathing comfortably, symmetric chest rise, clear to auscultation bilaterally. No wheezes, rales, or rhonchi.  Neuro: alert and oriented to time, place, and person. Normal gait  Psych: normal mood and affect       Result Review :   The following data was reviewed by: Rigoberto Gomes Jr, MD on 06/24/2024:  Common labs          2/8/2024    13:08 2/20/2024    11:28 2/20/2024    12:00 6/24/2024    13:35   Common Labs   Glucose   185  170    BUN   18  23    Creatinine   1.20  0.96    Sodium   138  140  "   Potassium   4.3  4.5    Chloride   100  102    Calcium   10.0  9.9    Albumin 4.6   4.7  4.6    Total Bilirubin 1.1   0.5  0.7    Alkaline Phosphatase 79   80  71    AST (SGOT) 55   42  61    ALT (SGPT) 72   49  59    WBC   7.85  7.19    Hemoglobin   15.2  15.6    Hematocrit   46.4  48.1    Platelets   185  199    Total Cholesterol   152  153    Triglycerides   198  122    HDL Cholesterol   40  44    LDL Cholesterol    79  87    Hemoglobin A1C   8.90  8.40    Microalbumin, Urine  <1.2          Lab Results   Component Value Date    INR 0.88 02/08/2024    BILIRUBINUR Negative 02/20/2024       Last Urine Toxicity  More data exists         Latest Ref Rng & Units 6/24/2024 2/20/2024   LAST URINE TOXICITY RESULTS   Creatinine, Urine mg/dL - 87.7    Amphetamine, Urine Qual Negative Negative  Negative    Barbiturates Screen, Urine Negative Negative  Negative    Benzodiazepine Screen, Urine Negative Negative  Negative    Buprenorphine, Screen, Urine Negative Negative  Negative    Cocaine Screen, Urine Negative Negative  Negative    Methadone Screen , Urine Negative Negative  Negative    Methamphetamine, Ur Negative Negative  Negative        Assessment and Plan    Diagnoses and all orders for this visit:    1. Type 2 diabetes mellitus with hyperglycemia, without long-term current use of insulin (Primary)  Comments:  check labs. concerned weight loss may be due to uncontrolled BG.  Orders:  -     CBC & Differential  -     Comprehensive Metabolic Panel  -     Hemoglobin A1c    2. Mixed hyperlipidemia  -     Lipid Panel    3. Vitamin D deficiency  -     Vitamin D,25-Hydroxy    4. Acquired hypothyroidism  Comments:  check TSH and T4, may need dose adjustement with weight loss.  Orders:  -     T4, Free  -     TSH    5. Scalp lesion  -     Ambulatory Referral to Dermatology    6. Tinea  -     clotrimazole-betamethasone (LOTRISONE) 1-0.05 % cream; Apply 1 Application topically to the appropriate area as directed 2 (Two) Times a  Day for 10 days.  Dispense: 45 g; Refill: 0    7. Encounter for long-term (current) use of other medications  -     POC Medline 12 Panel Urine Drug Screen    8. DDD (degenerative disc disease), cervical  Comments:  Lyrica prn helped.  UDS and Chris reviewed.    Other orders  -     carboxymethylcellulose (Refresh Plus) 0.5 % solution; Administer 2 drops to both eyes 3 (Three) Times a Day As Needed for Dry Eyes.  Dispense: 70 each; Refill: 3          Medications Discontinued During This Encounter   Medication Reason    Cholecalciferol 458275 UNIT/GM powder *Error    Cholecalciferol 50 MCG (2000 UT) tablet dispersible *Error    carboxymethylcellulose (Refresh Plus) 0.5 % solution Reorder          Follow Up   Return in about 4 months (around 10/24/2024) for DM, HTN.  Patient was given instructions and counseling regarding her condition or for health maintenance advice. Please see specific information pulled into the AVS if appropriate.       Rigoberto Gomes Jr, MD  07/05/24  12:07 EDT

## 2024-06-25 ENCOUNTER — TELEPHONE (OUTPATIENT)
Dept: INTERNAL MEDICINE | Facility: CLINIC | Age: 74
End: 2024-06-25
Payer: MEDICARE

## 2024-06-25 DIAGNOSIS — E03.9 ACQUIRED HYPOTHYROIDISM: ICD-10-CM

## 2024-06-25 RX ORDER — LEVOTHYROXINE SODIUM 0.03 MG/1
25 TABLET ORAL DAILY
Qty: 90 TABLET | Refills: 1 | Status: SHIPPED | OUTPATIENT
Start: 2024-06-25

## 2024-06-25 NOTE — TELEPHONE ENCOUNTER
----- Message from Rigoberto Gomes sent at 6/25/2024  7:55 AM EDT -----  Blood glucose is improved. Keep up the good work!  Thyroid levels are such that patient can lower dose of synthroid - will send updated Prescription.   Elevated liver enzymes- continue follow-up with gastroenterology.

## 2024-06-26 NOTE — PROGRESS NOTES
Liver Elastography    Performed by: Alcira Castellon APRN  Authorized by: Roxanne Castro APRN  Ordering Provider: Roxanne Castro APRN    Probe:  M+  Procedure Details:  Procedure: After providing an oral and written explanation of the Fibroscan vibration controlled transient elastography (VTCE) test procedure to the patient. The patient was placed in supine position with right arm in maximum abduction to allow optimal exposure of right lateral abdomen. Patient was briefly assessed, identifying terminus of the xyphoid process and locating an ideal transient elastography testing site, midline and lateral to this point. Patient was instructed to breathe normally and remain stationary during the test process. Pre-measurement data confirmed the transient elastography probe was centered over the liver parenchyma. A series of ten 50 Hz mechanical pulses were applied with controlled application pressure to induce a mechanical shear wave in the liver tissue. For each measurement, the shear wave propagation speed was detected, displayed and converted to its equivalent liver stiffness value in kilopascals. Skin to liver capsules distance and shear wave characteristics were monitored during the entire examination to assure quality data. Median liver stiffness measurement and interquartile range were calculated and displayed in real time. Acquired measurement data was stored and submitted to the provider for review and interpretation. Patient tolerated the procedure well and was discharged without incident.   Clinical Information:     NPO 3 Hours or More: Yes      Actively Drinking: No    Findings:     Median Liver Stiffness Score:  8.4    Interquartile Range (IQR) to Median Ratio:  7    [place]ienna Stiffness Consistent with:  F2    Current Scan Considered Reliab: Yes      Median Controlled Attenuation Parameter (dB/m):  277    IQR:  9    CAP SCORE:  Moderate/severe liver fat

## 2024-06-27 ENCOUNTER — TELEPHONE (OUTPATIENT)
Dept: GASTROENTEROLOGY | Facility: CLINIC | Age: 74
End: 2024-06-27
Payer: MEDICARE

## 2024-06-27 ENCOUNTER — CLINICAL SUPPORT (OUTPATIENT)
Dept: INTERNAL MEDICINE | Facility: CLINIC | Age: 74
End: 2024-06-27
Payer: MEDICARE

## 2024-06-27 DIAGNOSIS — Z23 COVID-19 VACCINE ADMINISTERED: Primary | ICD-10-CM

## 2024-06-27 PROCEDURE — 91320 SARSCV2 VAC 30MCG TRS-SUC IM: CPT | Performed by: INTERNAL MEDICINE

## 2024-06-27 PROCEDURE — 90480 ADMN SARSCOV2 VAC 1/ONLY CMP: CPT | Performed by: INTERNAL MEDICINE

## 2024-06-27 NOTE — TELEPHONE ENCOUNTER
----- Message from Roxanne Castro sent at 6/27/2024  8:34 AM EDT -----  I have reviewed the patient's most recent FibroScan.  The patient's liver stiffness score is consistent with F2, this is on a scale of F0 being normal and F4 consistent with cirrhosis. Fibroscan also indicates moderate/severe fatty infiltration of the liver. Continue previously discussed recommendations of dietary modifications and weight loss.

## 2024-06-28 ENCOUNTER — TELEPHONE (OUTPATIENT)
Dept: GASTROENTEROLOGY | Facility: CLINIC | Age: 74
End: 2024-06-28
Payer: MEDICARE

## 2024-07-01 ENCOUNTER — OFFICE VISIT (OUTPATIENT)
Dept: ORTHOPEDIC SURGERY | Facility: CLINIC | Age: 74
End: 2024-07-01
Payer: MEDICARE

## 2024-07-01 VITALS
HEIGHT: 64 IN | HEART RATE: 79 BPM | BODY MASS INDEX: 23.56 KG/M2 | WEIGHT: 138 LBS | SYSTOLIC BLOOD PRESSURE: 120 MMHG | OXYGEN SATURATION: 96 % | DIASTOLIC BLOOD PRESSURE: 79 MMHG

## 2024-07-01 DIAGNOSIS — M65.4 TENDINITIS, DE QUERVAIN'S: Primary | ICD-10-CM

## 2024-07-01 PROCEDURE — 1160F RVW MEDS BY RX/DR IN RCRD: CPT | Performed by: STUDENT IN AN ORGANIZED HEALTH CARE EDUCATION/TRAINING PROGRAM

## 2024-07-01 PROCEDURE — 1159F MED LIST DOCD IN RCRD: CPT | Performed by: STUDENT IN AN ORGANIZED HEALTH CARE EDUCATION/TRAINING PROGRAM

## 2024-07-01 PROCEDURE — 99203 OFFICE O/P NEW LOW 30 MIN: CPT | Performed by: STUDENT IN AN ORGANIZED HEALTH CARE EDUCATION/TRAINING PROGRAM

## 2024-07-01 PROCEDURE — 20550 NJX 1 TENDON SHEATH/LIGAMENT: CPT | Performed by: STUDENT IN AN ORGANIZED HEALTH CARE EDUCATION/TRAINING PROGRAM

## 2024-07-01 RX ORDER — LIDOCAINE HYDROCHLORIDE 10 MG/ML
1 INJECTION, SOLUTION INFILTRATION; PERINEURAL
Status: COMPLETED | OUTPATIENT
Start: 2024-07-01 | End: 2024-07-01

## 2024-07-01 RX ORDER — TRIAMCINOLONE ACETONIDE 40 MG/ML
40 INJECTION, SUSPENSION INTRA-ARTICULAR; INTRAMUSCULAR
Status: COMPLETED | OUTPATIENT
Start: 2024-07-01 | End: 2024-07-01

## 2024-07-01 RX ADMIN — LIDOCAINE HYDROCHLORIDE 1 ML: 10 INJECTION, SOLUTION INFILTRATION; PERINEURAL at 14:28

## 2024-07-01 RX ADMIN — TRIAMCINOLONE ACETONIDE 40 MG: 40 INJECTION, SUSPENSION INTRA-ARTICULAR; INTRAMUSCULAR at 14:28

## 2024-07-01 NOTE — PROGRESS NOTES
"Chief Complaint  Pain and Initial Evaluation of the Left Wrist    Subjective          Susan Hernandez presents to [facility] for an evaluation of her left wrist.     History of Present Illness    The patient presents here today for an evaluation of her left wrist. She has been having pain to her wrist for about two months. She went to her family doctor where she had x-rays done on her hand and wrist. She reports no specific injury, trauma, falls or surgery to her wrist. She has been wearing a brace and states this makes her pain worse.     Allergies   Allergen Reactions    Aspirin Hives and Unknown - High Severity    Penicillins Unknown - High Severity    Penicillin V Potassium Rash        Social History     Socioeconomic History    Marital status:    Tobacco Use    Smoking status: Former     Current packs/day: 0.00     Average packs/day: 2.0 packs/day for 38.0 years (76.0 ttl pk-yrs)     Types: Cigarettes     Start date:      Quit date:      Years since quittin.5     Passive exposure: Past    Smokeless tobacco: Never   Vaping Use    Vaping status: Never Used   Substance and Sexual Activity    Alcohol use: Not Currently     Comment: last drink     Drug use: Never    Sexual activity: Defer        I reviewed the patient's chief complaint, history of present illness, review of systems, past medical history, surgical history, family history, social history, medications, and allergy list.     REVIEW OF SYSTEMS    Constitutional: Denies fevers, chills, weight loss  Cardiovascular: Denies chest pain, shortness of breath  Skin: Denies rashes, acute skin changes  Neurologic: Denies headache, loss of consciousness  MSK: Left wrist pain       Objective   Vital Signs:   /79   Pulse 79   Ht 162.6 cm (64\")   Wt 62.6 kg (138 lb)   SpO2 96%   BMI 23.69 kg/m²     Body mass index is 23.69 kg/m².    Physical Exam    General: Alert. No acute distress.   Left upper extremity: "  Tender over the first dorsal compartment tendons, positive  finkelstein, 60 flexion and extension, 90 degrees supination  and pronation, negative cmc grind, negative cmc tenderness, neurovascularly intact, sensation intact to the medial, radial and ulnar nerve.      Medium Joint Arthrocentesis  Date/Time: 7/1/2024 2:28 PM  Consent given by: patient  Site marked: site marked  Timeout: Immediately prior to procedure a time out was called to verify the correct patient, procedure, equipment, support staff and site/side marked as required   Supporting Documentation  Indications: pain   Procedure Details  Location: wrist (LEFT) -   Needle size: 23 G  Medications administered: 1 mL lidocaine 1 %; 40 mg triamcinolone acetonide 40 MG/ML  Patient tolerance: patient tolerated the procedure well with no immediate complications    This injection documentation was Scribed for Thomas Caban MD by Jacqui Sharif.  07/01/24   14:29 EDT    Imaging Results (Most Recent)       None                     Assessment and Plan        XR Hand 3+ View Left    Result Date: 6/15/2024  Narrative: XR HAND 3+ VW LEFT, XR WRIST 3+ VW LEFT Date of Exam: 6/15/2024 4:16 PM EDT Indication: Pain and swelling medial left wrist and base of thumb for 3 weeks.  No known injury Comparison: None available. Findings: No evidence of acute fracture nor dislocation. Alignment is normal. There is mild first CMC joint and interphalangeal joint degenerative arthrosis. Soft tissues are unremarkable.     Impression: Impression: 1.Minimal degenerative arthrosis. No acute process is identified. Electronically Signed: Aren Rogers MD  6/15/2024 4:47 PM EDT  Workstation ID: [id number]    XR Wrist 3+ View Left    Result Date: 6/15/2024  Narrative: XR HAND 3+ VW LEFT, XR WRIST 3+ VW LEFT Date of Exam: 6/15/2024 4:16 PM EDT Indication: Pain and swelling medial left wrist and base of thumb for 3 weeks.  No known injury Comparison: None available. Findings: No evidence of acute  fracture nor dislocation. Alignment is normal. There is mild first CMC joint and interphalangeal joint degenerative arthrosis. Soft tissues are unremarkable.     Impression: Impression: 1.Minimal degenerative arthrosis. No acute process is identified. Electronically Signed: Aren Rogers MD  6/15/2024 4:47 PM EDT  Workstation ID: [id number]      Diagnoses and all orders for this visit:    1. Tendinitis, de Quervain's (Primary)        The patient presents here today for an evaluation  of her left wrist.     Discussed the risks and benefits of a left wrist steroid injection today in the office. Patient expressed understanding and wishes to proceed. She tolerated the injection well and without any complications.     Home exercises given to the patient today.     She will continue over the counter medications for pain control.     Call or return if worsening symptoms.    Scribed for Thomas Caban MD by Jayne Palma  07/01/2024   14:01 EDT         Follow Up       PRN     Patient was given instructions and counseling regarding her condition or for health maintenance advice. Please see specific information pulled into the AVS if appropriate.       I have personally performed the services described in this document as scribed by the above individual and it is both accurate and complete. Thomas Caban MD 07/01/24 15:22 EDT

## 2024-07-09 ENCOUNTER — TELEPHONE (OUTPATIENT)
Dept: INTERNAL MEDICINE | Facility: CLINIC | Age: 74
End: 2024-07-09
Payer: MEDICARE

## 2024-07-12 DIAGNOSIS — M50.30 DDD (DEGENERATIVE DISC DISEASE), CERVICAL: ICD-10-CM

## 2024-07-12 RX ORDER — PREGABALIN 300 MG/1
300 CAPSULE ORAL 2 TIMES DAILY PRN
Qty: 60 CAPSULE | Refills: 0 | Status: SHIPPED | OUTPATIENT
Start: 2024-07-12

## 2024-07-12 NOTE — TELEPHONE ENCOUNTER
Caller: Susan Hernandez Art    Relationship: Self    Best call back number: [phone]     Requested Prescriptions:   Requested Prescriptions     Pending Prescriptions Disp Refills    pregabalin (Lyrica) 300 MG capsule 60 capsule 0     Sig: Take 1 capsule by mouth 2 (Two) Times a Day As Needed (pain).        Pharmacy where request should be sent: [place] [phone] [place] [phone]      Last office visit with prescribing clinician: 6/24/2024   Last telemedicine visit with prescribing clinician: Visit date not found   Next office visit with prescribing clinician: 7/22/2024     Additional details provided by patient: PATIENT HAS 2 DAYS LEFT OF MEDICATION    Does the patient have less than a 3 day supply:  [x] Yes  [] No    Would you like a call back once the refill request has been completed: [x] Yes [] No    If the office needs to give you a call back, can they leave a voicemail: [] Yes [] No    Chandu Huerta Rep   07/12/24 15:25 EDT

## 2024-07-12 NOTE — TELEPHONE ENCOUNTER
Refill request for controlled substance.      Date of request: 7/12/2024    Medication requested: Lyrica   Last OV: 6/24/24  Last UDS: 6/24/24  Contract signed: yes    Date:6/24/24  Next office visit: 7/22/24    Wendy Hernández

## 2024-08-05 ENCOUNTER — TRANSCRIBE ORDERS (OUTPATIENT)
Dept: ADMINISTRATIVE | Facility: HOSPITAL | Age: 74
End: 2024-08-05
Payer: MEDICARE

## 2024-08-05 DIAGNOSIS — F17.200 TOBACCO USE DISORDER: Primary | ICD-10-CM

## 2024-08-05 DIAGNOSIS — Z72.0 TOBACCO ABUSE: ICD-10-CM

## 2024-08-05 DIAGNOSIS — Z87.891 PERSONAL HISTORY OF TOBACCO USE, PRESENTING HAZARDS TO HEALTH: ICD-10-CM

## 2024-08-13 DIAGNOSIS — M50.30 DDD (DEGENERATIVE DISC DISEASE), CERVICAL: ICD-10-CM

## 2024-08-13 NOTE — TELEPHONE ENCOUNTER
Refill Request for Controlled Substance    Date of Request: 8/13/2024  Medication Requested: Veneciacollin   Last UDS: 06/24/2024  Last Consent: 06/24/2024  Last OV: 06/24/2024  Next OV: 10/24/2024

## 2024-08-13 NOTE — TELEPHONE ENCOUNTER
Caller: Susan Hernandez Art    Relationship: Self    Best call back number: [phone]     Requested Prescriptions:   Requested Prescriptions     Pending Prescriptions Disp Refills    pregabalin (Lyrica) 300 MG capsule 60 capsule 0     Sig: Take 1 capsule by mouth 2 (Two) Times a Day As Needed (pain).        Pharmacy where request should be sent: [place] [phone] [place] [phone]      Last office visit with prescribing clinician: 6/24/2024   Last telemedicine visit with prescribing clinician: Visit date not found   Next office visit with prescribing clinician: 10/24/2024     Additional details provided by patient: PATIENT HAS 2 DAYS LEFT OF MEDICATION     Does the patient have less than a 3 day supply:  [x] Yes  [] No    Would you like a call back once the refill request has been completed: [x] Yes [] No    If the office needs to give you a call back, can they leave a voicemail: [] Yes [x] No  TEXT MESSAGE OR CALL PREFERRED    hCandu Saravia   08/13/24 13:37 EDT

## 2024-08-15 RX ORDER — PREGABALIN 300 MG/1
300 CAPSULE ORAL 2 TIMES DAILY PRN
Qty: 60 CAPSULE | Refills: 0 | Status: SHIPPED | OUTPATIENT
Start: 2024-08-15

## 2024-09-10 DIAGNOSIS — M50.30 DDD (DEGENERATIVE DISC DISEASE), CERVICAL: ICD-10-CM

## 2024-09-10 NOTE — TELEPHONE ENCOUNTER
Caller: Susan Hernandez Art    Relationship: Self    Best call back number: [phone]     Requested Prescriptions:   Requested Prescriptions     Pending Prescriptions Disp Refills    pregabalin (Lyrica) 300 MG capsule 60 capsule 0     Sig: Take 1 capsule by mouth 2 (Two) Times a Day As Needed (pain).        Pharmacy where request should be sent: [place] [phone] [place] [phone]      Last office visit with prescribing clinician: 6/24/2024   Last telemedicine visit with prescribing clinician: Visit date not found   Next office visit with prescribing clinician: 10/24/2024     Does the patient have less than a 3 day supply:  [x] Yes  [] No    Would you like a call back once the refill request has been completed: [x] Yes [] No    If the office needs to give you a call back, can they leave a voicemail: [x] Yes [] No    Chandu Low   09/10/24 14:01 EDT

## 2024-09-10 NOTE — TELEPHONE ENCOUNTER
Refill request for controlled substance.      Date of request: 9/10/2024    Medication requested: Lyrica   Last OV: 6/27/24  Last UDS: 6/24/24  Contract signed: yes    Date:6/24/24  Next office visit: 10/24/24    Wendy Hernández

## 2024-09-13 RX ORDER — PREGABALIN 300 MG/1
300 CAPSULE ORAL 2 TIMES DAILY PRN
Qty: 60 CAPSULE | Refills: 0 | Status: SHIPPED | OUTPATIENT
Start: 2024-09-13

## 2024-10-09 DIAGNOSIS — M50.30 DDD (DEGENERATIVE DISC DISEASE), CERVICAL: ICD-10-CM

## 2024-10-09 NOTE — TELEPHONE ENCOUNTER
Caller: Susan Hernandez Art    Relationship: Self    Best call back number: [phone]    Requested Prescriptions:   Requested Prescriptions     Pending Prescriptions Disp Refills    pregabalin (Lyrica) 300 MG capsule 60 capsule 0     Sig: Take 1 capsule by mouth 2 (Two) Times a Day As Needed (pain).        Pharmacy where request should be sent: [place] [phone] [place] [phone]      Last office visit with prescribing clinician: 6/24/2024   Last telemedicine visit with prescribing clinician: Visit date not found   Next office visit with prescribing clinician: 10/24/2024     Additional details provided by patient: TWO DAYS LEFT OF MEDICATION     Does the patient have less than a 3 day supply:  [x] Yes  [] No    Would you like a call back once the refill request has been completed: [] Yes [] No    If the office needs to give you a call back, can they leave a voicemail: [] Yes [] No    Chandu Lopez Rep   10/09/24 11:57 EDT

## 2024-10-10 RX ORDER — PREGABALIN 300 MG/1
300 CAPSULE ORAL 2 TIMES DAILY PRN
Qty: 60 CAPSULE | Refills: 0 | Status: SHIPPED | OUTPATIENT
Start: 2024-10-10

## 2024-10-10 NOTE — TELEPHONE ENCOUNTER
Refill request for controlled substance.      Date of request: 10/10/2024    Medication requested: Lyrica   Last OV: 6/27/24  Last UDS: 6/24/24  Contract signed: no    Date:6/24/24  Next office visit: 10/24/24    Wendy Hernández

## 2024-10-23 NOTE — PROGRESS NOTES
Subjective   The ABCs of the Annual Wellness Visit  Medicare Wellness Visit      Susan Hernandez is a 73 y.o. patient who presents for a Medicare Wellness Visit.    The following portions of the patient's history were reviewed and   updated as appropriate: allergies, current medications, past family history, past medical history, past social history, past surgical history, and problem list.    Compared to one year ago, the patient's physical   health is worse.  Compared to one year ago, the patient's mental   health is worse.    Recent Hospitalizations:  She was not admitted to the hospital during the last year.     Current Medical Providers:  Patient Care Team:  Rigoberto Gomes Jr., MD as PCP - General (Internal Medicine)  Tonio Johnson as Medical Assistant    Outpatient Medications Prior to Visit   Medication Sig Dispense Refill    carboxymethylcellulose (Refresh Plus) 0.5 % solution Administer 2 drops to both eyes 3 (Three) Times a Day As Needed for Dry Eyes. 70 each 3    cholecalciferol (VITAMIN D3) 25 MCG (1000 UT) tablet Take 1 tablet by mouth 2 (Two) Times a Day. 180 tablet 3    Diclofenac Sodium (VOLTAREN) 1 % gel gel Apply 4 g topically to the appropriate area as directed 4 (Four) Times a Day As Needed (pain). 350 g 1    DULoxetine (CYMBALTA) 30 MG capsule Take 1 capsule by mouth Daily. 90 capsule 3    empagliflozin (Jardiance) 25 MG tablet tablet Take 1 tablet by mouth Daily. 90 tablet 3    glipizide (Glucotrol) 10 MG tablet Take 1 tablet by mouth 2 (Two) Times a Day Before Meals. 180 tablet 3    levothyroxine (Synthroid) 25 MCG tablet Take 1 tablet by mouth Daily. 90 tablet 1    lidocaine (LIDODERM) 5 % Place 1 patch on the skin as directed by provider Daily. Remove & Discard patch within 12 hours or as directed by MD 90 patch 3    metFORMIN (GLUCOPHAGE) 500 MG tablet Take 1 tablet by mouth 2 (Two) Times a Day With Meals. 180 tablet 3    multivitamins-minerals (PRESERVISION AREDS 2) capsule capsule        Omega-3 Fatty Acids (fish oil) 1000 MG capsule capsule Take 1 capsule by mouth Daily With Breakfast.      omeprazole (priLOSEC) 40 MG capsule Take 1 capsule by mouth Daily. 90 capsule 3    pantoprazole (PROTONIX) 40 MG EC tablet Take 1 tablet by mouth Daily.      pregabalin (Lyrica) 300 MG capsule Take 1 capsule by mouth 2 (Two) Times a Day As Needed (pain). 60 capsule 0    traMADol (ULTRAM) 50 MG tablet       atorvastatin (LIPITOR) 10 MG tablet Take 1 tablet by mouth Every Night. 90 tablet 3    clindamycin (CLEOCIN T) 1 % external solution       simvastatin (ZOCOR) 20 MG tablet Daily.      SITagliptin (Januvia) 100 MG tablet Take 1 tablet by mouth Daily. 90 tablet 3     No facility-administered medications prior to visit.     Opioid medication/s are on active medication list.  and I have evaluated her active treatment plan and pain score trends (see table).  There were no vitals filed for this visit.  I have reviewed the chart for potential of high risk medication and harmful drug interactions in the elderly.        Aspirin is not on active medication list.  Aspirin use is indicated based on review of current medical condition/s. Pros and cons of this therapy have been discussed with this patient. Benefits of this medication outweigh potential harm.  Patient has been instructed to start taking this medication..    Patient Active Problem List   Diagnosis    Arthritis    Cancer, colon    Type 2 diabetes mellitus with hyperglycemia, without long-term current use of insulin    Esophageal reflux    Low back pain    Small bowel obstruction    DDD (degenerative disc disease), cervical    Mixed hyperlipidemia    Acquired hypothyroidism    Nuclear cataract    Vitamin D deficiency    NAFLD (nonalcoholic fatty liver disease)    Personal history of colon cancer    History of colon polyps     Advance Care Planning Advance Directive is not on file.  ACP discussion was held with the patient during this visit. Patient does  "not have an advance directive, information provided.        Objective   Vitals:    10/24/24 1410   BP: 119/82   BP Location: Left arm   Patient Position: Sitting   Cuff Size: Adult   Pulse: 64   Temp: 97.8 °F (36.6 °C)   TempSrc: Temporal   SpO2: 93%   Weight: 64.4 kg (142 lb)   Height: 162.6 cm (64\")       Estimated body mass index is 24.37 kg/m² as calculated from the following:    Height as of this encounter: 162.6 cm (64\").    Weight as of this encounter: 64.4 kg (142 lb).    BMI is within normal parameters. No other follow-up for BMI required.     Does the patient have evidence of cognitive impairment? No                                                                                                Health  Risk Assessment    Smoking Status:  Social History     Tobacco Use   Smoking Status Former    Current packs/day: 0.00    Average packs/day: 2.0 packs/day for 38.0 years (76.0 ttl pk-yrs)    Types: Cigarettes    Start date:     Quit date:     Years since quittin.8    Passive exposure: Past   Smokeless Tobacco Never     Alcohol Consumption:  Social History     Substance and Sexual Activity   Alcohol Use Not Currently    Comment: last drink        Fall Risk Screen  STEADI Fall Risk Assessment was completed, and patient is at LOW risk for falls.Assessment completed on:10/24/2024    Depression Screening:      10/24/2024     2:11 PM   PHQ-2/PHQ-9 Depression Screening   Little interest or pleasure in doing things Not at all   Feeling down, depressed, or hopeless Not at all     Health Habits and Functional and Cognitive Screening:      10/24/2024     2:13 PM   Functional & Cognitive Status   Do you have difficulty preparing food and eating? No   Do you have difficulty bathing yourself, getting dressed or grooming yourself? No   Do you have difficulty using the toilet? No   Do you have difficulty moving around from place to place? No   Do you have trouble with steps or getting out of a bed or a chair? " No   Current Diet Well Balanced Diet   Dental Exam Not up to date   Eye Exam Up to date   Exercise (times per week) 0 times per week   Current Exercises Include No Regular Exercise   Do you need help using the phone?  No   Are you deaf or do you have serious difficulty hearing?  Yes   Do you need help to go to places out of walking distance? No   Do you need help shopping? No   Do you need help preparing meals?  No   Do you need help with housework?  No   Do you need help with laundry? No   Do you need help taking your medications? No   Do you need help managing money? No   Do you ever drive or ride in a car without wearing a seat belt? No   Have you felt unusual stress, anger or loneliness in the last month? No   Who do you live with? Alone   Have you been bothered in the last four weeks by sexual problems? No   Do you have difficulty concentrating, remembering or making decisions? No           Age-appropriate Screening Schedule:  Refer to the list below for future screening recommendations based on patient's age, sex and/or medical conditions. Orders for these recommended tests are listed in the plan section. The patient has been provided with a written plan.    Health Maintenance List  Health Maintenance   Topic Date Due    DIABETIC FOOT EXAM  01/16/2024    LUNG CANCER SCREENING  11/21/2024    HEMOGLOBIN A1C  12/24/2024    DXA SCAN  12/29/2024    ZOSTER VACCINE (1 of 2) 10/24/2024 (Originally 12/21/2000)    INFLUENZA VACCINE  03/31/2025 (Originally 8/1/2024)    TDAP/TD VACCINES (1 - Tdap) 06/24/2025 (Originally 12/21/1969)    URINE MICROALBUMIN  02/20/2025    LIPID PANEL  06/24/2025    DIABETIC EYE EXAM  09/10/2025    ANNUAL WELLNESS VISIT  10/24/2025    MAMMOGRAM  01/09/2026    HEPATITIS C SCREENING  Completed    COVID-19 Vaccine  Completed    Pneumococcal Vaccine 65+  Completed    COLORECTAL CANCER SCREENING  Discontinued                                                                                                                                                 CMS Preventative Services Quick Reference  Risk Factors Identified During Encounter  Immunizations Discussed/Encouraged: Influenza, COVID19, and RSV (Respiratory Syncytial Virus)    The above risks/problems have been discussed with the patient.  Pertinent information has been shared with the patient in the After Visit Summary.  An After Visit Summary and PPPS were made available to the patient.    Follow Up:   Next Medicare Wellness visit to be scheduled in 1 year.       Additional E&M Note during same encounter follows:  Patient has multiple medical problems which are significant and separately identifiable that require additional work above and beyond the Medicare Wellness Visit.      Chief Complaint  Back Pain (Patient stated that it started in the front side then radiates to the Lower back /Her back has been hurting for 2  week /She stated that for 2 days she couldn't get out of bed ) and Medicare Wellness-subsequent    Subjective          Sun Art Hernandez presents to [facility]  History of Present Illness  Patient reports ongoing chronic back pain. Patient has not been evaluated with pain mgmt yet, but is amenable.   Patient has been evaluated by orthopedics for left wrist pain. She reports getting injectable therapy for wrist and it is doing better.   DM2- patient does not want to do injectable therapy. She report hermila blood glucose are high recently. She is amenable to actos. She report socmplaince with metformin, jardiance, and januvia.     Current Outpatient Medications   Medication Instructions    atorvastatin (LIPITOR) 10 mg, Oral, Nightly    carboxymethylcellulose (Refresh Plus) 0.5 % solution 2 drops, Both Eyes, 3 Times Daily PRN    cholecalciferol (VITAMIN D3) 1,000 Units, Oral, 2 Times Daily    Diclofenac Sodium (VOLTAREN) 4 g, Topical, 4 Times Daily PRN    DULoxetine (CYMBALTA) 30 mg, Oral, Daily     "empagliflozin (JARDIANCE) 25 mg, Oral, Daily    fish oil 1,000 mg, Daily With Breakfast    glipizide (GLUCOTROL) 10 mg, Oral, 2 Times Daily Before Meals    levothyroxine (SYNTHROID) 25 mcg, Oral, Daily    lidocaine (LIDODERM) 5 % 1 patch, Transdermal, Every 24 Hours, Remove & Discard patch within 12 hours or as directed by MD    metFORMIN (GLUCOPHAGE) 500 mg, Oral, 2 Times Daily With Meals    multivitamins-minerals (PRESERVISION AREDS 2) capsule capsule     omeprazole (PRILOSEC) 40 mg, Oral, Daily    pantoprazole (PROTONIX) 40 MG EC tablet 1 tablet, Daily    pioglitazone (ACTOS) 15 mg, Oral, Daily    pregabalin (LYRICA) 300 mg, Oral, 2 Times Daily PRN    SITagliptin (JANUVIA) 100 mg, Oral, Daily    traMADol (ULTRAM) 50 MG tablet        The following portions of the patient's history were reviewed and updated as appropriate: allergies, current medications, past family history, past medical history, past social history, past surgical history, and problem list.    Objective   Vital Signs:   /82 (BP Location: Left arm, Patient Position: Sitting, Cuff Size: Adult)   Pulse 64   Temp 97.8 °F (36.6 °C) (Temporal)   Ht 162.6 cm (64\")   Wt 64.4 kg (142 lb)   SpO2 93%   BMI 24.37 kg/m²     BP Readings from Last 3 Encounters:   10/24/24 119/82   08/27/24 139/83   07/01/24 120/79     Wt Readings from Last 3 Encounters:   10/24/24 64.4 kg (142 lb)   08/27/24 63 kg (139 lb)   07/01/24 62.6 kg (138 lb)     BMI is within normal parameters. No other follow-up for BMI required.     Physical Exam   Appearance: No acute distress, well-nourished  Head: normocephalic, atraumatic  Eyes: extraocular movements intact, no scleral icterus, no conjunctival injection  Ears, Nose, and Throat: external ears normal, nares patent, moist mucous membranes  Cardiovascular: regular rate and rhythm. no murmurs, rubs, or gallops. no edema  Respiratory: breathing comfortably, symmetric chest rise, clear to auscultation bilaterally. No wheezes, " rales, or rhonchi.  Neuro: alert and oriented to time, place, and person. Normal gait  Psych: normal mood and affect     Result Review :   The following data was reviewed by: Rigoberto Gomes Jr, MD on 10/24/2024:  Common labs          2/8/2024    13:08 2/20/2024    11:28 2/20/2024    12:00 6/24/2024    13:35   Common Labs   Glucose   185  170    BUN   18  23    Creatinine   1.20  0.96    Sodium   138  140    Potassium   4.3  4.5    Chloride   100  102    Calcium   10.0  9.9    Albumin 4.6   4.7  4.6    Total Bilirubin 1.1   0.5  0.7    Alkaline Phosphatase 79   80  71    AST (SGOT) 55   42  61    ALT (SGPT) 72   49  59    WBC   7.85  7.19    Hemoglobin   15.2  15.6    Hematocrit   46.4  48.1    Platelets   185  199    Total Cholesterol   152  153    Triglycerides   198  122    HDL Cholesterol   40  44    LDL Cholesterol    79  87    Hemoglobin A1C   8.90  8.40    Microalbumin, Urine  <1.2          Lab Results   Component Value Date    SARSANTIGEN Detected (A) 08/27/2024    FLUAAG Not Detected 08/27/2024    FLUBAG Not Detected 08/27/2024    RAPSCRN Negative 08/27/2024    INR 0.88 02/08/2024    BILIRUBINUR Negative 02/20/2024       Last Urine Toxicity  More data exists         Latest Ref Rng & Units 6/24/2024 2/20/2024   LAST URINE TOXICITY RESULTS   Creatinine, Urine mg/dL - 87.7    Amphetamine, Urine Qual Negative Negative  Negative    Barbiturates Screen, Urine Negative Negative  Negative    Benzodiazepine Screen, Urine Negative Negative  Negative    Buprenorphine, Screen, Urine Negative Negative  Negative    Cocaine Screen, Urine Negative Negative  Negative    Methadone Screen , Urine Negative Negative  Negative    Methamphetamine, Ur Negative Negative  Negative       Details                   Assessment and Plan    Diagnoses and all orders for this visit:    1. Annual physical exam (Primary)    2. Chronic bilateral low back pain without sciatica  Comments:  toradol injection today. refer to pain  management for further eval and management  Orders:  -     Ambulatory Referral to Pain Management  -     ketorolac (TORADOL) injection 30 mg    3. Mixed hyperlipidemia  -     Comprehensive Metabolic Panel  -     Lipid Panel  -     atorvastatin (LIPITOR) 10 MG tablet; Take 1 tablet by mouth Every Night.  Dispense: 90 tablet; Refill: 3    4. Acquired hypothyroidism  -     TSH    5. Type 2 diabetes mellitus with hyperglycemia, without long-term current use of insulin  Comments:  will start actos with other regimen. check labs.  Orders:  -     CBC & Differential  -     Hemoglobin A1c  -     SITagliptin (Januvia) 100 MG tablet; Take 1 tablet by mouth Daily.  Dispense: 90 tablet; Refill: 3  -     pioglitazone (Actos) 15 MG tablet; Take 1 tablet by mouth Daily.  Dispense: 90 tablet; Refill: 1    6. Need for COVID-19 vaccine  -     COVID-19 (Pfizer) 12yrs+ (COMIRNATY)    7. Need for RSV vaccination  -     ABRYSVO RSV Vaccine (Adults 60+, pregnant women 32-36 wks)        Medications Discontinued During This Encounter   Medication Reason    clindamycin (CLEOCIN T) 1 % external solution *Therapy completed    simvastatin (ZOCOR) 20 MG tablet *Error    SITagliptin (Januvia) 100 MG tablet Reorder    atorvastatin (LIPITOR) 10 MG tablet Reorder          Follow Up   Return in about 4 months (around 2/24/2025) for DM.  Patient was given instructions and counseling regarding her condition or for health maintenance advice. Please see specific information pulled into the AVS if appropriate.       Rigoberto Gomes Jr, MD  10/24/24  15:29 EDT

## 2024-10-24 ENCOUNTER — OFFICE VISIT (OUTPATIENT)
Dept: INTERNAL MEDICINE | Facility: CLINIC | Age: 74
End: 2024-10-24
Payer: MEDICARE

## 2024-10-24 VITALS
HEART RATE: 64 BPM | DIASTOLIC BLOOD PRESSURE: 82 MMHG | OXYGEN SATURATION: 93 % | BODY MASS INDEX: 24.24 KG/M2 | WEIGHT: 142 LBS | SYSTOLIC BLOOD PRESSURE: 119 MMHG | TEMPERATURE: 97.8 F | HEIGHT: 64 IN

## 2024-10-24 DIAGNOSIS — M54.50 CHRONIC BILATERAL LOW BACK PAIN WITHOUT SCIATICA: ICD-10-CM

## 2024-10-24 DIAGNOSIS — E03.9 ACQUIRED HYPOTHYROIDISM: ICD-10-CM

## 2024-10-24 DIAGNOSIS — E78.2 MIXED HYPERLIPIDEMIA: ICD-10-CM

## 2024-10-24 DIAGNOSIS — E11.65 TYPE 2 DIABETES MELLITUS WITH HYPERGLYCEMIA, WITHOUT LONG-TERM CURRENT USE OF INSULIN: ICD-10-CM

## 2024-10-24 DIAGNOSIS — Z00.00 ANNUAL PHYSICAL EXAM: Primary | ICD-10-CM

## 2024-10-24 DIAGNOSIS — Z29.11 NEED FOR RSV VACCINATION: ICD-10-CM

## 2024-10-24 DIAGNOSIS — Z23 NEED FOR COVID-19 VACCINE: ICD-10-CM

## 2024-10-24 DIAGNOSIS — G89.29 CHRONIC BILATERAL LOW BACK PAIN WITHOUT SCIATICA: ICD-10-CM

## 2024-10-24 LAB
BASOPHILS # BLD AUTO: 0.03 10*3/MM3 (ref 0–0.2)
BASOPHILS NFR BLD AUTO: 0.5 % (ref 0–1.5)
DEPRECATED RDW RBC AUTO: 44.3 FL (ref 37–54)
EOSINOPHIL # BLD AUTO: 0.06 10*3/MM3 (ref 0–0.4)
EOSINOPHIL NFR BLD AUTO: 1 % (ref 0.3–6.2)
ERYTHROCYTE [DISTWIDTH] IN BLOOD BY AUTOMATED COUNT: 12.2 % (ref 12.3–15.4)
HBA1C MFR BLD: 7.9 % (ref 4.8–5.6)
HCT VFR BLD AUTO: 46.2 % (ref 34–46.6)
HGB BLD-MCNC: 15.5 G/DL (ref 12–15.9)
IMM GRANULOCYTES # BLD AUTO: 0.03 10*3/MM3 (ref 0–0.05)
IMM GRANULOCYTES NFR BLD AUTO: 0.5 % (ref 0–0.5)
LYMPHOCYTES # BLD AUTO: 1.96 10*3/MM3 (ref 0.7–3.1)
LYMPHOCYTES NFR BLD AUTO: 31.2 % (ref 19.6–45.3)
MCH RBC QN AUTO: 33.5 PG (ref 26.6–33)
MCHC RBC AUTO-ENTMCNC: 33.5 G/DL (ref 31.5–35.7)
MCV RBC AUTO: 99.8 FL (ref 79–97)
MONOCYTES # BLD AUTO: 0.49 10*3/MM3 (ref 0.1–0.9)
MONOCYTES NFR BLD AUTO: 7.8 % (ref 5–12)
NEUTROPHILS NFR BLD AUTO: 3.71 10*3/MM3 (ref 1.7–7)
NEUTROPHILS NFR BLD AUTO: 59 % (ref 42.7–76)
NRBC BLD AUTO-RTO: 0 /100 WBC (ref 0–0.2)
PLATELET # BLD AUTO: 226 10*3/MM3 (ref 140–450)
PMV BLD AUTO: 11 FL (ref 6–12)
RBC # BLD AUTO: 4.63 10*6/MM3 (ref 3.77–5.28)
WBC NRBC COR # BLD AUTO: 6.28 10*3/MM3 (ref 3.4–10.8)

## 2024-10-24 PROCEDURE — 84443 ASSAY THYROID STIM HORMONE: CPT | Performed by: INTERNAL MEDICINE

## 2024-10-24 PROCEDURE — 3052F HG A1C>EQUAL 8.0%<EQUAL 9.0%: CPT | Performed by: INTERNAL MEDICINE

## 2024-10-24 PROCEDURE — G0439 PPPS, SUBSEQ VISIT: HCPCS | Performed by: INTERNAL MEDICINE

## 2024-10-24 PROCEDURE — 85025 COMPLETE CBC W/AUTO DIFF WBC: CPT | Performed by: INTERNAL MEDICINE

## 2024-10-24 PROCEDURE — 1170F FXNL STATUS ASSESSED: CPT | Performed by: INTERNAL MEDICINE

## 2024-10-24 PROCEDURE — 80061 LIPID PANEL: CPT | Performed by: INTERNAL MEDICINE

## 2024-10-24 PROCEDURE — 1125F AMNT PAIN NOTED PAIN PRSNT: CPT | Performed by: INTERNAL MEDICINE

## 2024-10-24 PROCEDURE — 90678 RSV VACC PREF BIVALENT IM: CPT | Performed by: INTERNAL MEDICINE

## 2024-10-24 PROCEDURE — 90480 ADMN SARSCOV2 VAC 1/ONLY CMP: CPT | Performed by: INTERNAL MEDICINE

## 2024-10-24 PROCEDURE — 90471 IMMUNIZATION ADMIN: CPT | Performed by: INTERNAL MEDICINE

## 2024-10-24 PROCEDURE — 99214 OFFICE O/P EST MOD 30 MIN: CPT | Performed by: INTERNAL MEDICINE

## 2024-10-24 PROCEDURE — 83036 HEMOGLOBIN GLYCOSYLATED A1C: CPT | Performed by: INTERNAL MEDICINE

## 2024-10-24 PROCEDURE — 91320 SARSCV2 VAC 30MCG TRS-SUC IM: CPT | Performed by: INTERNAL MEDICINE

## 2024-10-24 PROCEDURE — 80053 COMPREHEN METABOLIC PANEL: CPT | Performed by: INTERNAL MEDICINE

## 2024-10-24 RX ORDER — CLINDAMYCIN PHOSPHATE 11.9 MG/ML
SOLUTION TOPICAL
COMMUNITY
Start: 2024-09-06 | End: 2024-10-24

## 2024-10-24 RX ORDER — ATORVASTATIN CALCIUM 10 MG/1
10 TABLET, FILM COATED ORAL NIGHTLY
Qty: 90 TABLET | Refills: 3 | Status: SHIPPED | OUTPATIENT
Start: 2024-10-24

## 2024-10-24 RX ORDER — KETOROLAC TROMETHAMINE 30 MG/ML
30 INJECTION, SOLUTION INTRAMUSCULAR; INTRAVENOUS ONCE
Status: SHIPPED | OUTPATIENT
Start: 2024-10-24 | End: 2024-10-29

## 2024-10-24 RX ORDER — PIOGLITAZONEHYDROCHLORIDE 15 MG/1
15 TABLET ORAL DAILY
Qty: 90 TABLET | Refills: 1 | Status: SHIPPED | OUTPATIENT
Start: 2024-10-24

## 2024-10-24 NOTE — LETTER
[facility]  Vaccine Consent Form    Patient Name:  Susan Hernandez  Patient :  1950     Vaccine(s) Ordered    COVID-19 (Pfizer) 12yrs+ (COMIRNATY)  ABRYSVO RSV Vaccine (Adults 60+, pregnant women 32-36 wks)        Screening Checklist  The following questions should be completed prior to vaccination. If you answer “yes” to any question, it does not necessarily mean you should not be vaccinated. It just means we may need to clarify or ask more questions. If a question is unclear, please ask your healthcare provider to explain it.    Yes No   Any fever or moderate to severe illness today (mild illness and/or antibiotic treatment are not contraindications)?     Do you have a history of a serious reaction to any previous vaccinations, such as anaphylaxis, encephalopathy within 7 days, Guillain-[place] syndrome within 6 weeks, seizure?     Have you received any live vaccine(s) (e.g MMR, OMAR) or any other vaccines in the last month (to ensure duplicate doses aren't given)?     Do you have an anaphylactic allergy to latex (DTaP, DTaP-IPV, Hep A, Hep B, MenB, RV, Td, Tdap), baker’s yeast (Hep B, HPV), polysorbates (RSV, nirsevimab, PCV 20, Rotavirrus, Tdap, Shingrix), or gelatin (OMAR, MMR)?     Do you have an anaphylactic allergy to neomycin (Rabies, OMAR, MMR, IPV, Hep A), polymyxin B (IPV), or streptomycin (IPV)?      Any cancer, leukemia, AIDS, or other immune system disorder? (OMAR, MMR, RV)     Do you have a parent, brother, or sister with an immune system problem (if immune competence of vaccine recipient clinically verified, can proceed)? (MMR, OMAR)     Any recent steroid treatments for >2 weeks, chemotherapy, or radiation treatment? (OMAR, MMR)     Have you received antibody-containing blood transfusions or IVIG in the past 11 months (recommended interval is dependent on product)? (MMR, OMAR)     Have you taken antiviral drugs (acyclovir, famciclovir, valacyclovir for OMAR) in the last 24 or 48 hours,  "respectively?      Are you pregnant or planning to become pregnant within 1 month? (OMAR, MMR, HPV, IPV, MenB, Abrexvy; For Hep B- refer to Engerix-B; For RSV - Abrysvo is indicated for 32-36 weeks of pregnancy from September to January)     For infants, have you ever been told your child has had intussusception or a medical emergency involving obstruction of the intestine (Rotavirus)? If not for an infant, can skip this question.         *Ordering Physicians/APC should be consulted if \"yes\" is checked by the patient or guardian above.  I have received, read, and understand the Vaccine Information Statement (VIS) for each vaccine ordered.  I have considered my or my child's health status as well as the health status of my close contacts.  I have taken the opportunity to discuss my vaccine questions with my or my child's health care provider.   I have requested that the ordered vaccine(s) be given to me or my child.  I understand the benefits and risks of the vaccines.  I understand that I should remain in the clinic for 15 minutes after receiving the vaccine(s).  _________________________________________________________  Signature of Patient or Parent/Legal Guardian ____________________  Date     "

## 2024-10-25 LAB
ALBUMIN SERPL-MCNC: 4.5 G/DL (ref 3.5–5.2)
ALBUMIN/GLOB SERPL: 1.9 G/DL
ALP SERPL-CCNC: 63 U/L (ref 39–117)
ALT SERPL W P-5'-P-CCNC: 43 U/L (ref 1–33)
ANION GAP SERPL CALCULATED.3IONS-SCNC: 14 MMOL/L (ref 5–15)
AST SERPL-CCNC: 58 U/L (ref 1–32)
BILIRUB SERPL-MCNC: 0.5 MG/DL (ref 0–1.2)
BUN SERPL-MCNC: 14 MG/DL (ref 8–23)
BUN/CREAT SERPL: 12.8 (ref 7–25)
CALCIUM SPEC-SCNC: 10.2 MG/DL (ref 8.6–10.5)
CHLORIDE SERPL-SCNC: 100 MMOL/L (ref 98–107)
CHOLEST SERPL-MCNC: 147 MG/DL (ref 0–200)
CO2 SERPL-SCNC: 25 MMOL/L (ref 22–29)
CREAT SERPL-MCNC: 1.09 MG/DL (ref 0.57–1)
EGFRCR SERPLBLD CKD-EPI 2021: 53.8 ML/MIN/1.73
GLOBULIN UR ELPH-MCNC: 2.4 GM/DL
GLUCOSE SERPL-MCNC: 205 MG/DL (ref 65–99)
HDLC SERPL-MCNC: 32 MG/DL (ref 40–60)
LDLC SERPL CALC-MCNC: 80 MG/DL (ref 0–100)
LDLC/HDLC SERPL: 2.31 {RATIO}
POTASSIUM SERPL-SCNC: 4.4 MMOL/L (ref 3.5–5.2)
PROT SERPL-MCNC: 6.9 G/DL (ref 6–8.5)
SODIUM SERPL-SCNC: 139 MMOL/L (ref 136–145)
TRIGL SERPL-MCNC: 206 MG/DL (ref 0–150)
TSH SERPL DL<=0.05 MIU/L-ACNC: 2.42 UIU/ML (ref 0.27–4.2)
VLDLC SERPL-MCNC: 35 MG/DL (ref 5–40)

## 2024-10-28 ENCOUNTER — TELEPHONE (OUTPATIENT)
Dept: INTERNAL MEDICINE | Facility: CLINIC | Age: 74
End: 2024-10-28
Payer: MEDICARE

## 2024-10-28 NOTE — TELEPHONE ENCOUNTER
----- Message from Wendy CARTER sent at 10/28/2024  9:15 AM EDT -----      ----- Message -----  From: Rigoberto Gomes Jr., MD  Sent: 10/25/2024   3:44 PM EDT  To: [facility]    HgbA1c elevated above goal. Would recommend start actos as discussed at appointment.     Elevated triglycerides, which are the storage form of sugar - recommend lower carbohydrate/sugar intake.     HDL low - this is protective cholesterol and generally like the number to be >50. encourage exercise to increase level.     Liver enzymes and kidney function stable.

## 2024-11-11 DIAGNOSIS — M50.30 DDD (DEGENERATIVE DISC DISEASE), CERVICAL: ICD-10-CM

## 2024-11-11 NOTE — TELEPHONE ENCOUNTER
Caller: Susan Hernandez Art    Relationship: Self    Best call back number  [phone]      Requested Prescriptions:   Requested Prescriptions     Pending Prescriptions Disp Refills    pregabalin (Lyrica) 300 MG capsule 60 capsule 0     Sig: Take 1 capsule by mouth 2 (Two) Times a Day As Needed (pain).        Pharmacy where request should be sent: [place] [phone] [place] [phone]      Last office visit with prescribing clinician: 10/24/2024   Last telemedicine visit with prescribing clinician: Visit date not found   Next office visit with prescribing clinician: 2/25/2025     Additional details provided by patient:        THE PATIENT IS A CALL ONCE SENT TO THE PHARMACY     Does the patient have less than a 3 day supply:  [x] Yes  [] No    Would you like a call back once the refill request has been completed: [x] Yes [] No    If the office needs to give you a call back, can they leave a voicemail: [x] Yes [] No    Chandu Vuong Rep   11/11/24 13:17 EST

## 2024-11-12 RX ORDER — PREGABALIN 300 MG/1
300 CAPSULE ORAL 2 TIMES DAILY PRN
Qty: 60 CAPSULE | Refills: 0 | Status: SHIPPED | OUTPATIENT
Start: 2024-11-12

## 2024-11-12 NOTE — TELEPHONE ENCOUNTER
Request for Controlled Substance      Date of Request: 11/12/2024  Medication: Lyrica   Last OV: 10/24/2024  Next OV: 2/25/2024  Last UDS: 6/24/2024  Last Narcotic Consent: 6/24/2024

## 2024-11-22 ENCOUNTER — HOSPITAL ENCOUNTER (OUTPATIENT)
Dept: CT IMAGING | Facility: HOSPITAL | Age: 74
Discharge: HOME | End: 2024-11-22
Payer: MEDICARE

## 2024-11-22 DIAGNOSIS — Z87.891 PERSONAL HISTORY OF TOBACCO USE, PRESENTING HAZARDS TO HEALTH: ICD-10-CM

## 2024-11-22 DIAGNOSIS — F17.200 TOBACCO USE DISORDER: ICD-10-CM

## 2024-11-22 DIAGNOSIS — Z72.0 TOBACCO ABUSE: ICD-10-CM

## 2024-11-22 PROCEDURE — 71271 CT THORAX LUNG CANCER SCR C-: CPT

## 2024-12-06 ENCOUNTER — OFFICE VISIT (OUTPATIENT)
Dept: GASTROENTEROLOGY | Facility: CLINIC | Age: 74
End: 2024-12-06
Payer: MEDICARE

## 2024-12-06 VITALS
WEIGHT: 143 LBS | OXYGEN SATURATION: 100 % | HEIGHT: 64 IN | DIASTOLIC BLOOD PRESSURE: 81 MMHG | HEART RATE: 80 BPM | BODY MASS INDEX: 24.41 KG/M2 | SYSTOLIC BLOOD PRESSURE: 134 MMHG

## 2024-12-06 DIAGNOSIS — K76.0 FATTY LIVER: Primary | ICD-10-CM

## 2024-12-06 DIAGNOSIS — K76.0 NAFLD (NONALCOHOLIC FATTY LIVER DISEASE): ICD-10-CM

## 2024-12-06 DIAGNOSIS — K21.9 GASTROESOPHAGEAL REFLUX DISEASE, UNSPECIFIED WHETHER ESOPHAGITIS PRESENT: ICD-10-CM

## 2024-12-06 NOTE — PROGRESS NOTES
Chief Complaint  Follow-up and Fatty liver (Primary)    Susan Hernandez is a 73 y.o. female who presents to [facility] [place] for follow-up.    History of present Illness  Established patient dating back to 2016. She has history of colon cancer in 2006 and 2011. 2006 was very early and did not require surgery, cancer returned in 2011 where she had resection and chemotherapy.    Last office visit 2/01/24 - elevated liver enzymes noted. Denies alcohol use.   US liver 11/21/23 - hepatic steatosis, small liver cyst  Liver workup 2/8/2024 - normal.      EGD/colonoscopy 4/2/2024 by Dr. Velazquez-normal esophagus, mild gastritis, and normal duodenum.  Stomach biopsies-mild reactive gastropathy.  Small tubular adenoma polyp in the transverse colon, poor prep with moderate stool throughout precluding visualization.  Repeat colonoscopy 5/20/2024 by Dr. Velazquez-evidence of end-to-side anastomosis and proximal transverse colon otherwise normal mucosa throughout.  Repeat 3 years.    Last office visit 5/2024-heartburn controlled with Protonix 40 mg daily.  No other GI complaints.  Discussed fatty liver at length.  FibroScan 6/24/2024-F2, moderate to severe liver fat    Patient presents to the office for follow-up.She has lost about 2 pounds since last office visit. Heartburn is controlled with PPI once daily, uncertain if its Protonix or Prilosec. Denies breakthrough heartburn, nausea, vomiting, and dysphagia. Denies lower GI symptoms.     Past Medical History:   Diagnosis Date    Arthritis     Cancer     Colon cancer     Diabetes mellitus, type 2     Disease of thyroid gland     GERD (gastroesophageal reflux disease)     Lumbar pain     Small bowel obstruction        Past Surgical History:   Procedure Laterality Date    APPENDECTOMY      COLON SURGERY  2011,2006    CANCER, CHEMO    COLONOSCOPY      COLONOSCOPY N/A 07/19/2022    Procedure: COLONOSCOPY WITH POLYPECTOMIES;  Surgeon: Manny Velazquez,  MD;  Location: [facility] ENDOSCOPY;  Service: Gastroenterology;  Laterality: N/A;  COLON POLYPS    COLONOSCOPY N/A 04/02/2024    Procedure: COLONOSCOPY COLD SNARE POLYPECTOMY;  Surgeon: Manny Velazquez MD;  Location: [facility] ENDOSCOPY;  Service: Gastroenterology;  Laterality: N/A;  POOR PREP, COLON POLYP, PREVIOUS SURGERY    COLONOSCOPY N/A 5/20/2024    Procedure: COLONOSCOPY;  Surgeon: Manny Velazquez MD;  Location: [facility] ENDOSCOPY;  Service: Gastroenterology;  Laterality: N/A;  PREVIOUS SURGERY    ENDOSCOPY N/A 04/02/2024    Procedure: ESOPHAGOGASTRODUODENOSCOPY WITH BIOPSIES;  Surgeon: Manny Velazquez MD;  Location: [facility] ENDOSCOPY;  Service: Gastroenterology;  Laterality: N/A;  ERROSIVE GASTRITIS    HYSTERECTOMY  2004    LYSIS OF ABDOMINAL ADHESIONS           Current Outpatient Medications:     atorvastatin (LIPITOR) 10 MG tablet, Take 1 tablet by mouth Every Night., Disp: 90 tablet, Rfl: 3    carboxymethylcellulose (Refresh Plus) 0.5 % solution, Administer 2 drops to both eyes 3 (Three) Times a Day As Needed for Dry Eyes., Disp: 70 each, Rfl: 3    cholecalciferol (VITAMIN D3) 25 MCG (1000 UT) tablet, Take 1 tablet by mouth 2 (Two) Times a Day., Disp: 180 tablet, Rfl: 3    Diclofenac Sodium (VOLTAREN) 1 % gel gel, Apply 4 g topically to the appropriate area as directed 4 (Four) Times a Day As Needed (pain)., Disp: 350 g, Rfl: 1    DULoxetine (CYMBALTA) 30 MG capsule, Take 1 capsule by mouth Daily., Disp: 90 capsule, Rfl: 3    empagliflozin (Jardiance) 25 MG tablet tablet, Take 1 tablet by mouth Daily., Disp: 90 tablet, Rfl: 3    glipizide (Glucotrol) 10 MG tablet, Take 1 tablet by mouth 2 (Two) Times a Day Before Meals., Disp: 180 tablet, Rfl: 3    levothyroxine (Synthroid) 25 MCG tablet, Take 1 tablet by mouth Daily., Disp: 90 tablet, Rfl: 1    lidocaine (LIDODERM) 5 %, Place 1 patch on the skin as directed by provider Daily. Remove & Discard patch within 12 hours or as directed by MD, Disp: 90  "patch, Rfl: 3    metFORMIN (GLUCOPHAGE) 500 MG tablet, Take 1 tablet by mouth 2 (Two) Times a Day With Meals., Disp: 180 tablet, Rfl: 3    multivitamins-minerals (PRESERVISION AREDS 2) capsule capsule, , Disp: , Rfl:     Omega-3 Fatty Acids (fish oil) 1000 MG capsule capsule, Take 1 capsule by mouth Daily With Breakfast., Disp: , Rfl:     omeprazole (priLOSEC) 40 MG capsule, Take 1 capsule by mouth Daily., Disp: 90 capsule, Rfl: 3    pantoprazole (PROTONIX) 40 MG EC tablet, Take 1 tablet by mouth Daily., Disp: , Rfl:     pioglitazone (Actos) 15 MG tablet, Take 1 tablet by mouth Daily., Disp: 90 tablet, Rfl: 1    pregabalin (Lyrica) 300 MG capsule, Take 1 capsule by mouth 2 (Two) Times a Day As Needed (pain)., Disp: 60 capsule, Rfl: 0    SITagliptin (Januvia) 100 MG tablet, Take 1 tablet by mouth Daily., Disp: 90 tablet, Rfl: 3    traMADol (ULTRAM) 50 MG tablet, , Disp: , Rfl:      Allergies   Allergen Reactions    Aspirin Hives and Unknown - High Severity    Penicillins Unknown - High Severity    Penicillin V Potassium Rash       Family History   Adopted: Yes   Problem Relation Age of Onset    Breast cancer Brother     Other Brother         RENAL CANCER    Colon cancer Neg Hx         Social History     Social History Narrative    Not on file       Objective       Vital Signs:   /81 (BP Location: Left arm, Patient Position: Sitting, Cuff Size: Adult)   Pulse 80   Ht 162.6 cm (64.02\")   Wt 64.9 kg (143 lb)   SpO2 100%   BMI 24.53 kg/m²       Physical Exam  Constitutional:       Appearance: Normal appearance. She is normal weight.   HENT:      Head: Normocephalic and atraumatic.      Nose: Nose normal.   Pulmonary:      Effort: Pulmonary effort is normal.   Skin:     General: Skin is warm and dry.   Neurological:      Mental Status: She is alert and oriented to person, place, and time. Mental status is at baseline.   Psychiatric:         Mood and Affect: Mood normal.         Behavior: Behavior normal.        "  Thought Content: Thought content normal.         Judgment: Judgment normal.         Result Review :       CBC w/diff          2/20/2024    12:00 6/24/2024    13:35 10/24/2024    14:51   CBC w/Diff   WBC 7.85  7.19  6.28    RBC 4.74  4.86  4.63    Hemoglobin 15.2  15.6  15.5    Hematocrit 46.4  48.1  46.2    MCV 97.9  99.0  99.8    MCH 32.1  32.1  33.5    MCHC 32.8  32.4  33.5    RDW 12.5  12.4  12.2    Platelets 185  199  226    Neutrophil Rel % 59.4  60.4  59.0    Immature Granulocyte Rel % 0.5  0.4  0.5    Lymphocyte Rel % 32.6  31.7  31.2    Monocyte Rel % 6.6  6.0  7.8    Eosinophil Rel % 0.6  1.1  1.0    Basophil Rel % 0.3  0.4  0.5      CMP          2/20/2024    12:00 6/24/2024    13:35 10/24/2024    14:51   CMP   Glucose 185  170  205    BUN 18  23  14    Creatinine 1.20  0.96  1.09    EGFR 47.9  62.6  53.8    Sodium 138  140  139    Potassium 4.3  4.5  4.4    Chloride 100  102  100    Calcium 10.0  9.9  10.2    Total Protein 7.0  7.2  6.9    Albumin 4.7  4.6  4.5    Globulin 2.3  2.6  2.4    Total Bilirubin 0.5  0.7  0.5    Alkaline Phosphatase 80  71  63    AST (SGOT) 42  61  58    ALT (SGPT) 49  59  43    Albumin/Globulin Ratio 2.0  1.8  1.9    BUN/Creatinine Ratio 15.0  24.0  12.8    Anion Gap 13.0  11.0  14.0        Liver Workup   ALPHA -1 ANTITRYPSIN   Date Value Ref Range Status   02/08/2024 121 90 - 200 mg/dL Final     Smooth Muscle Ab   Date Value Ref Range Status   02/08/2024 8 0 - 19 Units Final     Comment:                      Negative                     0 - 19                   Weak positive               20 - 30                   Moderate to strong positive     >30   Actin Antibodies are found in 52-85% of patients with   autoimmune hepatitis or chronic active hepatitis and   in 22% of patients with primary biliary cirrhosis.     Ceruloplasmin   Date Value Ref Range Status   02/08/2024 22 19 - 39 mg/dL Final     Ferritin   Date Value Ref Range Status   02/08/2024 183.00 (H) 13.00 - 150.00  ng/mL Final     Immunofixation Result, Serum   Date Value Ref Range Status   02/08/2024 Comment  Final     Comment:     No monoclonality detected.     IgG   Date Value Ref Range Status   02/08/2024 1063 586 - 1602 mg/dL Final     IgA   Date Value Ref Range Status   02/08/2024 279 64 - 422 mg/dL Final     IgM   Date Value Ref Range Status   02/08/2024 79 26 - 217 mg/dL Final     Iron   Date Value Ref Range Status   02/08/2024 128 37 - 145 mcg/dL Final     TIBC   Date Value Ref Range Status   02/08/2024 399 298 - 536 mcg/dL Final     Iron Saturation (TSAT)   Date Value Ref Range Status   02/08/2024 32 20 - 50 % Final     Transferrin   Date Value Ref Range Status   02/08/2024 268 200 - 360 mg/dL Final     Mitochondrial Ab   Date Value Ref Range Status   02/08/2024 <20.0 0.0 - 20.0 Units Final     Comment:                                     Negative    0.0 - 20.0                                  Equivocal  20.1 - 24.9                                  Positive         >24.9  Mitochondrial (M2) Antibodies are found in 90-96% of  patients with primary biliary cirrhosis.     Protime   Date Value Ref Range Status   02/08/2024 12.1 11.8 - 14.9 Seconds Final     INR   Date Value Ref Range Status   02/08/2024 0.88 0.86 - 1.15 Final     ALPHA-FETOPROTEIN   Date Value Ref Range Status   02/08/2024 <2 0 - 8.3 ng/mL Final           Assessment and Plan    Diagnoses and all orders for this visit:    1. Fatty liver (Primary)    2. NAFLD (nonalcoholic fatty liver disease)    3. Gastroesophageal reflux disease, unspecified whether esophagitis present    Advise patient to maintain a healthy lifestyle: cutting back on carbs, sugars, and fried fatty foods, limiting intake of soda and sugary drinks, and abstain from alcohol.   Emphasized importance of good glycemic control.   Discussed Rezdiffra, patient wishes to defer  Continue PPI    Follow Up   Return in about 6 months (around 6/6/2025).  Patient was given instructions and counseling  regarding her condition or for health maintenance advice. Please see specific information pulled into the AVS if appropriate.

## 2024-12-11 DIAGNOSIS — M50.30 DDD (DEGENERATIVE DISC DISEASE), CERVICAL: ICD-10-CM

## 2024-12-11 NOTE — TELEPHONE ENCOUNTER
Refill Request for Controlled Substance    Date of Request: 12/11/2024  Medication Requested: [email or web address]  Last UDS: 06/24/2024  Last Consent: 06/24/2024  Last OV: 10/24/2024  Next OV: 02/25/2025

## 2024-12-11 NOTE — TELEPHONE ENCOUNTER
Caller: Susan Hernandez Art    Relationship: Self    Best call back number: [phone]     Requested Prescriptions:   Requested Prescriptions     Pending Prescriptions Disp Refills    pregabalin (Lyrica) 300 MG capsule 60 capsule 0     Sig: Take 1 capsule by mouth 2 (Two) Times a Day As Needed (pain).        Pharmacy where request should be sent: [place] [phone] [place] [phone]      Last office visit with prescribing clinician: 10/24/2024   Last telemedicine visit with prescribing clinician: Visit date not found   Next office visit with prescribing clinician: 2/25/2025     Does the patient have less than a 3 day supply:  [x] Yes  [] No    Would you like a call back once the refill request has been completed: [x] Yes [] No    If the office needs to give you a call back, can they leave a voicemail: [x] Yes [] No    Chandu Low Rep   12/11/24 16:22 EST

## 2024-12-12 RX ORDER — PREGABALIN 300 MG/1
300 CAPSULE ORAL 2 TIMES DAILY PRN
Qty: 60 CAPSULE | Refills: 0 | Status: SHIPPED | OUTPATIENT
Start: 2024-12-12

## 2025-01-08 DIAGNOSIS — M50.30 DDD (DEGENERATIVE DISC DISEASE), CERVICAL: ICD-10-CM

## 2025-01-08 RX ORDER — LIDOCAINE 50 MG/G
1 PATCH TOPICAL EVERY 24 HOURS
Qty: 90 PATCH | Refills: 3 | Status: SHIPPED | OUTPATIENT
Start: 2025-01-08

## 2025-01-08 NOTE — TELEPHONE ENCOUNTER
Caller: Susan Hernandez Art    Relationship: Self    Best call back number: [phone]    Requested Prescriptions:   Requested Prescriptions     Pending Prescriptions Disp Refills    lidocaine (LIDODERM) 5 % 90 patch 3     Sig: Place 1 patch on the skin as directed by provider Daily. Remove & Discard patch within 12 hours or as directed by MD        Pharmacy where request should be sent: [place] [phone] [place] [phone]      Last office visit with prescribing clinician: 10/24/2024   Last telemedicine visit with prescribing clinician: Visit date not found   Next office visit with prescribing clinician: 2/25/2025     Does the patient have less than a 3 day supply:  [x] Yes  [] No    Would you like a call back once the refill request has been completed: [] Yes [x] No    If the office needs to give you a call back, can they leave a voicemail: [] Yes [x] No    Chandu Wallaec Rep   01/08/25 14:02 EST

## 2025-01-14 DIAGNOSIS — E11.65 TYPE 2 DIABETES MELLITUS WITH HYPERGLYCEMIA, WITHOUT LONG-TERM CURRENT USE OF INSULIN: ICD-10-CM

## 2025-01-14 DIAGNOSIS — E03.9 ACQUIRED HYPOTHYROIDISM: ICD-10-CM

## 2025-01-14 DIAGNOSIS — M50.30 DDD (DEGENERATIVE DISC DISEASE), CERVICAL: ICD-10-CM

## 2025-01-14 RX ORDER — PIOGLITAZONE 15 MG/1
15 TABLET ORAL DAILY
Qty: 90 TABLET | Refills: 1 | Status: SHIPPED | OUTPATIENT
Start: 2025-01-14

## 2025-01-14 RX ORDER — DULOXETIN HYDROCHLORIDE 30 MG/1
30 CAPSULE, DELAYED RELEASE ORAL DAILY
Qty: 90 CAPSULE | Refills: 3 | Status: SHIPPED | OUTPATIENT
Start: 2025-01-14

## 2025-01-14 RX ORDER — LEVOTHYROXINE SODIUM 25 UG/1
25 TABLET ORAL DAILY
Qty: 90 TABLET | Refills: 1 | Status: SHIPPED | OUTPATIENT
Start: 2025-01-14

## 2025-01-14 RX ORDER — PREGABALIN 300 MG/1
300 CAPSULE ORAL 2 TIMES DAILY PRN
Qty: 60 CAPSULE | Refills: 0 | Status: SHIPPED | OUTPATIENT
Start: 2025-01-14

## 2025-01-14 NOTE — TELEPHONE ENCOUNTER
Caller: Susan Hernandez Art    Relationship: Self    Best call back number: [phone]    Requested Prescriptions:   Requested Prescriptions     Pending Prescriptions Disp Refills    pregabalin (Lyrica) 300 MG capsule 60 capsule 0     Sig: Take 1 capsule by mouth 2 (Two) Times a Day As Needed (pain).    DULoxetine (CYMBALTA) 30 MG capsule 90 capsule 3     Sig: Take 1 capsule by mouth Daily.    pioglitazone (Actos) 15 MG tablet 90 tablet 1     Sig: Take 1 tablet by mouth Daily.    levothyroxine (Synthroid) 25 MCG tablet 90 tablet 1     Sig: Take 1 tablet by mouth Daily.        Pharmacy where request should be sent: [place] [phone] [place], [place] [phone]      Last office visit with prescribing clinician: 10/24/2024   Last telemedicine visit with prescribing clinician: Visit date not found   Next office visit with prescribing clinician: 2/25/2025     Does the patient have less than a 3 day supply:  [x] Yes  [] No    Would you like a call back once the refill request has been completed: [] Yes [x] No    If the office needs to give you a call back, can they leave a voicemail: [] Yes [x] No    Chandu Wallace Rep   01/14/25 08:47 EST

## 2025-01-14 NOTE — TELEPHONE ENCOUNTER
Request for Controlled Substance      Date of Request: 1/14/2025  Medication: Lyrica   Last OV: 10/24/24  Next OV: 2/25/25  Last UDS: 6/24/24  Last Narcotic Consent: 6/24/24

## 2025-02-03 ENCOUNTER — TRANSCRIBE ORDERS (OUTPATIENT)
Dept: ADMINISTRATIVE | Facility: HOSPITAL | Age: 75
End: 2025-02-03
Payer: MEDICARE

## 2025-02-03 DIAGNOSIS — Z12.31 VISIT FOR SCREENING MAMMOGRAM: Primary | ICD-10-CM

## 2025-02-06 NOTE — TELEPHONE ENCOUNTER
PROGRESS NOTE         Subjective     Janette is a 7 year old  female here for Office Visit (Follow up from [facility])    Presents with: father    The patient is here today for a follow-up visit.    She was recently in the [facility] 2 days ago with fever, sinus congestion, and cough. She tested negative for influenza and COVID-19. The child is overall doing better with regards to cough and congestion, with no more fever or chills.    For precocious puberty, they are also working with Dr. Coreas. The child is on Lupron. They are working with healthy diet and exercise.    She is continuing to work with therapists for speech and developmental delay.      Review of Systems  As documented above.    SDOH Never Smoker          Objective   Vitals:    02/06/25 1557   Pulse: 94   Resp: 20   Temp: 98.7 °F (37.1 °C)   TempSrc: Temporal   SpO2: 98%   Weight: 46.8 kg (103 lb 2.8 oz)   Height: 4' 5.78\" (1.366 m)   PainSc:  0   BMI (Calculated): 25.08     Physical Exam  GENERAL:  Well appearing.  Alert, active and consolable.  SKIN:  Warm, normal turgor. No cyanosis. No rash.  HEAD: Normocephalic, atraumatic.    EYES: Conjunctivae appear normal, non-injected, non-icteric  NOSE: Appears normal without drainage.  EARS:  Normal external auditory canals. Tympanic Membranes are transparent with normal landmarks.   THROAT: Moist mucous membranes without lesions.  NECK: Supple, no lymphadenopathy or masses.  HEART: Regular rate and rhythm. Normal S1, S2. No murmurs, rubs, gallops.   LUNGS: Normal work effort with breathing. Clear to auscultation. No wheezes, rales, rhonchi.   ABDOMEN: Bowel sounds present. Soft, nontender. No hepatomegaly.  No splenomegaly. No masses.  EXTREMITIES: Normal bilateral range of motion of upper and lower extremities. Equal femoral pulses. Peripheral pulses 2/4.  BACK: Spine straight.    NEUROLOGIC:  Normal muscle tone, bulk, and symmetrical strength. Symmetrical facial motor function. Normal deep tendon reflexes.        PT(PATIENT) ALSO REQUESTED AN EYE DROP REFILL    NO ACTIVE MEDICATION IN PT(PATIENT) CHART FOR EYE DROPS     PROVIDER PLEASE ADVISE     ASSESSMENT AND PLAN        1. Severe obesity.  Continue management as per pediatric endocrinologist.    2. Central precocious puberty.  Currently on Lupron. Continue management as per pediatric endocrinologist.    3. Speech delay.  Continue with therapies.    4. Viral upper respiratory infection.  Continue with supportive care.    1. Severe obesity with body mass index (BMI) greater than 99th percentile for age in childhood (CMD)  2. Central precocious puberty  (CMD)  3. Speech delay  4. Viral URI

## 2025-02-10 DIAGNOSIS — M50.30 DDD (DEGENERATIVE DISC DISEASE), CERVICAL: ICD-10-CM

## 2025-02-10 DIAGNOSIS — E11.65 TYPE 2 DIABETES MELLITUS WITH HYPERGLYCEMIA, WITHOUT LONG-TERM CURRENT USE OF INSULIN: ICD-10-CM

## 2025-02-10 RX ORDER — PREGABALIN 300 MG/1
300 CAPSULE ORAL 2 TIMES DAILY PRN
Qty: 60 CAPSULE | Refills: 0 | Status: ON HOLD | OUTPATIENT
Start: 2025-02-10

## 2025-02-10 NOTE — TELEPHONE ENCOUNTER
Caller: Susan Hernandez Art    Relationship: Self    Best call back number: [phone]     Requested Prescriptions:   Requested Prescriptions     Pending Prescriptions Disp Refills    pregabalin (Lyrica) 300 MG capsule 60 capsule 0     Sig: Take 1 capsule by mouth 2 (Two) Times a Day As Needed (pain).    metFORMIN (GLUCOPHAGE) 500 MG tablet 180 tablet 3     Sig: Take 1 tablet by mouth 2 (Two) Times a Day With Meals.        Pharmacy where request should be sent: [place] [phone] [place], [place] [phone]      Last office visit with prescribing clinician: 10/24/2024   Last telemedicine visit with prescribing clinician: Visit date not found   Next office visit with prescribing clinician: 2/25/2025     Additional details provided by patient:     Does the patient have less than a 3 day supply:  [x] Yes  [] No    Would you like a call back once the refill request has been completed: [x] Yes [] No    If the office needs to give you a call back, can they leave a voicemail: [] Yes [x] No    Chandu Rizo Rep   02/10/25 13:03 EST

## 2025-02-10 NOTE — TELEPHONE ENCOUNTER
Refill Request for Controlled Substance    Date of Request: 2/10/2025  Medication Requested: LYRICA  Last UDS: 06/24/2024  Last Consent: 06/24/2024  Last OV: 10/24/2024  Next OV: 02/25/2025

## 2025-02-13 ENCOUNTER — HOSPITAL ENCOUNTER (OUTPATIENT)
Dept: MAMMOGRAPHY | Facility: HOSPITAL | Age: 75
Discharge: HOME | End: 2025-02-13
Admitting: INTERNAL MEDICINE
Payer: MEDICARE

## 2025-02-13 DIAGNOSIS — Z12.31 VISIT FOR SCREENING MAMMOGRAM: ICD-10-CM

## 2025-02-13 PROCEDURE — 77063 BREAST TOMOSYNTHESIS BI: CPT

## 2025-02-13 PROCEDURE — 77067 SCR MAMMO BI INCL CAD: CPT

## 2025-02-16 ENCOUNTER — APPOINTMENT (OUTPATIENT)
Dept: GENERAL RADIOLOGY | Facility: HOSPITAL | Age: 75
DRG: 389 | End: 2025-02-16
Payer: MEDICARE

## 2025-02-16 ENCOUNTER — APPOINTMENT (OUTPATIENT)
Dept: CT IMAGING | Facility: HOSPITAL | Age: 75
DRG: 389 | End: 2025-02-16
Payer: MEDICARE

## 2025-02-16 ENCOUNTER — HOSPITAL ENCOUNTER (INPATIENT)
Facility: HOSPITAL | Age: 75
LOS: 6 days | Discharge: HOME | DRG: 389 | End: 2025-02-22
Attending: EMERGENCY MEDICINE | Admitting: HOSPITALIST
Payer: MEDICARE

## 2025-02-16 DIAGNOSIS — K56.609 SBO (SMALL BOWEL OBSTRUCTION): ICD-10-CM

## 2025-02-16 DIAGNOSIS — E87.20 LACTIC ACIDOSIS: ICD-10-CM

## 2025-02-16 DIAGNOSIS — K56.609 SMALL BOWEL OBSTRUCTION: Primary | ICD-10-CM

## 2025-02-16 LAB
ACETONE BLD QL: ABNORMAL
ALBUMIN SERPL-MCNC: 3.9 G/DL (ref 3.5–5.2)
ALBUMIN/GLOB SERPL: 1.3 G/DL
ALP SERPL-CCNC: 66 U/L (ref 39–117)
ALT SERPL W P-5'-P-CCNC: 19 U/L (ref 1–33)
ANION GAP SERPL CALCULATED.3IONS-SCNC: 19.7 MMOL/L (ref 5–15)
AST SERPL-CCNC: 20 U/L (ref 1–32)
BASOPHILS # BLD AUTO: 0.03 10*3/MM3 (ref 0–0.2)
BASOPHILS NFR BLD AUTO: 0.5 % (ref 0–1.5)
BILIRUB SERPL-MCNC: 1.3 MG/DL (ref 0–1.2)
BILIRUB UR QL STRIP: NEGATIVE
BUN SERPL-MCNC: 25 MG/DL (ref 8–23)
BUN/CREAT SERPL: 23.4 (ref 7–25)
CALCIUM SPEC-SCNC: 9.2 MG/DL (ref 8.6–10.5)
CHLORIDE SERPL-SCNC: 99 MMOL/L (ref 98–107)
CLARITY UR: CLEAR
CO2 SERPL-SCNC: 21.3 MMOL/L (ref 22–29)
COLOR UR: YELLOW
CREAT SERPL-MCNC: 1.07 MG/DL (ref 0.57–1)
D-LACTATE SERPL-SCNC: 1.6 MMOL/L (ref 0.5–2)
D-LACTATE SERPL-SCNC: 4.1 MMOL/L (ref 0.5–2)
D-LACTATE SERPL-SCNC: 6.5 MMOL/L (ref 0.5–2)
DEPRECATED RDW RBC AUTO: 49.7 FL (ref 37–54)
EGFRCR SERPLBLD CKD-EPI 2021: 54.6 ML/MIN/1.73
EOSINOPHIL # BLD AUTO: 0.16 10*3/MM3 (ref 0–0.4)
EOSINOPHIL NFR BLD AUTO: 2.6 % (ref 0.3–6.2)
ERYTHROCYTE [DISTWIDTH] IN BLOOD BY AUTOMATED COUNT: 13.7 % (ref 12.3–15.4)
GLOBULIN UR ELPH-MCNC: 3 GM/DL
GLUCOSE BLDC GLUCOMTR-MCNC: 198 MG/DL (ref 70–99)
GLUCOSE SERPL-MCNC: 272 MG/DL (ref 65–99)
GLUCOSE UR STRIP-MCNC: ABNORMAL MG/DL
HCT VFR BLD AUTO: 46.4 % (ref 34–46.6)
HGB BLD-MCNC: 15.2 G/DL (ref 12–15.9)
HGB UR QL STRIP.AUTO: NEGATIVE
HOLD SPECIMEN: NORMAL
HOLD SPECIMEN: NORMAL
IMM GRANULOCYTES # BLD AUTO: 0.01 10*3/MM3 (ref 0–0.05)
IMM GRANULOCYTES NFR BLD AUTO: 0.2 % (ref 0–0.5)
KETONES UR QL STRIP: ABNORMAL
LEUKOCYTE ESTERASE UR QL STRIP.AUTO: NEGATIVE
LIPASE SERPL-CCNC: 21 U/L (ref 13–60)
LYMPHOCYTES # BLD AUTO: 0.77 10*3/MM3 (ref 0.7–3.1)
LYMPHOCYTES NFR BLD AUTO: 12.5 % (ref 19.6–45.3)
MAGNESIUM SERPL-MCNC: 1.5 MG/DL (ref 1.6–2.4)
MCH RBC QN AUTO: 31.8 PG (ref 26.6–33)
MCHC RBC AUTO-ENTMCNC: 32.8 G/DL (ref 31.5–35.7)
MCV RBC AUTO: 97.1 FL (ref 79–97)
MONOCYTES # BLD AUTO: 0.51 10*3/MM3 (ref 0.1–0.9)
MONOCYTES NFR BLD AUTO: 8.3 % (ref 5–12)
NEUTROPHILS NFR BLD AUTO: 4.7 10*3/MM3 (ref 1.7–7)
NEUTROPHILS NFR BLD AUTO: 75.9 % (ref 42.7–76)
NITRITE UR QL STRIP: NEGATIVE
NRBC BLD AUTO-RTO: 0 /100 WBC (ref 0–0.2)
PH UR STRIP.AUTO: 5.5 [PH] (ref 5–8)
PHOSPHATE SERPL-MCNC: 4.1 MG/DL (ref 2.5–4.5)
PLATELET # BLD AUTO: 200 10*3/MM3 (ref 140–450)
PMV BLD AUTO: 10.9 FL (ref 6–12)
POTASSIUM SERPL-SCNC: 4.2 MMOL/L (ref 3.5–5.2)
PROT SERPL-MCNC: 6.9 G/DL (ref 6–8.5)
PROT UR QL STRIP: NEGATIVE
RBC # BLD AUTO: 4.78 10*6/MM3 (ref 3.77–5.28)
SODIUM SERPL-SCNC: 140 MMOL/L (ref 136–145)
SP GR UR STRIP: >1.03 (ref 1–1.03)
UROBILINOGEN UR QL STRIP: ABNORMAL
WBC NRBC COR # BLD AUTO: 6.18 10*3/MM3 (ref 3.4–10.8)
WHOLE BLOOD HOLD COAG: NORMAL
WHOLE BLOOD HOLD SPECIMEN: NORMAL

## 2025-02-16 PROCEDURE — 36415 COLL VENOUS BLD VENIPUNCTURE: CPT | Performed by: EMERGENCY MEDICINE

## 2025-02-16 PROCEDURE — 99222 1ST HOSP IP/OBS MODERATE 55: CPT | Performed by: SURGERY

## 2025-02-16 PROCEDURE — 74018 RADEX ABDOMEN 1 VIEW: CPT

## 2025-02-16 PROCEDURE — 74177 CT ABD & PELVIS W/CONTRAST: CPT

## 2025-02-16 PROCEDURE — 83605 ASSAY OF LACTIC ACID: CPT | Performed by: EMERGENCY MEDICINE

## 2025-02-16 PROCEDURE — 99223 1ST HOSP IP/OBS HIGH 75: CPT | Performed by: HOSPITALIST

## 2025-02-16 PROCEDURE — 99285 EMERGENCY DEPT VISIT HI MDM: CPT

## 2025-02-16 PROCEDURE — 25010000002 HYDROMORPHONE 1 MG/ML SOLUTION: Performed by: EMERGENCY MEDICINE

## 2025-02-16 PROCEDURE — 81003 URINALYSIS AUTO W/O SCOPE: CPT | Performed by: EMERGENCY MEDICINE

## 2025-02-16 PROCEDURE — 80053 COMPREHEN METABOLIC PANEL: CPT | Performed by: EMERGENCY MEDICINE

## 2025-02-16 PROCEDURE — 25510000001 IOPAMIDOL PER 1 ML: Performed by: EMERGENCY MEDICINE

## 2025-02-16 PROCEDURE — 83735 ASSAY OF MAGNESIUM: CPT | Performed by: HOSPITALIST

## 2025-02-16 PROCEDURE — 25810000003 LACTATED RINGERS SOLUTION: Performed by: EMERGENCY MEDICINE

## 2025-02-16 PROCEDURE — 25810000003 LACTATED RINGERS PER 1000 ML: Performed by: HOSPITALIST

## 2025-02-16 PROCEDURE — 25010000002 HYDROMORPHONE 1 MG/ML SOLUTION: Performed by: HOSPITALIST

## 2025-02-16 PROCEDURE — 82948 REAGENT STRIP/BLOOD GLUCOSE: CPT

## 2025-02-16 PROCEDURE — 83690 ASSAY OF LIPASE: CPT | Performed by: EMERGENCY MEDICINE

## 2025-02-16 PROCEDURE — 85025 COMPLETE CBC W/AUTO DIFF WBC: CPT | Performed by: EMERGENCY MEDICINE

## 2025-02-16 PROCEDURE — 84100 ASSAY OF PHOSPHORUS: CPT | Performed by: HOSPITALIST

## 2025-02-16 PROCEDURE — 82009 KETONE BODYS QUAL: CPT | Performed by: HOSPITALIST

## 2025-02-16 PROCEDURE — 25010000002 MAGNESIUM SULFATE 2 GM/50ML SOLUTION: Performed by: HOSPITALIST

## 2025-02-16 RX ORDER — DEXTROSE MONOHYDRATE 25 G/50ML
25 INJECTION, SOLUTION INTRAVENOUS
Status: DISCONTINUED | OUTPATIENT
Start: 2025-02-16 | End: 2025-02-22 | Stop reason: HOSPADM

## 2025-02-16 RX ORDER — HYDRALAZINE HYDROCHLORIDE 20 MG/ML
10 INJECTION INTRAMUSCULAR; INTRAVENOUS EVERY 6 HOURS PRN
Status: DISCONTINUED | OUTPATIENT
Start: 2025-02-16 | End: 2025-02-22 | Stop reason: HOSPADM

## 2025-02-16 RX ORDER — PANTOPRAZOLE SODIUM 40 MG/10ML
40 INJECTION, POWDER, LYOPHILIZED, FOR SOLUTION INTRAVENOUS
Status: DISCONTINUED | OUTPATIENT
Start: 2025-02-17 | End: 2025-02-22 | Stop reason: HOSPADM

## 2025-02-16 RX ORDER — ONDANSETRON 2 MG/ML
4 INJECTION INTRAMUSCULAR; INTRAVENOUS ONCE
Status: DISCONTINUED | OUTPATIENT
Start: 2025-02-16 | End: 2025-02-22 | Stop reason: HOSPADM

## 2025-02-16 RX ORDER — IBUPROFEN 600 MG/1
1 TABLET ORAL
Status: DISCONTINUED | OUTPATIENT
Start: 2025-02-16 | End: 2025-02-22 | Stop reason: HOSPADM

## 2025-02-16 RX ORDER — ONDANSETRON 4 MG/1
4 TABLET, ORALLY DISINTEGRATING ORAL EVERY 6 HOURS PRN
Status: DISCONTINUED | OUTPATIENT
Start: 2025-02-16 | End: 2025-02-17

## 2025-02-16 RX ORDER — IOPAMIDOL 755 MG/ML
100 INJECTION, SOLUTION INTRAVASCULAR
Status: COMPLETED | OUTPATIENT
Start: 2025-02-16 | End: 2025-02-16

## 2025-02-16 RX ORDER — SODIUM CHLORIDE 0.9 % (FLUSH) 0.9 %
10 SYRINGE (ML) INJECTION EVERY 12 HOURS SCHEDULED
Status: DISCONTINUED | OUTPATIENT
Start: 2025-02-16 | End: 2025-02-22 | Stop reason: HOSPADM

## 2025-02-16 RX ORDER — SODIUM CHLORIDE 0.9 % (FLUSH) 0.9 %
10 SYRINGE (ML) INJECTION AS NEEDED
Status: DISCONTINUED | OUTPATIENT
Start: 2025-02-16 | End: 2025-02-22 | Stop reason: HOSPADM

## 2025-02-16 RX ORDER — SODIUM CHLORIDE 9 MG/ML
40 INJECTION, SOLUTION INTRAVENOUS AS NEEDED
Status: DISCONTINUED | OUTPATIENT
Start: 2025-02-16 | End: 2025-02-22 | Stop reason: HOSPADM

## 2025-02-16 RX ORDER — ONDANSETRON 2 MG/ML
4 INJECTION INTRAMUSCULAR; INTRAVENOUS EVERY 6 HOURS PRN
Status: DISCONTINUED | OUTPATIENT
Start: 2025-02-16 | End: 2025-02-17

## 2025-02-16 RX ORDER — SODIUM CHLORIDE, SODIUM LACTATE, POTASSIUM CHLORIDE, CALCIUM CHLORIDE 600; 310; 30; 20 MG/100ML; MG/100ML; MG/100ML; MG/100ML
100 INJECTION, SOLUTION INTRAVENOUS CONTINUOUS
Status: ACTIVE | OUTPATIENT
Start: 2025-02-16 | End: 2025-02-17

## 2025-02-16 RX ORDER — NICOTINE POLACRILEX 4 MG
15 LOZENGE BUCCAL
Status: DISCONTINUED | OUTPATIENT
Start: 2025-02-16 | End: 2025-02-22 | Stop reason: HOSPADM

## 2025-02-16 RX ORDER — CLINDAMYCIN PHOSPHATE 11.9 MG/ML
1 SOLUTION TOPICAL 2 TIMES DAILY PRN
COMMUNITY
End: 2025-02-25

## 2025-02-16 RX ORDER — NALOXONE HCL 0.4 MG/ML
0.4 VIAL (ML) INJECTION
Status: DISCONTINUED | OUTPATIENT
Start: 2025-02-16 | End: 2025-02-22 | Stop reason: HOSPADM

## 2025-02-16 RX ORDER — MAGNESIUM SULFATE HEPTAHYDRATE 40 MG/ML
2 INJECTION, SOLUTION INTRAVENOUS ONCE
Status: COMPLETED | OUTPATIENT
Start: 2025-02-16 | End: 2025-02-16

## 2025-02-16 RX ADMIN — SODIUM CHLORIDE, SODIUM LACTATE, POTASSIUM CHLORIDE, CALCIUM CHLORIDE 1000 ML: 20; 30; 600; 310 INJECTION, SOLUTION INTRAVENOUS at 11:29

## 2025-02-16 RX ADMIN — HYDROMORPHONE HYDROCHLORIDE 1 MG: 1 INJECTION, SOLUTION INTRAMUSCULAR; INTRAVENOUS; SUBCUTANEOUS at 20:07

## 2025-02-16 RX ADMIN — MAGNESIUM SULFATE HEPTAHYDRATE 2 G: 40 INJECTION, SOLUTION INTRAVENOUS at 20:08

## 2025-02-16 RX ADMIN — IOPAMIDOL 100 ML: 755 INJECTION, SOLUTION INTRAVENOUS at 13:15

## 2025-02-16 RX ADMIN — HYDROMORPHONE HYDROCHLORIDE 1 MG: 1 INJECTION, SOLUTION INTRAMUSCULAR; INTRAVENOUS; SUBCUTANEOUS at 16:42

## 2025-02-16 RX ADMIN — Medication 10 ML: at 20:08

## 2025-02-16 RX ADMIN — HYDROMORPHONE HYDROCHLORIDE 0.5 MG: 1 INJECTION, SOLUTION INTRAMUSCULAR; INTRAVENOUS; SUBCUTANEOUS at 14:33

## 2025-02-16 RX ADMIN — Medication 10 ML: at 13:59

## 2025-02-16 RX ADMIN — SODIUM CHLORIDE, SODIUM LACTATE, POTASSIUM CHLORIDE, CALCIUM CHLORIDE 100 ML/HR: 20; 30; 600; 310 INJECTION, SOLUTION INTRAVENOUS at 14:35
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
## 2025-02-16 NOTE — ED PROVIDER NOTES
Time: 2:16 PM EST  Date of encounter:  2/16/2025  Independent Historian/Clinical History and Information was obtained by:   Patient and Family  Chief Complaint: Abdominal pain    History is limited by: N/A    History of Present Illness:  Patient is a 74 y.o. year old female who presents to the emergency department for evaluation of abdominal pain.  Patient reports she started having abdominal pain last night around 7 PM.  Patient reports that to be severe at this point.  Patient is the pain is located all over her abdomen.  Patient describes as a squeezing and needle poking type of pain.  Patient also reports she is having vomiting x 3 last night and additional 3 times this morning.  She has had no diarrhea.  She denies any fevers.  Patient has a significant past medical history of colon cancer with prior colon resection.    Patient Care Team  Primary Care Provider: Rigoberto Gomes Jr., MD    Past Medical History:     Allergies   Allergen Reactions    Aspirin Hives and Unknown - High Severity    Penicillins Unknown - High Severity    Penicillin V Potassium Rash     Past Medical History:   Diagnosis Date    Arthritis     Cancer     Colon cancer     Diabetes mellitus, type 2     Disease of thyroid gland     GERD (gastroesophageal reflux disease)     Lumbar pain     Small bowel obstruction      Past Surgical History:   Procedure Laterality Date    APPENDECTOMY      COLON SURGERY  2011,2006    CANCER, CHEMO    COLONOSCOPY      COLONOSCOPY N/A 07/19/2022    Procedure: COLONOSCOPY WITH POLYPECTOMIES;  Surgeon: Manny Velazquez MD;  Location: [facility] ENDOSCOPY;  Service: Gastroenterology;  Laterality: N/A;  COLON POLYPS    COLONOSCOPY N/A 04/02/2024    Procedure: COLONOSCOPY COLD SNARE POLYPECTOMY;  Surgeon: Manny Velazquez MD;  Location: [facility] ENDOSCOPY;  Service: Gastroenterology;  Laterality: N/A;  POOR PREP, COLON POLYP, PREVIOUS SURGERY    COLONOSCOPY N/A 5/20/2024    Procedure: COLONOSCOPY;  Surgeon:  Manny Velazquez MD;  Location: [facility] ENDOSCOPY;  Service: Gastroenterology;  Laterality: N/A;  PREVIOUS SURGERY    ENDOSCOPY N/A 04/02/2024    Procedure: ESOPHAGOGASTRODUODENOSCOPY WITH BIOPSIES;  Surgeon: Manny Velazquez MD;  Location: [facility] ENDOSCOPY;  Service: Gastroenterology;  Laterality: N/A;  ERROSIVE GASTRITIS    HYSTERECTOMY  2004    LYSIS OF ABDOMINAL ADHESIONS       Family History   Adopted: Yes   Problem Relation Age of Onset    Breast cancer Brother     Other Brother         RENAL CANCER    Colon cancer Neg Hx        Home Medications:  Prior to Admission medications    Medication Sig Start Date End Date Taking? Authorizing Provider   atorvastatin (LIPITOR) 10 MG tablet Take 1 tablet by mouth Every Night. 10/24/24   Rigoberto Gomes Jr., MD   carboxymethylcellulose (Refresh Plus) 0.5 % solution Administer 2 drops to both eyes 3 (Three) Times a Day As Needed for Dry Eyes. 6/24/24   Rigoberto Gomes Jr., MD   cholecalciferol (VITAMIN D3) 25 MCG (1000 UT) tablet Take 1 tablet by mouth 2 (Two) Times a Day. 2/5/24   Rigoberto Gomes Jr., MD   clindamycin (CLEOCIN T) 1 % external solution     Provider, MD Jaye   Diclofenac Sodium (VOLTAREN) 1 % gel gel Apply 4 g topically to the appropriate area as directed 4 (Four) Times a Day As Needed (pain). 1/9/23   Rigoberto Gomes Jr., MD   DULoxetine (CYMBALTA) 30 MG capsule Take 1 capsule by mouth Daily. 1/14/25   Rigoberto Gomes Jr., MD   empagliflozin (Jardiance) 25 MG tablet tablet Take 1 tablet by mouth Daily. 2/20/24   Rigoberto Gomes Jr., MD   glipizide (Glucotrol) 10 MG tablet Take 1 tablet by mouth 2 (Two) Times a Day Before Meals. 2/20/24   Rigoberto Gomes Jr., MD   levothyroxine (Synthroid) 25 MCG tablet Take 1 tablet by mouth Daily. 1/14/25   Rigoberto Gomes Jr., MD   lidocaine (LIDODERM) 5 % Place 1 patch on the skin as directed by provider Daily. Remove & Discard patch within  12 hours or as directed by MD 1/8/25   Rigoberto Gomes Jr., MD   metFORMIN (GLUCOPHAGE) 500 MG tablet Take 1 tablet by mouth 2 (Two) Times a Day With Meals. 2/10/25   Rigoberto Gomes Jr., MD   multivitamins-minerals (PRESERVISION AREDS 2) capsule capsule  9/12/23   Jaye Wynne MD   Omega-3 Fatty Acids (fish oil) 1000 MG capsule capsule Take 1 capsule by mouth Daily With Breakfast.    Jaye Wynne MD   omeprazole (priLOSEC) 40 MG capsule Take 1 capsule by mouth Daily. 2/20/24   Rigoberto Gomes Jr., MD   pantoprazole (PROTONIX) 40 MG EC tablet Take 1 tablet by mouth Daily.    Jaye Wynne MD   pioglitazone (Actos) 15 MG tablet Take 1 tablet by mouth Daily. 1/14/25   Rigoberto Gomes Jr., MD   pregabalin (Lyrica) 300 MG capsule Take 1 capsule by mouth 2 (Two) Times a Day As Needed (pain). 2/10/25   Rigoberto Gomes Jr., MD   SITagliptin (Januvia) 100 MG tablet Take 1 tablet by mouth Daily. 10/24/24   Rigoberto Gomes Jr., MD   traMADol (ULTRAM) 50 MG tablet  7/25/23   Jaye Wynne MD        Social History:   Social History     Tobacco Use    Smoking status: Former     Current packs/day: 0.00     Average packs/day: 2.0 packs/day for 38.0 years (76.0 ttl pk-yrs)     Types: Cigarettes     Start date: 1972     Quit date: 2010     Years since quitting: 15.1     Passive exposure: Past    Smokeless tobacco: Never   Vaping Use    Vaping status: Never Used   Substance Use Topics    Alcohol use: Not Currently     Comment: last drink 1978    Drug use: Never         Review of Systems:  Review of Systems   Constitutional:  Negative for chills and fever.   HENT:  Negative for congestion, ear pain and sore throat.    Eyes:  Negative for pain.   Respiratory:  Negative for cough, chest tightness and shortness of breath.    Cardiovascular:  Negative for chest pain.   Gastrointestinal:  Positive for abdominal distention, abdominal pain, nausea and vomiting.  "Negative for diarrhea.   Genitourinary:  Negative for flank pain and hematuria.   Musculoskeletal:  Negative for joint swelling.   Skin:  Negative for pallor.   Neurological:  Negative for seizures and headaches.   All other systems reviewed and are negative.       Physical Exam:  /69 (BP Location: Right arm, Patient Position: Lying)   Pulse 87   Temp 97.3 °F (36.3 °C) (Oral)   Resp 18   Ht 162.6 cm (64\")   Wt 68.1 kg (150 lb 2.1 oz)   SpO2 96%   BMI 25.77 kg/m²     Physical Exam    Vital signs were reviewed under triage note.  General appearance - Patient appears well-developed and well-nourished.  Patient is uncomfortable appearing.  Head - Normocephalic, atraumatic.  Pupils - Equal, round, reactive to light.  Extraocular muscles are intact.  Conjunctiva is clear.  Nasal - Normal inspection.  No evidence of trauma or epistaxis.  Tympanic membranes - Gray, intact without erythema or retractions.  Oral mucosa - Pink and moist without lesions or erythema.  Uvula is midline.  Chest wall - Atraumatic.  Chest wall is nontender.  There are no vesicular rashes noted.  Neck - Supple.  Trachea was midline.  There is no palpable lymphadenopathy or thyromegaly.  There are no meningeal signs  Lungs - Clear to auscultation and percussion bilaterally.  Heart - Regular rate and rhythm without any murmurs, clicks, or gallops.  Abdomen - Soft.  Bowel sounds are diminished.  Abdomen seems somewhat distended.  Patient has moderate diffuse tenderness with some guarding noted.  There is no palpable masses.   Back - Spine is straight and midline.  There is no CVA tenderness.  Extremities - Intact x4 with full range of motion.  There is no palpable edema.  Pulses are intact x4 and equal.  Neurologic - Patient is awake, alert, and oriented x3.  Cranial nerves II through XII are grossly intact.  Motor and sensory functions grossly intact.  Cerebellar function was normal.  Integument - There are no rashes.  There are no " petechia or purpura lesions noted.  There are no vesicular lesions noted.           Procedures:  Procedures      Medical Decision Making:      Comorbidities that affect care:    History of colon cancer with bowel resection, GERD, diabetes, hypothyroidism    External Notes reviewed:    Previous Clinic Note: Urgent care visit from earlier today was reviewed by me.      The following orders were placed and all results were independently analyzed by me:  Orders Placed This Encounter   Procedures    CT Abdomen Pelvis With Contrast    XR Abdomen KUB    XR Abdomen KUB    [place] Draw    Comprehensive Metabolic Panel    Lipase    Urinalysis With Microscopic If Indicated (No Culture) - Urine, Clean Catch    Lactic Acid, Plasma    CBC Auto Differential    STAT Lactic Acid, Reflex    STAT Lactic Acid, Reflex    Basic Metabolic Panel    Magnesium    Phosphorus    Acetone    CBC Auto Differential    Scan Slide    NPO Diet NPO Type: Ice Chips, Other (see comments)    Undress & Gown    Nasogastric tube insertion    NG Tube to Low Wall Suction    Vital Signs    Intake & Output    Weigh Patient    Oral Care    Saline Lock & Maintain IV Access    Place Sequential Compression Device    Maintain Sequential Compression Device    Opioid Administration - Document EtCO2 and / or SpO2 With Each Set of Vitals & Any Change in Patient Status    Opioid Administration - Notify Provider Hypercapnic Monitoring    Opioid Administration - Continuous Pulse Oximetry (SpO2)    Up In Chair    Ambulate In Daniels    Code Status and Medical Interventions: CPR (Attempt to Resuscitate); Full Support    Hospitalist (on-call MD unless specified)    Surgery (on-call MD unless specified)    PT Consult: Eval & Treat Functional Mobility Below Baseline    POC Glucose 4x Daily Before Meals & at Bedtime    POC Glucose Once    POC Glucose Once    POC Glucose Once    Insert Peripheral IV    Insert Peripheral IV    Initiate Observation Status    Inpatient Admission    CBC  & Differential    Green Top (Gel)    Lavender Top    Gold Top - SST    Light Blue Top    CBC & Differential       Medications Given in the Emergency Department:  Medications   sodium chloride 0.9 % flush 10 mL (10 mL Intravenous Given 2/16/25 1359)   ondansetron (ZOFRAN) injection 4 mg (0 mg Intravenous Hold 2/16/25 1156)   lactated ringers infusion (100 mL/hr Intravenous New Bag 2/17/25 0353)   sodium chloride 0.9 % flush 10 mL (10 mL Intravenous Given 2/17/25 0853)   sodium chloride 0.9 % flush 10 mL (has no administration in time range)   sodium chloride 0.9 % infusion 40 mL (has no administration in time range)   HYDROmorphone (DILAUDID) injection 1 mg (1 mg Intravenous Given 2/17/25 0936)     And   naloxone (NARCAN) injection 0.4 mg (has no administration in time range)   pantoprazole (PROTONIX) injection 40 mg (40 mg Intravenous Given 2/17/25 0537)   dextrose (GLUTOSE) oral gel 15 g (has no administration in time range)   dextrose (D50W) (25 g/50 mL) IV injection 25 g (has no administration in time range)   glucagon (GLUCAGEN) injection 1 mg (has no administration in time range)   insulin regular (humuLIN R,novoLIN R) injection 2-7 Units ( Subcutaneous Not Given 2/17/25 0707)   hydrALAZINE (APRESOLINE) injection 10 mg (has no administration in time range)   ondansetron ODT (ZOFRAN-ODT) disintegrating tablet 4 mg (has no administration in time range)     Or   ondansetron (ZOFRAN) injection 4 mg (has no administration in time range)   lactated ringers bolus 1,000 mL (0 mL Intravenous Stopped 2/16/25 1203)   iopamidol (ISOVUE-370) 76 % injection 100 mL (100 mL Intravenous Given 2/16/25 1315)   HYDROmorphone (DILAUDID) injection 0.5 mg (0.5 mg Intravenous Given 2/16/25 1433)   magnesium sulfate 2g/50 mL (PREMIX) infusion (2 g Intravenous New Bag 2/16/25 2008)        ED Course:     The patient was seen and evaluated in the ED by me.  The above history and physical examination was performed as documented.   Diagnostic data was obtained.  Results reviewed.  Patient was found to have small bowel obstruction.  NG was placed.  Patient given IV fluids.  Dr. Pichardo, general surgery, was consulted.  Hospital service was consulted for primary admission.  The patient and family are aware of the results and the need for admission.    Labs:    Lab Results (last 24 hours)       Procedure Component Value Units Date/Time    CBC & Differential [[id number]]  (Abnormal) Collected: 02/16/25 1051    Specimen: Blood Updated: 02/16/25 1058    Narrative:      The following orders were created for panel order CBC & Differential.  Procedure                               Abnormality         Status                     ---------                               -----------         ------                     CBC Auto Differential[[id number]]        Abnormal            Final result                 Please view results for these tests on the individual orders.    Comprehensive Metabolic Panel [[id number]]  (Abnormal) Collected: 02/16/25 1051    Specimen: Blood Updated: 02/16/25 1115     Glucose 272 mg/dL      BUN 25 mg/dL      Creatinine 1.07 mg/dL      Sodium 140 mmol/L      Potassium 4.2 mmol/L      Chloride 99 mmol/L      CO2 21.3 mmol/L      Calcium 9.2 mg/dL      Total Protein 6.9 g/dL      Albumin 3.9 g/dL      ALT (SGPT) 19 U/L      AST (SGOT) 20 U/L      Alkaline Phosphatase 66 U/L      Total Bilirubin 1.3 mg/dL      Globulin 3.0 gm/dL      A/G Ratio 1.3 g/dL      BUN/Creatinine Ratio 23.4     Anion Gap 19.7 mmol/L      eGFR 54.6 mL/min/1.73     Narrative:      GFR Categories in Chronic Kidney Disease (CKD)      GFR Category          GFR (mL/min/1.73)    Interpretation  G1                     90 or greater         Normal or high (1)  G2                      60-89                Mild decrease (1)  G3a                   45-59                Mild to moderate decrease  G3b                   30-44                Moderate to severe decrease  G4                     15-29                Severe decrease  G5                    14 or less           Kidney failure          (1)In the absence of evidence of kidney disease, neither GFR category G1 or G2 fulfill the criteria for CKD.    eGFR calculation 2021 CKD-EPI creatinine equation, which does not include race as a factor    Lipase [[id number]]  (Normal) Collected: 02/16/25 1051    Specimen: Blood Updated: 02/16/25 1115     Lipase 21 U/L     Lactic Acid, Plasma [[id number]]  (Abnormal) Collected: 02/16/25 1051    Specimen: Blood Updated: 02/16/25 1124     Lactate 6.5 mmol/L     CBC Auto Differential [[id number]]  (Abnormal) Collected: 02/16/25 1051    Specimen: Blood Updated: 02/16/25 1058     WBC 6.18 10*3/mm3      RBC 4.78 10*6/mm3      Hemoglobin 15.2 g/dL      Hematocrit 46.4 %      MCV 97.1 fL      MCH 31.8 pg      MCHC 32.8 g/dL      RDW 13.7 %      RDW-SD 49.7 fl      MPV 10.9 fL      Platelets 200 10*3/mm3      Neutrophil % 75.9 %      Lymphocyte % 12.5 %      Monocyte % 8.3 %      Eosinophil % 2.6 %      Basophil % 0.5 %      Immature Grans % 0.2 %      Neutrophils, Absolute 4.70 10*3/mm3      Lymphocytes, Absolute 0.77 10*3/mm3      Monocytes, Absolute 0.51 10*3/mm3      Eosinophils, Absolute 0.16 10*3/mm3      Basophils, Absolute 0.03 10*3/mm3      Immature Grans, Absolute 0.01 10*3/mm3      nRBC 0.0 /100 WBC     Urinalysis With Microscopic If Indicated (No Culture) - Urine, Clean Catch [[id number]]  (Abnormal) Collected: 02/16/25 1207    Specimen: Urine, Clean Catch Updated: 02/16/25 1219     Color, UA Yellow     Appearance, UA Clear     pH, UA 5.5     Specific Gravity, UA >1.030     Glucose, UA >=1000 mg/dL (3+)     Ketones, UA Trace     Bilirubin, UA Negative     Blood, UA Negative     Protein, UA Negative     Leuk Esterase, UA Negative     Nitrite, UA Negative     Urobilinogen, UA 0.2 E.U./dL    Narrative:      Urine microscopic not indicated.    STAT Lactic Acid, Reflex [[id number]]  (Abnormal) Collected:  02/16/25 1359    Specimen: Blood Updated: 02/16/25 1455     Lactate 4.1 mmol/L     STAT Lactic Acid, Reflex [[id number]]  (Normal) Collected: 02/16/25 1707    Specimen: Blood from Hand, Right Updated: 02/16/25 1735     Lactate 1.6 mmol/L     Magnesium [[id number]]  (Abnormal) Collected: 02/16/25 1707    Specimen: Blood from Hand, Right Updated: 02/16/25 1734     Magnesium 1.5 mg/dL     Phosphorus [[id number]]  (Normal) Collected: 02/16/25 1707    Specimen: Blood from Hand, Right Updated: 02/16/25 1734     Phosphorus 4.1 mg/dL     Acetone [[id number]]  (Abnormal) Collected: 02/16/25 1707    Specimen: Blood from Hand, Right Updated: 02/16/25 1729     Acetone Small    POC Glucose Once [[id number]]  (Abnormal) Collected: 02/16/25 1725    Specimen: Blood Updated: 02/16/25 1728     Glucose 198 mg/dL      Comment: Serial Number: [id number]:  [id number]       POC Glucose Once [[id number]]  (Abnormal) Collected: 02/17/25 0046    Specimen: Blood Updated: 02/17/25 0049     Glucose 180 mg/dL      Comment: Serial Number: [id number]:  [id number]       POC Glucose Once [[id number]]  (Abnormal) Collected: 02/17/25 0553    Specimen: Blood Updated: 02/17/25 0555     Glucose 176 mg/dL      Comment: Serial Number: [id number]:  [id number]       Basic Metabolic Panel [[id number]]  (Abnormal) Collected: 02/17/25 0613    Specimen: Blood from Hand, Right Updated: 02/17/25 0653     Glucose 168 mg/dL      BUN 26 mg/dL      Creatinine 0.70 mg/dL      Sodium 139 mmol/L      Potassium 4.1 mmol/L      Chloride 103 mmol/L      CO2 23.9 mmol/L      Calcium 8.4 mg/dL      BUN/Creatinine Ratio 37.1     Anion Gap 12.1 mmol/L      eGFR 90.9 mL/min/1.73     Narrative:      GFR Categories in Chronic Kidney Disease (CKD)      GFR Category          GFR (mL/min/1.73)    Interpretation  G1                     90 or greater         Normal or high (1)  G2                      60-89                Mild decrease (1)  G3a                   45-59                 Mild to moderate decrease  G3b                   30-44                Moderate to severe decrease  G4                    15-29                Severe decrease  G5                    14 or less           Kidney failure          (1)In the absence of evidence of kidney disease, neither GFR category G1 or G2 fulfill the criteria for CKD.    eGFR calculation 2021 CKD-EPI creatinine equation, which does not include race as a factor    CBC & Differential [[id number]]  (Abnormal) Collected: 02/17/25 0613    Specimen: Blood from Hand, Right Updated: 02/17/25 0656    Narrative:      The following orders were created for panel order CBC & Differential.  Procedure                               Abnormality         Status                     ---------                               -----------         ------                     CBC Auto Differential[[id number]]        Abnormal            Final result               Scan Slide[[id number]]                   Normal              Final result                 Please view results for these tests on the individual orders.    Magnesium [[id number]]  (Normal) Collected: 02/17/25 0613    Specimen: Blood from Hand, Right Updated: 02/17/25 0658     Magnesium 2.2 mg/dL     Phosphorus [[id number]]  (Normal) Collected: 02/17/25 0613    Specimen: Blood from Hand, Right Updated: 02/17/25 0653     Phosphorus 4.0 mg/dL     CBC Auto Differential [[id number]]  (Abnormal) Collected: 02/17/25 0613    Specimen: Blood from Hand, Right Updated: 02/17/25 0656     WBC 3.04 10*3/mm3      RBC 4.13 10*6/mm3      Hemoglobin 13.6 g/dL      Hematocrit 40.7 %      MCV 98.5 fL      MCH 32.9 pg      MCHC 33.4 g/dL      RDW 14.4 %      RDW-SD 52.0 fl      MPV 10.9 fL      Platelets 154 10*3/mm3      Neutrophil % 49.4 %      Lymphocyte % 28.9 %      Monocyte % 19.1 %      Eosinophil % 0.3 %      Basophil % 1.6 %      Immature Grans % 0.7 %      Neutrophils, Absolute 1.50 10*3/mm3      Lymphocytes, Absolute 0.88 10*3/mm3       Monocytes, Absolute 0.58 10*3/mm3      Eosinophils, Absolute 0.01 10*3/mm3      Basophils, Absolute 0.05 10*3/mm3      Immature Grans, Absolute 0.02 10*3/mm3      nRBC 0.0 /100 WBC     Scan Slide [[id number]]  (Normal) Collected: 02/17/25 0613    Specimen: Blood from Hand, Right Updated: 02/17/25 0656     RBC Morphology Normal     WBC Morphology Normal     Platelet Morphology Normal             Imaging:    XR Abdomen KUB    Result Date: 2/16/2025  XR ABDOMEN KUB Date of Exam: 2/16/2025 2:22 PM EST Indication: NG placement Comparison: None available. Findings: Nasogastric tube is noted with its tip in the stomach. Sideport is around the GE junction. This should be advanced some for more optimal positioning. There are dilated small bowel loops which could relate to small bowel obstruction.     Impression: 1. Nasogastric tube tip is within the stomach. This should be advanced some for more optimal positioning. 2. Dilated small bowel loops which could relate to small bowel obstruction. Electronically Signed: Charles Shankar MD  2/16/2025 2:52 PM EST  Workstation ID: [id number]    CT Abdomen Pelvis With Contrast    Result Date: 2/16/2025  CT ABDOMEN PELVIS W CONTRAST Date of Exam: 2/16/2025 12:59 PM EST Indication: Abdominal pain and vomiting with history of multiple GI surgeries and colon cancer. Comparison: August 29, 2023 Technique: Axial CT images were obtained of the abdomen and pelvis after the uneventful intravenous administration of iodinated contrast. Reconstructed coronal and sagittal images were also obtained. Automated exposure control and iterative construction methods were used. Findings: The lung bases are clear. A couple small hepatic cysts appear unchanged. There is hepatic steatosis. The gallbladder, adrenal glands, kidneys, spleen, and pancreas are unremarkable. The stomach appears normal. There are post-surgical changes along the small and large bowel. There is a small bowel obstruction with small bowel  feces involving the ileum within the right lower quadrant in the area of a surgical anastomosis, depicted on image 132 of series 2. The colon appears normal in caliber. The appendix is not visualized. There is trace pelvic ascites. There is no loculated collection. No abnormally enlarged lymph nodes are identified. The rectum urinary bladder are unremarkable. The uterus is surgically absent. No aggressive osseous lesions are identified.     Impression: 1.Small bowel obstruction involving ileum within right lower quadrant in the area of surgical anastomosis. 2.Trace pelvic ascites. 3.Hepatic steatosis. Electronically Signed: Gabe Admas MD  2/16/2025 1:29 PM EST  Workstation ID: [id number]       Differential Diagnosis and Discussion:    Abdominal Pain: Based on the patient's signs and symptoms, I considered abdominal aortic aneurysm, small bowel obstruction, pancreatitis, acute cholecystitis, acute appendecitis, peptic ulcer disease, gastritis, colitis, endocrine disorders, irritable bowel syndrome and other differential diagnosis an etiology of the patient's abdominal pain.  Vomiting: Differential diagnosis includes but is not limited to migraine, labyrinthine disorders, psychogenic, metabolic and endocrine causes, peptic ulcer, gastric outlet obstruction, gastritis, gastroenteritis, appendicitis, intestinal obstruction, paralytic ileus, food poisoning, cholecystitis, acute hepatitis, acute pancreatitis, acute febrile illness, and myocardial infarction.    Labs were collected in the emergency department and all labs were reviewed and interpreted by me.  X-ray were performed in the emergency department and all X-ray impressions were independently interpreted by me.  CT scan was performed in the emergency department and the CT scan radiology impression was interpreted by me.    MDM           Patient Care Considerations:    SEPSIS was considered but is NOT present in the emergency department as SIRS criteria is not  present.      Consultants/Shared Management Plan:    Hospitalist: I have discussed the case with Dr. Burton who agrees to accept the patient for admission.  Consultant: I have discussed the case with Dr. Pichardo who agrees to consult on the patient.    Social Determinants of Health:    Patient has presented with family members who are responsible, reliable and will ensure follow up care.      Disposition and Care Coordination:    Admit:   Through independent evaluation of the patient's history, physical, and imperical data, the patient meets criteria for inpatient admission to the hospital.        Final diagnoses:   Small bowel obstruction   Lactic acidosis        ED Disposition       ED Disposition   Decision to Admit    Condition   --    Comment   Level of Care: Med/Surg [1]   Diagnosis: SBO (small bowel obstruction) [349748]   Admitting Physician: OLGA LIDIA BURTON [[id number]]   Attending Physician: OLGA LIDIA BURTON [[id number]]   Isolate for COVID?: No [0]   Certification: I Certify That Inpatient Hospital Services Are Medically Necessary For Greater Than 2 Midnights                 This medical record created using voice recognition software.             Daniele Galan DO  02/17/25 1031

## 2025-02-16 NOTE — H&P
[facility]  Date: 2025   Patient Name: Susan Hernandez  : 1950  MRN: [id number]  Primary Care Physician:  Rigoberto Gomes Jr., MD  Date of admission: 2025    Subjective abdominal pain  Subjective     Chief Complaint: Abdominal pain    HPI: Patient is a 74-year-old female that complains of severe stabbing abdominal pain and 3 and vomiting yesterday.  She is vomiting having nausea and weakness.  Patient's blood pressure is low.  She was seen at urgent care.  Patient was instructed to come to the ER.  She denies any diarrhea.    On arrival to the ED, patient's temperature 97.7, pulse of 86, respiratory rate of 20, blood pressure 120/77, and she saturating 96% on room air.    On labs, patient sodium is 140, potassium is 4.2, CO2 is 21.3, anion gap is 19.7, creatinine is 1.07, glucose is 272.  Lactate is 6.8-second lactate is 4.1.  Patient is on metformin, Jardiance.    Patient's urine shows a trace amount of ketones.    CT abdomen shows: Small bowel obstruction involving the ileum within the right lower quadrant in the area of surgical anastomosis.  Trace pelvic ascites.  Hepatic steatosis.    Patient had NG tube placed in the ED.  Personal History     Past Medical History:  Past Medical History:   Diagnosis Date    Arthritis     Cancer     Colon cancer     Diabetes mellitus, type 2     Disease of thyroid gland     GERD (gastroesophageal reflux disease)     Lumbar pain     Small bowel obstruction          Past Surgical History:  Past Surgical History:   Procedure Laterality Date    APPENDECTOMY      COLON SURGERY      CANCER, CHEMO    COLONOSCOPY      COLONOSCOPY N/A 2022    Procedure: COLONOSCOPY WITH POLYPECTOMIES;  Surgeon: Manny Velazquez MD;  Location: [facility] ENDOSCOPY;  Service: Gastroenterology;  Laterality: N/A;  COLON POLYPS    COLONOSCOPY N/A 2024    Procedure: COLONOSCOPY COLD SNARE POLYPECTOMY;  Surgeon: Manny Velazquez  MD Ok;  Location: [facility] ENDOSCOPY;  Service: Gastroenterology;  Laterality: N/A;  POOR PREP, COLON POLYP, PREVIOUS SURGERY    COLONOSCOPY N/A 5/20/2024    Procedure: COLONOSCOPY;  Surgeon: Manny Velazquez MD;  Location: [facility] ENDOSCOPY;  Service: Gastroenterology;  Laterality: N/A;  PREVIOUS SURGERY    ENDOSCOPY N/A 04/02/2024    Procedure: ESOPHAGOGASTRODUODENOSCOPY WITH BIOPSIES;  Surgeon: Manny Velazquez MD;  Location: [facility] ENDOSCOPY;  Service: Gastroenterology;  Laterality: N/A;  ERROSIVE GASTRITIS    HYSTERECTOMY  2004    LYSIS OF ABDOMINAL ADHESIONS          Family History:   Breast Cancer-related family history includes Breast cancer in her brother. She was adopted.      Social History:   Social History     Socioeconomic History    Marital status:    Tobacco Use    Smoking status: Former     Current packs/day: 0.00     Average packs/day: 2.0 packs/day for 38.0 years (76.0 ttl pk-yrs)     Types: Cigarettes     Start date: 1972     Quit date: 2010     Years since quitting: 15.1     Passive exposure: Past    Smokeless tobacco: Never   Vaping Use    Vaping status: Never Used   Substance and Sexual Activity    Alcohol use: Not Currently     Comment: last drink 1978    Drug use: Never    Sexual activity: Defer         Home Medications:  DULoxetine, Diclofenac Sodium, SITagliptin, atorvastatin, carboxymethylcellulose, cholecalciferol, clindamycin, empagliflozin, fish oil, glipizide, levothyroxine, lidocaine, metFORMIN, multivitamins-minerals, omeprazole, pantoprazole, pioglitazone, pregabalin, and traMADol    Allergies:  Allergies   Allergen Reactions    Aspirin Hives and Unknown - High Severity    Penicillins Unknown - High Severity    Penicillin V Potassium Rash       Review of Systems   All systems were reviewed and negative except for: Vomiting    Objective   Objective     Vitals:   Temp:  [96.2 °F (35.7 °C)-98.1 °F (36.7 °C)] 98.1 °F (36.7 °C)  Heart Rate:  [83-98] 89  Resp:  [16-20]  16  BP: ()/(57-93) 141/92  Flow (L/min) (Oxygen Therapy):  [2] 2    Physical Exam   Physical Exam  Constitutional:       Comments: NG tube placed   HENT:      Mouth/Throat:      Mouth: Mucous membranes are moist.   Eyes:      Pupils: Pupils are equal, round, and reactive to light.   Cardiovascular:      Rate and Rhythm: Normal rate.   Pulmonary:      Effort: Pulmonary effort is normal.   Abdominal:      General: There is distension.   Musculoskeletal:         General: Normal range of motion.   Skin:     General: Skin is warm.   Neurological:      General: No focal deficit present.      Mental Status: She is alert.   Psychiatric:         Mood and Affect: Mood normal.          Result Review      Result Review:  I have personally reviewed the results from the time of this admission to 2/16/2025 16:11 EST and agree with these findings:  [x]  Laboratory  [x]  Microbiology  [x]  Radiology  [x]  EKG/Telemetry   [x]  Cardiology/Vascular   [x]  Pathology  [x]  Old records  [x]  Other:    Lab Results (most recent)       Procedure Component Value Units Date/Time    STAT Lactic Acid, Reflex [[id number]]  (Abnormal) Collected: 02/16/25 1359    Specimen: Blood Updated: 02/16/25 1455     Lactate 4.1 mmol/L     Urinalysis With Microscopic If Indicated (No Culture) - Urine, Clean Catch [[id number]]  (Abnormal) Collected: 02/16/25 1207    Specimen: Urine, Clean Catch Updated: 02/16/25 1219     Color, UA Yellow     Appearance, UA Clear     pH, UA 5.5     Specific Gravity, UA >1.030     Glucose, UA >=1000 mg/dL (3+)     Ketones, UA Trace     Bilirubin, UA Negative     Blood, UA Negative     Protein, UA Negative     Leuk Esterase, UA Negative     Nitrite, UA Negative     Urobilinogen, UA 0.2 E.U./dL    Narrative:      Urine microscopic not indicated.    Lactic Acid, Plasma [[id number]]  (Abnormal) Collected: 02/16/25 1051    Specimen: Blood Updated: 02/16/25 1124     Lactate 6.5 mmol/L     Comprehensive Metabolic Panel [[id number]]   (Abnormal) Collected: 02/16/25 1051    Specimen: Blood Updated: 02/16/25 1115     Glucose 272 mg/dL      BUN 25 mg/dL      Creatinine 1.07 mg/dL      Sodium 140 mmol/L      Potassium 4.2 mmol/L      Chloride 99 mmol/L      CO2 21.3 mmol/L      Calcium 9.2 mg/dL      Total Protein 6.9 g/dL      Albumin 3.9 g/dL      ALT (SGPT) 19 U/L      AST (SGOT) 20 U/L      Alkaline Phosphatase 66 U/L      Total Bilirubin 1.3 mg/dL      Globulin 3.0 gm/dL      A/G Ratio 1.3 g/dL      BUN/Creatinine Ratio 23.4     Anion Gap 19.7 mmol/L      eGFR 54.6 mL/min/1.73     Narrative:      GFR Categories in Chronic Kidney Disease (CKD)      GFR Category          GFR (mL/min/1.73)    Interpretation  G1                     90 or greater         Normal or high (1)  G2                      60-89                Mild decrease (1)  G3a                   45-59                Mild to moderate decrease  G3b                   30-44                Moderate to severe decrease  G4                    15-29                Severe decrease  G5                    14 or less           Kidney failure          (1)In the absence of evidence of kidney disease, neither GFR category G1 or G2 fulfill the criteria for CKD.    eGFR calculation 2021 CKD-EPI creatinine equation, which does not include race as a factor    Lipase [[id number]]  (Normal) Collected: 02/16/25 1051    Specimen: Blood Updated: 02/16/25 1115     Lipase 21 U/L     [place] Draw [[id number]] Collected: 02/16/25 1051    Specimen: Blood Updated: 02/16/25 1100    Narrative:      The following orders were created for panel order [place] [facility].  Procedure                               Abnormality         Status                     ---------                               -----------         ------                     Green Top (Gel)[[id number]]                                  Final result               Lavender Top[[id number]]                                     Final result               Gold Top -  SST[[id number]]                                   Final result               Light Blue Top[[id number]]                                   Final result                 Please view results for these tests on the individual orders.    Green Top (Gel) [[id number]] Collected: 02/16/25 1051    Specimen: Blood Updated: 02/16/25 1100     Extra Tube Hold for add-ons.     Comment: Auto resulted.       Lavender Top [[id number]] Collected: 02/16/25 1051    Specimen: Blood Updated: 02/16/25 1100     Extra Tube hold for add-on     Comment: Auto resulted       Gold Top - SST [[id number]] Collected: 02/16/25 1051    Specimen: Blood Updated: 02/16/25 1100     Extra Tube Hold for add-ons.     Comment: Auto resulted.       Light Blue Top [[id number]] Collected: 02/16/25 1051    Specimen: Blood Updated: 02/16/25 1100     Extra Tube Hold for add-ons.     Comment: Auto resulted       CBC & Differential [[id number]]  (Abnormal) Collected: 02/16/25 1051    Specimen: Blood Updated: 02/16/25 1058    Narrative:      The following orders were created for panel order CBC & Differential.  Procedure                               Abnormality         Status                     ---------                               -----------         ------                     CBC Auto Differential[[id number]]        Abnormal            Final result                 Please view results for these tests on the individual orders.    CBC Auto Differential [[id number]]  (Abnormal) Collected: 02/16/25 1051    Specimen: Blood Updated: 02/16/25 1058     WBC 6.18 10*3/mm3      RBC 4.78 10*6/mm3      Hemoglobin 15.2 g/dL      Hematocrit 46.4 %      MCV 97.1 fL      MCH 31.8 pg      MCHC 32.8 g/dL      RDW 13.7 %      RDW-SD 49.7 fl      MPV 10.9 fL      Platelets 200 10*3/mm3      Neutrophil % 75.9 %      Lymphocyte % 12.5 %      Monocyte % 8.3 %      Eosinophil % 2.6 %      Basophil % 0.5 %      Immature Grans % 0.2 %      Neutrophils, Absolute 4.70 10*3/mm3      Lymphocytes,  Absolute 0.77 10*3/mm3      Monocytes, Absolute 0.51 10*3/mm3      Eosinophils, Absolute 0.16 10*3/mm3      Basophils, Absolute 0.03 10*3/mm3      Immature Grans, Absolute 0.01 10*3/mm3      nRBC 0.0 /100 WBC             XR Abdomen KUB    Result Date: 2/16/2025  Impression: 1. Nasogastric tube tip is within the stomach. This should be advanced some for more optimal positioning. 2. Dilated small bowel loops which could relate to small bowel obstruction. Electronically Signed: Charles Shankar MD  2/16/2025 2:52 PM EST  Workstation ID: [id number]    CT Abdomen Pelvis With Contrast    Result Date: 2/16/2025  Impression: 1.Small bowel obstruction involving ileum within right lower quadrant in the area of surgical anastomosis. 2.Trace pelvic ascites. 3.Hepatic steatosis. Electronically Signed: Gabe Adams MD  2/16/2025 1:29 PM EST  Workstation ID: [id number]     Assessment & Plan   Assessment / Plan   #1 SBO  -Aggressive hydration, NG tube  -Surgery consulted    #2 non-insulin-dependent diabetes  -Will cover with insulin sliding scale  -Hold Actos, metformin, Jardiance, Januvia  -Will check acetone to ensure the patient is not in DKA.  However, doubtful.    #3 hyperlipidemia patient on Lipitor we will hold.    #4 hypothyroidism-hold Synthroid    #5 GERD IV Protonix ordered    #6 pain patient on Lyrica and tramadol.  Will cover with as needed Dilaudid.    Will resume patient's home meds when she is not n.p.o. and once pharmacy verifies them.        VTE Prophylaxis:  Mechanical VTE prophylaxis orders are present.        CODE STATUS:    Level Of Support Discussed With: Patient  Code Status (Patient has no pulse and is not breathing): CPR (Attempt to Resuscitate)  Medical Interventions (Patient has pulse or is breathing): Full Support      Admission Status:  I believe this patient meets inpatient status.    Electronically signed by Stephanie Cordero DO, 02/16/25, 4:11 PM EST.

## 2025-02-16 NOTE — PLAN OF CARE
Goal Outcome Evaluation:  Plan of Care Reviewed With: patient        Progress: no change  Outcome Evaluation: Vitals WNL. Pt has been resting well. Pt did complain of abdomianl pain. Dilauded given. Pt is currently NPO to rest her bowel. She is hooked up to low-intermittent suction. Surgeon wants to wait and see if her bowel situation gets better with the suction before resorting to surgery. No other complaints this shift. Call light within reach. Will continue plan of care.

## 2025-02-16 NOTE — CONSULTS
General Surgery/Colorectal Surgery Note    Patient Name:  Susan Hernandez  YOB: 1950  [id number]    Referring Provider: No ref. provider found      Patient Care Team:  Rigoberto Gomes Jr., MD as PCP - General (Internal Medicine)  Tonio Johnson as Medical Assistant    Chief complaint abdominal pain    Subjective .     History of present illness:    History of worsening generalized abdominal pain starting yesterday with nausea and abdominal distention.  Her last flatus was last night.  She said she had a bowel movement this morning.  She has had multiple abdominal surgeries including 2 previous surgeries open for colon cancer, 2 open surgeries for a blockage, total abdominal hysterectomy, open appendectomy.  These records are not available to me at this time. No blood thinner use.    Came to the emergency department.  Afebrile.  Workup with WBC 6, hemoglobin 15, platelets 200, lactic acid 6.5.  IV fluids given.  Decreased down to 4.1.  Lipase 21.  Total bilirubin 1.3 with normal ALP, creatinine 1.07.  CT abdomen pelvis with small bowel obstruction involving the ileum within the right lower quadrant in the area of surgical anastomosis.  Trace pelvic ascites, hepatic steatosis.      History:  Past Medical History:   Diagnosis Date    Arthritis     Cancer     Colon cancer     Diabetes mellitus, type 2     Disease of thyroid gland     GERD (gastroesophageal reflux disease)     Lumbar pain     Small bowel obstruction        Past Surgical History:   Procedure Laterality Date    APPENDECTOMY      COLON SURGERY  2011,2006    CANCER, CHEMO    COLONOSCOPY      COLONOSCOPY N/A 07/19/2022    Procedure: COLONOSCOPY WITH POLYPECTOMIES;  Surgeon: Manny Velazquez MD;  Location: formerly [facility] ENDOSCOPY;  Service: Gastroenterology;  Laterality: N/A;  COLON POLYPS    COLONOSCOPY N/A 04/02/2024    Procedure: COLONOSCOPY COLD SNARE POLYPECTOMY;  Surgeon: Manny Velazquez MD;  Location: formerly [facility] ENDOSCOPY;  Service:  Gastroenterology;  Laterality: N/A;  POOR PREP, COLON POLYP, PREVIOUS SURGERY    COLONOSCOPY N/A 5/20/2024    Procedure: COLONOSCOPY;  Surgeon: Manny Velazquez MD;  Location: [facility] ENDOSCOPY;  Service: Gastroenterology;  Laterality: N/A;  PREVIOUS SURGERY    ENDOSCOPY N/A 04/02/2024    Procedure: ESOPHAGOGASTRODUODENOSCOPY WITH BIOPSIES;  Surgeon: Manny Velazquez MD;  Location: [facility] ENDOSCOPY;  Service: Gastroenterology;  Laterality: N/A;  ERROSIVE GASTRITIS    HYSTERECTOMY  2004    LYSIS OF ABDOMINAL ADHESIONS         Family History   Adopted: Yes   Problem Relation Age of Onset    Breast cancer Brother     Other Brother         RENAL CANCER    Colon cancer Neg Hx        Social History     Tobacco Use    Smoking status: Former     Current packs/day: 0.00     Average packs/day: 2.0 packs/day for 38.0 years (76.0 ttl pk-yrs)     Types: Cigarettes     Start date: 1972     Quit date: 2010     Years since quitting: 15.1     Passive exposure: Past    Smokeless tobacco: Never   Vaping Use    Vaping status: Never Used   Substance Use Topics    Alcohol use: Not Currently     Comment: last drink 1978    Drug use: Never       Review of Systems  All systems were reviewed and negative except for:   Review of Systems   Constitutional: Negative for chills, fever and unexpected weight loss.   HENT: Negative for congestion, nosebleeds and voice change.    Eyes: Negative for blurred vision, double vision and discharge.   Respiratory: Negative for apnea, chest tightness and shortness of breath.    Cardiovascular: Negative for chest pain and leg swelling.   Gastrointestinal:        See HPI   Endocrine: Negative for cold intolerance and heat intolerance.   Genitourinary: Negative for dysuria, hematuria and urgency.   Musculoskeletal: Negative for back pain, joint swelling and neck pain.   Skin: Negative for color change and dry skin.   Neurological: Negative for dizziness and confusion.   Hematological: Negative for  adenopathy.   Psychiatric/Behavioral: Negative for agitation and behavioral problems.     MEDS:  Prior to Admission medications    Medication Sig Start Date End Date Taking? Authorizing Provider   atorvastatin (LIPITOR) 10 MG tablet Take 1 tablet by mouth Every Night. 10/24/24   Rigoberto Gomes Jr., MD   carboxymethylcellulose (Refresh Plus) 0.5 % solution Administer 2 drops to both eyes 3 (Three) Times a Day As Needed for Dry Eyes. 6/24/24   Rigoberto Gomes Jr., MD   cholecalciferol (VITAMIN D3) 25 MCG (1000 UT) tablet Take 1 tablet by mouth 2 (Two) Times a Day. 2/5/24   Rigoberto Gomes Jr., MD   clindamycin (CLEOCIN T) 1 % external solution     Jaye Wynne MD   Diclofenac Sodium (VOLTAREN) 1 % gel gel Apply 4 g topically to the appropriate area as directed 4 (Four) Times a Day As Needed (pain). 1/9/23   Rigoberto Gomes Jr., MD   DULoxetine (CYMBALTA) 30 MG capsule Take 1 capsule by mouth Daily. 1/14/25   Rigoberto Gomes Jr., MD   empagliflozin (Jardiance) 25 MG tablet tablet Take 1 tablet by mouth Daily. 2/20/24   Rigoberto Gomes Jr., MD   glipizide (Glucotrol) 10 MG tablet Take 1 tablet by mouth 2 (Two) Times a Day Before Meals. 2/20/24   Rigoberto Gomes Jr., MD   levothyroxine (Synthroid) 25 MCG tablet Take 1 tablet by mouth Daily. 1/14/25   Rigoberto Gomes Jr., MD   lidocaine (LIDODERM) 5 % Place 1 patch on the skin as directed by provider Daily. Remove & Discard patch within 12 hours or as directed by MD 1/8/25   Rigoberto Gomes Jr., MD   metFORMIN (GLUCOPHAGE) 500 MG tablet Take 1 tablet by mouth 2 (Two) Times a Day With Meals. 2/10/25   Rigoberto Gomes Jr., MD   multivitamins-minerals (PRESERVISION AREDS 2) capsule capsule  9/12/23   Jaye Wynne MD   Omega-3 Fatty Acids (fish oil) 1000 MG capsule capsule Take 1 capsule by mouth Daily With Breakfast.    Jaye Wynne MD   omeprazole (priLOSEC) 40 MG  capsule Take 1 capsule by mouth Daily. 2/20/24   Rigoberto Gomes Jr., MD   pantoprazole (PROTONIX) 40 MG EC tablet Take 1 tablet by mouth Daily.    Provider, MD Jaye   pioglitazone (Actos) 15 MG tablet Take 1 tablet by mouth Daily. 1/14/25   Rigoberto Gomes Jr., MD   pregabalin (Lyrica) 300 MG capsule Take 1 capsule by mouth 2 (Two) Times a Day As Needed (pain). 2/10/25   Rigoberto Gomes Jr., MD   SITagliptin (Januvia) 100 MG tablet Take 1 tablet by mouth Daily. 10/24/24   Rigoberto Gomes Jr., MD   traMADol (ULTRAM) 50 MG tablet  7/25/23   Provider, MD Jaye        ondansetron, 4 mg, Intravenous, Once         lactated ringers, 100 mL/hr, Last Rate: 100 mL/hr (02/16/25 1435)         Allergies:  Aspirin, Penicillins, and Penicillin v potassium    Objective     Vital Signs   Temp:  [96.2 °F (35.7 °C)-98 °F (36.7 °C)] 98 °F (36.7 °C)  Heart Rate:  [85-98] 94  Resp:  [18-20] 18  BP: ()/(57-93) 136/89    Physical Exam:     General Appearance:    Alert, cooperative, in no acute distress   Head:    Normocephalic, without obvious abnormality, atraumatic   Eyes:          Conjunctivae and sclerae normal, no icterus   Ears:    Ears appear intact with no abnormalities noted   Throat:   No oral lesions, no thrush, oral mucosa moist   Neck:   No adenopathy, supple, trachea midline, no thyromegaly   Back:     No kyphosis present, no scoliosis present, no skin lesions,    erythema or scars, no tenderness to percussion or                palpation, range of motion normal   Lungs:     Clear to auscultation, respirations regular, even and               unlabored    Heart:    Regular rhythm and normal rate, normal S1 and S2, no          murmur, no gallop, no rub, no click   Chest Wall:    No abnormalities observed   Abdomen:     Normal bowel sounds, no masses, no organomegaly, soft      moderate distention, moderate generalized tenderness to palpation, no guarding, no rebound              tenderness   Rectal:        Extremities:   Moves all extremities well, no edema, no cyanosis, no          redness   Pulses:   Pulses palpable and equal bilaterally   Skin:   No bleeding, bruising or rash   Lymph nodes:   No palpable adenopathy   Neurologic:   A/o x 4 with no deficits       Results Review: I have reviewed the patient's labs and imaging    LABS/IMAGING:    Imaging Results (Last 72 Hours)       Procedure Component Value Units Date/Time    XR Abdomen KUB [[id number]] Collected: 02/16/25 1450     Updated: 02/16/25 1454    Narrative:      XR ABDOMEN KUB    Date of Exam: 2/16/2025 2:22 PM EST    Indication: NG placement    Comparison: None available.    Findings:  Nasogastric tube is noted with its tip in the stomach. [place] is around the GE junction. This should be advanced some for more optimal positioning. There are dilated small bowel loops which could relate to small bowel obstruction.      Impression:      Impression:    1. Nasogastric tube tip is within the stomach. This should be advanced some for more optimal positioning.  2. Dilated small bowel loops which could relate to small bowel obstruction.        Electronically Signed: Charles Shankar MD    2/16/2025 2:52 PM EST    Workstation ID: [id number]    CT Abdomen Pelvis With Contrast [[id number]] Collected: 02/16/25 1319     Updated: 02/16/25 1331    Narrative:      CT ABDOMEN PELVIS W CONTRAST    Date of Exam: 2/16/2025 12:59 PM EST    Indication: Abdominal pain and vomiting with history of multiple GI surgeries and colon cancer.    Comparison: August 29, 2023    Technique: Axial CT images were obtained of the abdomen and pelvis after the uneventful intravenous administration of iodinated contrast. Reconstructed coronal and sagittal images were also obtained. Automated exposure control and iterative construction   methods were used.      Findings:  The lung bases are clear.    A couple small hepatic cysts appear unchanged. There is hepatic steatosis.  The gallbladder, adrenal glands, kidneys, spleen, and pancreas are unremarkable.    The stomach appears normal. There are post-surgical changes along the small and large bowel. There is a small bowel obstruction with small bowel feces involving the ileum within the right lower quadrant in the area of a surgical anastomosis, depicted on   image 132 of series 2. The colon appears normal in caliber. The appendix is not visualized. There is trace pelvic ascites. There is no loculated collection. No abnormally enlarged lymph nodes are identified.    The rectum urinary bladder are unremarkable. The uterus is surgically absent.    No aggressive osseous lesions are identified.      Impression:      Impression:  1.Small bowel obstruction involving ileum within right lower quadrant in the area of surgical anastomosis.  2.Trace pelvic ascites.  3.Hepatic steatosis.        Electronically Signed: Gabe Adams MD    2/16/2025 1:29 PM EST    Workstation ID: [id number]             Lab Results (last 72 hours)       Procedure Component Value Units Date/Time    STAT Lactic Acid, Reflex [[id number]]  (Abnormal) Collected: 02/16/25 1359    Specimen: Blood Updated: 02/16/25 1455     Lactate 4.1 mmol/L     Urinalysis With Microscopic If Indicated (No Culture) - Urine, Clean Catch [[id number]]  (Abnormal) Collected: 02/16/25 1207    Specimen: Urine, Clean Catch Updated: 02/16/25 1219     Color, UA Yellow     Appearance, UA Clear     pH, UA 5.5     Specific Gravity, UA >1.030     Glucose, UA >=1000 mg/dL (3+)     Ketones, UA Trace     Bilirubin, UA Negative     Blood, UA Negative     Protein, UA Negative     Leuk Esterase, UA Negative     Nitrite, UA Negative     Urobilinogen, UA 0.2 E.U./dL    Narrative:      Urine microscopic not indicated.    Lactic Acid, Plasma [[id number]]  (Abnormal) Collected: 02/16/25 1051    Specimen: Blood Updated: 02/16/25 1124     Lactate 6.5 mmol/L     Comprehensive Metabolic Panel [[id number]]  (Abnormal)  Collected: 02/16/25 1051    Specimen: Blood Updated: 02/16/25 1115     Glucose 272 mg/dL      BUN 25 mg/dL      Creatinine 1.07 mg/dL      Sodium 140 mmol/L      Potassium 4.2 mmol/L      Chloride 99 mmol/L      CO2 21.3 mmol/L      Calcium 9.2 mg/dL      Total Protein 6.9 g/dL      Albumin 3.9 g/dL      ALT (SGPT) 19 U/L      AST (SGOT) 20 U/L      Alkaline Phosphatase 66 U/L      Total Bilirubin 1.3 mg/dL      Globulin 3.0 gm/dL      A/G Ratio 1.3 g/dL      BUN/Creatinine Ratio 23.4     Anion Gap 19.7 mmol/L      eGFR 54.6 mL/min/1.73     Narrative:      GFR Categories in Chronic Kidney Disease (CKD)      GFR Category          GFR (mL/min/1.73)    Interpretation  G1                     90 or greater         Normal or high (1)  G2                      60-89                Mild decrease (1)  G3a                   45-59                Mild to moderate decrease  G3b                   30-44                Moderate to severe decrease  G4                    15-29                Severe decrease  G5                    14 or less           Kidney failure          (1)In the absence of evidence of kidney disease, neither GFR category G1 or G2 fulfill the criteria for CKD.    eGFR calculation 2021 CKD-EPI creatinine equation, which does not include race as a factor    Lipase [[id number]]  (Normal) Collected: 02/16/25 1051    Specimen: Blood Updated: 02/16/25 1115     Lipase 21 U/L     [place] Draw [[id number]] Collected: 02/16/25 1051    Specimen: Blood Updated: 02/16/25 1100    Narrative:      The following orders were created for panel order [place] Draw.  Procedure                               Abnormality         Status                     ---------                               -----------         ------                     Green Top (Gel)[[id number]]                                  Final result               Lavender Top[[id number]]                                     Final result               Gold Top - SST[[id number]]                                    Final result               Light Blue Top[[id number]]                                   Final result                 Please view results for these tests on the individual orders.    Green Top (Gel) [[id number]] Collected: 02/16/25 1051    Specimen: Blood Updated: 02/16/25 1100     Extra Tube Hold for add-ons.     Comment: Auto resulted.       Lavender Top [[id number]] Collected: 02/16/25 1051    Specimen: Blood Updated: 02/16/25 1100     Extra Tube hold for add-on     Comment: Auto resulted       Gold Top - SST [[id number]] Collected: 02/16/25 1051    Specimen: Blood Updated: 02/16/25 1100     Extra Tube Hold for add-ons.     Comment: Auto resulted.       Light Blue Top [[id number]] Collected: 02/16/25 1051    Specimen: Blood Updated: 02/16/25 1100     Extra Tube Hold for add-ons.     Comment: Auto resulted       CBC & Differential [[id number]]  (Abnormal) Collected: 02/16/25 1051    Specimen: Blood Updated: 02/16/25 1058    Narrative:      The following orders were created for panel order CBC & Differential.  Procedure                               Abnormality         Status                     ---------                               -----------         ------                     CBC Auto Differential[[id number]]        Abnormal            Final result                 Please view results for these tests on the individual orders.    CBC Auto Differential [[id number]]  (Abnormal) Collected: 02/16/25 1051    Specimen: Blood Updated: 02/16/25 1058     WBC 6.18 10*3/mm3      RBC 4.78 10*6/mm3      Hemoglobin 15.2 g/dL      Hematocrit 46.4 %      MCV 97.1 fL      MCH 31.8 pg      MCHC 32.8 g/dL      RDW 13.7 %      RDW-SD 49.7 fl      MPV 10.9 fL      Platelets 200 10*3/mm3      Neutrophil % 75.9 %      Lymphocyte % 12.5 %      Monocyte % 8.3 %      Eosinophil % 2.6 %      Basophil % 0.5 %      Immature Grans % 0.2 %      Neutrophils, Absolute 4.70 10*3/mm3      Lymphocytes, Absolute 0.77 10*3/mm3       Monocytes, Absolute 0.51 10*3/mm3      Eosinophils, Absolute 0.16 10*3/mm3      Basophils, Absolute 0.03 10*3/mm3      Immature Grans, Absolute 0.01 10*3/mm3      nRBC 0.0 /100 WBC                Result Review :Labs  Result Review  Imaging  Med Tab  Media     Assessment & Plan     SBO (small bowel obstruction)     Partial small bowel obstruction versus small bowel obstruction, history of multiple abdominal surgeries    I have reviewed the patient's workup with results mentioned above.  Recommend admission to the hospitalist.  Appreciate their help.  NPO, okay for ice chips and popsicles.  NG tube to low remittent wall suction.  GI prophylaxis.  IV fluid resuscitation.  Labs in the morning.  I explained to her hopefully we can get her through this without a surgery.       Gibran Pichardo MD  02/16/25 14:56 EST

## 2025-02-17 ENCOUNTER — APPOINTMENT (OUTPATIENT)
Dept: GENERAL RADIOLOGY | Facility: HOSPITAL | Age: 75
DRG: 389 | End: 2025-02-17
Payer: MEDICARE

## 2025-02-17 LAB
ANION GAP SERPL CALCULATED.3IONS-SCNC: 12.1 MMOL/L (ref 5–15)
BASOPHILS # BLD AUTO: 0.05 10*3/MM3 (ref 0–0.2)
BASOPHILS NFR BLD AUTO: 1.6 % (ref 0–1.5)
BUN SERPL-MCNC: 26 MG/DL (ref 8–23)
BUN/CREAT SERPL: 37.1 (ref 7–25)
CALCIUM SPEC-SCNC: 8.4 MG/DL (ref 8.6–10.5)
CHLORIDE SERPL-SCNC: 103 MMOL/L (ref 98–107)
CO2 SERPL-SCNC: 23.9 MMOL/L (ref 22–29)
CREAT SERPL-MCNC: 0.7 MG/DL (ref 0.57–1)
DEPRECATED RDW RBC AUTO: 52 FL (ref 37–54)
EGFRCR SERPLBLD CKD-EPI 2021: 90.9 ML/MIN/1.73
EOSINOPHIL # BLD AUTO: 0.01 10*3/MM3 (ref 0–0.4)
EOSINOPHIL NFR BLD AUTO: 0.3 % (ref 0.3–6.2)
ERYTHROCYTE [DISTWIDTH] IN BLOOD BY AUTOMATED COUNT: 14.4 % (ref 12.3–15.4)
GLUCOSE BLDC GLUCOMTR-MCNC: 139 MG/DL (ref 70–99)
GLUCOSE BLDC GLUCOMTR-MCNC: 167 MG/DL (ref 70–99)
GLUCOSE BLDC GLUCOMTR-MCNC: 176 MG/DL (ref 70–99)
GLUCOSE BLDC GLUCOMTR-MCNC: 180 MG/DL (ref 70–99)
GLUCOSE SERPL-MCNC: 168 MG/DL (ref 65–99)
HCT VFR BLD AUTO: 40.7 % (ref 34–46.6)
HGB BLD-MCNC: 13.6 G/DL (ref 12–15.9)
IMM GRANULOCYTES # BLD AUTO: 0.02 10*3/MM3 (ref 0–0.05)
IMM GRANULOCYTES NFR BLD AUTO: 0.7 % (ref 0–0.5)
LYMPHOCYTES # BLD AUTO: 0.88 10*3/MM3 (ref 0.7–3.1)
LYMPHOCYTES NFR BLD AUTO: 28.9 % (ref 19.6–45.3)
MAGNESIUM SERPL-MCNC: 2.2 MG/DL (ref 1.6–2.4)
MCH RBC QN AUTO: 32.9 PG (ref 26.6–33)
MCHC RBC AUTO-ENTMCNC: 33.4 G/DL (ref 31.5–35.7)
MCV RBC AUTO: 98.5 FL (ref 79–97)
MONOCYTES # BLD AUTO: 0.58 10*3/MM3 (ref 0.1–0.9)
MONOCYTES NFR BLD AUTO: 19.1 % (ref 5–12)
NEUTROPHILS NFR BLD AUTO: 1.5 10*3/MM3 (ref 1.7–7)
NEUTROPHILS NFR BLD AUTO: 49.4 % (ref 42.7–76)
NRBC BLD AUTO-RTO: 0 /100 WBC (ref 0–0.2)
PHOSPHATE SERPL-MCNC: 4 MG/DL (ref 2.5–4.5)
PLAT MORPH BLD: NORMAL
PLATELET # BLD AUTO: 154 10*3/MM3 (ref 140–450)
PMV BLD AUTO: 10.9 FL (ref 6–12)
POTASSIUM SERPL-SCNC: 4.1 MMOL/L (ref 3.5–5.2)
RBC # BLD AUTO: 4.13 10*6/MM3 (ref 3.77–5.28)
RBC MORPH BLD: NORMAL
SODIUM SERPL-SCNC: 139 MMOL/L (ref 136–145)
WBC MORPH BLD: NORMAL
WBC NRBC COR # BLD AUTO: 3.04 10*3/MM3 (ref 3.4–10.8)

## 2025-02-17 PROCEDURE — 85007 BL SMEAR W/DIFF WBC COUNT: CPT | Performed by: HOSPITALIST

## 2025-02-17 PROCEDURE — 99232 SBSQ HOSP IP/OBS MODERATE 35: CPT | Performed by: SURGERY

## 2025-02-17 PROCEDURE — 25010000002 ENOXAPARIN PER 10 MG: Performed by: STUDENT IN AN ORGANIZED HEALTH CARE EDUCATION/TRAINING PROGRAM

## 2025-02-17 PROCEDURE — 82948 REAGENT STRIP/BLOOD GLUCOSE: CPT

## 2025-02-17 PROCEDURE — 63710000001 INSULIN REGULAR HUMAN PER 5 UNITS: Performed by: HOSPITALIST

## 2025-02-17 PROCEDURE — 25010000002 ONDANSETRON PER 1 MG: Performed by: STUDENT IN AN ORGANIZED HEALTH CARE EDUCATION/TRAINING PROGRAM

## 2025-02-17 PROCEDURE — 25010000002 HYDROMORPHONE 1 MG/ML SOLUTION: Performed by: HOSPITALIST

## 2025-02-17 PROCEDURE — 99232 SBSQ HOSP IP/OBS MODERATE 35: CPT | Performed by: STUDENT IN AN ORGANIZED HEALTH CARE EDUCATION/TRAINING PROGRAM

## 2025-02-17 PROCEDURE — 80048 BASIC METABOLIC PNL TOTAL CA: CPT | Performed by: HOSPITALIST

## 2025-02-17 PROCEDURE — 85025 COMPLETE CBC W/AUTO DIFF WBC: CPT | Performed by: HOSPITALIST

## 2025-02-17 PROCEDURE — 25810000003 LACTATED RINGERS PER 1000 ML: Performed by: HOSPITALIST

## 2025-02-17 PROCEDURE — 84100 ASSAY OF PHOSPHORUS: CPT | Performed by: HOSPITALIST

## 2025-02-17 PROCEDURE — 74018 RADEX ABDOMEN 1 VIEW: CPT

## 2025-02-17 PROCEDURE — 83735 ASSAY OF MAGNESIUM: CPT | Performed by: HOSPITALIST

## 2025-02-17 RX ORDER — ONDANSETRON 4 MG/1
4 TABLET, ORALLY DISINTEGRATING ORAL EVERY 6 HOURS PRN
Status: DISCONTINUED | OUTPATIENT
Start: 2025-02-17 | End: 2025-02-22 | Stop reason: HOSPADM

## 2025-02-17 RX ORDER — ENOXAPARIN SODIUM 100 MG/ML
40 INJECTION SUBCUTANEOUS EVERY 24 HOURS
Status: DISCONTINUED | OUTPATIENT
Start: 2025-02-17 | End: 2025-02-22 | Stop reason: HOSPADM

## 2025-02-17 RX ORDER — ONDANSETRON 2 MG/ML
4 INJECTION INTRAMUSCULAR; INTRAVENOUS EVERY 6 HOURS PRN
Status: DISCONTINUED | OUTPATIENT
Start: 2025-02-17 | End: 2025-02-22 | Stop reason: HOSPADM

## 2025-02-17 RX ADMIN — PANTOPRAZOLE SODIUM 40 MG: 40 INJECTION, POWDER, FOR SOLUTION INTRAVENOUS at 05:37

## 2025-02-17 RX ADMIN — HYDROMORPHONE HYDROCHLORIDE 1 MG: 1 INJECTION, SOLUTION INTRAMUSCULAR; INTRAVENOUS; SUBCUTANEOUS at 07:06

## 2025-02-17 RX ADMIN — HYDROMORPHONE HYDROCHLORIDE 1 MG: 1 INJECTION, SOLUTION INTRAMUSCULAR; INTRAVENOUS; SUBCUTANEOUS at 12:27

## 2025-02-17 RX ADMIN — HUMAN INSULIN 2 UNITS: 100 INJECTION, SOLUTION SUBCUTANEOUS at 12:47

## 2025-02-17 RX ADMIN — ENOXAPARIN SODIUM 40 MG: 100 INJECTION SUBCUTANEOUS at 16:04

## 2025-02-17 RX ADMIN — Medication 10 ML: at 08:53

## 2025-02-17 RX ADMIN — ONDANSETRON 4 MG: 2 INJECTION INTRAMUSCULAR; INTRAVENOUS at 21:32

## 2025-02-17 RX ADMIN — HYDROMORPHONE HYDROCHLORIDE 1 MG: 1 INJECTION, SOLUTION INTRAMUSCULAR; INTRAVENOUS; SUBCUTANEOUS at 21:17

## 2025-02-17 RX ADMIN — SODIUM CHLORIDE, SODIUM LACTATE, POTASSIUM CHLORIDE, CALCIUM CHLORIDE 100 ML/HR: 20; 30; 600; 310 INJECTION, SOLUTION INTRAVENOUS at 03:53

## 2025-02-17 RX ADMIN — HYDROMORPHONE HYDROCHLORIDE 1 MG: 1 INJECTION, SOLUTION INTRAMUSCULAR; INTRAVENOUS; SUBCUTANEOUS at 18:46

## 2025-02-17 RX ADMIN — HYDROMORPHONE HYDROCHLORIDE 1 MG: 1 INJECTION, SOLUTION INTRAMUSCULAR; INTRAVENOUS; SUBCUTANEOUS at 04:46

## 2025-02-17 RX ADMIN — HYDROMORPHONE HYDROCHLORIDE 1 MG: 1 INJECTION, SOLUTION INTRAMUSCULAR; INTRAVENOUS; SUBCUTANEOUS at 09:36

## 2025-02-17 RX ADMIN — Medication 10 ML: at 21:17

## 2025-02-17 RX ADMIN — SODIUM CHLORIDE, SODIUM LACTATE, POTASSIUM CHLORIDE, CALCIUM CHLORIDE 100 ML/HR: 20; 30; 600; 310 INJECTION, SOLUTION INTRAVENOUS at 13:56

## 2025-02-17 RX ADMIN — HYDROMORPHONE HYDROCHLORIDE 1 MG: 1 INJECTION, SOLUTION INTRAMUSCULAR; INTRAVENOUS; SUBCUTANEOUS at 00:41

## 2025-02-17 RX ADMIN — HYDROMORPHONE HYDROCHLORIDE 1 MG: 1 INJECTION, SOLUTION INTRAMUSCULAR; INTRAVENOUS; SUBCUTANEOUS at 16:04

## 2025-02-17 NOTE — PROGRESS NOTES
"POST OP PROGRESS NOTE     Patient Name:  Susan Hernandez  YOB: 1950  [id number]   LOS: 1 day   * No surgery found *            Subjective     Interval History:   VSS, afebrile, reports passed flatus once, abdomen distended and requesting frequent pain medication, abd xray yesterday indicated NG needed to be advanced    Review of Systems:    A complete review of systems was performed and all are negative except what is documented in the HPI.       Objective     Constitutional:  well nourished, no acute distress, appears stated age /69 (BP Location: Right arm, Patient Position: Lying)   Pulse 87   Temp 97.3 °F (36.3 °C) (Oral)   Resp 18   Ht 162.6 cm (64\")   Wt 68.1 kg (150 lb 2.1 oz)   SpO2 96%   BMI 25.77 kg/m²    Eyes:  anicteric sclerae, moist conjunctivae, no lid lag, PERRLA  ENMT:  oropharynx clear, moist mucous membranes  Neck:   full ROM, trachea midline  Cardiovascular: RRR, S1 and S2 present, no MRG, heart rate 87, no pedal edema  Respiratory: lungs CTA, respirations even and unlabored  GI:  Abdomen soft, right lower abdomen tender, distended     Skin:  warm and dry, normal turgor, no rashes  Psychiatric:  alert and oriented x 4, intact judgment and insight, cooperative          Results Review:       I reviewed the patient's new clinical results including  CBC, BMP.     WBC   Date Value Ref Range Status   02/17/2025 3.04 (L) 3.40 - 10.80 10*3/mm3 Final     RBC   Date Value Ref Range Status   02/17/2025 4.13 3.77 - 5.28 10*6/mm3 Final     Hemoglobin   Date Value Ref Range Status   02/17/2025 13.6 12.0 - 15.9 g/dL Final     Hematocrit   Date Value Ref Range Status   02/17/2025 40.7 34.0 - 46.6 % Final     MCV   Date Value Ref Range Status   02/17/2025 98.5 (H) 79.0 - 97.0 fL Final     MCH   Date Value Ref Range Status   02/17/2025 32.9 26.6 - 33.0 pg Final     MCHC   Date Value Ref Range Status   02/17/2025 33.4 31.5 - 35.7 g/dL Final     RDW   Date Value Ref Range Status "   02/17/2025 14.4 12.3 - 15.4 % Final     RDW-SD   Date Value Ref Range Status   02/17/2025 52.0 37.0 - 54.0 fl Final     MPV   Date Value Ref Range Status   02/17/2025 10.9 6.0 - 12.0 fL Final     Platelets   Date Value Ref Range Status   02/17/2025 154 140 - 450 10*3/mm3 Final     Neutrophil %   Date Value Ref Range Status   02/17/2025 49.4 42.7 - 76.0 % Final     Lymphocyte %   Date Value Ref Range Status   02/17/2025 28.9 19.6 - 45.3 % Final     Monocyte %   Date Value Ref Range Status   02/17/2025 19.1 (H) 5.0 - 12.0 % Final     Eosinophil %   Date Value Ref Range Status   02/17/2025 0.3 0.3 - 6.2 % Final     Basophil %   Date Value Ref Range Status   02/17/2025 1.6 (H) 0.0 - 1.5 % Final     Immature Grans %   Date Value Ref Range Status   02/17/2025 0.7 (H) 0.0 - 0.5 % Final     Neutrophils, Absolute   Date Value Ref Range Status   02/17/2025 1.50 (L) 1.70 - 7.00 10*3/mm3 Final     Lymphocytes, Absolute   Date Value Ref Range Status   02/17/2025 0.88 0.70 - 3.10 10*3/mm3 Final     Monocytes, Absolute   Date Value Ref Range Status   02/17/2025 0.58 0.10 - 0.90 10*3/mm3 Final     Eosinophils, Absolute   Date Value Ref Range Status   02/17/2025 0.01 0.00 - 0.40 10*3/mm3 Final     Basophils, Absolute   Date Value Ref Range Status   02/17/2025 0.05 0.00 - 0.20 10*3/mm3 Final     Immature Grans, Absolute   Date Value Ref Range Status   02/17/2025 0.02 0.00 - 0.05 10*3/mm3 Final     nRBC   Date Value Ref Range Status   02/17/2025 0.0 0.0 - 0.2 /100 WBC Final         Basic Metabolic Panel    Sodium Sodium   Date Value Ref Range Status   02/17/2025 139 136 - 145 mmol/L Final   02/16/2025 140 136 - 145 mmol/L Final      Potassium Potassium   Date Value Ref Range Status   02/17/2025 4.1 3.5 - 5.2 mmol/L Final   02/16/2025 4.2 3.5 - 5.2 mmol/L Final      Chloride Chloride   Date Value Ref Range Status   02/17/2025 103 98 - 107 mmol/L Final   02/16/2025 99 98 - 107 mmol/L Final      Bicarbonate No results found for:  "\"PLASMABICARB\"   BUN BUN   Date Value Ref Range Status   02/17/2025 26 (H) 8 - 23 mg/dL Final   02/16/2025 25 (H) 8 - 23 mg/dL Final      Creatinine Creatinine   Date Value Ref Range Status   02/17/2025 0.70 0.57 - 1.00 mg/dL Final   02/16/2025 1.07 (H) 0.57 - 1.00 mg/dL Final      Calcium Calcium   Date Value Ref Range Status   02/17/2025 8.4 (L) 8.6 - 10.5 mg/dL Final   02/16/2025 9.2 8.6 - 10.5 mg/dL Final      Glucose      No components found for: \"GLUCOSE.*\"       Lab Results   Component Value Date    GLUCOSE 168 (H) 02/17/2025    BUN 26 (H) 02/17/2025    CREATININE 0.70 02/17/2025     02/17/2025    K 4.1 02/17/2025     02/17/2025    CALCIUM 8.4 (L) 02/17/2025    PROTEINTOT 6.9 02/16/2025    ALBUMIN 3.9 02/16/2025    ALT 19 02/16/2025    AST 20 02/16/2025    ALKPHOS 66 02/16/2025    BILITOT 1.3 (H) 02/16/2025    GLOB 3.0 02/16/2025    AGRATIO 1.3 02/16/2025    BCR 37.1 (H) 02/17/2025    ANIONGAP 12.1 02/17/2025    EGFR 90.9 02/17/2025       IMAGING:  Imaging Results (Last 72 Hours)       Procedure Component Value Units Date/Time    XR Abdomen KUB [[id number]] Collected: 02/16/25 1450     Updated: 02/16/25 1454    Narrative:      XR ABDOMEN KUB    Date of Exam: 2/16/2025 2:22 PM EST    Indication: NG placement    Comparison: None available.    Findings:  Nasogastric tube is noted with its tip in the stomach. Sideport is around the GE junction. This should be advanced some for more optimal positioning. There are dilated small bowel loops which could relate to small bowel obstruction.      Impression:      Impression:    1. Nasogastric tube tip is within the stomach. This should be advanced some for more optimal positioning.  2. Dilated small bowel loops which could relate to small bowel obstruction.        Electronically Signed: Charles Shankar MD    2/16/2025 2:52 PM EST    Workstation ID: [id number]    CT Abdomen Pelvis With Contrast [[id number]] Collected: 02/16/25 1319     Updated: 02/16/25 1331    " Narrative:      CT ABDOMEN PELVIS W CONTRAST    Date of Exam: 2/16/2025 12:59 PM EST    Indication: Abdominal pain and vomiting with history of multiple GI surgeries and colon cancer.    Comparison: August 29, 2023    Technique: Axial CT images were obtained of the abdomen and pelvis after the uneventful intravenous administration of iodinated contrast. Reconstructed coronal and sagittal images were also obtained. Automated exposure control and iterative construction   methods were used.      Findings:  The lung bases are clear.    A couple small hepatic cysts appear unchanged. There is hepatic steatosis. The gallbladder, adrenal glands, kidneys, spleen, and pancreas are unremarkable.    The stomach appears normal. There are post-surgical changes along the small and large bowel. There is a small bowel obstruction with small bowel feces involving the ileum within the right lower quadrant in the area of a surgical anastomosis, depicted on   image 132 of series 2. The colon appears normal in caliber. The appendix is not visualized. There is trace pelvic ascites. There is no loculated collection. No abnormally enlarged lymph nodes are identified.    The rectum urinary bladder are unremarkable. The uterus is surgically absent.    No aggressive osseous lesions are identified.      Impression:      Impression:  1.Small bowel obstruction involving ileum within right lower quadrant in the area of surgical anastomosis.  2.Trace pelvic ascites.  3.Hepatic steatosis.        Electronically Signed: Gabe Adams MD    2/16/2025 1:29 PM EST    Workstation ID: [id number]            Medications:    Current Facility-Administered Medications:     dextrose (D50W) (25 g/50 mL) IV injection 25 g, 25 g, Intravenous, Q15 Min PRN, Cordero, Dimpi, DO    dextrose (GLUTOSE) oral gel 15 g, 15 g, Oral, Q15 Min PRN, Cordero, Dimpi, DO    glucagon (GLUCAGEN) injection 1 mg, 1 mg, Intramuscular, Q15 Min PRN, Cordero, Dimpi, DO    hydrALAZINE  (APRESOLINE) injection 10 mg, 10 mg, Intravenous, Q6H PRN, Cordero, Dimpi, DO    HYDROmorphone (DILAUDID) injection 1 mg, 1 mg, Intravenous, Q2H PRN, 1 mg at 02/17/25 0936 **AND** naloxone (NARCAN) injection 0.4 mg, 0.4 mg, Intravenous, Q5 Min PRN, Cordero, Dimpi, DO    insulin regular (humuLIN R,novoLIN R) injection 2-7 Units, 2-7 Units, Subcutaneous, Q6H, Cordero, Dimpi, DO    lactated ringers infusion, 100 mL/hr, Intravenous, Continuous, Cordero, Dimpi, DO, Last Rate: 100 mL/hr at 02/17/25 0353, 100 mL/hr at 02/17/25 0353    ondansetron (ZOFRAN) injection 4 mg, 4 mg, Intravenous, Once, Daniele Galan DO    ondansetron ODT (ZOFRAN-ODT) disintegrating tablet 4 mg, 4 mg, Nasogastric, Q6H PRN **OR** ondansetron (ZOFRAN) injection 4 mg, 4 mg, Intravenous, Q6H PRN, Luciana Saldivar DO    pantoprazole (PROTONIX) injection 40 mg, 40 mg, Intravenous, Q AM, Cordero, Dimpi, DO, 40 mg at 02/17/25 0537    sodium chloride 0.9 % flush 10 mL, 10 mL, Intravenous, PRN, Daniele Galan, , 10 mL at 02/16/25 1359    sodium chloride 0.9 % flush 10 mL, 10 mL, Intravenous, Q12H, Cordero, Dimpi, DO, 10 mL at 02/17/25 0853    sodium chloride 0.9 % flush 10 mL, 10 mL, Intravenous, PRN, Cordero, Dimpi, DO    sodium chloride 0.9 % infusion 40 mL, 40 mL, Intravenous, PRN, Cordero, Dimpi, DO    Assessment & Plan       SBO (small bowel obstruction)   Cont NG tube   Abd xray to check NG placement        Electronically signed by Lazara Tyler, KEITH, 02/17/25, 9:59 AM EST.

## 2025-02-17 NOTE — PLAN OF CARE
Goal Outcome Evaluation:              Outcome Evaluation: Pt is alert and orient x4.  VS WNL for pt norm.  this am pt ng was partial out.  was able to reinsert to 50.  before pt had us stop.  CXR completed to verify placement.  Pt bowel sounds positive with pt passing gas.  Pt allowed to have ice chips and popcicles

## 2025-02-17 NOTE — PROGRESS NOTES
[facility]   Hospitalist Progress Note  Date: 2025  Patient Name: Susan Hernandez  : 1950  MRN: [id number]  Date of admission: 2025  Room/Bed: [facility] [place] 2]      Subjective   Subjective     Chief Complaint: Abdominal pain    Summary:Susan Hernandez is a 74 y.o. female that complains of severe stabbing abdominal pain and 3 and vomiting yesterday. She is vomiting having nausea and weakness. Patient's blood pressure is low. She was seen at urgent care. Patient was instructed to come to the ER. She denies any diarrhea.  Patient admitted for SBO seen on CT scan.    Interval Followup: No acute overnight events.  No acute distress.  Patient resting comfortably in bed.  She reports that she still is having abdominal pain but intermittent IV Dilaudid is helping.  General surgery has seen her.  NG tube is in place, nursing had to readjust this morning, x-ray confirms placement.        Objective   Objective     Vitals:   Temp:  [97.2 °F (36.2 °C)-98.1 °F (36.7 °C)] 98.1 °F (36.7 °C)  Heart Rate:  [81-93] 85  Resp:  [16-18] 16  BP: (116-141)/(69-92) 137/73  Flow (L/min) (Oxygen Therapy):  [2] 2    Physical Exam   Gen: NAD, Alert and Oriented  HEENT: NG tube in place  Pulm: No respiratory distress, can complete full sentences  Abd: soft, distended  Extremities: no pitting edema    Result Review    Result Review:  I have personally reviewed these results:  [x]  Laboratory      Lab 25  0613 25  1707 25  1359 25  1051   WBC 3.04*  --   --  6.18   HEMOGLOBIN 13.6  --   --  15.2   HEMATOCRIT 40.7  --   --  46.4   PLATELETS 154  --   --  200   NEUTROS ABS 1.50*  --   --  4.70   IMMATURE GRANS (ABS) 0.02  --   --  0.01   LYMPHS ABS 0.88  --   --  0.77   MONOS ABS 0.58  --   --  0.51   EOS ABS 0.01  --   --  0.16   MCV 98.5*  --   --  97.1*   LACTATE  --  1.6 4.1* 6.5*         Lab 25  0613 25  1707 25  1051   SODIUM 139  --  140   POTASSIUM 4.1  --  4.2   CHLORIDE 103  --  99   CO2  23.9  --  21.3*   ANION GAP 12.1  --  19.7*   BUN 26*  --  25*   CREATININE 0.70  --  1.07*   EGFR 90.9  --  54.6*   GLUCOSE 168*  --  272*   CALCIUM 8.4*  --  9.2   MAGNESIUM 2.2 1.5*  --    PHOSPHORUS 4.0 4.1  --          Lab 02/16/25  1051   TOTAL PROTEIN 6.9   ALBUMIN 3.9   GLOBULIN 3.0   ALT (SGPT) 19   AST (SGOT) 20   BILIRUBIN 1.3*   ALK PHOS 66   LIPASE 21                     Brief Urine Lab Results  (Last result in the past 365 days)        Color   Clarity   Blood   Leuk Est   Nitrite   Protein   CREAT   Urine HCG        02/16/25 1207 Yellow   Clear   Negative   Negative   Negative   Negative                 [x]  Microbiology   Microbiology Results (last 10 days)       ** No results found for the last 240 hours. **          [x]  Radiology  XR Abdomen KUB    Result Date: 2/17/2025  Impression: Nasogastric tube tip in the proximal stomach with sidehole at the gastroesophageal junction. Electronically Signed: Aren Rogers MD  2/17/2025 10:57 AM EST  Workstation ID: [id number]    XR Abdomen KUB    Result Date: 2/16/2025  Impression: 1. Nasogastric tube tip is within the stomach. This should be advanced some for more optimal positioning. 2. Dilated small bowel loops which could relate to small bowel obstruction. Electronically Signed: Charles Shankar MD  2/16/2025 2:52 PM EST  Workstation ID: [id number]    CT Abdomen Pelvis With Contrast    Result Date: 2/16/2025  Impression: 1.Small bowel obstruction involving ileum within right lower quadrant in the area of surgical anastomosis. 2.Trace pelvic ascites. 3.Hepatic steatosis. Electronically Signed: Gabe Adams MD  2/16/2025 1:29 PM EST  Workstation ID: [id number]    Mammo Screening Digital Tomosynthesis Bilateral With CAD    Result Date: 2/14/2025  No mammographic evidence of malignancy.  Recommend annual screening mammography.  BI-RADS ASSESSMENT: BI-RADS 1. Negative.  Note:  It has been reported that there is approximately a 15% false negative rate in mammography.   Therefore, management of a palpable abnormality should not be deferred because of a negative mammogram.  2/14/2025 7:44 AM by NAINA HOOKS MD on Workstation: [id number]     []  EKG/Telemetry   []  Cardiology/Vascular   []  Pathology  []  Old records  []  Other:    Assessment & Plan   Assessment / Plan     Assessment:  SBO  Abdominal pain  Type 2 diabetes  Hypothyroidism  Hyperlipidemia  History multiple abdominal surgeries  History colon cancer s/p resection/chemotherapy  Lactic acidosis    Plan:  Continue inpatient admission  General Surgery following  NG tube in place  IV Dilaudid as needed  Nausea medications as needed  Lactic 6.5 on admission, resolved with IV fluids  Continue IV fluids  A.m. labs       Discussed with RN.    VTE Prophylaxis:  Mechanical VTE prophylaxis orders are present.        CODE STATUS:   Level Of Support Discussed With: Patient  Code Status (Patient has no pulse and is not breathing): CPR (Attempt to Resuscitate)  Medical Interventions (Patient has pulse or is breathing): Full Support      Electronically signed by Luciana Saldivar DO, 2/17/2025, 14:19 EST.

## 2025-02-18 LAB
ANION GAP SERPL CALCULATED.3IONS-SCNC: 17.8 MMOL/L (ref 5–15)
BASOPHILS # BLD MANUAL: 0.04 10*3/MM3 (ref 0–0.2)
BASOPHILS NFR BLD MANUAL: 1 % (ref 0–1.5)
BUN SERPL-MCNC: 28 MG/DL (ref 8–23)
BUN/CREAT SERPL: 39.4 (ref 7–25)
CALCIUM SPEC-SCNC: 8.4 MG/DL (ref 8.6–10.5)
CHLORIDE SERPL-SCNC: 99 MMOL/L (ref 98–107)
CO2 SERPL-SCNC: 19.2 MMOL/L (ref 22–29)
CREAT SERPL-MCNC: 0.71 MG/DL (ref 0.57–1)
DEPRECATED RDW RBC AUTO: 51.8 FL (ref 37–54)
EGFRCR SERPLBLD CKD-EPI 2021: 89.4 ML/MIN/1.73
ERYTHROCYTE [DISTWIDTH] IN BLOOD BY AUTOMATED COUNT: 14 % (ref 12.3–15.4)
GLUCOSE BLDC GLUCOMTR-MCNC: 122 MG/DL (ref 70–99)
GLUCOSE BLDC GLUCOMTR-MCNC: 130 MG/DL (ref 70–99)
GLUCOSE BLDC GLUCOMTR-MCNC: 141 MG/DL (ref 70–99)
GLUCOSE BLDC GLUCOMTR-MCNC: 147 MG/DL (ref 70–99)
GLUCOSE BLDC GLUCOMTR-MCNC: 192 MG/DL (ref 70–99)
GLUCOSE SERPL-MCNC: 154 MG/DL (ref 65–99)
HCT VFR BLD AUTO: 39.8 % (ref 34–46.6)
HGB BLD-MCNC: 12.9 G/DL (ref 12–15.9)
LYMPHOCYTES # BLD MANUAL: 0.98 10*3/MM3 (ref 0.7–3.1)
LYMPHOCYTES NFR BLD MANUAL: 6 % (ref 5–12)
MAGNESIUM SERPL-MCNC: 2 MG/DL (ref 1.6–2.4)
MCH RBC QN AUTO: 32.3 PG (ref 26.6–33)
MCHC RBC AUTO-ENTMCNC: 32.4 G/DL (ref 31.5–35.7)
MCV RBC AUTO: 99.5 FL (ref 79–97)
METAMYELOCYTES NFR BLD MANUAL: 8 % (ref 0–0)
MONOCYTES # BLD: 0.27 10*3/MM3 (ref 0.1–0.9)
NEUTROPHILS # BLD AUTO: 2.82 10*3/MM3 (ref 1.7–7)
NEUTROPHILS NFR BLD MANUAL: 45 % (ref 42.7–76)
NEUTS BAND NFR BLD MANUAL: 18 % (ref 0–5)
PHOSPHATE SERPL-MCNC: 2.6 MG/DL (ref 2.5–4.5)
PLATELET # BLD AUTO: 155 10*3/MM3 (ref 140–450)
PMV BLD AUTO: 11.4 FL (ref 6–12)
POTASSIUM SERPL-SCNC: 4.2 MMOL/L (ref 3.5–5.2)
RBC # BLD AUTO: 4 10*6/MM3 (ref 3.77–5.28)
RBC MORPH BLD: NORMAL
SCAN SLIDE: NORMAL
SMALL PLATELETS BLD QL SMEAR: ADEQUATE
SODIUM SERPL-SCNC: 136 MMOL/L (ref 136–145)
VARIANT LYMPHS NFR BLD MANUAL: 1 % (ref 0–5)
VARIANT LYMPHS NFR BLD MANUAL: 21 % (ref 19.6–45.3)
WBC MORPH BLD: NORMAL
WBC NRBC COR # BLD AUTO: 4.47 10*3/MM3 (ref 3.4–10.8)

## 2025-02-18 PROCEDURE — 85007 BL SMEAR W/DIFF WBC COUNT: CPT | Performed by: HOSPITALIST

## 2025-02-18 PROCEDURE — 63710000001 INSULIN REGULAR HUMAN PER 5 UNITS: Performed by: HOSPITALIST

## 2025-02-18 PROCEDURE — 25010000002 HYDROMORPHONE 1 MG/ML SOLUTION: Performed by: HOSPITALIST

## 2025-02-18 PROCEDURE — 99232 SBSQ HOSP IP/OBS MODERATE 35: CPT | Performed by: STUDENT IN AN ORGANIZED HEALTH CARE EDUCATION/TRAINING PROGRAM

## 2025-02-18 PROCEDURE — 83735 ASSAY OF MAGNESIUM: CPT | Performed by: HOSPITALIST

## 2025-02-18 PROCEDURE — 85025 COMPLETE CBC W/AUTO DIFF WBC: CPT | Performed by: HOSPITALIST

## 2025-02-18 PROCEDURE — 82948 REAGENT STRIP/BLOOD GLUCOSE: CPT

## 2025-02-18 PROCEDURE — 99232 SBSQ HOSP IP/OBS MODERATE 35: CPT | Performed by: SURGERY

## 2025-02-18 PROCEDURE — 84100 ASSAY OF PHOSPHORUS: CPT | Performed by: HOSPITALIST

## 2025-02-18 PROCEDURE — 25010000002 ENOXAPARIN PER 10 MG: Performed by: STUDENT IN AN ORGANIZED HEALTH CARE EDUCATION/TRAINING PROGRAM

## 2025-02-18 PROCEDURE — 80048 BASIC METABOLIC PNL TOTAL CA: CPT | Performed by: HOSPITALIST

## 2025-02-18 RX ADMIN — Medication 10 ML: at 21:01

## 2025-02-18 RX ADMIN — HUMAN INSULIN 2 UNITS: 100 INJECTION, SOLUTION SUBCUTANEOUS at 17:40

## 2025-02-18 RX ADMIN — ENOXAPARIN SODIUM 40 MG: 100 INJECTION SUBCUTANEOUS at 17:34

## 2025-02-18 RX ADMIN — HYDROMORPHONE HYDROCHLORIDE 1 MG: 1 INJECTION, SOLUTION INTRAMUSCULAR; INTRAVENOUS; SUBCUTANEOUS at 06:33

## 2025-02-18 RX ADMIN — Medication 10 ML: at 08:51

## 2025-02-18 RX ADMIN — HYDROMORPHONE HYDROCHLORIDE 1 MG: 1 INJECTION, SOLUTION INTRAMUSCULAR; INTRAVENOUS; SUBCUTANEOUS at 10:05

## 2025-02-18 RX ADMIN — PANTOPRAZOLE SODIUM 40 MG: 40 INJECTION, POWDER, FOR SOLUTION INTRAVENOUS at 08:51

## 2025-02-18 NOTE — PROGRESS NOTES
[facility]   Hospitalist Progress Note  Date: 2025  Patient Name: Susan Hernandez  : 1950  MRN: [id number]  Date of admission: 2025  Room/Bed: [facility]      Subjective   Subjective     Chief Complaint: Abdominal pain    Summary:Susan Hernandez is a 74 y.o. female that complains of severe stabbing abdominal pain and 3 and vomiting yesterday. She is vomiting having nausea and weakness. Patient's blood pressure is low. She was seen at urgent care. Patient was instructed to come to the ER. She denies any diarrhea.  Patient admitted for SBO seen on CT scan.    Interval Followup: No acute overnight events.  No acute distress.  Patient resting comfortably in bed.  She had a small BM.  Reports she still having abdominal pain but that it is improved.  Having some gas movement.      Objective   Objective     Vitals:   Temp:  [98.2 °F (36.8 °C)-99 °F (37.2 °C)] 98.6 °F (37 °C)  Heart Rate:  [84-99] 87  Resp:  [16-18] 16  BP: (120-135)/(67-74) 130/74  Flow (L/min) (Oxygen Therapy):  [2] 2    Physical Exam   Gen: NAD, Alert and Oriented  HEENT: NG tube in place  Pulm: No respiratory distress, can complete full sentences  Abd: soft, distended  Extremities: no pitting edema    Result Review    Result Review:  I have personally reviewed these results:  [x]  Laboratory      Lab 25  0455 25  0613 25  1707 25  1359 25  1051   WBC 4.47 3.04*  --   --  6.18   HEMOGLOBIN 12.9 13.6  --   --  15.2   HEMATOCRIT 39.8 40.7  --   --  46.4   PLATELETS 155 154  --   --  200   NEUTROS ABS 2.82 1.50*  --   --  4.70   IMMATURE GRANS (ABS)  --  0.02  --   --  0.01   LYMPHS ABS  --  0.88  --   --  0.77   MONOS ABS  --  0.58  --   --  0.51   EOS ABS  --  0.01  --   --  0.16   MCV 99.5* 98.5*  --   --  97.1*   LACTATE  --   --  1.6 4.1* 6.5*         Lab 25  0455 25  0613 25  1707 25  1051   SODIUM 136 139  --  140   POTASSIUM 4.2 4.1  --  4.2   CHLORIDE 99 103  --  99   CO2 19.2*  23.9  --  21.3*   ANION GAP 17.8* 12.1  --  19.7*   BUN 28* 26*  --  25*   CREATININE 0.71 0.70  --  1.07*   EGFR 89.4 90.9  --  54.6*   GLUCOSE 154* 168*  --  272*   CALCIUM 8.4* 8.4*  --  9.2   MAGNESIUM 2.0 2.2 1.5*  --    PHOSPHORUS 2.6 4.0 4.1  --          Lab 02/16/25  1051   TOTAL PROTEIN 6.9   ALBUMIN 3.9   GLOBULIN 3.0   ALT (SGPT) 19   AST (SGOT) 20   BILIRUBIN 1.3*   ALK PHOS 66   LIPASE 21                     Brief Urine Lab Results  (Last result in the past 365 days)        Color   Clarity   Blood   Leuk Est   Nitrite   Protein   CREAT   Urine HCG        02/16/25 1207 Yellow   Clear   Negative   Negative   Negative   Negative                 [x]  Microbiology   Microbiology Results (last 10 days)       ** No results found for the last 240 hours. **          [x]  Radiology  XR Abdomen KUB    Result Date: 2/17/2025  Impression: Nasogastric tube tip in the proximal stomach with sidehole at the gastroesophageal junction. Electronically Signed: Aren Rogers MD  2/17/2025 10:57 AM EST  Workstation ID: [id number]    XR Abdomen KUB    Result Date: 2/16/2025  Impression: 1. Nasogastric tube tip is within the stomach. This should be advanced some for more optimal positioning. 2. Dilated small bowel loops which could relate to small bowel obstruction. Electronically Signed: Charles Shankar MD  2/16/2025 2:52 PM EST  Workstation ID: [id number]    CT Abdomen Pelvis With Contrast    Result Date: 2/16/2025  Impression: 1.Small bowel obstruction involving ileum within right lower quadrant in the area of surgical anastomosis. 2.Trace pelvic ascites. 3.Hepatic steatosis. Electronically Signed: Gabe Adams MD  2/16/2025 1:29 PM EST  Workstation ID: [id number]    Mammo Screening Digital Tomosynthesis Bilateral With CAD    Result Date: 2/14/2025  No mammographic evidence of malignancy.  Recommend annual screening mammography.  BI-RADS ASSESSMENT: BI-RADS 1. Negative.  Note:  It has been reported that there is approximately a  15% false negative rate in mammography.  Therefore, management of a palpable abnormality should not be deferred because of a negative mammogram.  2/14/2025 7:44 AM by NAINA HOOKS MD on Workstation: [id number]     []  EKG/Telemetry   []  Cardiology/Vascular   []  Pathology  []  Old records  []  Other:    Assessment & Plan   Assessment / Plan     Assessment:  SBO  Abdominal pain  Type 2 diabetes  Hypothyroidism  Hyperlipidemia  History multiple abdominal surgeries  History colon cancer s/p resection/chemotherapy  Lactic acidosis    Plan:  Continue inpatient admission  General Surgery following  NG tube in place  IV Dilaudid as needed  Nausea medications as needed  Continue IV fluids  Continue supplemental O2 at 2 L.  Wean as tolerated.  A.m. labs       Discussed with RN.    VTE Prophylaxis:  Pharmacologic & mechanical VTE prophylaxis orders are present.        CODE STATUS:   Level Of Support Discussed With: Patient  Code Status (Patient has no pulse and is not breathing): CPR (Attempt to Resuscitate)  Medical Interventions (Patient has pulse or is breathing): Full Support      Electronically signed by Luciana Saldivar DO, 2/18/2025, 11:28 EST.

## 2025-02-18 NOTE — PLAN OF CARE
Goal Outcome Evaluation:  Plan of Care Reviewed With: patient        Progress: improving  Outcome Evaluation: Patient still having pain but pain level is decreasing and frequency is less, patient had medium BM formed and hard, patient alert and oriented, NG to right nare intact, call light within reach, bed alert on,

## 2025-02-18 NOTE — PLAN OF CARE
Goal Outcome Evaluation:      Pt received pain medication PRN this shift, pulled out NG tube, adlib to bedside commode in room, VS stable, tolerating NPO with ice chips.

## 2025-02-18 NOTE — PROGRESS NOTES
SURGERY PROGRESS NOTE     Patient Name:  Susna Hernandez  YOB: 1950  [id number]   LOS: 2 days   * No surgery found *  Patient Care Team:  Rigoberto Gomes Jr., MD as PCP - General (Internal Medicine)  Tonio Johnson as Medical Assistant      Subjective     Interval History: Patient pulled NG tube out.  Positive flatus and bowel movement.  Abdominal pain slightly better.  Denies nausea.  Afebrile, vital signs stable.  WBC 4, no shift      Review of Systems:    All systems were reviewed and negative except for: Negative nausea vomiting diarrhea.  Positive for abdominal pain    Objective     Vital Signs  Temp:  [98.2 °F (36.8 °C)-99 °F (37.2 °C)] 98.6 °F (37 °C)  Heart Rate:  [84-99] 87  Resp:  [16-18] 16  BP: (120-135)/(67-74) 130/74    Physical Exam:     General Appearance:   Alert, cooperative, in no acute distress   Head:   Normocephalic, without obvious abnormality, atraumatic   Eyes:            Lids and lashes normal, conjunctivae and sclerae normal, no      Icterus    Ears:   Ears appear intact with no abnormalities noted   Throat:  No oral lesions, no thrush, oral mucosa moist   Neck:  No adenopathy, supple, trachea midline, no thyromegaly, no     carotid bruit, no JVD   Back:    no tenderness to percussion or palpation,   range of motion       normal   Lungs:    Clear to auscultation,respirations regular, even and                     unlabored    Heart:   Regular rhythm and normal rate, no murmur, no gallop, no rub   Chest Wall:   No abnormalities observed   Abdomen:    Normal bowel sounds, no masses, no organomegaly, soft          mild distention, minimal generalized abdominal pain, no guarding, no rebound                  tenderness   Rectal:      Extremities:  Moves all extremities well, no edema, no cyanosis, no                redness   Skin:  No bleeding, bruising or rash   Lymph nodes:  No palpable adenopathy   Neurologic:  A/Ox4 with no deficits        Results Review:     I reviewed  the patient's new clinical results.    LABS:  Lab Results (last 72 hours)       Procedure Component Value Units Date/Time    POC Glucose Once [[id number]]  (Abnormal) Collected: 02/18/25 1120    Specimen: Blood Updated: 02/18/25 1122     Glucose 122 mg/dL      Comment: Serial Number: [id number]:  [id number]       POC Glucose Once [[id number]]  (Abnormal) Collected: 02/18/25 0631    Specimen: Blood Updated: 02/18/25 0632     Glucose 147 mg/dL      Comment: Serial Number: [id number]:  [id number]       CBC & Differential [[id number]]  (Abnormal) Collected: 02/18/25 0455    Specimen: Blood from Arm, Right Updated: 02/18/25 0551    Narrative:      The following orders were created for panel order CBC & Differential.  Procedure                               Abnormality         Status                     ---------                               -----------         ------                     CBC Auto Differential[[id number]]        Abnormal            Final result               Scan Slide[[id number]]                                       Final result                 Please view results for these tests on the individual orders.    CBC Auto Differential [[id number]]  (Abnormal) Collected: 02/18/25 0455    Specimen: Blood from Arm, Right Updated: 02/18/25 0551     WBC 4.47 10*3/mm3      RBC 4.00 10*6/mm3      Hemoglobin 12.9 g/dL      Hematocrit 39.8 %      MCV 99.5 fL      MCH 32.3 pg      MCHC 32.4 g/dL      RDW 14.0 %      RDW-SD 51.8 fl      MPV 11.4 fL      Platelets 155 10*3/mm3     Scan Slide [[id number]] Collected: 02/18/25 0455    Specimen: Blood from Arm, Right Updated: 02/18/25 0551     Scan Slide --     Comment: See Manual Differential Results       Manual Differential [[id number]]  (Abnormal) Collected: 02/18/25 0455    Specimen: Blood from Arm, Right Updated: 02/18/25 0551     Neutrophil % 45.0 %      Lymphocyte % 21.0 %      Monocyte % 6.0 %      Basophil % 1.0 %      Bands %  18.0 %      Metamyelocyte % 8.0  %      Atypical Lymphocyte % 1.0 %      Neutrophils Absolute 2.82 10*3/mm3      Lymphocytes Absolute 0.98 10*3/mm3      Monocytes Absolute 0.27 10*3/mm3      Basophils Absolute 0.04 10*3/mm3      RBC Morphology Normal     WBC Morphology Normal     Platelet Estimate Adequate    Magnesium [[id number]]  (Normal) Collected: 02/18/25 0455    Specimen: Blood from Arm, Right Updated: 02/18/25 0544     Magnesium 2.0 mg/dL     Phosphorus [[id number]]  (Normal) Collected: 02/18/25 0455    Specimen: Blood from Arm, Right Updated: 02/18/25 0544     Phosphorus 2.6 mg/dL     Basic Metabolic Panel [[id number]]  (Abnormal) Collected: 02/18/25 0455    Specimen: Blood from Arm, Right Updated: 02/18/25 0544     Glucose 154 mg/dL      BUN 28 mg/dL      Creatinine 0.71 mg/dL      Sodium 136 mmol/L      Potassium 4.2 mmol/L      Comment: Specimen hemolyzed.  Result may be falsely elevated.        Chloride 99 mmol/L      CO2 19.2 mmol/L      Calcium 8.4 mg/dL      BUN/Creatinine Ratio 39.4     Anion Gap 17.8 mmol/L      eGFR 89.4 mL/min/1.73     Narrative:      GFR Categories in Chronic Kidney Disease (CKD)      GFR Category          GFR (mL/min/1.73)    Interpretation  G1                     90 or greater         Normal or high (1)  G2                      60-89                Mild decrease (1)  G3a                   45-59                Mild to moderate decrease  G3b                   30-44                Moderate to severe decrease  G4                    15-29                Severe decrease  G5                    14 or less           Kidney failure          (1)In the absence of evidence of kidney disease, neither GFR category G1 or G2 fulfill the criteria for CKD.    eGFR calculation 2021 CKD-EPI creatinine equation, which does not include race as a factor    POC Glucose Once [[id number]]  (Abnormal) Collected: 02/18/25 0020    Specimen: Blood Updated: 02/18/25 0022     Glucose 141 mg/dL      Comment: Serial Number: [id number]:   [id number]       POC Glucose Once [[id number]]  (Abnormal) Collected: 02/17/25 1849    Specimen: Blood Updated: 02/17/25 1851     Glucose 139 mg/dL      Comment: Serial Number: [id number]:  [id number]       POC Glucose Once [[id number]]  (Abnormal) Collected: 02/17/25 1232    Specimen: Blood Updated: 02/17/25 1233     Glucose 167 mg/dL      Comment: Serial Number: [id number]:  [id number]       Magnesium [[id number]]  (Normal) Collected: 02/17/25 0613    Specimen: Blood from Hand, Right Updated: 02/17/25 0658     Magnesium 2.2 mg/dL     CBC & Differential [[id number]]  (Abnormal) Collected: 02/17/25 0613    Specimen: Blood from Hand, Right Updated: 02/17/25 0656    Narrative:      The following orders were created for panel order CBC & Differential.  Procedure                               Abnormality         Status                     ---------                               -----------         ------                     CBC Auto Differential[[id number]]        Abnormal            Final result               Scan Slide[[id number]]                   Normal              Final result                 Please view results for these tests on the individual orders.    CBC Auto Differential [[id number]]  (Abnormal) Collected: 02/17/25 0613    Specimen: Blood from Hand, Right Updated: 02/17/25 0656     WBC 3.04 10*3/mm3      RBC 4.13 10*6/mm3      Hemoglobin 13.6 g/dL      Hematocrit 40.7 %      MCV 98.5 fL      MCH 32.9 pg      MCHC 33.4 g/dL      RDW 14.4 %      RDW-SD 52.0 fl      MPV 10.9 fL      Platelets 154 10*3/mm3      Neutrophil % 49.4 %      Lymphocyte % 28.9 %      Monocyte % 19.1 %      Eosinophil % 0.3 %      Basophil % 1.6 %      Immature Grans % 0.7 %      Neutrophils, Absolute 1.50 10*3/mm3      Lymphocytes, Absolute 0.88 10*3/mm3      Monocytes, Absolute 0.58 10*3/mm3      Eosinophils, Absolute 0.01 10*3/mm3      Basophils, Absolute 0.05 10*3/mm3      Immature Grans, Absolute 0.02 10*3/mm3      nRBC 0.0 /100  WBC     Scan Slide [[id number]]  (Normal) Collected: 02/17/25 0613    Specimen: Blood from Hand, Right Updated: 02/17/25 0656     RBC Morphology Normal     WBC Morphology Normal     Platelet Morphology Normal    Basic Metabolic Panel [[id number]]  (Abnormal) Collected: 02/17/25 0613    Specimen: Blood from Hand, Right Updated: 02/17/25 0653     Glucose 168 mg/dL      BUN 26 mg/dL      Creatinine 0.70 mg/dL      Sodium 139 mmol/L      Potassium 4.1 mmol/L      Chloride 103 mmol/L      CO2 23.9 mmol/L      Calcium 8.4 mg/dL      BUN/Creatinine Ratio 37.1     Anion Gap 12.1 mmol/L      eGFR 90.9 mL/min/1.73     Narrative:      GFR Categories in Chronic Kidney Disease (CKD)      GFR Category          GFR (mL/min/1.73)    Interpretation  G1                     90 or greater         Normal or high (1)  G2                      60-89                Mild decrease (1)  G3a                   45-59                Mild to moderate decrease  G3b                   30-44                Moderate to severe decrease  G4                    15-29                Severe decrease  G5                    14 or less           Kidney failure          (1)In the absence of evidence of kidney disease, neither GFR category G1 or G2 fulfill the criteria for CKD.    eGFR calculation 2021 CKD-EPI creatinine equation, which does not include race as a factor    Phosphorus [[id number]]  (Normal) Collected: 02/17/25 0613    Specimen: Blood from Hand, Right Updated: 02/17/25 0653     Phosphorus 4.0 mg/dL     POC Glucose Once [[id number]]  (Abnormal) Collected: 02/17/25 0553    Specimen: Blood Updated: 02/17/25 0555     Glucose 176 mg/dL      Comment: Serial Number: [id number]:  [id number]       POC Glucose Once [[id number]]  (Abnormal) Collected: 02/17/25 0046    Specimen: Blood Updated: 02/17/25 0049     Glucose 180 mg/dL      Comment: Serial Number: [id number]:  [id number]       STAT Lactic Acid, Reflex [[id number]]  (Normal) Collected: 02/16/25  1707    Specimen: Blood from Hand, Right Updated: 02/16/25 1735     Lactate 1.6 mmol/L     Magnesium [[id number]]  (Abnormal) Collected: 02/16/25 1707    Specimen: Blood from Hand, Right Updated: 02/16/25 1734     Magnesium 1.5 mg/dL     Phosphorus [[id number]]  (Normal) Collected: 02/16/25 1707    Specimen: Blood from Hand, Right Updated: 02/16/25 1734     Phosphorus 4.1 mg/dL     Acetone [[id number]]  (Abnormal) Collected: 02/16/25 1707    Specimen: Blood from Hand, Right Updated: 02/16/25 1729     Acetone Small    POC Glucose Once [[id number]]  (Abnormal) Collected: 02/16/25 1725    Specimen: Blood Updated: 02/16/25 1728     Glucose 198 mg/dL      Comment: Serial Number: [id number]:  [id number]       STAT Lactic Acid, Reflex [[id number]]  (Abnormal) Collected: 02/16/25 1359    Specimen: Blood Updated: 02/16/25 1455     Lactate 4.1 mmol/L     Urinalysis With Microscopic If Indicated (No Culture) - Urine, Clean Catch [[id number]]  (Abnormal) Collected: 02/16/25 1207    Specimen: Urine, Clean Catch Updated: 02/16/25 1219     Color, UA Yellow     Appearance, UA Clear     pH, UA 5.5     Specific Gravity, UA >1.030     Glucose, UA >=1000 mg/dL (3+)     Ketones, UA Trace     Bilirubin, UA Negative     Blood, UA Negative     Protein, UA Negative     Leuk Esterase, UA Negative     Nitrite, UA Negative     Urobilinogen, UA 0.2 E.U./dL    Narrative:      Urine microscopic not indicated.    Lactic Acid, Plasma [[id number]]  (Abnormal) Collected: 02/16/25 1051    Specimen: Blood Updated: 02/16/25 1124     Lactate 6.5 mmol/L     Comprehensive Metabolic Panel [[id number]]  (Abnormal) Collected: 02/16/25 1051    Specimen: Blood Updated: 02/16/25 1115     Glucose 272 mg/dL      BUN 25 mg/dL      Creatinine 1.07 mg/dL      Sodium 140 mmol/L      Potassium 4.2 mmol/L      Chloride 99 mmol/L      CO2 21.3 mmol/L      Calcium 9.2 mg/dL      Total Protein 6.9 g/dL      Albumin 3.9 g/dL      ALT (SGPT) 19 U/L      AST (SGOT) 20 U/L       Alkaline Phosphatase 66 U/L      Total Bilirubin 1.3 mg/dL      Globulin 3.0 gm/dL      A/G Ratio 1.3 g/dL      BUN/Creatinine Ratio 23.4     Anion Gap 19.7 mmol/L      eGFR 54.6 mL/min/1.73     Narrative:      GFR Categories in Chronic Kidney Disease (CKD)      GFR Category          GFR (mL/min/1.73)    Interpretation  G1                     90 or greater         Normal or high (1)  G2                      60-89                Mild decrease (1)  G3a                   45-59                Mild to moderate decrease  G3b                   30-44                Moderate to severe decrease  G4                    15-29                Severe decrease  G5                    14 or less           Kidney failure          (1)In the absence of evidence of kidney disease, neither GFR category G1 or G2 fulfill the criteria for CKD.    eGFR calculation 2021 CKD-EPI creatinine equation, which does not include race as a factor    Lipase [[id number]]  (Normal) Collected: 02/16/25 1051    Specimen: Blood Updated: 02/16/25 1115     Lipase 21 U/L     [place] Draw [[id number]] Collected: 02/16/25 1051    Specimen: Blood Updated: 02/16/25 1100    Narrative:      The following orders were created for panel order [place] Draw.  Procedure                               Abnormality         Status                     ---------                               -----------         ------                     Green Top (Gel)[[id number]]                                  Final result               Lavender Top[[id number]]                                     Final result               Gold Top - SST[[id number]]                                   Final result               Light Blue Top[[id number]]                                   Final result                 Please view results for these tests on the individual orders.    Green Top (Gel) [[id number]] Collected: 02/16/25 1051    Specimen: Blood Updated: 02/16/25 1100     Extra Tube Hold for add-ons.     Comment:  Auto resulted.       Lavender Top [[id number]] Collected: 02/16/25 1051    Specimen: Blood Updated: 02/16/25 1100     Extra Tube hold for add-on     Comment: Auto resulted       Gold Top - SST [[id number]] Collected: 02/16/25 1051    Specimen: Blood Updated: 02/16/25 1100     Extra Tube Hold for add-ons.     Comment: Auto resulted.       Light Blue Top [[id number]] Collected: 02/16/25 1051    Specimen: Blood Updated: 02/16/25 1100     Extra Tube Hold for add-ons.     Comment: Auto resulted       CBC & Differential [[id number]]  (Abnormal) Collected: 02/16/25 1051    Specimen: Blood Updated: 02/16/25 1058    Narrative:      The following orders were created for panel order CBC & Differential.  Procedure                               Abnormality         Status                     ---------                               -----------         ------                     CBC Auto Differential[[id number]]        Abnormal            Final result                 Please view results for these tests on the individual orders.    CBC Auto Differential [[id number]]  (Abnormal) Collected: 02/16/25 1051    Specimen: Blood Updated: 02/16/25 1058     WBC 6.18 10*3/mm3      RBC 4.78 10*6/mm3      Hemoglobin 15.2 g/dL      Hematocrit 46.4 %      MCV 97.1 fL      MCH 31.8 pg      MCHC 32.8 g/dL      RDW 13.7 %      RDW-SD 49.7 fl      MPV 10.9 fL      Platelets 200 10*3/mm3      Neutrophil % 75.9 %      Lymphocyte % 12.5 %      Monocyte % 8.3 %      Eosinophil % 2.6 %      Basophil % 0.5 %      Immature Grans % 0.2 %      Neutrophils, Absolute 4.70 10*3/mm3      Lymphocytes, Absolute 0.77 10*3/mm3      Monocytes, Absolute 0.51 10*3/mm3      Eosinophils, Absolute 0.16 10*3/mm3      Basophils, Absolute 0.03 10*3/mm3      Immature Grans, Absolute 0.01 10*3/mm3      nRBC 0.0 /100 WBC             IMAGING:  Imaging Results (Last 72 Hours)       Procedure Component Value Units Date/Time    XR Abdomen KUB [[id number]] Collected: 02/17/25 1056      Updated: 02/17/25 1059    Narrative:      XR ABDOMEN KUB    Date of Exam: 2/17/2025 10:28 AM EST    Indication: NG placement    Comparison: 2/16/2025    Findings:  There is a gastric tube with tip in the proximal body of the stomach, sidehole in the region of the gastroesophageal junction, similar to prior examination. Similar dilated loops of small intestine measuring to 3.8 cm in diameter. Moderate colonic fecal   load.      Impression:      Impression:  Nasogastric tube tip in the proximal stomach with sidehole at the gastroesophageal junction.      Electronically Signed: Aren Rogers MD    2/17/2025 10:57 AM EST    Workstation ID: [id number]    XR Abdomen KUB [[id number]] Collected: 02/16/25 1450     Updated: 02/16/25 1454    Narrative:      XR ABDOMEN KUB    Date of Exam: 2/16/2025 2:22 PM EST    Indication: NG placement    Comparison: None available.    Findings:  Nasogastric tube is noted with its tip in the stomach. Sideport is around the GE junction. This should be advanced some for more optimal positioning. There are dilated small bowel loops which could relate to small bowel obstruction.      Impression:      Impression:    1. Nasogastric tube tip is within the stomach. This should be advanced some for more optimal positioning.  2. Dilated small bowel loops which could relate to small bowel obstruction.        Electronically Signed: Charles Shankar MD    2/16/2025 2:52 PM EST    Workstation ID: [id number]    CT Abdomen Pelvis With Contrast [[id number]] Collected: 02/16/25 1319     Updated: 02/16/25 1331    Narrative:      CT ABDOMEN PELVIS W CONTRAST    Date of Exam: 2/16/2025 12:59 PM EST    Indication: Abdominal pain and vomiting with history of multiple GI surgeries and colon cancer.    Comparison: August 29, 2023    Technique: Axial CT images were obtained of the abdomen and pelvis after the uneventful intravenous administration of iodinated contrast. Reconstructed coronal and sagittal images were also  obtained. Automated exposure control and iterative construction   methods were used.      Findings:  The lung bases are clear.    A couple small hepatic cysts appear unchanged. There is hepatic steatosis. The gallbladder, adrenal glands, kidneys, spleen, and pancreas are unremarkable.    The stomach appears normal. There are post-surgical changes along the small and large bowel. There is a small bowel obstruction with small bowel feces involving the ileum within the right lower quadrant in the area of a surgical anastomosis, depicted on   image 132 of series 2. The colon appears normal in caliber. The appendix is not visualized. There is trace pelvic ascites. There is no loculated collection. No abnormally enlarged lymph nodes are identified.    The rectum urinary bladder are unremarkable. The uterus is surgically absent.    No aggressive osseous lesions are identified.      Impression:      Impression:  1.Small bowel obstruction involving ileum within right lower quadrant in the area of surgical anastomosis.  2.Trace pelvic ascites.  3.Hepatic steatosis.        Electronically Signed: Gabe Adams MD    2/16/2025 1:29 PM EST    Workstation ID: [id number]            Medications:    Current Facility-Administered Medications:     dextrose (D50W) (25 g/50 mL) IV injection 25 g, 25 g, Intravenous, Q15 Min PRN, Cordero, Dimpi, DO    dextrose (GLUTOSE) oral gel 15 g, 15 g, Oral, Q15 Min PRN, Cordero, Dimpi, DO    Enoxaparin Sodium (LOVENOX) syringe 40 mg, 40 mg, Subcutaneous, Q24H, Luciana Saldivar DO, 40 mg at 02/17/25 1604    glucagon (GLUCAGEN) injection 1 mg, 1 mg, Intramuscular, Q15 Min PRN, Cordero, Dimpi, DO    hydrALAZINE (APRESOLINE) injection 10 mg, 10 mg, Intravenous, Q6H PRN, Cordero, Dimpi, DO    HYDROmorphone (DILAUDID) injection 1 mg, 1 mg, Intravenous, Q2H PRN, 1 mg at 02/18/25 1005 **AND** naloxone (NARCAN) injection 0.4 mg, 0.4 mg, Intravenous, Q5 Min PRN, Cordero, Dimpi, DO    insulin regular (humuLIN  R,novoLIN R) injection 2-7 Units, 2-7 Units, Subcutaneous, Q6H, Cordero, Dimpi, DO, 2 Units at 02/17/25 1247    ondansetron (ZOFRAN) injection 4 mg, 4 mg, Intravenous, Once, Galan, Daniele, DO    ondansetron ODT (ZOFRAN-ODT) disintegrating tablet 4 mg, 4 mg, Nasogastric, Q6H PRN **OR** ondansetron (ZOFRAN) injection 4 mg, 4 mg, Intravenous, Q6H PRN, Luciana Saldivar, DO, 4 mg at 02/17/25 2132    pantoprazole (PROTONIX) injection 40 mg, 40 mg, Intravenous, Q AM, Cordero, Dimpi, DO, 40 mg at 02/18/25 0851    sodium chloride 0.9 % flush 10 mL, 10 mL, Intravenous, PRN, Daniele Galan, DO, 10 mL at 02/16/25 1359    sodium chloride 0.9 % flush 10 mL, 10 mL, Intravenous, Q12H, Cordero, Dimpi, DO, 10 mL at 02/18/25 0851    sodium chloride 0.9 % flush 10 mL, 10 mL, Intravenous, PRN, Cordero, Dimpi, DO    sodium chloride 0.9 % infusion 40 mL, 40 mL, Intravenous, PRN, Cordero, Dimpi, DO    Assessment & Plan       SBO (small bowel obstruction)    Partial small bowel obstruction    Labs reviewed.  No surgery at this time.  Okay to leave NG tube out.  Recommend replacing for worsening abdominal pain, nausea.  Continue n.p.o. today.  Consider clear liquids versus Gastrografin challenge tomorrow.  Labs in the morning.  I explained to the patient one of my partners will check on her tomorrow.  All questions answered.  She agrees with the plan.         Gibran Pichardo MD  02/18/25  15:10 EST

## 2025-02-19 ENCOUNTER — APPOINTMENT (OUTPATIENT)
Dept: GENERAL RADIOLOGY | Facility: HOSPITAL | Age: 75
DRG: 389 | End: 2025-02-19
Payer: MEDICARE

## 2025-02-19 LAB
ANION GAP SERPL CALCULATED.3IONS-SCNC: 22 MMOL/L (ref 5–15)
BASOPHILS # BLD AUTO: 0.07 10*3/MM3 (ref 0–0.2)
BASOPHILS NFR BLD AUTO: 1.7 % (ref 0–1.5)
BUN SERPL-MCNC: 21 MG/DL (ref 8–23)
BUN/CREAT SERPL: 28 (ref 7–25)
CALCIUM SPEC-SCNC: 8.1 MG/DL (ref 8.6–10.5)
CHLORIDE SERPL-SCNC: 103 MMOL/L (ref 98–107)
CO2 SERPL-SCNC: 13 MMOL/L (ref 22–29)
CREAT SERPL-MCNC: 0.75 MG/DL (ref 0.57–1)
DEPRECATED RDW RBC AUTO: 51.2 FL (ref 37–54)
EGFRCR SERPLBLD CKD-EPI 2021: 83.7 ML/MIN/1.73
EOSINOPHIL # BLD AUTO: 0 10*3/MM3 (ref 0–0.4)
EOSINOPHIL NFR BLD AUTO: 0 % (ref 0.3–6.2)
ERYTHROCYTE [DISTWIDTH] IN BLOOD BY AUTOMATED COUNT: 13.9 % (ref 12.3–15.4)
GLUCOSE BLDC GLUCOMTR-MCNC: 125 MG/DL (ref 70–99)
GLUCOSE BLDC GLUCOMTR-MCNC: 139 MG/DL (ref 70–99)
GLUCOSE BLDC GLUCOMTR-MCNC: 141 MG/DL (ref 70–99)
GLUCOSE BLDC GLUCOMTR-MCNC: 156 MG/DL (ref 70–99)
GLUCOSE SERPL-MCNC: 150 MG/DL (ref 65–99)
HCT VFR BLD AUTO: 38.3 % (ref 34–46.6)
HGB BLD-MCNC: 12.4 G/DL (ref 12–15.9)
IMM GRANULOCYTES # BLD AUTO: 0.15 10*3/MM3 (ref 0–0.05)
IMM GRANULOCYTES NFR BLD AUTO: 3.6 % (ref 0–0.5)
LYMPHOCYTES # BLD AUTO: 0.78 10*3/MM3 (ref 0.7–3.1)
LYMPHOCYTES NFR BLD AUTO: 18.6 % (ref 19.6–45.3)
MCH RBC QN AUTO: 32.4 PG (ref 26.6–33)
MCHC RBC AUTO-ENTMCNC: 32.4 G/DL (ref 31.5–35.7)
MCV RBC AUTO: 100 FL (ref 79–97)
MONOCYTES # BLD AUTO: 0.75 10*3/MM3 (ref 0.1–0.9)
MONOCYTES NFR BLD AUTO: 17.9 % (ref 5–12)
NEUTROPHILS NFR BLD AUTO: 2.45 10*3/MM3 (ref 1.7–7)
NEUTROPHILS NFR BLD AUTO: 58.2 % (ref 42.7–76)
NRBC BLD AUTO-RTO: 0 /100 WBC (ref 0–0.2)
PLATELET # BLD AUTO: 133 10*3/MM3 (ref 140–450)
PMV BLD AUTO: 10.9 FL (ref 6–12)
POTASSIUM SERPL-SCNC: 3.5 MMOL/L (ref 3.5–5.2)
RBC # BLD AUTO: 3.83 10*6/MM3 (ref 3.77–5.28)
SODIUM SERPL-SCNC: 138 MMOL/L (ref 136–145)
WBC NRBC COR # BLD AUTO: 4.2 10*3/MM3 (ref 3.4–10.8)

## 2025-02-19 PROCEDURE — 25010000002 HYDROMORPHONE 1 MG/ML SOLUTION: Performed by: HOSPITALIST

## 2025-02-19 PROCEDURE — 82948 REAGENT STRIP/BLOOD GLUCOSE: CPT | Performed by: HOSPITALIST

## 2025-02-19 PROCEDURE — 82948 REAGENT STRIP/BLOOD GLUCOSE: CPT

## 2025-02-19 PROCEDURE — 80048 BASIC METABOLIC PNL TOTAL CA: CPT | Performed by: HOSPITALIST

## 2025-02-19 PROCEDURE — 99231 SBSQ HOSP IP/OBS SF/LOW 25: CPT | Performed by: NURSE PRACTITIONER

## 2025-02-19 PROCEDURE — 63710000001 INSULIN REGULAR HUMAN PER 5 UNITS: Performed by: HOSPITALIST

## 2025-02-19 PROCEDURE — 99232 SBSQ HOSP IP/OBS MODERATE 35: CPT | Performed by: FAMILY MEDICINE

## 2025-02-19 PROCEDURE — 74018 RADEX ABDOMEN 1 VIEW: CPT

## 2025-02-19 PROCEDURE — 25010000002 ENOXAPARIN PER 10 MG: Performed by: STUDENT IN AN ORGANIZED HEALTH CARE EDUCATION/TRAINING PROGRAM

## 2025-02-19 PROCEDURE — 74019 RADEX ABDOMEN 2 VIEWS: CPT

## 2025-02-19 PROCEDURE — 85025 COMPLETE CBC W/AUTO DIFF WBC: CPT | Performed by: HOSPITALIST

## 2025-02-19 PROCEDURE — 25510000002 DIATRIZOATE MEGLUMINE & SODIUM PER 1 ML: Performed by: NURSE PRACTITIONER

## 2025-02-19 PROCEDURE — 25810000003 LACTATED RINGERS PER 1000 ML: Performed by: FAMILY MEDICINE

## 2025-02-19 RX ORDER — SODIUM CHLORIDE, SODIUM LACTATE, POTASSIUM CHLORIDE, CALCIUM CHLORIDE 600; 310; 30; 20 MG/100ML; MG/100ML; MG/100ML; MG/100ML
100 INJECTION, SOLUTION INTRAVENOUS CONTINUOUS
Status: ACTIVE | OUTPATIENT
Start: 2025-02-19 | End: 2025-02-20

## 2025-02-19 RX ORDER — DIATRIZOATE MEGLUMINE AND DIATRIZOATE SODIUM 660; 100 MG/ML; MG/ML
120 SOLUTION ORAL; RECTAL ONCE
Status: COMPLETED | OUTPATIENT
Start: 2025-02-19 | End: 2025-02-19

## 2025-02-19 RX ORDER — DEXTROSE MONOHYDRATE, SODIUM CHLORIDE, AND POTASSIUM CHLORIDE 50; 1.49; 4.5 G/1000ML; G/1000ML; G/1000ML
75 INJECTION, SOLUTION INTRAVENOUS CONTINUOUS
Status: DISCONTINUED | OUTPATIENT
Start: 2025-02-19 | End: 2025-02-19

## 2025-02-19 RX ADMIN — HYDROMORPHONE HYDROCHLORIDE 1 MG: 1 INJECTION, SOLUTION INTRAMUSCULAR; INTRAVENOUS; SUBCUTANEOUS at 08:04

## 2025-02-19 RX ADMIN — SODIUM CHLORIDE, SODIUM LACTATE, POTASSIUM CHLORIDE, CALCIUM CHLORIDE 100 ML/HR: 20; 30; 600; 310 INJECTION, SOLUTION INTRAVENOUS at 08:05

## 2025-02-19 RX ADMIN — ENOXAPARIN SODIUM 40 MG: 100 INJECTION SUBCUTANEOUS at 15:32

## 2025-02-19 RX ADMIN — HYDROMORPHONE HYDROCHLORIDE 1 MG: 1 INJECTION, SOLUTION INTRAMUSCULAR; INTRAVENOUS; SUBCUTANEOUS at 20:14

## 2025-02-19 RX ADMIN — SODIUM CHLORIDE, SODIUM LACTATE, POTASSIUM CHLORIDE, CALCIUM CHLORIDE 100 ML/HR: 20; 30; 600; 310 INJECTION, SOLUTION INTRAVENOUS at 17:45

## 2025-02-19 RX ADMIN — Medication 10 ML: at 21:01

## 2025-02-19 RX ADMIN — DIATRIZOATE MEGLUMINE AND DIATRIZOATE SODIUM 120 ML: 660; 100 LIQUID ORAL; RECTAL at 13:30

## 2025-02-19 RX ADMIN — Medication 10 ML: at 09:38

## 2025-02-19 RX ADMIN — HUMAN INSULIN 2 UNITS: 100 INJECTION, SOLUTION SUBCUTANEOUS at 17:45

## 2025-02-19 RX ADMIN — PANTOPRAZOLE SODIUM 40 MG: 40 INJECTION, POWDER, FOR SOLUTION INTRAVENOUS at 05:34

## 2025-02-19 RX ADMIN — HYDROMORPHONE HYDROCHLORIDE 1 MG: 1 INJECTION, SOLUTION INTRAMUSCULAR; INTRAVENOUS; SUBCUTANEOUS at 05:34

## 2025-02-19 RX ADMIN — HYDROMORPHONE HYDROCHLORIDE 1 MG: 1 INJECTION, SOLUTION INTRAMUSCULAR; INTRAVENOUS; SUBCUTANEOUS at 17:52

## 2025-02-19 NOTE — PROGRESS NOTES
[facility]   Hospitalist Progress Note  Date: 2025  Patient Name: Susan Hernandez  : 1950  MRN: [id number]  Date of admission: 2025      Subjective   Subjective     Chief complaint: Abdominal pain    Summary:  74-year-old female with history of diabetes, hypothyroidism, dyslipidemia, multiple abdominal surgeries, history of colon cancer s/p resection and chemotherapy, hospitalized on 2025 for chief complaint of abdominal pain, CT showing small bowel obstruction, general surgery consulted, NG tube placed to low wall suction    Interval follow-up: Seen and examined, no distress, no events overnight, patient pulled out her NG tube yesterday afternoon, had a large bowel movement described by nursing staff.  Bicarb Roge down to 13, potassium 3.5, sodium 138, white blood cell count 4000, hemoglobin 12.4.  Suspect there is some degree of starvation ketosis as patient has not had oral intake for several days.  On 2 L nasal cannula, sats in the 90s.  KUB pending from this morning.    Review of systems:  All systems reviewed and negative except for weakness, fatigue, abdominal discomfort    Objective   Objective     Vitals:   Temp:  [98 °F (36.7 °C)-99 °F (37.2 °C)] 98 °F (36.7 °C)  Heart Rate:  [] 85  Resp:  [16] 16  BP: (122-136)/(67-74) 122/67  Flow (L/min) (Oxygen Therapy):  [2] 2  Physical Exam   Gen: NAD, Alert and Oriented  HEENT: Patent nares, moist mucous membranes  Pulm: No respiratory distress, can complete full sentences  Abd: soft, distended, tender to deep palpation, auscultated bowel sounds  Extremities: no pitting edema    Result Review    Result Review:  I have personally reviewed the pertinent results from the past 24 hours to 2025 10:24 EST and agree with these findings:  [x]  Laboratory   CBC          2025    06:13 2025    04:55 2025    06:16   CBC   WBC 3.04  4.47  4.20    RBC 4.13  4.00  3.83    Hemoglobin 13.6  12.9  12.4    Hematocrit 40.7  39.8   38.3    MCV 98.5  99.5  100.0    MCH 32.9  32.3  32.4    MCHC 33.4  32.4  32.4    RDW 14.4  14.0  13.9    Platelets 154  155  133      BMP          2/17/2025    06:13 2/18/2025    04:55 2/19/2025    06:16   BMP   BUN 26  28  21    Creatinine 0.70  0.71  0.75    Sodium 139  136  138    Potassium 4.1  4.2  3.5    Chloride 103  99  103    CO2 23.9  19.2  13.0    Calcium 8.4  8.4  8.1      LIVER FUNCTION TESTS:      Lab 02/16/25  1051   TOTAL PROTEIN 6.9   ALBUMIN 3.9   GLOBULIN 3.0   ALT (SGPT) 19   AST (SGOT) 20   BILIRUBIN 1.3*   ALK PHOS 66   LIPASE 21       [x]  Microbiology   Microbiology Results (last 10 days)       ** No results found for the last 240 hours. **              [x]  Radiology XR Abdomen KUB    Result Date: 2/17/2025  Impression: Nasogastric tube tip in the proximal stomach with sidehole at the gastroesophageal junction. Electronically Signed: Aren Rogers MD  2/17/2025 10:57 AM EST  Workstation ID: [id number]    XR Abdomen KUB    Result Date: 2/16/2025  Impression: 1. Nasogastric tube tip is within the stomach. This should be advanced some for more optimal positioning. 2. Dilated small bowel loops which could relate to small bowel obstruction. Electronically Signed: Charles Shankar MD  2/16/2025 2:52 PM EST  Workstation ID: [id number]    CT Abdomen Pelvis With Contrast    Result Date: 2/16/2025  Impression: 1.Small bowel obstruction involving ileum within right lower quadrant in the area of surgical anastomosis. 2.Trace pelvic ascites. 3.Hepatic steatosis. Electronically Signed: Gabe Adams MD  2/16/2025 1:29 PM EST  Workstation ID: [id number]    Mammo Screening Digital Tomosynthesis Bilateral With CAD    Result Date: 2/14/2025  No mammographic evidence of malignancy.  Recommend annual screening mammography.  BI-RADS ASSESSMENT: BI-RADS 1. Negative.  Note:  It has been reported that there is approximately a 15% false negative rate in mammography.  Therefore, management of a palpable abnormality should  not be deferred because of a negative mammogram.  2/14/2025 7:44 AM by NAINA HOOKS MD on Workstation: [id number]         []  EKG/Telemetry   No orders to display       []  Cardiology/Vascular   []  Pathology  [x]  Old records  []  Other:    Assessment & Plan   Assessment / Plan     Assessment/Plan:  Assessment:  SBO  Abdominal pain  Type 2 diabetes  Hypothyroidism  Hyperlipidemia  History multiple abdominal surgeries  History colon cancer s/p resection/chemotherapy  Lactic acidosis     Plan:  Labs and imaging reviewed  Follow-up KUB from this morning  Continue Protonix 40 mg IV daily  Diet per general surgery  Continue insulin sliding cell coverage  Restart lactated Ringer's at 100 cc/h  Following metabolic acidosis  A.m. labs  Full code  DVT prophylax with Lovenox  Clinical course dictate further management  Discussed with nurse at the bedside    VTE Prophylaxis:  Pharmacologic & mechanical VTE prophylaxis orders are present.        CODE STATUS:   Level Of Support Discussed With: Patient  Code Status (Patient has no pulse and is not breathing): CPR (Attempt to Resuscitate)  Medical Interventions (Patient has pulse or is breathing): Full Support        Electronically signed by Laurie Malone MD, 2/19/2025, 10:24 EST.    Portions of this documentation were transcribed electronically from a voice recognition software.  I confirm all data accurately represents the service(s) I performed at today's visit.

## 2025-02-19 NOTE — PROGRESS NOTES
"PROGRESS NOTE     Patient Name:  Susan Hernandez  YOB: 1950  [id number]   LOS: 3 days   * No surgery found *            Subjective     Interval History: VSS, afebrile, reports frequent right sided abdominal pain requiring IV pain meds, reports feels bloated, small BM yesterday      Review of Systems:      A complete review of systems was performed and all are negative except what is documented in the HPI.       Objective         Physical Exam:     Constitutional:  well nourished, no acute distress, appears stated age /71 (BP Location: Right arm, Patient Position: Lying)   Pulse 85   Temp 97.8 °F (36.6 °C) (Oral)   Resp 16   Ht 162.6 cm (64\")   Wt 68.1 kg (150 lb 2.1 oz)   SpO2 95%   BMI 25.77 kg/m²    Eyes:  anicteric sclerae, moist conjunctivae, no lid lag, PERRLA  ENMT:  oropharynx clear, moist mucous membranes,   Neck:   full ROM, trachea midline  Cardiovascular: RRR, S1 and S2 present, no MRG, heart rate 85, no pedal edema  Respiratory: lungs CTA, respirations even and unlabored  GI:  Abdomen soft, right sided tender, distended     Skin:  warm and dry, normal turgor, no rashes  Psychiatric:  alert and oriented x 4, intact judgment and insight, cooperative    Results Review:      I reviewed the patient's new clinical results including CBC, BMP.  LABS:  WBC   Date Value Ref Range Status   02/19/2025 4.20 3.40 - 10.80 10*3/mm3 Final     RBC   Date Value Ref Range Status   02/19/2025 3.83 3.77 - 5.28 10*6/mm3 Final     Hemoglobin   Date Value Ref Range Status   02/19/2025 12.4 12.0 - 15.9 g/dL Final     Hematocrit   Date Value Ref Range Status   02/19/2025 38.3 34.0 - 46.6 % Final     MCV   Date Value Ref Range Status   02/19/2025 100.0 (H) 79.0 - 97.0 fL Final     MCH   Date Value Ref Range Status   02/19/2025 32.4 26.6 - 33.0 pg Final     MCHC   Date Value Ref Range Status   02/19/2025 32.4 31.5 - 35.7 g/dL Final     RDW   Date Value Ref Range Status   02/19/2025 13.9 12.3 - 15.4 % Final "     RDW-SD   Date Value Ref Range Status   02/19/2025 51.2 37.0 - 54.0 fl Final     MPV   Date Value Ref Range Status   02/19/2025 10.9 6.0 - 12.0 fL Final     Platelets   Date Value Ref Range Status   02/19/2025 133 (L) 140 - 450 10*3/mm3 Final     Neutrophil %   Date Value Ref Range Status   02/19/2025 58.2 42.7 - 76.0 % Final     Lymphocyte %   Date Value Ref Range Status   02/19/2025 18.6 (L) 19.6 - 45.3 % Final     Monocyte %   Date Value Ref Range Status   02/19/2025 17.9 (H) 5.0 - 12.0 % Final     Eosinophil %   Date Value Ref Range Status   02/19/2025 0.0 (L) 0.3 - 6.2 % Final     Basophil %   Date Value Ref Range Status   02/19/2025 1.7 (H) 0.0 - 1.5 % Final     Immature Grans %   Date Value Ref Range Status   02/19/2025 3.6 (H) 0.0 - 0.5 % Final     Neutrophils, Absolute   Date Value Ref Range Status   02/19/2025 2.45 1.70 - 7.00 10*3/mm3 Final     Lymphocytes, Absolute   Date Value Ref Range Status   02/19/2025 0.78 0.70 - 3.10 10*3/mm3 Final     Monocytes, Absolute   Date Value Ref Range Status   02/19/2025 0.75 0.10 - 0.90 10*3/mm3 Final     Eosinophils, Absolute   Date Value Ref Range Status   02/19/2025 0.00 0.00 - 0.40 10*3/mm3 Final     Basophils, Absolute   Date Value Ref Range Status   02/19/2025 0.07 0.00 - 0.20 10*3/mm3 Final     Immature Grans, Absolute   Date Value Ref Range Status   02/19/2025 0.15 (H) 0.00 - 0.05 10*3/mm3 Final     nRBC   Date Value Ref Range Status   02/19/2025 0.0 0.0 - 0.2 /100 WBC Final         Basic Metabolic Panel    Sodium Sodium   Date Value Ref Range Status   02/19/2025 138 136 - 145 mmol/L Final   02/18/2025 136 136 - 145 mmol/L Final   02/17/2025 139 136 - 145 mmol/L Final      Potassium Potassium   Date Value Ref Range Status   02/19/2025 3.5 3.5 - 5.2 mmol/L Final   02/18/2025 4.2 3.5 - 5.2 mmol/L Final     Comment:     Specimen hemolyzed.  Result may be falsely elevated.   02/17/2025 4.1 3.5 - 5.2 mmol/L Final      Chloride Chloride   Date Value Ref Range  "Status   02/19/2025 103 98 - 107 mmol/L Final   02/18/2025 99 98 - 107 mmol/L Final   02/17/2025 103 98 - 107 mmol/L Final      Bicarbonate No results found for: \"PLASMABICARB\"   BUN BUN   Date Value Ref Range Status   02/19/2025 21 8 - 23 mg/dL Final   02/18/2025 28 (H) 8 - 23 mg/dL Final   02/17/2025 26 (H) 8 - 23 mg/dL Final      Creatinine Creatinine   Date Value Ref Range Status   02/19/2025 0.75 0.57 - 1.00 mg/dL Final   02/18/2025 0.71 0.57 - 1.00 mg/dL Final   02/17/2025 0.70 0.57 - 1.00 mg/dL Final      Calcium Calcium   Date Value Ref Range Status   02/19/2025 8.1 (L) 8.6 - 10.5 mg/dL Final   02/18/2025 8.4 (L) 8.6 - 10.5 mg/dL Final   02/17/2025 8.4 (L) 8.6 - 10.5 mg/dL Final      Glucose      No components found for: \"GLUCOSE.*\"         IMAGING:  Imaging Results (Last 72 Hours)       Procedure Component Value Units Date/Time    XR Abdomen KUB [[id number]] Collected: 02/19/25 1052     Updated: 02/19/25 1100    Narrative:      XR ABDOMEN KUB    Date of Exam: 2/19/2025 10:20 AM EST    Indication: SBO    Comparison: 2/17/2025    Findings:  There is been removal of the nasogastric tube. Mild bibasal opacities may reflect atelectasis. Dilated gas-filled small bowel loops are again seen measuring up to 4.3 cm. There appears to be some oral contrast extending into the rectum, right colon, and   transverse colon suggesting no complete small bowel obstruction. No other significant change.      Impression:      Impression:  Persistently dilated small bowel loops measuring up to 4.3 cm compatible with likely partial small bowel obstruction. There appears to be oral contrast extending into the rectum suggesting no complete small bowel obstruction.    There is been interval removal of the nasogastric tube.      Electronically Signed: Thong Mccabe MD    2/19/2025 10:58 AM EST    Workstation ID: [id number]    XR Abdomen KUB [[id number]] Collected: 02/17/25 1056     Updated: 02/17/25 1059    Narrative:      XR ABDOMEN " KUB    Date of Exam: 2/17/2025 10:28 AM EST    Indication: NG placement    Comparison: 2/16/2025    Findings:  There is a gastric tube with tip in the proximal body of the stomach, sidehole in the region of the gastroesophageal junction, similar to prior examination. Similar dilated loops of small intestine measuring to 3.8 cm in diameter. Moderate colonic fecal   load.      Impression:      Impression:  Nasogastric tube tip in the proximal stomach with sidehole at the gastroesophageal junction.      Electronically Signed: Aren Rogers MD    2/17/2025 10:57 AM EST    Workstation ID: [id number]    XR Abdomen KUB [[id number]] Collected: 02/16/25 1450     Updated: 02/16/25 1454    Narrative:      XR ABDOMEN KUB    Date of Exam: 2/16/2025 2:22 PM EST    Indication: NG placement    Comparison: None available.    Findings:  Nasogastric tube is noted with its tip in the stomach. Sideport is around the GE junction. This should be advanced some for more optimal positioning. There are dilated small bowel loops which could relate to small bowel obstruction.      Impression:      Impression:    1. Nasogastric tube tip is within the stomach. This should be advanced some for more optimal positioning.  2. Dilated small bowel loops which could relate to small bowel obstruction.        Electronically Signed: Charles Shankar MD    2/16/2025 2:52 PM EST    Workstation ID: [id number]            Medications:    Current Facility-Administered Medications:     dextrose (D50W) (25 g/50 mL) IV injection 25 g, 25 g, Intravenous, Q15 Min PRN, Cordero, Dimpi, DO    dextrose (GLUTOSE) oral gel 15 g, 15 g, Oral, Q15 Min PRN, Cordero, Dimpi, DO    Enoxaparin Sodium (LOVENOX) syringe 40 mg, 40 mg, Subcutaneous, Q24H, Luciana Saldivar DO, 40 mg at 02/18/25 1734    glucagon (GLUCAGEN) injection 1 mg, 1 mg, Intramuscular, Q15 Min PRN, Cordero, Dimpi, DO    hydrALAZINE (APRESOLINE) injection 10 mg, 10 mg, Intravenous, Q6H PRN, Cordero, Dimpi, DO     HYDROmorphone (DILAUDID) injection 1 mg, 1 mg, Intravenous, Q2H PRN, 1 mg at 02/19/25 0804 **AND** naloxone (NARCAN) injection 0.4 mg, 0.4 mg, Intravenous, Q5 Min PRN, Cordero, Dimpi, DO    insulin regular (humuLIN R,novoLIN R) injection 2-7 Units, 2-7 Units, Subcutaneous, Q6H, Cordero, Dimpi, DO, 2 Units at 02/18/25 1740    lactated ringers infusion, 100 mL/hr, Intravenous, Continuous, Laurie Malone MD, Last Rate: 100 mL/hr at 02/19/25 0805, 100 mL/hr at 02/19/25 0805    ondansetron (ZOFRAN) injection 4 mg, 4 mg, Intravenous, Once, Daniele Galan,     ondansetron ODT (ZOFRAN-ODT) disintegrating tablet 4 mg, 4 mg, Nasogastric, Q6H PRN **OR** ondansetron (ZOFRAN) injection 4 mg, 4 mg, Intravenous, Q6H PRN, Luciana Saldivar DO, 4 mg at 02/17/25 2132    pantoprazole (PROTONIX) injection 40 mg, 40 mg, Intravenous, Q AM, Cordero, Dimpi, DO, 40 mg at 02/19/25 0534    sodium chloride 0.9 % flush 10 mL, 10 mL, Intravenous, PRN, Daniele Galan, , 10 mL at 02/16/25 1359    sodium chloride 0.9 % flush 10 mL, 10 mL, Intravenous, Q12H, Cordero, Dimpi, DO, 10 mL at 02/19/25 0938    sodium chloride 0.9 % flush 10 mL, 10 mL, Intravenous, PRN, Cordero, Dimpi, DO    sodium chloride 0.9 % infusion 40 mL, 40 mL, Intravenous, PRN, Cordero, Dimpi, DO    Assessment & Plan       SBO (small bowel obstruction)     Npo   Gastrografin challenge today with xray at 2000 and again in the AM      KEITH Guajardo  02/19/25  13:42 EST    Electronically signed by KEITH Guajardo, 02/19/25, 1:42 PM EST.

## 2025-02-19 NOTE — PLAN OF CARE
Goal Outcome Evaluation:  Plan of Care Reviewed With: patient        Progress: improving    No  complaints of pain or nausea. Up to BSC, tolerating ice chips

## 2025-02-20 ENCOUNTER — APPOINTMENT (OUTPATIENT)
Dept: GENERAL RADIOLOGY | Facility: HOSPITAL | Age: 75
DRG: 389 | End: 2025-02-20
Payer: MEDICARE

## 2025-02-20 LAB
ALBUMIN SERPL-MCNC: 3.1 G/DL (ref 3.5–5.2)
ALP SERPL-CCNC: 42 U/L (ref 39–117)
ALT SERPL W P-5'-P-CCNC: 14 U/L (ref 1–33)
ANION GAP SERPL CALCULATED.3IONS-SCNC: 17.2 MMOL/L (ref 5–15)
AST SERPL-CCNC: 18 U/L (ref 1–32)
BASOPHILS # BLD AUTO: 0.08 10*3/MM3 (ref 0–0.2)
BASOPHILS NFR BLD AUTO: 1.7 % (ref 0–1.5)
BILIRUB CONJ SERPL-MCNC: 0.3 MG/DL (ref 0–0.3)
BILIRUB INDIRECT SERPL-MCNC: 0.5 MG/DL
BILIRUB SERPL-MCNC: 0.8 MG/DL (ref 0–1.2)
BUN SERPL-MCNC: 9 MG/DL (ref 8–23)
BUN/CREAT SERPL: 15.3 (ref 7–25)
CALCIUM SPEC-SCNC: 8 MG/DL (ref 8.6–10.5)
CHLORIDE SERPL-SCNC: 103 MMOL/L (ref 98–107)
CO2 SERPL-SCNC: 16.8 MMOL/L (ref 22–29)
CREAT SERPL-MCNC: 0.59 MG/DL (ref 0.57–1)
DEPRECATED RDW RBC AUTO: 48.4 FL (ref 37–54)
EGFRCR SERPLBLD CKD-EPI 2021: 94.7 ML/MIN/1.73
EOSINOPHIL # BLD AUTO: 0.03 10*3/MM3 (ref 0–0.4)
EOSINOPHIL NFR BLD AUTO: 0.6 % (ref 0.3–6.2)
ERYTHROCYTE [DISTWIDTH] IN BLOOD BY AUTOMATED COUNT: 13.6 % (ref 12.3–15.4)
GLUCOSE BLDC GLUCOMTR-MCNC: 120 MG/DL (ref 70–99)
GLUCOSE BLDC GLUCOMTR-MCNC: 126 MG/DL (ref 70–99)
GLUCOSE BLDC GLUCOMTR-MCNC: 170 MG/DL (ref 70–99)
GLUCOSE BLDC GLUCOMTR-MCNC: 191 MG/DL (ref 70–99)
GLUCOSE SERPL-MCNC: 141 MG/DL (ref 65–99)
HCT VFR BLD AUTO: 37 % (ref 34–46.6)
HGB BLD-MCNC: 12.3 G/DL (ref 12–15.9)
IMM GRANULOCYTES # BLD AUTO: 0.35 10*3/MM3 (ref 0–0.05)
IMM GRANULOCYTES NFR BLD AUTO: 7.4 % (ref 0–0.5)
LYMPHOCYTES # BLD AUTO: 1.12 10*3/MM3 (ref 0.7–3.1)
LYMPHOCYTES NFR BLD AUTO: 23.7 % (ref 19.6–45.3)
MAGNESIUM SERPL-MCNC: 1.5 MG/DL (ref 1.6–2.4)
MCH RBC QN AUTO: 32 PG (ref 26.6–33)
MCHC RBC AUTO-ENTMCNC: 33.2 G/DL (ref 31.5–35.7)
MCV RBC AUTO: 96.4 FL (ref 79–97)
MONOCYTES # BLD AUTO: 0.75 10*3/MM3 (ref 0.1–0.9)
MONOCYTES NFR BLD AUTO: 15.9 % (ref 5–12)
NEUTROPHILS NFR BLD AUTO: 2.4 10*3/MM3 (ref 1.7–7)
NEUTROPHILS NFR BLD AUTO: 50.7 % (ref 42.7–76)
NRBC BLD AUTO-RTO: 0 /100 WBC (ref 0–0.2)
PHOSPHATE SERPL-MCNC: 1.1 MG/DL (ref 2.5–4.5)
PLATELET # BLD AUTO: 127 10*3/MM3 (ref 140–450)
PMV BLD AUTO: 10.8 FL (ref 6–12)
POTASSIUM SERPL-SCNC: 2.6 MMOL/L (ref 3.5–5.2)
PROT SERPL-MCNC: 5.5 G/DL (ref 6–8.5)
RBC # BLD AUTO: 3.84 10*6/MM3 (ref 3.77–5.28)
RBC MORPH BLD: NORMAL
SMALL PLATELETS BLD QL SMEAR: NORMAL
SODIUM SERPL-SCNC: 137 MMOL/L (ref 136–145)
WBC MORPH BLD: NORMAL
WBC NRBC COR # BLD AUTO: 4.73 10*3/MM3 (ref 3.4–10.8)

## 2025-02-20 PROCEDURE — 82948 REAGENT STRIP/BLOOD GLUCOSE: CPT

## 2025-02-20 PROCEDURE — 82948 REAGENT STRIP/BLOOD GLUCOSE: CPT | Performed by: HOSPITALIST

## 2025-02-20 PROCEDURE — 25010000002 MAGNESIUM SULFATE 4 GM/100ML SOLUTION: Performed by: FAMILY MEDICINE

## 2025-02-20 PROCEDURE — 99232 SBSQ HOSP IP/OBS MODERATE 35: CPT | Performed by: FAMILY MEDICINE

## 2025-02-20 PROCEDURE — 25810000003 LACTATED RINGERS PER 1000 ML: Performed by: FAMILY MEDICINE

## 2025-02-20 PROCEDURE — 99231 SBSQ HOSP IP/OBS SF/LOW 25: CPT | Performed by: NURSE PRACTITIONER

## 2025-02-20 PROCEDURE — 74019 RADEX ABDOMEN 2 VIEWS: CPT

## 2025-02-20 PROCEDURE — 63710000001 INSULIN REGULAR HUMAN PER 5 UNITS: Performed by: HOSPITALIST

## 2025-02-20 PROCEDURE — 80076 HEPATIC FUNCTION PANEL: CPT | Performed by: FAMILY MEDICINE

## 2025-02-20 PROCEDURE — 80048 BASIC METABOLIC PNL TOTAL CA: CPT | Performed by: FAMILY MEDICINE

## 2025-02-20 PROCEDURE — 83735 ASSAY OF MAGNESIUM: CPT | Performed by: FAMILY MEDICINE

## 2025-02-20 PROCEDURE — 85007 BL SMEAR W/DIFF WBC COUNT: CPT | Performed by: FAMILY MEDICINE

## 2025-02-20 PROCEDURE — 25810000003 SODIUM CHLORIDE 0.9 % SOLUTION: Performed by: FAMILY MEDICINE

## 2025-02-20 PROCEDURE — 85025 COMPLETE CBC W/AUTO DIFF WBC: CPT | Performed by: FAMILY MEDICINE

## 2025-02-20 PROCEDURE — 84100 ASSAY OF PHOSPHORUS: CPT | Performed by: FAMILY MEDICINE

## 2025-02-20 PROCEDURE — 25010000002 HYDROMORPHONE 1 MG/ML SOLUTION: Performed by: HOSPITALIST

## 2025-02-20 RX ORDER — HYDROCODONE BITARTRATE AND ACETAMINOPHEN 5; 325 MG/1; MG/1
1 TABLET ORAL EVERY 6 HOURS PRN
Status: COMPLETED | OUTPATIENT
Start: 2025-02-20 | End: 2025-02-21

## 2025-02-20 RX ORDER — MAGNESIUM SULFATE HEPTAHYDRATE 40 MG/ML
4 INJECTION, SOLUTION INTRAVENOUS ONCE
Status: COMPLETED | OUTPATIENT
Start: 2025-02-20 | End: 2025-02-20

## 2025-02-20 RX ORDER — ACETAMINOPHEN 500 MG
1000 TABLET ORAL EVERY 6 HOURS PRN
Status: DISCONTINUED | OUTPATIENT
Start: 2025-02-20 | End: 2025-02-22 | Stop reason: HOSPADM

## 2025-02-20 RX ORDER — FENTANYL/ROPIVACAINE/NS/PF 2-625MCG/1
15 PLASTIC BAG, INJECTION (ML) EPIDURAL
Status: COMPLETED | OUTPATIENT
Start: 2025-02-20 | End: 2025-02-20

## 2025-02-20 RX ORDER — POTASSIUM CHLORIDE 750 MG/1
40 CAPSULE, EXTENDED RELEASE ORAL 2 TIMES DAILY WITH MEALS
Status: COMPLETED | OUTPATIENT
Start: 2025-02-20 | End: 2025-02-20

## 2025-02-20 RX ADMIN — SODIUM CHLORIDE, SODIUM LACTATE, POTASSIUM CHLORIDE, CALCIUM CHLORIDE 100 ML/HR: 20; 30; 600; 310 INJECTION, SOLUTION INTRAVENOUS at 03:24

## 2025-02-20 RX ADMIN — POTASSIUM PHOSPHATE, MONOBASIC AND POTASSIUM PHOSPHATE, DIBASIC 15 MMOL: 224; 236 INJECTION, SOLUTION, CONCENTRATE INTRAVENOUS at 10:51

## 2025-02-20 RX ADMIN — POTASSIUM PHOSPHATE, MONOBASIC AND POTASSIUM PHOSPHATE, DIBASIC 15 MMOL: 224; 236 INJECTION, SOLUTION, CONCENTRATE INTRAVENOUS at 14:56

## 2025-02-20 RX ADMIN — HUMAN INSULIN 2 UNITS: 100 INJECTION, SOLUTION SUBCUTANEOUS at 13:00

## 2025-02-20 RX ADMIN — Medication 10 ML: at 09:44

## 2025-02-20 RX ADMIN — MAGNESIUM SULFATE IN WATER FOR 4 G: 40 INJECTION INTRAVENOUS at 09:46

## 2025-02-20 RX ADMIN — HYDROMORPHONE HYDROCHLORIDE 1 MG: 1 INJECTION, SOLUTION INTRAMUSCULAR; INTRAVENOUS; SUBCUTANEOUS at 09:46

## 2025-02-20 RX ADMIN — HYDROMORPHONE HYDROCHLORIDE 1 MG: 1 INJECTION, SOLUTION INTRAMUSCULAR; INTRAVENOUS; SUBCUTANEOUS at 16:10

## 2025-02-20 RX ADMIN — HYDROMORPHONE HYDROCHLORIDE 1 MG: 1 INJECTION, SOLUTION INTRAMUSCULAR; INTRAVENOUS; SUBCUTANEOUS at 06:33

## 2025-02-20 RX ADMIN — POTASSIUM CHLORIDE 40 MEQ: 750 CAPSULE, EXTENDED RELEASE ORAL at 17:54

## 2025-02-20 RX ADMIN — HUMAN INSULIN 2 UNITS: 100 INJECTION, SOLUTION SUBCUTANEOUS at 17:54

## 2025-02-20 RX ADMIN — HYDROCODONE BITARTRATE AND ACETAMINOPHEN 1 TABLET: 5; 325 TABLET ORAL at 23:19

## 2025-02-20 RX ADMIN — Medication 10 ML: at 23:18

## 2025-02-20 RX ADMIN — PANTOPRAZOLE SODIUM 40 MG: 40 INJECTION, POWDER, FOR SOLUTION INTRAVENOUS at 05:39

## 2025-02-20 RX ADMIN — HYDROMORPHONE HYDROCHLORIDE 1 MG: 1 INJECTION, SOLUTION INTRAMUSCULAR; INTRAVENOUS; SUBCUTANEOUS at 19:34

## 2025-02-20 RX ADMIN — POTASSIUM CHLORIDE 40 MEQ: 750 CAPSULE, EXTENDED RELEASE ORAL at 09:50

## 2025-02-20 RX ADMIN — POTASSIUM PHOSPHATE, MONOBASIC AND POTASSIUM PHOSPHATE, DIBASIC 15 MMOL: 224; 236 INJECTION, SOLUTION, CONCENTRATE INTRAVENOUS at 11:44

## 2025-02-20 RX ADMIN — HYDROMORPHONE HYDROCHLORIDE 1 MG: 1 INJECTION, SOLUTION INTRAMUSCULAR; INTRAVENOUS; SUBCUTANEOUS at 01:48

## 2025-02-20 NOTE — PROGRESS NOTES
[facility]   Hospitalist Progress Note  Date: 2025  Patient Name: Susan Hernandez  : 1950  MRN: [id number]  Date of admission: 2025      Subjective   Subjective     Chief complaint: Abdominal pain    Summary:  74-year-old female with history of diabetes, hypothyroidism, dyslipidemia, multiple abdominal surgeries, history of colon cancer s/p resection and chemotherapy, hospitalized on 2025 for chief complaint of abdominal pain, CT showing small bowel obstruction, general surgery consulted, NG tube placed to low wall suction NG tube removed, patient started having bowel movements, aggressive electrolyte replacement pursued.    Interval follow-up: Seen and examined, no distress, no events overnight, subsequent bowel movements, tolerating some oral intake.  Electrolytes wildly off, potassium down to 2.6, phosphorus 1.1, magnesium 1.5, replacement electrolytes ordered.  Creatinine 0.59.  Bicarb up to 16.8.  Still has mild abdominal discomfort    Review of systems:  All systems reviewed and negative except for weakness, fatigue, abdominal discomfort    Objective   Objective     Vitals:   Temp:  [97.7 °F (36.5 °C)-98.4 °F (36.9 °C)] 97.9 °F (36.6 °C)  Heart Rate:  [78-90] 79  Resp:  [16-20] 20  BP: (135-159)/(67-76) 147/76  Flow (L/min) (Oxygen Therapy):  [2] 2  Physical Exam   Gen: NAD, Alert and Oriented  HEENT: Patent nares, moist mucous membranes  Pulm: No respiratory distress  Abd: soft, distended, mild tenderness to deep palpation, auscultated bowel sounds  Extremities: no pitting edema    Result Review    Result Review:  I have personally reviewed the pertinent results from the past 24 hours to 2025 12:14 EST and agree with these findings:  [x]  Laboratory   CBC          2025    04:55 2025    06:16 2025    05:50   CBC   WBC 4.47  4.20  4.73    RBC 4.00  3.83  3.84    Hemoglobin 12.9  12.4  12.3    Hematocrit 39.8  38.3  37.0    MCV 99.5  100.0  96.4    MCH 32.3  32.4   32.0    MCHC 32.4  32.4  33.2    RDW 14.0  13.9  13.6    Platelets 155  133  127      BMP          2/18/2025    04:55 2/19/2025    06:16 2/20/2025    05:50   BMP   BUN 28  21  9    Creatinine 0.71  0.75  0.59    Sodium 136  138  137    Potassium 4.2  3.5  2.6    Chloride 99  103  103    CO2 19.2  13.0  16.8    Calcium 8.4  8.1  8.0      LIVER FUNCTION TESTS:      Lab 02/20/25  0550 02/16/25  1051   TOTAL PROTEIN 5.5* 6.9   ALBUMIN 3.1* 3.9   GLOBULIN  --  3.0   ALT (SGPT) 14 19   AST (SGOT) 18 20   BILIRUBIN 0.8 1.3*   INDIRECT BILIRUBIN 0.5  --    BILIRUBIN DIRECT 0.3  --    ALK PHOS 42 66   LIPASE  --  21       [x]  Microbiology   Microbiology Results (last 10 days)       ** No results found for the last 240 hours. **              [x]  Radiology XR Abdomen Flat & Upright    Result Date: 2/20/2025  Further interval decrease in volume of oral contrast in the colon. No definite small bowel obstruction. Electronically Signed: Kel Larson MD  2/20/2025 6:07 AM EST  Workstation ID: [id number]    XR Abdomen Flat & Upright    Result Date: 2/19/2025  Impression: Oral contrast is present in the colon and rectum. No evidence of complete bowel obstruction. No evidence of pneumoperitoneum. Electronically Signed: Rafael Prater MD  2/19/2025 8:32 PM EST  Workstation ID: [id number]    XR Abdomen KUB    Result Date: 2/19/2025  Impression: Persistently dilated small bowel loops measuring up to 4.3 cm compatible with likely partial small bowel obstruction. There appears to be oral contrast extending into the rectum suggesting no complete small bowel obstruction. There is been interval removal of the nasogastric tube. Electronically Signed: Thong Mccabe MD  2/19/2025 10:58 AM EST  Workstation ID: [id number]    XR Abdomen KUB    Result Date: 2/17/2025  Impression: Nasogastric tube tip in the proximal stomach with sidehole at the gastroesophageal junction. Electronically Signed: Aren Rogers MD  2/17/2025 10:57 AM EST  Workstation  ID: [id number]    XR Abdomen KUB    Result Date: 2/16/2025  Impression: 1. Nasogastric tube tip is within the stomach. This should be advanced some for more optimal positioning. 2. Dilated small bowel loops which could relate to small bowel obstruction. Electronically Signed: Charles Shankar MD  2/16/2025 2:52 PM EST  Workstation ID: [id number]    CT Abdomen Pelvis With Contrast    Result Date: 2/16/2025  Impression: 1.Small bowel obstruction involving ileum within right lower quadrant in the area of surgical anastomosis. 2.Trace pelvic ascites. 3.Hepatic steatosis. Electronically Signed: Gabe Adams MD  2/16/2025 1:29 PM EST  Workstation ID: [id number]    Mammo Screening Digital Tomosynthesis Bilateral With CAD    Result Date: 2/14/2025  No mammographic evidence of malignancy.  Recommend annual screening mammography.  BI-RADS ASSESSMENT: BI-RADS 1. Negative.  Note:  It has been reported that there is approximately a 15% false negative rate in mammography.  Therefore, management of a palpable abnormality should not be deferred because of a negative mammogram.  2/14/2025 7:44 AM by NAINA HOOKS MD on Workstation: [id number]         []  EKG/Telemetry   No orders to display       []  Cardiology/Vascular   []  Pathology  [x]  Old records  []  Other:    Assessment & Plan   Assessment / Plan     Assessment/Plan:  Assessment:  SBO  Abdominal pain  Type 2 diabetes  Hypothyroidism  Hyperlipidemia  History multiple abdominal surgeries  History colon cancer s/p resection/chemotherapy  Lactic acidosis     Plan:  Labs and imaging reviewed  40 mill equivalents of IV potassium ordered in addition to potassium phosphate 45 mmol IV  Magnesium 4 g IV ordered for replacement  Continue Protonix 40 mg IV daily  Diet per general surgery  Continue insulin sliding cell coverage  Restart lactated Ringer's at 100 cc/h  Following metabolic acidosis  A.m. labs  Full code  DVT prophylax with Lovenox  Clinical course dictate further  management  Discussed with nurse at the bedside    VTE Prophylaxis:  Pharmacologic & mechanical VTE prophylaxis orders are present.        CODE STATUS:   Level Of Support Discussed With: Patient  Code Status (Patient has no pulse and is not breathing): CPR (Attempt to Resuscitate)  Medical Interventions (Patient has pulse or is breathing): Full Support        Electronically signed by Laurie Malone MD, 2/20/2025, 12:14 EST.    Portions of this documentation were transcribed electronically from a voice recognition software.  I confirm all data accurately represents the service(s) I performed at today's visit.

## 2025-02-20 NOTE — PLAN OF CARE
Goal Outcome Evaluation:              Outcome Evaluation: Patient has abdominal pain controlled with medications. Patient has had frequent loose or liquid stools and painful gas. Continue plan of care.

## 2025-02-20 NOTE — PROGRESS NOTES
"PROGRESS NOTE     Patient Name:  Susan Hernandez  YOB: 1950  [id number]   LOS: 4 days   * No surgery found *          Subjective     Interval History: Xray shows contrast thru colon.  Multiple Bms overnight, tolerating regular diet.       Review of Systems:      A complete review of systems was performed and all are negative except what is documented in the HPI.       Objective         Physical Exam:     Constitutional:  well nourished, no acute distress, appears stated age /72 (BP Location: Right arm, Patient Position: Lying)   Pulse 85   Temp 98.3 °F (36.8 °C) (Oral)   Resp 20   Ht 162.6 cm (64\")   Wt 68.1 kg (150 lb 2.1 oz)   SpO2 96%   BMI 25.77 kg/m²    Eyes:  anicteric sclerae, moist conjunctivae, no lid lag, PERRLA  ENMT:  oropharynx clear, moist mucous membranes,   Neck:   full ROM, trachea midline  Cardiovascular: RRR, S1 and S2 present, no MRG, heart rate 85, no pedal edema  Respiratory: lungs CTA, respirations even and unlabored  GI:  Abdomen soft, nontender, nondistended     Skin:  warm and dry, normal turgor, no rashes  Psychiatric:  alert and oriented x 4, intact judgment and insight, cooperative    Results Review:      I reviewed the patient's new clinical results including CBC, BMP, abdominal xray.  LABS:  WBC   Date Value Ref Range Status   02/20/2025 4.73 3.40 - 10.80 10*3/mm3 Final     RBC   Date Value Ref Range Status   02/20/2025 3.84 3.77 - 5.28 10*6/mm3 Final     Hemoglobin   Date Value Ref Range Status   02/20/2025 12.3 12.0 - 15.9 g/dL Final     Hematocrit   Date Value Ref Range Status   02/20/2025 37.0 34.0 - 46.6 % Final     MCV   Date Value Ref Range Status   02/20/2025 96.4 79.0 - 97.0 fL Final     MCH   Date Value Ref Range Status   02/20/2025 32.0 26.6 - 33.0 pg Final     MCHC   Date Value Ref Range Status   02/20/2025 33.2 31.5 - 35.7 g/dL Final     RDW   Date Value Ref Range Status   02/20/2025 13.6 12.3 - 15.4 % Final     RDW-SD   Date Value Ref Range " Status   02/20/2025 48.4 37.0 - 54.0 fl Final     MPV   Date Value Ref Range Status   02/20/2025 10.8 6.0 - 12.0 fL Final     Platelets   Date Value Ref Range Status   02/20/2025 127 (L) 140 - 450 10*3/mm3 Final     Neutrophil %   Date Value Ref Range Status   02/20/2025 50.7 42.7 - 76.0 % Final     Lymphocyte %   Date Value Ref Range Status   02/20/2025 23.7 19.6 - 45.3 % Final     Monocyte %   Date Value Ref Range Status   02/20/2025 15.9 (H) 5.0 - 12.0 % Final     Eosinophil %   Date Value Ref Range Status   02/20/2025 0.6 0.3 - 6.2 % Final     Basophil %   Date Value Ref Range Status   02/20/2025 1.7 (H) 0.0 - 1.5 % Final     Immature Grans %   Date Value Ref Range Status   02/20/2025 7.4 (H) 0.0 - 0.5 % Final     Neutrophils, Absolute   Date Value Ref Range Status   02/20/2025 2.40 1.70 - 7.00 10*3/mm3 Final     Lymphocytes, Absolute   Date Value Ref Range Status   02/20/2025 1.12 0.70 - 3.10 10*3/mm3 Final     Monocytes, Absolute   Date Value Ref Range Status   02/20/2025 0.75 0.10 - 0.90 10*3/mm3 Final     Eosinophils, Absolute   Date Value Ref Range Status   02/20/2025 0.03 0.00 - 0.40 10*3/mm3 Final     Basophils, Absolute   Date Value Ref Range Status   02/20/2025 0.08 0.00 - 0.20 10*3/mm3 Final     Immature Grans, Absolute   Date Value Ref Range Status   02/20/2025 0.35 (H) 0.00 - 0.05 10*3/mm3 Final     nRBC   Date Value Ref Range Status   02/20/2025 0.0 0.0 - 0.2 /100 WBC Final         Basic Metabolic Panel    Sodium Sodium   Date Value Ref Range Status   02/20/2025 137 136 - 145 mmol/L Final   02/19/2025 138 136 - 145 mmol/L Final   02/18/2025 136 136 - 145 mmol/L Final      Potassium Potassium   Date Value Ref Range Status   02/20/2025 2.6 (C) 3.5 - 5.2 mmol/L Final   02/19/2025 3.5 3.5 - 5.2 mmol/L Final   02/18/2025 4.2 3.5 - 5.2 mmol/L Final     Comment:     Specimen hemolyzed.  Result may be falsely elevated.      Chloride Chloride   Date Value Ref Range Status   02/20/2025 103 98 - 107 mmol/L  "Final   02/19/2025 103 98 - 107 mmol/L Final   02/18/2025 99 98 - 107 mmol/L Final      Bicarbonate No results found for: \"PLASMABICARB\"   BUN BUN   Date Value Ref Range Status   02/20/2025 9 8 - 23 mg/dL Final   02/19/2025 21 8 - 23 mg/dL Final   02/18/2025 28 (H) 8 - 23 mg/dL Final      Creatinine Creatinine   Date Value Ref Range Status   02/20/2025 0.59 0.57 - 1.00 mg/dL Final   02/19/2025 0.75 0.57 - 1.00 mg/dL Final   02/18/2025 0.71 0.57 - 1.00 mg/dL Final      Calcium Calcium   Date Value Ref Range Status   02/20/2025 8.0 (L) 8.6 - 10.5 mg/dL Final   02/19/2025 8.1 (L) 8.6 - 10.5 mg/dL Final   02/18/2025 8.4 (L) 8.6 - 10.5 mg/dL Final      Glucose      No components found for: \"GLUCOSE.*\"         IMAGING:  Imaging Results (Last 72 Hours)       Procedure Component Value Units Date/Time    XR Abdomen Flat & Upright [[id number]] Collected: 02/20/25 0606     Updated: 02/20/25 0609    Narrative:      XR ABDOMEN FLAT AND UPRIGHT    Date of Exam: 2/20/2025 5:45 AM EST    Indication: check progress of contrast thru colon for bowel obstruction    Comparison: 2/19/2025    Findings:  No free air on the upright view. Right pleural effusion is probably present. No definite small bowel obstruction. There has been further interval decrease in volume of oral contrast in the colon. Contrast predominantly remains in the ascending colon and   rectosigmoid.      Impression:      Further interval decrease in volume of oral contrast in the colon. No definite small bowel obstruction.        Electronically Signed: Kel Larson MD    2/20/2025 6:07 AM EST    Workstation ID: [id number]    XR Abdomen Flat & Upright [[id number]] Collected: 02/19/25 2024     Updated: 02/19/25 2034    Narrative:      XR ABDOMEN FLAT AND UPRIGHT    Date of Exam: 2/19/2025 8:05 PM EST    Indication: Gastrografin challenge    Comparison: 2/19/2025 1012 hours    Findings:  Oral contrast is present throughout the colon and rectum. Oral contrast is present in " the stomach. Stable gaseous distention of small bowel loops.      Impression:      Impression:  Oral contrast is present in the colon and rectum. No evidence of complete bowel obstruction. No evidence of pneumoperitoneum.      Electronically Signed: Rafael Prater MD    2/19/2025 8:32 PM EST    Workstation ID: [id number]    XR Abdomen KUB [[id number]] Collected: 02/19/25 1052     Updated: 02/19/25 1100    Narrative:      XR ABDOMEN KUB    Date of Exam: 2/19/2025 10:20 AM EST    Indication: SBO    Comparison: 2/17/2025    Findings:  There is been removal of the nasogastric tube. Mild bibasal opacities may reflect atelectasis. Dilated gas-filled small bowel loops are again seen measuring up to 4.3 cm. There appears to be some oral contrast extending into the rectum, right colon, and   transverse colon suggesting no complete small bowel obstruction. No other significant change.      Impression:      Impression:  Persistently dilated small bowel loops measuring up to 4.3 cm compatible with likely partial small bowel obstruction. There appears to be oral contrast extending into the rectum suggesting no complete small bowel obstruction.    There is been interval removal of the nasogastric tube.      Electronically Signed: Thong Mccabe MD    2/19/2025 10:58 AM EST    Workstation ID: [id number]    XR Abdomen KUB [[id number]] Collected: 02/17/25 1056     Updated: 02/17/25 1059    Narrative:      XR ABDOMEN KUB    Date of Exam: 2/17/2025 10:28 AM EST    Indication: NG placement    Comparison: 2/16/2025    Findings:  There is a gastric tube with tip in the proximal body of the stomach, sidehole in the region of the gastroesophageal junction, similar to prior examination. Similar dilated loops of small intestine measuring to 3.8 cm in diameter. Moderate colonic fecal   load.      Impression:      Impression:  Nasogastric tube tip in the proximal stomach with sidehole at the gastroesophageal junction.      Electronically Signed:  Aren Rgoers MD    2/17/2025 10:57 AM EST    Workstation ID: [id number]            Medications:    Current Facility-Administered Medications:     dextrose (D50W) (25 g/50 mL) IV injection 25 g, 25 g, Intravenous, Q15 Min PRN, Cordero, Dimpi, DO    dextrose (GLUTOSE) oral gel 15 g, 15 g, Oral, Q15 Min PRN, Cordero, Dimpi, DO    Enoxaparin Sodium (LOVENOX) syringe 40 mg, 40 mg, Subcutaneous, Q24H, Luciana Saldivar, , 40 mg at 02/19/25 1532    glucagon (GLUCAGEN) injection 1 mg, 1 mg, Intramuscular, Q15 Min PRN, Cordero, Dimpi, DO    hydrALAZINE (APRESOLINE) injection 10 mg, 10 mg, Intravenous, Q6H PRN, Cordero, Dimpi, DO    HYDROmorphone (DILAUDID) injection 1 mg, 1 mg, Intravenous, Q2H PRN, 1 mg at 02/20/25 0633 **AND** naloxone (NARCAN) injection 0.4 mg, 0.4 mg, Intravenous, Q5 Min PRN, Cordero, Dimpi, DO    insulin regular (humuLIN R,novoLIN R) injection 2-7 Units, 2-7 Units, Subcutaneous, Q6H, Cordero, Dimpi, DO, 2 Units at 02/19/25 1745    lactated ringers infusion, 100 mL/hr, Intravenous, Continuous, Laurie Malone MD, Last Rate: 100 mL/hr at 02/20/25 0324, 100 mL/hr at 02/20/25 0324    magnesium sulfate 4g/100mL (PREMIX) infusion, 4 g, Intravenous, Once, Laurie Malone MD    ondansetron (ZOFRAN) injection 4 mg, 4 mg, Intravenous, Once, Daniele Galan DO    ondansetron ODT (ZOFRAN-ODT) disintegrating tablet 4 mg, 4 mg, Nasogastric, Q6H PRN **OR** ondansetron (ZOFRAN) injection 4 mg, 4 mg, Intravenous, Q6H PRN, Luciana Saldivar DO, 4 mg at 02/17/25 2132    pantoprazole (PROTONIX) injection 40 mg, 40 mg, Intravenous, Q AM, Stephanie Cordero, DO, 40 mg at 02/20/25 0539    potassium chloride (MICRO-K/KLOR-CON) CR capsule, 40 mEq, Oral, BID With Meals, Laurie Malone MD    potassium phosphate 15 mmol in 0.9% normal saline 250 mL IVPB, 15 mmol, Intravenous, Q3H, Laurie Malone MD    sodium chloride 0.9 % flush 10 mL, 10 mL, Intravenous, PRN, Daniele Galan, DO, 10 mL at 02/16/25 1359    sodium chloride 0.9 %  flush 10 mL, 10 mL, Intravenous, Q12H, Cordero, Dimpi, DO, 10 mL at 02/19/25 2101    sodium chloride 0.9 % flush 10 mL, 10 mL, Intravenous, PRN, Cordero, Dimpi, DO    sodium chloride 0.9 % infusion 40 mL, 40 mL, Intravenous, PRN, Cordero, Dimpi, DO    Assessment & Plan       SBO (small bowel obstruction)   No surgery indicated   Ok to [place]  home      KEITH Guajardo  02/20/25  09:42 EST    Electronically signed by KEITH Guajardo, 02/20/25, 9:42 AM EST.

## 2025-02-20 NOTE — PLAN OF CARE
Goal Outcome Evaluation:  Plan of Care Reviewed With: patient        Progress: improving  Outcome Evaluation: no nausea. Complaints of abdominal pain, see eMAR. Tolerated cheeseburger at lunch today. See discharge care plans for further details.

## 2025-02-20 NOTE — SIGNIFICANT NOTE
02/20/25 1400   Physical Therapy Time and Intention   Session Not Performed patient/family declined evaluation  (States she is going home tomorrow)

## 2025-02-21 LAB
ALBUMIN SERPL-MCNC: 3.4 G/DL (ref 3.5–5.2)
ALP SERPL-CCNC: 47 U/L (ref 39–117)
ALT SERPL W P-5'-P-CCNC: 13 U/L (ref 1–33)
ANION GAP SERPL CALCULATED.3IONS-SCNC: 17.3 MMOL/L (ref 5–15)
AST SERPL-CCNC: 17 U/L (ref 1–32)
BASOPHILS # BLD AUTO: 0.01 10*3/MM3 (ref 0–0.2)
BASOPHILS NFR BLD AUTO: 0.2 % (ref 0–1.5)
BILIRUB CONJ SERPL-MCNC: 0.3 MG/DL (ref 0–0.3)
BILIRUB INDIRECT SERPL-MCNC: 0.5 MG/DL
BILIRUB SERPL-MCNC: 0.8 MG/DL (ref 0–1.2)
BUN SERPL-MCNC: 4 MG/DL (ref 8–23)
BUN/CREAT SERPL: 8 (ref 7–25)
CALCIUM SPEC-SCNC: 8.1 MG/DL (ref 8.6–10.5)
CHLORIDE SERPL-SCNC: 100 MMOL/L (ref 98–107)
CO2 SERPL-SCNC: 19.7 MMOL/L (ref 22–29)
CREAT SERPL-MCNC: 0.5 MG/DL (ref 0.57–1)
DEPRECATED RDW RBC AUTO: 47.7 FL (ref 37–54)
EGFRCR SERPLBLD CKD-EPI 2021: 98.6 ML/MIN/1.73
EOSINOPHIL # BLD AUTO: 0.06 10*3/MM3 (ref 0–0.4)
EOSINOPHIL NFR BLD AUTO: 1.4 % (ref 0.3–6.2)
ERYTHROCYTE [DISTWIDTH] IN BLOOD BY AUTOMATED COUNT: 13.4 % (ref 12.3–15.4)
GLUCOSE BLDC GLUCOMTR-MCNC: 151 MG/DL (ref 70–99)
GLUCOSE BLDC GLUCOMTR-MCNC: 153 MG/DL (ref 70–99)
GLUCOSE BLDC GLUCOMTR-MCNC: 173 MG/DL (ref 70–99)
GLUCOSE BLDC GLUCOMTR-MCNC: 188 MG/DL (ref 70–99)
GLUCOSE SERPL-MCNC: 156 MG/DL (ref 65–99)
HCT VFR BLD AUTO: 39.3 % (ref 34–46.6)
HGB BLD-MCNC: 13.3 G/DL (ref 12–15.9)
IMM GRANULOCYTES # BLD AUTO: 0.45 10*3/MM3 (ref 0–0.05)
IMM GRANULOCYTES NFR BLD AUTO: 10.2 % (ref 0–0.5)
LYMPHOCYTES # BLD AUTO: 1.13 10*3/MM3 (ref 0.7–3.1)
LYMPHOCYTES NFR BLD AUTO: 25.6 % (ref 19.6–45.3)
MAGNESIUM SERPL-MCNC: 1.8 MG/DL (ref 1.6–2.4)
MCH RBC QN AUTO: 32.6 PG (ref 26.6–33)
MCHC RBC AUTO-ENTMCNC: 33.8 G/DL (ref 31.5–35.7)
MCV RBC AUTO: 96.3 FL (ref 79–97)
MONOCYTES # BLD AUTO: 0.68 10*3/MM3 (ref 0.1–0.9)
MONOCYTES NFR BLD AUTO: 15.4 % (ref 5–12)
NEUTROPHILS NFR BLD AUTO: 2.09 10*3/MM3 (ref 1.7–7)
NEUTROPHILS NFR BLD AUTO: 47.2 % (ref 42.7–76)
NRBC BLD AUTO-RTO: 0.7 /100 WBC (ref 0–0.2)
PHOSPHATE SERPL-MCNC: 1.6 MG/DL (ref 2.5–4.5)
PLATELET # BLD AUTO: 136 10*3/MM3 (ref 140–450)
PMV BLD AUTO: 10.9 FL (ref 6–12)
POTASSIUM SERPL-SCNC: 2.9 MMOL/L (ref 3.5–5.2)
PROT SERPL-MCNC: 5.9 G/DL (ref 6–8.5)
RBC # BLD AUTO: 4.08 10*6/MM3 (ref 3.77–5.28)
SODIUM SERPL-SCNC: 137 MMOL/L (ref 136–145)
WBC NRBC COR # BLD AUTO: 4.42 10*3/MM3 (ref 3.4–10.8)

## 2025-02-21 PROCEDURE — 84100 ASSAY OF PHOSPHORUS: CPT | Performed by: FAMILY MEDICINE

## 2025-02-21 PROCEDURE — 99232 SBSQ HOSP IP/OBS MODERATE 35: CPT | Performed by: FAMILY MEDICINE

## 2025-02-21 PROCEDURE — 80048 BASIC METABOLIC PNL TOTAL CA: CPT | Performed by: FAMILY MEDICINE

## 2025-02-21 PROCEDURE — 25010000002 HYDRALAZINE PER 20 MG: Performed by: HOSPITALIST

## 2025-02-21 PROCEDURE — 25810000003 SODIUM CHLORIDE 0.9 % SOLUTION: Performed by: FAMILY MEDICINE

## 2025-02-21 PROCEDURE — 85025 COMPLETE CBC W/AUTO DIFF WBC: CPT | Performed by: FAMILY MEDICINE

## 2025-02-21 PROCEDURE — 25010000002 ENOXAPARIN PER 10 MG: Performed by: STUDENT IN AN ORGANIZED HEALTH CARE EDUCATION/TRAINING PROGRAM

## 2025-02-21 PROCEDURE — 25010000002 HYDROMORPHONE 1 MG/ML SOLUTION: Performed by: HOSPITALIST

## 2025-02-21 PROCEDURE — 63710000001 INSULIN REGULAR HUMAN PER 5 UNITS: Performed by: PHYSICIAN ASSISTANT

## 2025-02-21 PROCEDURE — 82948 REAGENT STRIP/BLOOD GLUCOSE: CPT

## 2025-02-21 PROCEDURE — 63710000001 INSULIN REGULAR HUMAN PER 5 UNITS: Performed by: HOSPITALIST

## 2025-02-21 PROCEDURE — 25010000002 POTASSIUM CHLORIDE 10 MEQ/100ML SOLUTION: Performed by: FAMILY MEDICINE

## 2025-02-21 PROCEDURE — 83735 ASSAY OF MAGNESIUM: CPT | Performed by: FAMILY MEDICINE

## 2025-02-21 PROCEDURE — 80076 HEPATIC FUNCTION PANEL: CPT | Performed by: FAMILY MEDICINE

## 2025-02-21 RX ORDER — POTASSIUM CHLORIDE 750 MG/1
40 CAPSULE, EXTENDED RELEASE ORAL ONCE
Status: COMPLETED | OUTPATIENT
Start: 2025-02-21 | End: 2025-02-21

## 2025-02-21 RX ORDER — FENTANYL/ROPIVACAINE/NS/PF 2-625MCG/1
15 PLASTIC BAG, INJECTION (ML) EPIDURAL
Status: COMPLETED | OUTPATIENT
Start: 2025-02-21 | End: 2025-02-21

## 2025-02-21 RX ORDER — POTASSIUM CHLORIDE 7.45 MG/ML
10 INJECTION INTRAVENOUS
Status: DISCONTINUED | OUTPATIENT
Start: 2025-02-21 | End: 2025-02-21

## 2025-02-21 RX ADMIN — ENOXAPARIN SODIUM 40 MG: 100 INJECTION SUBCUTANEOUS at 14:59

## 2025-02-21 RX ADMIN — HYDROCODONE BITARTRATE AND ACETAMINOPHEN 1 TABLET: 5; 325 TABLET ORAL at 06:38

## 2025-02-21 RX ADMIN — HYDROMORPHONE HYDROCHLORIDE 1 MG: 1 INJECTION, SOLUTION INTRAMUSCULAR; INTRAVENOUS; SUBCUTANEOUS at 10:04

## 2025-02-21 RX ADMIN — POTASSIUM CHLORIDE 10 MEQ: 7.46 INJECTION, SOLUTION INTRAVENOUS at 18:46

## 2025-02-21 RX ADMIN — HUMAN INSULIN 2 UNITS: 100 INJECTION, SOLUTION SUBCUTANEOUS at 06:31

## 2025-02-21 RX ADMIN — POTASSIUM CHLORIDE 40 MEQ: 750 CAPSULE, EXTENDED RELEASE ORAL at 20:26

## 2025-02-21 RX ADMIN — PANTOPRAZOLE SODIUM 40 MG: 40 INJECTION, POWDER, FOR SOLUTION INTRAVENOUS at 06:31

## 2025-02-21 RX ADMIN — HYDROMORPHONE HYDROCHLORIDE 1 MG: 1 INJECTION, SOLUTION INTRAMUSCULAR; INTRAVENOUS; SUBCUTANEOUS at 14:59

## 2025-02-21 RX ADMIN — HUMAN INSULIN 2 UNITS: 100 INJECTION, SOLUTION SUBCUTANEOUS at 20:26

## 2025-02-21 RX ADMIN — SODIUM CHLORIDE 15 MMOL: 9 INJECTION, SOLUTION INTRAVENOUS at 08:36

## 2025-02-21 RX ADMIN — SODIUM CHLORIDE 15 MMOL: 9 INJECTION, SOLUTION INTRAVENOUS at 15:37

## 2025-02-21 RX ADMIN — SODIUM CHLORIDE 15 MMOL: 9 INJECTION, SOLUTION INTRAVENOUS at 12:46

## 2025-02-21 RX ADMIN — Medication 10 ML: at 08:36

## 2025-02-21 RX ADMIN — HUMAN INSULIN 2 UNITS: 100 INJECTION, SOLUTION SUBCUTANEOUS at 17:16

## 2025-02-21 RX ADMIN — HYDRALAZINE HYDROCHLORIDE 10 MG: 20 INJECTION INTRAMUSCULAR; INTRAVENOUS at 23:34

## 2025-02-21 RX ADMIN — Medication 10 ML: at 20:26

## 2025-02-21 RX ADMIN — HYDROCODONE BITARTRATE AND ACETAMINOPHEN 1 TABLET: 5; 325 TABLET ORAL at 19:17

## 2025-02-21 NOTE — PLAN OF CARE
Goal Outcome Evaluation:  Plan of Care Reviewed With: patient        Progress: improving  Outcome Evaluation: Patient has continued abd pain, transitioned over to PO pain medications for pain management. Patient continues to have frequent liquid stools. Continue plan of care.

## 2025-02-21 NOTE — PLAN OF CARE
Problem: Adult Inpatient Plan of Care  Goal: Plan of Care Review  Outcome: Progressing  Flowsheets (Taken 2/21/2025 1658)  Progress: no change  Outcome Evaluation: Patient is alert and oriented x4. Patient has had complaints of pain, see EMAR. Patient has no complaints at this time.  Plan of Care Reviewed With: patient  Goal: Patient-Specific Goal (Individualized)  Outcome: Progressing  Goal: Absence of Hospital-Acquired Illness or Injury  Outcome: Progressing  Intervention: Identify and Manage Fall Risk  Recent Flowsheet Documentation  Taken 2/21/2025 1657 by Rosenda Blackman, RN  Safety Promotion/Fall Prevention:   assistive device/personal items within reach   clutter free environment maintained   fall prevention program maintained   lighting adjusted   nonskid shoes/slippers when out of bed   room organization consistent   safety round/check completed  Taken 2/21/2025 1538 by Rosenda Blackman, RN  Safety Promotion/Fall Prevention:   assistive device/personal items within reach   clutter free environment maintained   fall prevention program maintained   lighting adjusted   nonskid shoes/slippers when out of bed   room organization consistent   safety round/check completed  Taken 2/21/2025 1459 by Rosenda Blackman, RN  Safety Promotion/Fall Prevention:   assistive device/personal items within reach   clutter free environment maintained   fall prevention program maintained   lighting adjusted   nonskid shoes/slippers when out of bed   room organization consistent   safety round/check completed  Taken 2/21/2025 1246 by Rosenda Blackman, RN  Safety Promotion/Fall Prevention:   assistive device/personal items within reach   clutter free environment maintained   fall prevention program maintained   lighting adjusted   nonskid shoes/slippers when out of bed   room organization consistent   safety round/check completed  Taken 2/21/2025 1200 by Rosenda Blackman, RN  Safety Promotion/Fall Prevention:   assistive  device/personal items within reach   clutter free environment maintained   fall prevention program maintained   lighting adjusted   nonskid shoes/slippers when out of bed   room organization consistent   safety round/check completed  Taken 2/21/2025 1004 by Rosenda Blackman RN  Safety Promotion/Fall Prevention:   assistive device/personal items within reach   clutter free environment maintained   fall prevention program maintained   lighting adjusted   nonskid shoes/slippers when out of bed   room organization consistent   safety round/check completed  Taken 2/21/2025 0836 by Rosenda Blackman RN  Safety Promotion/Fall Prevention:   assistive device/personal items within reach   clutter free environment maintained   fall prevention program maintained   lighting adjusted   nonskid shoes/slippers when out of bed   room organization consistent   safety round/check completed  Taken 2/21/2025 0712 by Rosenda Blackman RN  Safety Promotion/Fall Prevention:   assistive device/personal items within reach   clutter free environment maintained   fall prevention program maintained   lighting adjusted   nonskid shoes/slippers when out of bed   room organization consistent   safety round/check completed  Intervention: Prevent Infection  Recent Flowsheet Documentation  Taken 2/21/2025 1657 by Rosenda Blackman, RN  Infection Prevention:   cohorting utilized   environmental surveillance performed   equipment surfaces disinfected   hand hygiene promoted   personal protective equipment utilized   rest/sleep promoted   single patient room provided  Taken 2/21/2025 1538 by Rosenda Blackman RN  Infection Prevention:   cohorting utilized   environmental surveillance performed   equipment surfaces disinfected   hand hygiene promoted   personal protective equipment utilized   rest/sleep promoted   single patient room provided  Taken 2/21/2025 1459 by Rosenda Blackman, RN  Infection Prevention:   cohorting utilized   environmental surveillance  performed   equipment surfaces disinfected   hand hygiene promoted   personal protective equipment utilized   rest/sleep promoted   single patient room provided  Taken 2/21/2025 1246 by Rosenda Blackman RN  Infection Prevention:   cohorting utilized   environmental surveillance performed   equipment surfaces disinfected   hand hygiene promoted   personal protective equipment utilized   rest/sleep promoted   single patient room provided  Taken 2/21/2025 1200 by Rosenda Blackman RN  Infection Prevention:   cohorting utilized   environmental surveillance performed   equipment surfaces disinfected   hand hygiene promoted   personal protective equipment utilized   rest/sleep promoted   single patient room provided  Taken 2/21/2025 1004 by Rosenda Blackman RN  Infection Prevention:   cohorting utilized   environmental surveillance performed   equipment surfaces disinfected   hand hygiene promoted   personal protective equipment utilized   rest/sleep promoted   single patient room provided  Taken 2/21/2025 0836 by Rosenda Blackman RN  Infection Prevention:   cohorting utilized   environmental surveillance performed   equipment surfaces disinfected   hand hygiene promoted   personal protective equipment utilized   rest/sleep promoted   single patient room provided  Taken 2/21/2025 0712 by Rosenda Blackman RN  Infection Prevention:   cohorting utilized   environmental surveillance performed   equipment surfaces disinfected   hand hygiene promoted   personal protective equipment utilized   rest/sleep promoted   single patient room provided  Goal: Optimal Comfort and Wellbeing  Outcome: Progressing  Intervention: Monitor Pain and Promote Comfort  Recent Flowsheet Documentation  Taken 2/21/2025 1459 by Rosenda Blackman RN  Pain Management Interventions:   care clustered   pain management plan reviewed with patient/caregiver   pain medication given   quiet environment facilitated  Taken 2/21/2025 1004 by Rosenda Blackman  RN  Pain Management Interventions:   care clustered   pain management plan reviewed with patient/caregiver   pain medication given   quiet environment facilitated  Intervention: Provide Person-Centered Care  Recent Flowsheet Documentation  Taken 2/21/2025 0836 by Rosenda Blackman RN  Trust Relationship/Rapport:   care explained   choices provided   emotional support provided   empathic listening provided   questions answered   questions encouraged   reassurance provided   thoughts/feelings acknowledged  Goal: Readiness for Transition of Care  Outcome: Progressing     Problem: Comorbidity Management  Goal: Blood Glucose Level Within Target Range  Outcome: Progressing  Intervention: Monitor and Manage Glycemia  Recent Flowsheet Documentation  Taken 2/21/2025 0836 by Rosenda Blackman RN  Medication Review/Management:   medications reviewed   high-risk medications identified  Goal: Blood Pressure in Desired Range  Outcome: Progressing  Intervention: Maintain Blood Pressure Management  Recent Flowsheet Documentation  Taken 2/21/2025 0836 by Rosenda Blackman RN  Medication Review/Management:   medications reviewed   high-risk medications identified     Problem: Fall Injury Risk  Goal: Absence of Fall and Fall-Related Injury  Outcome: Progressing  Intervention: Identify and Manage Contributors  Recent Flowsheet Documentation  Taken 2/21/2025 0836 by Rosenda Blackman RN  Medication Review/Management:   medications reviewed   high-risk medications identified  Intervention: Promote Injury-Free Environment  Recent Flowsheet Documentation  Taken 2/21/2025 1657 by Rosenda Blakcman RN  Safety Promotion/Fall Prevention:   assistive device/personal items within reach   clutter free environment maintained   fall prevention program maintained   lighting adjusted   nonskid shoes/slippers when out of bed   room organization consistent   safety round/check completed  Taken 2/21/2025 1538 by Rosenda Blackman RN  Safety Promotion/Fall  Prevention:   assistive device/personal items within reach   clutter free environment maintained   fall prevention program maintained   lighting adjusted   nonskid shoes/slippers when out of bed   room organization consistent   safety round/check completed  Taken 2/21/2025 1459 by Rosenda Blackman RN  Safety Promotion/Fall Prevention:   assistive device/personal items within reach   clutter free environment maintained   fall prevention program maintained   lighting adjusted   nonskid shoes/slippers when out of bed   room organization consistent   safety round/check completed  Taken 2/21/2025 1246 by Rosenda Blackman, RN  Safety Promotion/Fall Prevention:   assistive device/personal items within reach   clutter free environment maintained   fall prevention program maintained   lighting adjusted   nonskid shoes/slippers when out of bed   room organization consistent   safety round/check completed  Taken 2/21/2025 1200 by Rosenda Blackman RN  Safety Promotion/Fall Prevention:   assistive device/personal items within reach   clutter free environment maintained   fall prevention program maintained   lighting adjusted   nonskid shoes/slippers when out of bed   room organization consistent   safety round/check completed  Taken 2/21/2025 1004 by Rosenda Blackman, RN  Safety Promotion/Fall Prevention:   assistive device/personal items within reach   clutter free environment maintained   fall prevention program maintained   lighting adjusted   nonskid shoes/slippers when out of bed   room organization consistent   safety round/check completed  Taken 2/21/2025 0836 by Rosenda Blackman, RN  Safety Promotion/Fall Prevention:   assistive device/personal items within reach   clutter free environment maintained   fall prevention program maintained   lighting adjusted   nonskid shoes/slippers when out of bed   room organization consistent   safety round/check completed  Taken 2/21/2025 0712 by Rosenda Blackman, RN  Safety Promotion/Fall  Prevention:   assistive device/personal items within reach   clutter free environment maintained   fall prevention program maintained   lighting adjusted   nonskid shoes/slippers when out of bed   room organization consistent   safety round/check completed     Problem: Intestinal Obstruction  Goal: Optimal Bowel Function  Outcome: Progressing  Goal: Fluid and Electrolyte Balance  Outcome: Progressing  Goal: Absence of Infection Signs and Symptoms  Outcome: Progressing  Goal: Optimize Nutrition Status  Outcome: Progressing  Goal: Optimal Pain Control and Function  Outcome: Progressing  Intervention: Prevent or Manage Pain  Recent Flowsheet Documentation  Taken 2/21/2025 1459 by Rosenda Blackman RN  Pain Management Interventions:   care clustered   pain management plan reviewed with patient/caregiver   pain medication given   quiet environment facilitated  Taken 2/21/2025 1004 by Rosenda Blackman RN  Pain Management Interventions:   care clustered   pain management plan reviewed with patient/caregiver   pain medication given   quiet environment facilitated     Problem: Pain Acute  Goal: Optimal Pain Control and Function  Outcome: Progressing  Intervention: Develop Pain Management Plan  Recent Flowsheet Documentation  Taken 2/21/2025 1459 by Rosenda Blackman RN  Pain Management Interventions:   care clustered   pain management plan reviewed with patient/caregiver   pain medication given   quiet environment facilitated  Taken 2/21/2025 1004 by Rosenda Blackman, RN  Pain Management Interventions:   care clustered   pain management plan reviewed with patient/caregiver   pain medication given   quiet environment facilitated  Intervention: Prevent or Manage Pain  Recent Flowsheet Documentation  Taken 2/21/2025 0836 by Rosenda Blackman RN  Medication Review/Management:   medications reviewed   high-risk medications identified   Goal Outcome Evaluation:  Plan of Care Reviewed With: patient        Progress: no change  Outcome  Evaluation: Patient is alert and oriented x4. Patient has had complaints of pain, see EMAR. Patient has no complaints at this time.

## 2025-02-21 NOTE — PROGRESS NOTES
[facility]   Hospitalist Progress Note  Date: 2025  Patient Name: Susan Hernandez  : 1950  MRN: [id number]  Date of admission: 2025      Subjective   Subjective     Chief complaint: Abdominal pain    Summary:  74-year-old female with history of diabetes, hypothyroidism, dyslipidemia, multiple abdominal surgeries, history of colon cancer s/p resection and chemotherapy, hospitalized on 2025 for chief complaint of abdominal pain, CT showing small bowel obstruction, general surgery consulted, NG tube placed to low wall suction NG tube removed, patient started having bowel movements, aggressive electrolyte replacement pursued.    Interval follow-up: Seen and examined, no distress, no events overnight, had bowel movements yesterday.  Still has significant pain and frequent liquid stools.  Bicarb up to 19, potassium slightly better at 2.9, phosphorus still low at 1.6, potassium and phosphorus are still low, active replacement ongoing IV route.  White blood cell count 4000, hemoglobin 13.3.  Still has abdominal pain.  Difficulty tolerating oral intake.  No vomiting.    Review of systems:  All systems reviewed and negative except for weakness, fatigue, abdominal discomfort    Objective   Objective     Vitals:   Temp:  [97.3 °F (36.3 °C)-98.4 °F (36.9 °C)] 98 °F (36.7 °C)  Heart Rate:  [74-86] 86  Resp:  [16-20] 20  BP: (146-166)/(63-85) 156/85  Flow (L/min) (Oxygen Therapy):  [2] 2  Physical Exam   Gen: NAD, Alert and Oriented, resting comfortably  HEENT: Patent nares, moist mucous membranes  Pulm: No respiratory distress  Abd: soft, distended, mild tenderness to deep palpation, auscultated bowel sounds  Extremities: no pitting edema    Result Review    Result Review:  I have personally reviewed the pertinent results from the past 24 hours to 2025 12:08 EST and agree with these findings:  [x]  Laboratory   CBC          2025    06:16 2025    05:50 2025    06:00   CBC   WBC 4.20   4.73  4.42    RBC 3.83  3.84  4.08    Hemoglobin 12.4  12.3  13.3    Hematocrit 38.3  37.0  39.3    .0  96.4  96.3    MCH 32.4  32.0  32.6    MCHC 32.4  33.2  33.8    RDW 13.9  13.6  13.4    Platelets 133  127  136      BMP          2/19/2025    06:16 2/20/2025    05:50 2/21/2025    06:00   BMP   BUN 21  9  4    Creatinine 0.75  0.59  0.50    Sodium 138  137  137    Potassium 3.5  2.6  2.9    Chloride 103  103  100    CO2 13.0  16.8  19.7    Calcium 8.1  8.0  8.1      LIVER FUNCTION TESTS:      Lab 02/21/25  0600 02/20/25  0550 02/16/25  1051   TOTAL PROTEIN 5.9* 5.5* 6.9   ALBUMIN 3.4* 3.1* 3.9   GLOBULIN  --   --  3.0   ALT (SGPT) 13 14 19   AST (SGOT) 17 18 20   BILIRUBIN 0.8 0.8 1.3*   INDIRECT BILIRUBIN 0.5 0.5  --    BILIRUBIN DIRECT 0.3 0.3  --    ALK PHOS 47 42 66   LIPASE  --   --  21       [x]  Microbiology   Microbiology Results (last 10 days)       ** No results found for the last 240 hours. **              [x]  Radiology XR Abdomen Flat & Upright    Result Date: 2/20/2025  Further interval decrease in volume of oral contrast in the colon. No definite small bowel obstruction. Electronically Signed: Kel Larson MD  2/20/2025 6:07 AM EST  Workstation ID: [id number]    XR Abdomen Flat & Upright    Result Date: 2/19/2025  Impression: Oral contrast is present in the colon and rectum. No evidence of complete bowel obstruction. No evidence of pneumoperitoneum. Electronically Signed: Rafael Prater MD  2/19/2025 8:32 PM EST  Workstation ID: [id number]    XR Abdomen KUB    Result Date: 2/19/2025  Impression: Persistently dilated small bowel loops measuring up to 4.3 cm compatible with likely partial small bowel obstruction. There appears to be oral contrast extending into the rectum suggesting no complete small bowel obstruction. There is been interval removal of the nasogastric tube. Electronically Signed: Thong Mccabe MD  2/19/2025 10:58 AM EST  Workstation ID: [id number]    XR Abdomen KUB    Result  Date: 2/17/2025  Impression: Nasogastric tube tip in the proximal stomach with sidehole at the gastroesophageal junction. Electronically Signed: Aren Rogers MD  2/17/2025 10:57 AM EST  Workstation ID: [id number]    XR Abdomen KUB    Result Date: 2/16/2025  Impression: 1. Nasogastric tube tip is within the stomach. This should be advanced some for more optimal positioning. 2. Dilated small bowel loops which could relate to small bowel obstruction. Electronically Signed: Charles Shankar MD  2/16/2025 2:52 PM EST  Workstation ID: [id number]    CT Abdomen Pelvis With Contrast    Result Date: 2/16/2025  Impression: 1.Small bowel obstruction involving ileum within right lower quadrant in the area of surgical anastomosis. 2.Trace pelvic ascites. 3.Hepatic steatosis. Electronically Signed: Gabe Adams MD  2/16/2025 1:29 PM EST  Workstation ID: [id number]    Mammo Screening Digital Tomosynthesis Bilateral With CAD    Result Date: 2/14/2025  No mammographic evidence of malignancy.  Recommend annual screening mammography.  BI-RADS ASSESSMENT: BI-RADS 1. Negative.  Note:  It has been reported that there is approximately a 15% false negative rate in mammography.  Therefore, management of a palpable abnormality should not be deferred because of a negative mammogram.  2/14/2025 7:44 AM by NAINA HOOKS MD on Workstation: [id number]         []  EKG/Telemetry   No orders to display       []  Cardiology/Vascular   []  Pathology  [x]  Old records  []  Other:    Assessment & Plan   Assessment / Plan     Assessment/Plan:  Assessment:  SBO  Abdominal pain  Type 2 diabetes  Hypothyroidism  Hyperlipidemia  History multiple abdominal surgeries  History colon cancer s/p resection/chemotherapy  Lactic acidosis     Plan:  Labs and imaging reviewed  Potassium phosphate replacement 45 mmol IV in addition to 40 mill equivalents of IV potassium via 10 mill equivalent runs  Continue Protonix 40 mg IV daily  Diet per general surgery  Continue insulin  sliding cell coverage  Diet per tolerance  A.m. labs  Full code  DVT prophylax with Lovenox  Clinical course dictate further management  Discussed with nurse at the bedside    VTE Prophylaxis:  Pharmacologic & mechanical VTE prophylaxis orders are present.        CODE STATUS:   Level Of Support Discussed With: Patient  Code Status (Patient has no pulse and is not breathing): CPR (Attempt to Resuscitate)  Medical Interventions (Patient has pulse or is breathing): Full Support        Electronically signed by Laurie Malone MD, 2/21/2025, 12:08 EST.    Portions of this documentation were transcribed electronically from a voice recognition software.  I confirm all data accurately represents the service(s) I performed at today's visit.

## 2025-02-22 ENCOUNTER — READMISSION MANAGEMENT (OUTPATIENT)
Dept: CALL CENTER | Facility: HOSPITAL | Age: 75
End: 2025-02-22
Payer: MEDICARE

## 2025-02-22 VITALS
TEMPERATURE: 98.2 F | OXYGEN SATURATION: 95 % | BODY MASS INDEX: 25.63 KG/M2 | RESPIRATION RATE: 16 BRPM | WEIGHT: 150.13 LBS | HEART RATE: 79 BPM | DIASTOLIC BLOOD PRESSURE: 81 MMHG | HEIGHT: 64 IN | SYSTOLIC BLOOD PRESSURE: 142 MMHG

## 2025-02-22 PROBLEM — G89.29 CHRONIC PAIN: Status: ACTIVE | Noted: 2024-12-26

## 2025-02-22 PROBLEM — K56.609 SBO (SMALL BOWEL OBSTRUCTION): Status: RESOLVED | Noted: 2025-02-16 | Resolved: 2025-02-22

## 2025-02-22 LAB
ALBUMIN SERPL-MCNC: 3.4 G/DL (ref 3.5–5.2)
ALP SERPL-CCNC: 46 U/L (ref 39–117)
ALT SERPL W P-5'-P-CCNC: 14 U/L (ref 1–33)
ANION GAP SERPL CALCULATED.3IONS-SCNC: 14.8 MMOL/L (ref 5–15)
AST SERPL-CCNC: 18 U/L (ref 1–32)
BASOPHILS # BLD AUTO: 0.15 10*3/MM3 (ref 0–0.2)
BASOPHILS NFR BLD AUTO: 2.5 % (ref 0–1.5)
BILIRUB CONJ SERPL-MCNC: 0.2 MG/DL (ref 0–0.3)
BILIRUB INDIRECT SERPL-MCNC: 0.6 MG/DL
BILIRUB SERPL-MCNC: 0.8 MG/DL (ref 0–1.2)
BUN SERPL-MCNC: 8 MG/DL (ref 8–23)
BUN/CREAT SERPL: 13.8 (ref 7–25)
CALCIUM SPEC-SCNC: 8.6 MG/DL (ref 8.6–10.5)
CHLORIDE SERPL-SCNC: 99 MMOL/L (ref 98–107)
CO2 SERPL-SCNC: 20.2 MMOL/L (ref 22–29)
CREAT SERPL-MCNC: 0.58 MG/DL (ref 0.57–1)
DEPRECATED RDW RBC AUTO: 45.1 FL (ref 37–54)
EGFRCR SERPLBLD CKD-EPI 2021: 95.1 ML/MIN/1.73
EOSINOPHIL # BLD AUTO: 0.05 10*3/MM3 (ref 0–0.4)
EOSINOPHIL NFR BLD AUTO: 0.8 % (ref 0.3–6.2)
ERYTHROCYTE [DISTWIDTH] IN BLOOD BY AUTOMATED COUNT: 13.1 % (ref 12.3–15.4)
GLUCOSE BLDC GLUCOMTR-MCNC: 143 MG/DL (ref 70–99)
GLUCOSE SERPL-MCNC: 164 MG/DL (ref 65–99)
HCT VFR BLD AUTO: 40.5 % (ref 34–46.6)
HGB BLD-MCNC: 13.9 G/DL (ref 12–15.9)
IMM GRANULOCYTES # BLD AUTO: 0.45 10*3/MM3 (ref 0–0.05)
IMM GRANULOCYTES NFR BLD AUTO: 7.4 % (ref 0–0.5)
LYMPHOCYTES # BLD AUTO: 1.75 10*3/MM3 (ref 0.7–3.1)
LYMPHOCYTES NFR BLD AUTO: 28.8 % (ref 19.6–45.3)
MAGNESIUM SERPL-MCNC: 1.6 MG/DL (ref 1.6–2.4)
MCH RBC QN AUTO: 32.3 PG (ref 26.6–33)
MCHC RBC AUTO-ENTMCNC: 34.3 G/DL (ref 31.5–35.7)
MCV RBC AUTO: 94 FL (ref 79–97)
MONOCYTES # BLD AUTO: 1.04 10*3/MM3 (ref 0.1–0.9)
MONOCYTES NFR BLD AUTO: 17.1 % (ref 5–12)
NEUTROPHILS NFR BLD AUTO: 2.63 10*3/MM3 (ref 1.7–7)
NEUTROPHILS NFR BLD AUTO: 43.4 % (ref 42.7–76)
NRBC BLD AUTO-RTO: 0.3 /100 WBC (ref 0–0.2)
PHOSPHATE SERPL-MCNC: 3 MG/DL (ref 2.5–4.5)
PLATELET # BLD AUTO: 144 10*3/MM3 (ref 140–450)
PMV BLD AUTO: 10.8 FL (ref 6–12)
POLYCHROMASIA BLD QL SMEAR: NORMAL
POTASSIUM SERPL-SCNC: 3.7 MMOL/L (ref 3.5–5.2)
PROT SERPL-MCNC: 6.1 G/DL (ref 6–8.5)
RBC # BLD AUTO: 4.31 10*6/MM3 (ref 3.77–5.28)
SMALL PLATELETS BLD QL SMEAR: ADEQUATE
SODIUM SERPL-SCNC: 134 MMOL/L (ref 136–145)
TOXIC GRANULATION: NORMAL
WBC NRBC COR # BLD AUTO: 6.07 10*3/MM3 (ref 3.4–10.8)

## 2025-02-22 PROCEDURE — 80076 HEPATIC FUNCTION PANEL: CPT | Performed by: FAMILY MEDICINE

## 2025-02-22 PROCEDURE — 99239 HOSP IP/OBS DSCHRG MGMT >30: CPT | Performed by: FAMILY MEDICINE

## 2025-02-22 PROCEDURE — 83735 ASSAY OF MAGNESIUM: CPT | Performed by: FAMILY MEDICINE

## 2025-02-22 PROCEDURE — 85007 BL SMEAR W/DIFF WBC COUNT: CPT | Performed by: FAMILY MEDICINE

## 2025-02-22 PROCEDURE — 80048 BASIC METABOLIC PNL TOTAL CA: CPT | Performed by: FAMILY MEDICINE

## 2025-02-22 PROCEDURE — 82948 REAGENT STRIP/BLOOD GLUCOSE: CPT

## 2025-02-22 PROCEDURE — 85025 COMPLETE CBC W/AUTO DIFF WBC: CPT | Performed by: FAMILY MEDICINE

## 2025-02-22 PROCEDURE — 84100 ASSAY OF PHOSPHORUS: CPT | Performed by: FAMILY MEDICINE

## 2025-02-22 RX ORDER — HYDROCODONE BITARTRATE AND ACETAMINOPHEN 5; 325 MG/1; MG/1
1 TABLET ORAL EVERY 6 HOURS PRN
Status: DISCONTINUED | OUTPATIENT
Start: 2025-02-22 | End: 2025-02-22 | Stop reason: HOSPADM

## 2025-02-22 RX ADMIN — HYDROCODONE BITARTRATE AND ACETAMINOPHEN 1 TABLET: 5; 325 TABLET ORAL at 02:31

## 2025-02-22 RX ADMIN — PANTOPRAZOLE SODIUM 40 MG: 40 INJECTION, POWDER, FOR SOLUTION INTRAVENOUS at 05:40

## 2025-02-22 RX ADMIN — Medication 10 ML: at 08:09

## 2025-02-22 NOTE — NURSING NOTE
"Pt first potassium run was started at approximately 1845. Pt crying and not tolerating it. Nurse slowed rate down and tried ice pack but pt refused the ice pack and is refusing to receive the infusion due to \"pain\". Nurse explained that her potassium level was low and that it could affect her heart an she states that she \"knows this\". The infusion was stopped. She received approximately 50ml.   "

## 2025-02-22 NOTE — PLAN OF CARE
Problem: Adult Inpatient Plan of Care  Goal: Plan of Care Review  Outcome: Met  Flowsheets (Taken 2/22/2025 1011)  Progress: improving  Outcome Evaluation: Patient discharging home.  Plan of Care Reviewed With: patient  Goal: Patient-Specific Goal (Individualized)  Outcome: Met  Goal: Absence of Hospital-Acquired Illness or Injury  Outcome: Met  Intervention: Identify and Manage Fall Risk  Recent Flowsheet Documentation  Taken 2/22/2025 0747 by Rosenda Blackman, RN  Safety Promotion/Fall Prevention:   assistive device/personal items within reach   clutter free environment maintained   fall prevention program maintained   lighting adjusted   nonskid shoes/slippers when out of bed   room organization consistent   safety round/check completed  Taken 2/22/2025 0711 by Rosenda Blackman RN  Safety Promotion/Fall Prevention:   assistive device/personal items within reach   clutter free environment maintained   fall prevention program maintained   lighting adjusted   nonskid shoes/slippers when out of bed   room organization consistent   safety round/check completed  Intervention: Prevent Infection  Recent Flowsheet Documentation  Taken 2/22/2025 0747 by Rosenda Blackman, RN  Infection Prevention:   cohorting utilized   environmental surveillance performed   equipment surfaces disinfected   hand hygiene promoted   personal protective equipment utilized   rest/sleep promoted   single patient room provided  Taken 2/22/2025 0711 by Rosenda Blackman, RN  Infection Prevention:   cohorting utilized   environmental surveillance performed   equipment surfaces disinfected   hand hygiene promoted   personal protective equipment utilized   rest/sleep promoted   single patient room provided  Goal: Optimal Comfort and Wellbeing  Outcome: Met  Intervention: Provide Person-Centered Care  Recent Flowsheet Documentation  Taken 2/22/2025 0747 by Rosenda Blackman, RN  Trust Relationship/Rapport:   choices provided   care explained   emotional  support provided   empathic listening provided   questions answered   questions encouraged   reassurance provided   thoughts/feelings acknowledged  Goal: Readiness for Transition of Care  Outcome: Met     Problem: Comorbidity Management  Goal: Blood Glucose Level Within Target Range  Outcome: Met  Intervention: Monitor and Manage Glycemia  Recent Flowsheet Documentation  Taken 2/22/2025 0747 by Rosenda Blackman RN  Medication Review/Management:   medications reviewed   high-risk medications identified  Goal: Blood Pressure in Desired Range  Outcome: Met  Intervention: Maintain Blood Pressure Management  Recent Flowsheet Documentation  Taken 2/22/2025 0747 by Rosenda Blackman RN  Medication Review/Management:   medications reviewed   high-risk medications identified     Problem: Fall Injury Risk  Goal: Absence of Fall and Fall-Related Injury  Outcome: Met  Intervention: Identify and Manage Contributors  Recent Flowsheet Documentation  Taken 2/22/2025 0747 by Rosenda Blackman RN  Medication Review/Management:   medications reviewed   high-risk medications identified  Intervention: Promote Injury-Free Environment  Recent Flowsheet Documentation  Taken 2/22/2025 0747 by Rosenda Blackman RN  Safety Promotion/Fall Prevention:   assistive device/personal items within reach   clutter free environment maintained   fall prevention program maintained   lighting adjusted   nonskid shoes/slippers when out of bed   room organization consistent   safety round/check completed  Taken 2/22/2025 0711 by Rosenda Blackman RN  Safety Promotion/Fall Prevention:   assistive device/personal items within reach   clutter free environment maintained   fall prevention program maintained   lighting adjusted   nonskid shoes/slippers when out of bed   room organization consistent   safety round/check completed     Problem: Intestinal Obstruction  Goal: Optimal Bowel Function  Outcome: Met  Goal: Fluid and Electrolyte Balance  Outcome: Met  Goal:  Absence of Infection Signs and Symptoms  Outcome: Met  Goal: Optimize Nutrition Status  Outcome: Met  Goal: Optimal Pain Control and Function  Outcome: Met     Problem: Pain Acute  Goal: Optimal Pain Control and Function  Outcome: Met  Intervention: Prevent or Manage Pain  Recent Flowsheet Documentation  Taken 2/22/2025 7471 by Rosenda Blackman, RN  Medication Review/Management:   medications reviewed   high-risk medications identified   Goal Outcome Evaluation:  Plan of Care Reviewed With: patient        Progress: improving  Outcome Evaluation: Patient discharging home.

## 2025-02-22 NOTE — PLAN OF CARE
Goal Outcome Evaluation:  Plan of Care Reviewed With: patient        Progress: no change   Pt alert and oriented x 4. Hydralazine given x1 for elevated blood pressure. Pt had complaints of pain throughout the shift. Hydrocodone given as ordered.

## 2025-02-22 NOTE — DISCHARGE SUMMARY
[facility]         HOSPITALIST  DISCHARGE SUMMARY    Patient Name: Susan Hernandez  : 1950  MRN: [id number]    Date of Admission: 2025  Date of Discharge:  2025    Primary Care Physician: Rigoberto Gomes Jr., MD    Consults       Date and Time Order Name Status Description    2025  2:06 PM Surgery (on-call MD unless specified) Completed             Active and Resolved Hospital Problems:  SBO  Abdominal pain  Type 2 diabetes  Hypothyroidism  Hyperlipidemia  History multiple abdominal surgeries  History colon cancer s/p resection/chemotherapy  Lactic acidosis    Active Hospital Problems   No active problems to display.      Resolved Hospital Problems    Diagnosis POA    **SBO (small bowel obstruction) [K56.609] Yes       Hospital Course     Hospital Course:  74-year-old female with history of diabetes, hypothyroidism, dyslipidemia, multiple abdominal surgeries, history of colon cancer s/p resection and chemotherapy, hospitalized on 2025 for chief complaint of abdominal pain, CT showing small bowel obstruction, general surgery consulted, NG tube placed to low wall suction NG tube removed, patient started having bowel movements, aggressive electrolyte replacement pursued.  Electrolytes corrected, patient had subsequent bowel movements.  Abdominal pain improved.  Discharged in hemodynamically stable condition on 2025, to follow-up with general surgery as outpatient.  Patient is high risk for readmission with recurrent small bowel obstruction.      Day of Discharge     Vital Signs:  Temp:  [97 °F (36.1 °C)-99 °F (37.2 °C)] 98.2 °F (36.8 °C)  Heart Rate:  [77-98] 79  Resp:  [16-20] 16  BP: (116-180)/(67-91) 142/81  Review of systems:  All systems reviewed and negative except for weakness, fatigue    Physical Exam             Gen: NAD, Alert and Oriented, resting comfortably  HEENT: Patent nares, moist mucous membranes  Pulm: No respiratory distress  Abd: soft,  distended, mild tenderness to deep palpation, auscultated bowel sounds  Extremities: no pitting edema        Discharge Details        Discharge Medications        Continue These Medications        Instructions Start Date   atorvastatin 10 MG tablet  Commonly known as: LIPITOR   10 mg, Oral, Nightly      carboxymethylcellulose 0.5 % solution  Commonly known as: Refresh Plus   2 drops, Both Eyes, 3 Times Daily PRN      cholecalciferol 25 MCG (1000 UT) tablet  Commonly known as: VITAMIN D3   1,000 Units, Oral, 2 Times Daily      clindamycin 1 % external solution  Commonly known as: CLEOCIN T   Apply 1 Application topically to the appropriate area as directed 2 (Two) Times a Day As Needed.      DULoxetine 30 MG capsule  Commonly known as: CYMBALTA   30 mg, Oral, Daily      empagliflozin 25 MG tablet tablet  Commonly known as: Jardiance   25 mg, Oral, Daily      levothyroxine 25 MCG tablet  Commonly known as: Synthroid   25 mcg, Oral, Daily      lidocaine 5 %  Commonly known as: LIDODERM   1 patch, Transdermal, Every 24 Hours, Remove & Discard patch within 12 hours or as directed by MD      metFORMIN 500 MG tablet  Commonly known as: GLUCOPHAGE   500 mg, Oral, 2 Times Daily With Meals      multivitamins-minerals capsule capsule   Take 1 capsule by mouth 2 (Two) Times a Day.      omeprazole 40 MG capsule  Commonly known as: priLOSEC   40 mg, Oral, Daily      pioglitazone 15 MG tablet  Commonly known as: Actos   15 mg, Oral, Daily      pregabalin 300 MG capsule  Commonly known as: Lyrica   300 mg, Oral, 2 Times Daily PRN      SITagliptin 100 MG tablet  Commonly known as: Januvia   100 mg, Oral, Daily               Allergies   Allergen Reactions    Aspirin Hives and Unknown - High Severity    Penicillins Unknown - High Severity    Penicillin V Potassium Rash       Discharge Disposition:  Home or Self Care    Diet:  Hospital:No active diet order      Discharge Activity: as tolerates      CODE STATUS:  Code Status and Medical  Interventions: CPR (Attempt to Resuscitate); Full Support   Ordered at: 02/16/25 1609     Level Of Support Discussed With:    Patient     Code Status (Patient has no pulse and is not breathing):    CPR (Attempt to Resuscitate)     Medical Interventions (Patient has pulse or is breathing):    Full Support         Future Appointments   Date Time Provider Department Center   2/25/2025  1:00 PM Rigoberto Gomes Jr., MD [facility]   6/6/2025  3:00 PM Roxanne Castro APRN [facility]       Additional Instructions for the Follow-ups that You Need to Schedule       Discharge Follow-up with PCP   As directed       Currently Documented PCP:    Rigoberto Gomes Jr., MD    PCP Phone Number:    [phone]     Follow Up Details: 3 to 7 days                Pertinent  and/or Most Recent Results     PROCEDURES:   XR Abdomen Flat & Upright    Result Date: 2/20/2025  XR ABDOMEN FLAT AND UPRIGHT Date of Exam: 2/20/2025 5:45 AM EST Indication: check progress of contrast thru colon for bowel obstruction Comparison: 2/19/2025 Findings: No free air on the upright view. Right pleural effusion is probably present. No definite small bowel obstruction. There has been further interval decrease in volume of oral contrast in the colon. Contrast predominantly remains in the ascending colon and rectosigmoid.     Further interval decrease in volume of oral contrast in the colon. No definite small bowel obstruction. Electronically Signed: Kel Larson MD  2/20/2025 6:07 AM EST  Workstation ID: [id number]    XR Abdomen Flat & Upright    Result Date: 2/19/2025  XR ABDOMEN FLAT AND UPRIGHT Date of Exam: 2/19/2025 8:05 PM EST Indication: Gastrografin challenge Comparison: 2/19/2025 1012 hours Findings: Oral contrast is present throughout the colon and rectum. Oral contrast is present in the stomach. Stable gaseous distention of small bowel loops.     Impression: Oral contrast is present in the colon and rectum. No evidence  of complete bowel obstruction. No evidence of pneumoperitoneum. Electronically Signed: Rafael Prater MD  2/19/2025 8:32 PM EST  Workstation ID: [id number]    XR Abdomen KUB    Result Date: 2/19/2025  XR ABDOMEN KUB Date of Exam: 2/19/2025 10:20 AM EST Indication: SBO Comparison: 2/17/2025 Findings: There is been removal of the nasogastric tube. Mild bibasal opacities may reflect atelectasis. Dilated gas-filled small bowel loops are again seen measuring up to 4.3 cm. There appears to be some oral contrast extending into the rectum, right colon, and transverse colon suggesting no complete small bowel obstruction. No other significant change.     Impression: Persistently dilated small bowel loops measuring up to 4.3 cm compatible with likely partial small bowel obstruction. There appears to be oral contrast extending into the rectum suggesting no complete small bowel obstruction. There is been interval removal of the nasogastric tube. Electronically Signed: Thong Mccabe MD  2/19/2025 10:58 AM EST  Workstation ID: [id number]    XR Abdomen KUB    Result Date: 2/17/2025  XR ABDOMEN KUB Date of Exam: 2/17/2025 10:28 AM EST Indication: NG placement Comparison: 2/16/2025 Findings: There is a gastric tube with tip in the proximal body of the stomach, sidehole in the region of the gastroesophageal junction, similar to prior examination. Similar dilated loops of small intestine measuring to 3.8 cm in diameter. Moderate colonic fecal load.     Impression: Nasogastric tube tip in the proximal stomach with sidehole at the gastroesophageal junction. Electronically Signed: Aren Rogers MD  2/17/2025 10:57 AM EST  Workstation ID: [id number]    XR Abdomen KUB    Result Date: 2/16/2025  XR ABDOMEN KUB Date of Exam: 2/16/2025 2:22 PM EST Indication: NG placement Comparison: None available. Findings: Nasogastric tube is noted with its tip in the stomach. Sideport is around the GE junction. This should be advanced some for more optimal  positioning. There are dilated small bowel loops which could relate to small bowel obstruction.     Impression: 1. Nasogastric tube tip is within the stomach. This should be advanced some for more optimal positioning. 2. Dilated small bowel loops which could relate to small bowel obstruction. Electronically Signed: Charles Shankar MD  2/16/2025 2:52 PM EST  Workstation ID: [id number]    CT Abdomen Pelvis With Contrast    Result Date: 2/16/2025  CT ABDOMEN PELVIS W CONTRAST Date of Exam: 2/16/2025 12:59 PM EST Indication: Abdominal pain and vomiting with history of multiple GI surgeries and colon cancer. Comparison: August 29, 2023 Technique: Axial CT images were obtained of the abdomen and pelvis after the uneventful intravenous administration of iodinated contrast. Reconstructed coronal and sagittal images were also obtained. Automated exposure control and iterative construction methods were used. Findings: The lung bases are clear. A couple small hepatic cysts appear unchanged. There is hepatic steatosis. The gallbladder, adrenal glands, kidneys, spleen, and pancreas are unremarkable. The stomach appears normal. There are post-surgical changes along the small and large bowel. There is a small bowel obstruction with small bowel feces involving the ileum within the right lower quadrant in the area of a surgical anastomosis, depicted on image 132 of series 2. The colon appears normal in caliber. The appendix is not visualized. There is trace pelvic ascites. There is no loculated collection. No abnormally enlarged lymph nodes are identified. The rectum urinary bladder are unremarkable. The uterus is surgically absent. No aggressive osseous lesions are identified.     Impression: 1.Small bowel obstruction involving ileum within right lower quadrant in the area of surgical anastomosis. 2.Trace pelvic ascites. 3.Hepatic steatosis. Electronically Signed: Gabe Adams MD  2/16/2025 1:29 PM EST  Workstation ID:  [id number]    Mammo Screening Digital Tomosynthesis Bilateral With CAD    Result Date: 2/14/2025  MAMMO SCREENING DIGITAL TOMOSYNTHESIS BILATERAL W CAD-  Date of Exam: 2/13/2025 2:32 PM  Indication: Screening.  Comparison: Screening mammogram 1/9/2024, 10/4/2022, 7/6/2020  Technique: Routine screening mammogram images were obtained per protocol.  Tomosynthesis was utilized.  These mammographic images were interpreted with the assistance of a computer aided detection system.  Breast Density: The breast(s) are heterogenously dense, which may obscure small masses.  Findings:  No suspicious masses, suspicious microcalcifications, or architectural distortions are identified.      No mammographic evidence of malignancy.  Recommend annual screening mammography.  BI-RADS ASSESSMENT: BI-RADS 1. Negative.  Note:  It has been reported that there is approximately a 15% false negative rate in mammography.  Therefore, management of a palpable abnormality should not be deferred because of a negative mammogram.  2/14/2025 7:44 AM by NAINA HOOKS MD on Workstation: [id number]         LAB RESULTS:      Lab 02/22/25  0641 02/21/25  0600 02/20/25  0550 02/19/25  0616 02/18/25  0455 02/17/25  0613 02/16/25  1707 02/16/25  1359 02/16/25  1051   WBC 6.07 4.42 4.73 4.20 4.47 3.04*  --   --  6.18   HEMOGLOBIN 13.9 13.3 12.3 12.4 12.9 13.6  --   --  15.2   HEMATOCRIT 40.5 39.3 37.0 38.3 39.8 40.7  --   --  46.4   PLATELETS 144 136* 127* 133* 155 154  --   --  200   NEUTROS ABS 2.63 2.09 2.40 2.45 2.82 1.50*  --   --  4.70   IMMATURE GRANS (ABS) 0.45* 0.45* 0.35* 0.15*  --  0.02  --   --  0.01   LYMPHS ABS 1.75 1.13 1.12 0.78  --  0.88  --   --  0.77   MONOS ABS 1.04* 0.68 0.75 0.75  --  0.58  --   --  0.51   EOS ABS 0.05 0.06 0.03 0.00  --  0.01  --   --  0.16   MCV 94.0 96.3 96.4 100.0* 99.5* 98.5*  --   --  97.1*   LACTATE  --   --   --   --   --   --  1.6 4.1* 6.5*         Lab 02/22/25  0641 02/21/25  0600 02/20/25  0550 02/19/25  0616  02/18/25  0455 02/17/25  0613   SODIUM 134* 137 137 138 136 139   POTASSIUM 3.7 2.9* 2.6* 3.5 4.2 4.1   CHLORIDE 99 100 103 103 99 103   CO2 20.2* 19.7* 16.8* 13.0* 19.2* 23.9   ANION GAP 14.8 17.3* 17.2* 22.0* 17.8* 12.1   BUN 8 4* 9 21 28* 26*   CREATININE 0.58 0.50* 0.59 0.75 0.71 0.70   EGFR 95.1 98.6 94.7 83.7 89.4 90.9   GLUCOSE 164* 156* 141* 150* 154* 168*   CALCIUM 8.6 8.1* 8.0* 8.1* 8.4* 8.4*   MAGNESIUM 1.6 1.8 1.5*  --  2.0 2.2   PHOSPHORUS 3.0 1.6* 1.1*  --  2.6 4.0         Lab 02/22/25  0641 02/21/25  0600 02/20/25  0550 02/16/25  1051   TOTAL PROTEIN 6.1 5.9* 5.5* 6.9   ALBUMIN 3.4* 3.4* 3.1* 3.9   GLOBULIN  --   --   --  3.0   ALT (SGPT) 14 13 14 19   AST (SGOT) 18 17 18 20   BILIRUBIN 0.8 0.8 0.8 1.3*   INDIRECT BILIRUBIN 0.6 0.5 0.5  --    BILIRUBIN DIRECT 0.2 0.3 0.3  --    ALK PHOS 46 47 42 66   LIPASE  --   --   --  21                     Brief Urine Lab Results  (Last result in the past 365 days)        Color   Clarity   Blood   Leuk Est   Nitrite   Protein   CREAT   Urine HCG        02/16/25 1207 Yellow   Clear   Negative   Negative   Negative   Negative                 Microbiology Results (last 10 days)       ** No results found for the last 240 hours. **            XR Abdomen Flat & Upright    Result Date: 2/20/2025  Impression: Further interval decrease in volume of oral contrast in the colon. No definite small bowel obstruction. Electronically Signed: Kel Larson MD  2/20/2025 6:07 AM EST  Workstation ID: [id number]    XR Abdomen Flat & Upright    Result Date: 2/19/2025  Impression: Impression: Oral contrast is present in the colon and rectum. No evidence of complete bowel obstruction. No evidence of pneumoperitoneum. Electronically Signed: Rafael Prater MD  2/19/2025 8:32 PM EST  Workstation ID: [id number]    XR Abdomen KUB    Result Date: 2/19/2025  Impression: Impression: Persistently dilated small bowel loops measuring up to 4.3 cm compatible with likely partial small bowel  obstruction. There appears to be oral contrast extending into the rectum suggesting no complete small bowel obstruction. There is been interval removal of the nasogastric tube. Electronically Signed: Thong Mccabe MD  2/19/2025 10:58 AM EST  Workstation ID: [id number]    XR Abdomen KUB    Result Date: 2/17/2025  Impression: Impression: Nasogastric tube tip in the proximal stomach with sidehole at the gastroesophageal junction. Electronically Signed: Aren Rogers MD  2/17/2025 10:57 AM EST  Workstation ID: [id number]    XR Abdomen KUB    Result Date: 2/16/2025  Impression: Impression: 1. Nasogastric tube tip is within the stomach. This should be advanced some for more optimal positioning. 2. Dilated small bowel loops which could relate to small bowel obstruction. Electronically Signed: Charles Shankar MD  2/16/2025 2:52 PM EST  Workstation ID: [id number]    CT Abdomen Pelvis With Contrast    Result Date: 2/16/2025  Impression: Impression: 1.Small bowel obstruction involving ileum within right lower quadrant in the area of surgical anastomosis. 2.Trace pelvic ascites. 3.Hepatic steatosis. Electronically Signed: Gabe Adams MD  2/16/2025 1:29 PM EST  Workstation ID: [id number]    Mammo Screening Digital Tomosynthesis Bilateral With CAD    Result Date: 2/14/2025  Impression: No mammographic evidence of malignancy.  Recommend annual screening mammography.  BI-RADS ASSESSMENT: BI-RADS 1. Negative.  Note:  It has been reported that there is approximately a 15% false negative rate in mammography.  Therefore, management of a palpable abnormality should not be deferred because of a negative mammogram.  2/14/2025 7:44 AM by NAINA HOOKS MD on Workstation: [id number]                   Labs Pending at Discharge:        Time spent on Discharge including face to face service:  35 minutes    Electronically signed by Laurie Malone MD, 02/22/25, 4:14 PM EST.    Portions of this documentation were transcribed electronically from  a voice recognition software.  I confirm all data accurately represents the service(s) I performed at today's visit.

## 2025-02-22 NOTE — OUTREACH NOTE
Prep Survey      Flowsheet Row Responses   [facility] facility patient discharged from? [facility]   Is LACE score < 7 ? No   Eligibility [facility]   Date of Admission 02/16/25   Date of Discharge 02/22/25   Discharge Disposition Home or Self Care   Discharge diagnosis Small bowel obstruction   Does the patient have one of the following disease processes/diagnoses(primary or secondary)? Other   Does the patient have Home health ordered? No   Is there a DME ordered? No   Prep survey completed? Yes            SUNDAY A - Registered Nurse

## 2025-02-24 ENCOUNTER — NURSE TRIAGE (OUTPATIENT)
Dept: CALL CENTER | Facility: HOSPITAL | Age: 75
End: 2025-02-24
Payer: MEDICARE

## 2025-02-24 ENCOUNTER — TRANSITIONAL CARE MANAGEMENT TELEPHONE ENCOUNTER (OUTPATIENT)
Dept: CALL CENTER | Facility: HOSPITAL | Age: 75
End: 2025-02-24
Payer: MEDICARE

## 2025-02-24 NOTE — TELEPHONE ENCOUNTER
"Needs to follow up with Gibran Pichardo MD (General Surgery) but did not have his contact info. Provided to patient and all questions answered. Transferred to schedulers.     (Duplicate triage in central time zone)    Reason for Disposition   [1] Follow-up call to recent contact AND [2] information only call, no triage required    Additional Information   Negative: [1] Caller is not with the adult (patient) AND [2] reporting urgent symptoms   Negative: Lab result questions   Negative: Medication questions   Negative: Caller can't be reached by phone   Negative: Caller has already spoken to PCP or another triager   Negative: RN needs further essential information from caller in order to complete triage   Negative: Requesting regular office appointment   Negative: [1] Caller requesting NON-URGENT health information AND [2] PCP's office is the best resource   Negative: Health Information question, no triage required and triager able to answer question   Negative: General information question, no triage required and triager able to answer question   Negative: Question about upcoming scheduled test, no triage required and triager able to answer question   Negative: [1] Caller is not with the adult (patient) AND [2] probable NON-URGENT symptoms    Answer Assessment - Initial Assessment Questions  1. REASON FOR CALL or QUESTION: \"What is your reason for calling today?\" or \"How can I best help you?\" or \"What question do you have that I can help answer?\"      Needs to follow up with Gibran Pichardo MD (General Surgery) but did not have his contact info. Provided to patient and all questions answered. Transferred to schedulers.    Protocols used: Information Only Call - No Triage-ADULT-    "

## 2025-02-24 NOTE — OUTREACH NOTE
Call Center TCM Note      Flowsheet Row Responses   [facility] patient discharged from? [facility]   Does the patient have one of the following disease processes/diagnoses(primary or secondary)? Other   TCM attempt successful? Yes   Call start time 1714   Call end time 1716   Discharge diagnosis Small bowel obstruction   Person spoke with today (if not patient) and relationship Patient   Meds reviewed with patient/caregiver? Yes  [resume usual home meds.]   Does the patient have all medications ordered at discharge? N/A  [No new meds ordered at discharge]   Is the patient taking all medications as directed (includes completed medication regime)? Yes   Comments PCP Dr Gomes. Patient has office visit with PCP in place for tomorrow 2/25  1pm that she plans to keep.   Does the patient have an appointment with their PCP within 7-14 days of discharge? Other  [Patient has office visit in place with PCP within timeframe.]   Nursing Interventions Confirmed date/time of appointment, Routed TCM call to PCP office   Has home health visited the patient within 72 hours of discharge? N/A   Psychosocial issues? No   Did the patient receive a copy of their discharge instructions? Yes   Nursing interventions Reviewed instructions with patient   What is the patient's perception of their health status since discharge? Improving   Is the patient/caregiver able to teach back signs and symptoms related to disease process for when to call PCP? Yes   Is the patient/caregiver able to teach back the hierarchy of who to call/visit for symptoms/problems? PCP, Specialist, Home health nurse, Urgent Care, ED, 911 Yes   If the patient is a current smoker, are they able to teach back resources for cessation? Not a smoker   TCM call completed? Yes   Call end time 1716   Would this patient benefit from a Referral to Amb Social Work? No   Is the patient interested in additional calls from an ambulatory ? No            Yulisa Galvez,  RN    2/24/2025, 17:18 EST

## 2025-02-24 NOTE — OUTREACH NOTE
Call Center TCM Note      Flowsheet Row Responses   [facility] patient discharged from? [facility]   Does the patient have one of the following disease processes/diagnoses(primary or secondary)? Other   TCM attempt successful? No   Unsuccessful attempts Attempt 1            Nicky Daniels RN    2/24/2025, 08:22 EST

## 2025-02-25 ENCOUNTER — OFFICE VISIT (OUTPATIENT)
Dept: INTERNAL MEDICINE | Facility: CLINIC | Age: 75
End: 2025-02-25
Payer: MEDICARE

## 2025-02-25 VITALS
BODY MASS INDEX: 24.07 KG/M2 | TEMPERATURE: 97.1 F | HEIGHT: 64 IN | HEART RATE: 91 BPM | WEIGHT: 141 LBS | SYSTOLIC BLOOD PRESSURE: 111 MMHG | DIASTOLIC BLOOD PRESSURE: 75 MMHG | OXYGEN SATURATION: 95 %

## 2025-02-25 DIAGNOSIS — E11.65 TYPE 2 DIABETES MELLITUS WITH HYPERGLYCEMIA, WITHOUT LONG-TERM CURRENT USE OF INSULIN: ICD-10-CM

## 2025-02-25 DIAGNOSIS — E03.9 ACQUIRED HYPOTHYROIDISM: ICD-10-CM

## 2025-02-25 DIAGNOSIS — E55.9 VITAMIN D DEFICIENCY: ICD-10-CM

## 2025-02-25 DIAGNOSIS — K56.609 SMALL BOWEL OBSTRUCTION: Primary | ICD-10-CM

## 2025-02-25 DIAGNOSIS — E78.2 MIXED HYPERLIPIDEMIA: ICD-10-CM

## 2025-02-25 DIAGNOSIS — Z78.0 POSTMENOPAUSAL: ICD-10-CM

## 2025-02-25 DIAGNOSIS — K76.0 NAFLD (NONALCOHOLIC FATTY LIVER DISEASE): ICD-10-CM

## 2025-02-25 LAB
25(OH)D3 SERPL-MCNC: 71.3 NG/ML (ref 30–100)
ALBUMIN SERPL-MCNC: 3.8 G/DL (ref 3.5–5.2)
ALBUMIN/GLOB SERPL: 1.2 G/DL
ALP SERPL-CCNC: 51 U/L (ref 39–117)
ALT SERPL W P-5'-P-CCNC: 17 U/L (ref 1–33)
ANION GAP SERPL CALCULATED.3IONS-SCNC: 14.9 MMOL/L (ref 5–15)
AST SERPL-CCNC: 26 U/L (ref 1–32)
BILIRUB SERPL-MCNC: 0.5 MG/DL (ref 0–1.2)
BUN SERPL-MCNC: 18 MG/DL (ref 8–23)
BUN/CREAT SERPL: 17.8 (ref 7–25)
CALCIUM SPEC-SCNC: 10.2 MG/DL (ref 8.6–10.5)
CHLORIDE SERPL-SCNC: 102 MMOL/L (ref 98–107)
CHOLEST SERPL-MCNC: 132 MG/DL (ref 0–200)
CO2 SERPL-SCNC: 23.1 MMOL/L (ref 22–29)
CREAT SERPL-MCNC: 1.01 MG/DL (ref 0.57–1)
EGFRCR SERPLBLD CKD-EPI 2021: 58.5 ML/MIN/1.73
GLOBULIN UR ELPH-MCNC: 3.1 GM/DL
GLUCOSE SERPL-MCNC: 196 MG/DL (ref 65–99)
HBA1C MFR BLD: 7.5 % (ref 4.8–5.6)
HDLC SERPL-MCNC: 33 MG/DL (ref 40–60)
LDLC SERPL CALC-MCNC: 64 MG/DL (ref 0–100)
LDLC/HDLC SERPL: 1.72 {RATIO}
POTASSIUM SERPL-SCNC: 4 MMOL/L (ref 3.5–5.2)
PROT SERPL-MCNC: 6.9 G/DL (ref 6–8.5)
SODIUM SERPL-SCNC: 140 MMOL/L (ref 136–145)
TRIGL SERPL-MCNC: 211 MG/DL (ref 0–150)
TSH SERPL DL<=0.05 MIU/L-ACNC: 2.75 UIU/ML (ref 0.27–4.2)
VLDLC SERPL-MCNC: 35 MG/DL (ref 5–40)

## 2025-02-25 PROCEDURE — 82043 UR ALBUMIN QUANTITATIVE: CPT | Performed by: INTERNAL MEDICINE

## 2025-02-25 PROCEDURE — 1111F DSCHRG MED/CURRENT MED MERGE: CPT | Performed by: INTERNAL MEDICINE

## 2025-02-25 PROCEDURE — 82570 ASSAY OF URINE CREATININE: CPT | Performed by: INTERNAL MEDICINE

## 2025-02-25 PROCEDURE — 99495 TRANSJ CARE MGMT MOD F2F 14D: CPT | Performed by: INTERNAL MEDICINE

## 2025-02-25 PROCEDURE — 1125F AMNT PAIN NOTED PAIN PRSNT: CPT | Performed by: INTERNAL MEDICINE

## 2025-02-25 PROCEDURE — 84443 ASSAY THYROID STIM HORMONE: CPT | Performed by: INTERNAL MEDICINE

## 2025-02-25 PROCEDURE — 82306 VITAMIN D 25 HYDROXY: CPT | Performed by: INTERNAL MEDICINE

## 2025-02-25 PROCEDURE — 83036 HEMOGLOBIN GLYCOSYLATED A1C: CPT | Performed by: INTERNAL MEDICINE

## 2025-02-25 PROCEDURE — 80061 LIPID PANEL: CPT | Performed by: INTERNAL MEDICINE

## 2025-02-25 PROCEDURE — 80053 COMPREHEN METABOLIC PANEL: CPT | Performed by: INTERNAL MEDICINE

## 2025-02-25 NOTE — PROGRESS NOTES
Transitional Care Follow Up Visit  Subjective     Susan Hernandez is a 74 y.o. female who presents for a transitional care management visit.    Within 48 business hours after discharge our office contacted her via telephone to coordinate her care and needs.      I reviewed and discussed the details of that call along with the discharge summary, hospital problems, inpatient lab results, inpatient diagnostic studies, and consultation reports with Susan.     Current outpatient and discharge medications have been reconciled for the patient.  Reviewed by: Rigoberto Gomes Jr., MD          2/22/2025    11:46 AM   Date of TCM Phone Call   [facility]   Date of Admission 2/16/2025   Date of Discharge 2/22/2025   Discharge Disposition Home or Self Care     Risk for Readmission (LACE) Score: 12 (2/22/2025  6:00 AM)      History of Present Illness     Chief Complaint   Patient presents with    Hospital Follow Up Visit     SBO (small bowel obstruction)  Principal problem         Course During Hospital Stay:    Patient mated for small bowel obstruction.  She was placed a NG tube and low wall suction.  Obstruction resolved with nonoperative management.  She reports she is able to eat a normal diet now and has normal bowel movements.  She denied having medication changes.  Of note she was noted to be hypokalemic while inpatient.  Diabetes-she reports tolerating her current medication regimen well.  She does not check her sugars very often.  She denies hypoglycemic events.  She is due for DEXA.  Patient is due for recheck with labs.     The following portions of the patient's history were reviewed and updated as appropriate: allergies, current medications, past family history, past medical history, past social history, past surgical history, and problem list.      Objective   Vitals:    02/25/25 1247   BP: 111/75   Pulse: 91   Temp: 97.1 °F (36.2 °C)   SpO2: 95%       Appearance: No acute distress, well-nourished  Head:  normocephalic, atraumatic  Eyes: extraocular movements intact, no scleral icterus, no conjunctival injection  Ears, Nose, and Throat: external ears normal, nares patent, moist mucous membranes  Cardiovascular: regular rate and rhythm. no murmurs, rales, or rhonchi. no edema  Respiratory: breathing comfortably, symmetric chest rise, clear to auscultation bilaterally. No wheezes, rales, or rhonchi.  Neuro: alert and oriented to time, place, and person. Normal gait  Psych: normal mood and affect     Result Review :   The following data was reviewed by: Rigoberto Gomes Jr, MD on 02/25/2025:  Common labs          2/20/2025    05:50 2/21/2025    06:00 2/22/2025    06:41   Common Labs   Glucose 141  156  164    BUN 9  4  8    Creatinine 0.59  0.50  0.58    Sodium 137  137  134    Potassium 2.6  2.9  3.7    Chloride 103  100  99    Calcium 8.0  8.1  8.6    Albumin 3.1  3.4  3.4    Total Bilirubin 0.8  0.8  0.8    Alkaline Phosphatase 42  47  46    AST (SGOT) 18  17  18    ALT (SGPT) 14  13  14    WBC 4.73  4.42  6.07    Hemoglobin 12.3  13.3  13.9    Hematocrit 37.0  39.3  40.5    Platelets 127  136  144        DEXA Bone Density Axial    (Results Pending)       Lab Results   Component Value Date    SARSANTIGEN Detected (A) 08/27/2024    FLUAAG Not Detected 08/27/2024    FLUBAG Not Detected 08/27/2024    RAPSCRN Negative 08/27/2024    INR 0.88 02/08/2024    BILIRUBINUR Negative 02/16/2025       Last Urine Toxicity  More data exists         Latest Ref Rng & Units 6/24/2024 2/20/2024   LAST URINE TOXICITY RESULTS   Creatinine, Urine mg/dL - 87.7    Amphetamine, Urine Qual Negative Negative  Negative    Barbiturates Screen, Urine Negative Negative  Negative    Benzodiazepine Screen, Urine Negative Negative  Negative    Buprenorphine, Screen, Urine Negative Negative  Negative    Cocaine Screen, Urine Negative Negative  Negative    Methadone Screen , Urine Negative Negative  Negative    Methamphetamine, Ur Negative  Negative  Negative       Details                   Assessment & Plan     Diagnoses and all orders for this visit:    1. Small bowel obstruction (Primary)  Comments:  Resolved at this time.  Monitor.    2. Type 2 diabetes mellitus with hyperglycemia, without long-term current use of insulin  -     Microalbumin / Creatinine Urine Ratio - Urine, Clean Catch  -     Hemoglobin A1c    3. Mixed hyperlipidemia  -     Lipid Panel    4. Acquired hypothyroidism  -     TSH Rfx On Abnormal To Free T4    5. NAFLD (nonalcoholic fatty liver disease)  -     Comprehensive Metabolic Panel    6. Vitamin D deficiency  -     Vitamin D,25-Hydroxy    7. Postmenopausal  -     DEXA Bone Density Axial; Future        Medications Discontinued During This Encounter   Medication Reason    clindamycin (CLEOCIN T) 1 % external solution *Therapy completed       Return in about 4 months (around 6/25/2025).           Rigoberto Gomes Jr, MD  02/25/25  13:29 EST

## 2025-02-26 LAB
ALBUMIN UR-MCNC: 1.3 MG/DL
CREAT UR-MCNC: 106 MG/DL
MICROALBUMIN/CREAT UR: 12.3 MG/G (ref 0–29)

## 2025-02-28 DIAGNOSIS — E78.2 MIXED HYPERLIPIDEMIA: ICD-10-CM

## 2025-02-28 RX ORDER — ATORVASTATIN CALCIUM 20 MG/1
20 TABLET, FILM COATED ORAL NIGHTLY
Qty: 90 TABLET | Refills: 3 | Status: SHIPPED | OUTPATIENT
Start: 2025-02-28

## 2025-03-03 ENCOUNTER — TELEPHONE (OUTPATIENT)
Dept: INTERNAL MEDICINE | Facility: CLINIC | Age: 75
End: 2025-03-03
Payer: MEDICARE

## 2025-03-03 NOTE — TELEPHONE ENCOUNTER
Left message for patient to return call to clinic.    HUB TO READ:   Hgba1c is getting better. Keep going!. Like to get <7%.     Elevated triglycerides, which are the storage form of sugar - recommend lower carbohydrate/sugar intake.     HDL low - this is protective cholesterol and generally like the number to be >50. encourage exercise to increase level.     New LDL guidelines recommend level <55. Would recommend increase atorvastatin to 20mg nightly. Will update prescription.

## 2025-03-04 ENCOUNTER — READMISSION MANAGEMENT (OUTPATIENT)
Dept: CALL CENTER | Facility: HOSPITAL | Age: 75
End: 2025-03-04
Payer: MEDICARE

## 2025-03-04 NOTE — OUTREACH NOTE
Medical Week 2 Survey      Flowsheet Row Responses   [facility] patient discharged from? [facility]   Does the patient have one of the following disease processes/diagnoses(primary or secondary)? Other   Week 2 attempt successful? Yes   Call start time 1604   Discharge diagnosis Small bowel obstruction   Call end time 1606   Person spoke with today (if not patient) and relationship Patient   Meds reviewed with patient/caregiver? Yes   Is the patient having any side effects they believe may be caused by any medication additions or changes? No   Does the patient have all medications ordered at discharge? N/A   Is the patient taking all medications as directed (includes completed medication regime)? Yes   Does the patient have a primary care provider?  Yes   Does the patient have an appointment with their PCP within 7 days of discharge? Yes   Has the patient kept scheduled appointments due by today? Yes   Psychosocial issues? No   What is the patient's perception of their health status since discharge? Improving   Is the patient/caregiver able to teach back signs and symptoms related to disease process for when to call PCP? Yes   Is the patient/caregiver able to teach back signs and symptoms related to disease process for when to call 911? Yes   Is the patient/caregiver able to teach back the hierarchy of who to call/visit for symptoms/problems? PCP, Specialist, Home health nurse, Urgent Care, ED, 911 Yes   Week 2 Call Completed? Yes   Is the patient interested in additional calls from an ambulatory ? No   Would this patient benefit from a Referral to Amb Social Work? No   Wrap up additional comments Pt denies abdominal pain, and is having BMs every day. Pt had PCP fu appt   Call end time 1606            Ondina HUERTA - Registered Nurse

## 2025-03-11 DIAGNOSIS — M50.30 DDD (DEGENERATIVE DISC DISEASE), CERVICAL: ICD-10-CM

## 2025-03-11 RX ORDER — PREGABALIN 300 MG/1
300 CAPSULE ORAL 2 TIMES DAILY PRN
Qty: 60 CAPSULE | Refills: 0 | Status: SHIPPED | OUTPATIENT
Start: 2025-03-11

## 2025-03-11 NOTE — TELEPHONE ENCOUNTER
Caller: Mary Susan Cordova    Relationship: Self    Best call back number: [phone]    Requested Prescriptions:   Requested Prescriptions     Pending Prescriptions Disp Refills    pregabalin (Lyrica) 300 MG capsule 60 capsule 0     Sig: Take 1 capsule by mouth 2 (Two) Times a Day As Needed (pain).        Pharmacy where request should be sent: [place] [phone] [place] [phone]      Last office visit with prescribing clinician: 2/25/2025   Last telemedicine visit with prescribing clinician: Visit date not found   Next office visit with prescribing clinician: 6/27/2025     Additional details provided by patient:     Does the patient have less than a 3 day supply:  [x] Yes  [] No      Chandu Tracy Rep   03/11/25 10:41 EDT

## 2025-03-11 NOTE — TELEPHONE ENCOUNTER
Refill Request for Controlled Substance    Date of Request: 3/11/2025  Medication Requested: Lyrica  Last UDS: 06/24/2024  Last Consent: 06/24/2024  Last OV: 02/25/2025  Next OV: 06/27/2025

## 2025-03-11 NOTE — TELEPHONE ENCOUNTER
Caller: Susan Hernandez    Relationship: Self    Best call back number: [phone]    What medication are you requesting: PATIENT STATED SHE WAS TAKING A STOOL SOFTNER PREVIOUSLY THAT  MG SHE WOULD LIKE THIS MEDICATION AGAIN IF POSSIBLE       Have you had these symptoms before:    [x] Yes  [] No    Have you been treated for these symptoms before:   [x] Yes  [] No    If a prescription is needed, what is your preferred pharmacy and phone number: [facility] - [address] [phone] [facility] [phone]      Additional notes:

## 2025-03-14 ENCOUNTER — TELEPHONE (OUTPATIENT)
Dept: INTERNAL MEDICINE | Facility: CLINIC | Age: 75
End: 2025-03-14
Payer: MEDICARE

## 2025-03-14 ENCOUNTER — READMISSION MANAGEMENT (OUTPATIENT)
Dept: CALL CENTER | Facility: HOSPITAL | Age: 75
End: 2025-03-14
Payer: MEDICARE

## 2025-03-14 RX ORDER — POLYETHYLENE GLYCOL 3350 17 G/17G
17 POWDER, FOR SOLUTION ORAL DAILY
Qty: 100 EACH | Refills: 3 | Status: SHIPPED | OUTPATIENT
Start: 2025-03-14

## 2025-03-14 NOTE — TELEPHONE ENCOUNTER
Caller: Susan Hernandez    Relationship to patient: Self    Best call back number: [phone]    Patient is needing: PATIENT CALLING TO SEE IF A STOOL SOFTENER HAS BEEN SENT IN FOR HER.

## 2025-03-14 NOTE — OUTREACH NOTE
Medical Week 3 Survey      Flowsheet Row Responses   [facility] [place] facility patient discharged from? [facility]   Does the patient have one of the following disease processes/diagnoses(primary or secondary)? Other   Week 3 attempt successful? No   Unsuccessful attempts Attempt 1            SUNDAY Borges Registered Nurse

## 2025-03-19 ENCOUNTER — READMISSION MANAGEMENT (OUTPATIENT)
Dept: CALL CENTER | Facility: HOSPITAL | Age: 75
End: 2025-03-19
Payer: MEDICARE

## 2025-03-19 NOTE — OUTREACH NOTE
Medical Week 3 Survey      Flowsheet Row Responses   [facility] facility patient discharged from? [facility]   Does the patient have one of the following disease processes/diagnoses(primary or secondary)? Other   Week 3 attempt successful? No   Unsuccessful attempts Attempt 2            Aracelis PONCE - Registered Nurse

## 2025-03-25 ENCOUNTER — RESULTS FOLLOW-UP (OUTPATIENT)
Dept: BONE DENSITY | Facility: HOSPITAL | Age: 75
End: 2025-03-25
Payer: MEDICARE

## 2025-03-25 ENCOUNTER — HOSPITAL ENCOUNTER (OUTPATIENT)
Dept: BONE DENSITY | Facility: HOSPITAL | Age: 75
Discharge: HOME | End: 2025-03-25
Admitting: INTERNAL MEDICINE
Payer: MEDICARE

## 2025-03-25 DIAGNOSIS — Z78.0 POSTMENOPAUSAL: ICD-10-CM

## 2025-03-25 PROCEDURE — 77080 DXA BONE DENSITY AXIAL: CPT

## 2025-04-07 DIAGNOSIS — E11.65 TYPE 2 DIABETES MELLITUS WITH HYPERGLYCEMIA, WITHOUT LONG-TERM CURRENT USE OF INSULIN: ICD-10-CM

## 2025-04-07 DIAGNOSIS — M50.30 DDD (DEGENERATIVE DISC DISEASE), CERVICAL: ICD-10-CM

## 2025-04-07 DIAGNOSIS — E55.9 VITAMIN D DEFICIENCY: ICD-10-CM

## 2025-04-07 RX ORDER — DULOXETIN HYDROCHLORIDE 30 MG/1
30 CAPSULE, DELAYED RELEASE ORAL DAILY
Qty: 90 CAPSULE | Refills: 3 | OUTPATIENT
Start: 2025-04-07

## 2025-04-07 RX ORDER — CHOLECALCIFEROL (VITAMIN D3) 25 MCG
1000 TABLET ORAL 2 TIMES DAILY
Qty: 180 TABLET | Refills: 3 | Status: SHIPPED | OUTPATIENT
Start: 2025-04-07

## 2025-04-07 NOTE — TELEPHONE ENCOUNTER
Caller: Susan Hernandez Art    Relationship: Self    Best call back number: [phone]    Requested Prescriptions:   Requested Prescriptions     Pending Prescriptions Disp Refills    DULoxetine (CYMBALTA) 30 MG capsule 90 capsule 3     Sig: Take 1 capsule by mouth Daily.    empagliflozin (Jardiance) 25 MG tablet tablet 90 tablet 3     Sig: Take 1 tablet by mouth Daily.        Pharmacy where request should be sent: [place] [phone] [place], [place] [phone]      Last office visit with prescribing clinician: 2/25/2025   Last telemedicine visit with prescribing clinician: Visit date not found   Next office visit with prescribing clinician: 6/27/2025     Does the patient have less than a 3 day supply:  [x] Yes  [] No    Would you like a call back once the refill request has been completed: [] Yes [x] No    If the office needs to give you a call back, can they leave a voicemail: [] Yes [x] No    Chandu Wallace Rep   04/07/25 12:59 EDT

## 2025-04-07 NOTE — TELEPHONE ENCOUNTER
Caller: Susan Hernandez    Relationship: Self    Best call back number: [phone]     Requested Prescriptions:   Requested Prescriptions     Pending Prescriptions Disp Refills    DULoxetine (CYMBALTA) 30 MG capsule 90 capsule 3     Sig: Take 1 capsule by mouth Daily.    empagliflozin (Jardiance) 25 MG tablet tablet 90 tablet 3     Sig: Take 1 tablet by mouth Daily.    cholecalciferol (VITAMIN D3) 25 MCG (1000 UT) tablet 180 tablet 3     Sig: Take 1 tablet by mouth 2 (Two) Times a Day.        Pharmacy where request should be sent: [place] [phone] [place], [place] [phone]      Last office visit with prescribing clinician: 2/25/2025   Last telemedicine visit with prescribing clinician: Visit date not found   Next office visit with prescribing clinician: 6/27/2025     Does the patient have less than a 3 day supply:  [x] Yes  [] No    Would you like a call back once the refill request has been completed: [x] Yes [] No    If the office needs to give you a call back, can they leave a voicemail: [x] Yes [] No    Chandu Low Rep   04/07/25 13:05 EDT         DELETE AFTER READING TO PATIENT: “Thank you for sharing this information with me. I will send a message to the clinical team. Please allow 48 hours for the clinical staff to follow up on this request.”

## 2025-04-14 DIAGNOSIS — M50.30 DDD (DEGENERATIVE DISC DISEASE), CERVICAL: ICD-10-CM

## 2025-04-14 RX ORDER — DULOXETIN HYDROCHLORIDE 30 MG/1
30 CAPSULE, DELAYED RELEASE ORAL DAILY
Qty: 90 CAPSULE | Refills: 3 | Status: SHIPPED | OUTPATIENT
Start: 2025-04-14

## 2025-04-14 NOTE — TELEPHONE ENCOUNTER
Caller: Susan Hernandez Art    Relationship: Self    Best call back number: [phone]    Requested Prescriptions:   Requested Prescriptions     Pending Prescriptions Disp Refills    pregabalin (Lyrica) 300 MG capsule 60 capsule 0     Sig: Take 1 capsule by mouth 2 (Two) Times a Day As Needed (pain).    DULoxetine (CYMBALTA) 30 MG capsule 90 capsule 3     Sig: Take 1 capsule by mouth Daily.        Pharmacy where request should be sent: [place] [phone] [place], [place] [phone]      Last office visit with prescribing clinician: 2/25/2025   Last telemedicine visit with prescribing clinician: Visit date not found   Next office visit with prescribing clinician: 6/27/2025     Additional details provided by patient:     Does the patient have less than a 3 day supply:  [x] Yes  [] No        Chandu Tracy Rep   04/14/25 11:37 EDT

## 2025-04-14 NOTE — TELEPHONE ENCOUNTER
Refill Request for Controlled Substance    Date of Request: 4/14/2025  Medication Requested: lyrica  Last UDS: 06/24/2024  Last Consent: 06/24/2024  Last OV: 02/25/2025  Next OV: 06/27/2025

## 2025-04-15 RX ORDER — PREGABALIN 300 MG/1
300 CAPSULE ORAL 2 TIMES DAILY PRN
Qty: 60 CAPSULE | Refills: 0 | Status: SHIPPED | OUTPATIENT
Start: 2025-04-15
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
## 2025-05-12 DIAGNOSIS — M50.30 DDD (DEGENERATIVE DISC DISEASE), CERVICAL: ICD-10-CM

## 2025-05-12 NOTE — TELEPHONE ENCOUNTER
Refill Request for Controlled Substance    Date of Request: 5/12/2025  Medication Requested: LYRICA  Last UDS: 06/24/2024  Last Consent: 06/24/2024  Last OV: 02/25/2025  Next OV: 06/27/2025

## 2025-05-12 NOTE — TELEPHONE ENCOUNTER
Caller: Susan Hernandez    Relationship: Self    Best call back number:   Telephone Information:   Mobile [phone]        Requested Prescriptions:   Requested Prescriptions     Pending Prescriptions Disp Refills    pregabalin (Lyrica) 300 MG capsule 60 capsule 0     Sig: Take 1 capsule by mouth 2 (Two) Times a Day As Needed (pain).        Pharmacy where request should be sent: [place] [phone] [place] [phone]      Last office visit with prescribing clinician: 2/25/2025   Last telemedicine visit with prescribing clinician: Visit date not found   Next office visit with prescribing clinician: 6/27/2025         Does the patient have less than a 3 day supply:  [x] Yes  [] No        Chandu Ca Rep   05/12/25 14:37 EDT

## 2025-05-13 RX ORDER — PREGABALIN 300 MG/1
300 CAPSULE ORAL 2 TIMES DAILY PRN
Qty: 60 CAPSULE | Refills: 0 | Status: SHIPPED | OUTPATIENT
Start: 2025-05-13

## 2025-06-06 ENCOUNTER — TELEPHONE (OUTPATIENT)
Dept: GASTROENTEROLOGY | Facility: CLINIC | Age: 75
End: 2025-06-06

## 2025-06-06 NOTE — TELEPHONE ENCOUNTER
Attempted to contact/Contacted Susan Hernandez 1950 regarding the appointment no show with KEITH Day on 06.06.25 @ 3:00. Patient is aware that there is a 24-hour cancellation policy and understands that a no-show letter will be mailed to them at the address on file. [facility]

## 2025-06-12 DIAGNOSIS — M50.30 DDD (DEGENERATIVE DISC DISEASE), CERVICAL: ICD-10-CM

## 2025-06-12 NOTE — TELEPHONE ENCOUNTER
Caller: Susan Hernandez Art    Relationship: Self    Best call back number: [phone]     Requested Prescriptions:   Requested Prescriptions     Pending Prescriptions Disp Refills    pregabalin (Lyrica) 300 MG capsule 60 capsule 0     Sig: Take 1 capsule by mouth 2 (Two) Times a Day As Needed (pain).        Pharmacy where request should be sent: [place] [phone] [place] [phone]      Last office visit with prescribing clinician: 2/25/2025   Last telemedicine visit with prescribing clinician: Visit date not found   Next office visit with prescribing clinician: 6/27/2025       Does the patient have less than a 3 day supply:  [x] Yes  [] No    Would you like a call back once the refill request has been completed: [x] Yes [] No    If the office needs to give you a call back, can they leave a voicemail: [x] Yes [] No    Chandu Low   06/12/25 12:53 EDT

## 2025-06-12 NOTE — TELEPHONE ENCOUNTER
Refill Request for Controlled Substance    Date of Request: 6/12/2025  Medication Requested: lyrica  Last UDS: 06/24/2024  Last Consent: 06/24/2024  Last OV: 02/25/2025  Next OV: 06/27/2025

## 2025-06-13 RX ORDER — PREGABALIN 300 MG/1
300 CAPSULE ORAL 2 TIMES DAILY PRN
Qty: 60 CAPSULE | Refills: 0 | Status: SHIPPED | OUTPATIENT
Start: 2025-06-13

## 2025-06-25 NOTE — PROGRESS NOTES
Chief Complaint  Diabetes (Type 2 follow up ) and New Med Request (She is requesting a stool softener )    Josi Hernandez presents to [facility]  History of Present Illness  History of Present Illness  The patient presents for evaluation of chronic cough, pain management, hypercholesterolemia, diabetes mellitus, hepatic steatosis, and cephalalgia.    The patient reports a persistent, painful cough for the past month, without associated chest pain or dyspnea. Symptomatic relief has been achieved with the use of NyQuil and acetaminophen. She reports cough is nearly gone today. She denies fevers.     Pain management has been effectively achieved with pregabalin. She is able to be more active. She would like to continue medications.     The patient continues atorvastatin therapy for hypercholesterolemia but is amenable to transitioning to rosuvastatin in accordance with updated guidelines.    The patient's most recent hemoglobin A1c was 7.5% in February 2025. They request blood glucose testing during today's visit. She does not check blood glucose at home.     The patient expresses interest in receiving an updated COVID-19 booster vaccination.    The patient has a history of falls, with intermittent sharp pain localized to the left side of the head following an impact during one such fall. They request a brain imaging for further evaluation.     The patient has been diagnosed with hepatic steatosis, confirmed by a computed tomography scan earlier this year, and seeks information regarding the progression of the condition and potential new treatment options. She has not previously had elastography, but has completed liver ultrasound.         Current Outpatient Medications   Medication Instructions    carboxymethylcellulose (Refresh Plus) 0.5 % solution 2 drops, Both Eyes, 3 Times Daily PRN    cholecalciferol (VITAMIN D3) 1,000 Units, Oral, 2 Times Daily  "   DULoxetine (CYMBALTA) 30 mg, Oral, Daily    empagliflozin (JARDIANCE) 25 mg, Oral, Daily    levothyroxine (SYNTHROID) 25 mcg, Oral, Daily    lidocaine (LIDODERM) 5 % 1 patch, Transdermal, Every 24 Hours, Remove & Discard patch within 12 hours or as directed by MD    metFORMIN (GLUCOPHAGE) 500 mg, Oral, 2 Times Daily With Meals    multivitamins-minerals (PRESERVISION AREDS 2) capsule capsule Take 1 capsule by mouth 2 (Two) Times a Day.    omeprazole (PRILOSEC) 40 mg, Oral, Daily    pioglitazone (ACTOS) 15 mg, Oral, Daily    polyethylene glycol (MIRALAX) 17 g, Oral, Daily    pregabalin (LYRICA) 300 mg, Oral, 2 Times Daily PRN    rosuvastatin (CRESTOR) 20 mg, Oral, Daily    SITagliptin (JANUVIA) 100 mg, Oral, Daily       The following portions of the patient's history were reviewed and updated as appropriate: allergies, current medications, past family history, past medical history, past social history, past surgical history, and problem list.    Objective   Vital Signs:   /65 (BP Location: Left arm, Patient Position: Sitting, Cuff Size: Adult)   Pulse 85   Temp 97.3 °F (36.3 °C) (Temporal)   Ht 162.6 cm (64\")   Wt 67.1 kg (148 lb)   SpO2 95%   BMI 25.40 kg/m²     BP Readings from Last 3 Encounters:   06/27/25 102/65   02/25/25 111/75   02/22/25 142/81     Wt Readings from Last 3 Encounters:   06/27/25 67.1 kg (148 lb)   02/25/25 64 kg (141 lb)   02/16/25 68.1 kg (150 lb 2.1 oz)     Physical Exam   Appearance: No acute distress, well-nourished  Head: normocephalic, atraumatic  Eyes: extraocular movements intact, no scleral icterus, no conjunctival injection  Ears, Nose, and Throat: external ears normal, nares patent, moist mucous membranes  Cardiovascular: regular rate and rhythm. no murmurs, rubs, or gallops. no edema  Respiratory: breathing comfortably, symmetric chest rise, clear to auscultation bilaterally. No wheezes, rales, or rhonchi.  Neuro: alert and oriented to time, place, and person. Normal " gait  Psych: normal mood and affect     Result Review :   The following data was reviewed by: Rigoberto Gomes Jr, MD on 06/27/2025:  Common labs          2/21/2025    06:00 2/22/2025    06:41 2/25/2025    13:41   Common Labs   Glucose 156  164  196    BUN 4  8  18    Creatinine 0.50  0.58  1.01    Sodium 137  134  140    Potassium 2.9  3.7  4.0    Chloride 100  99  102    Calcium 8.1  8.6  10.2    Albumin 3.4  3.4  3.8    Total Bilirubin 0.8  0.8  0.5    Alkaline Phosphatase 47  46  51    AST (SGOT) 17  18  26    ALT (SGPT) 13  14  17    WBC 4.42  6.07     Hemoglobin 13.3  13.9     Hematocrit 39.3  40.5     Platelets 136  144     Total Cholesterol   132    Triglycerides   211    HDL Cholesterol   33    LDL Cholesterol    64    Hemoglobin A1C   7.50    Microalbumin, Urine   1.3        Lab Results   Component Value Date    SARSANTIGEN Detected (A) 08/27/2024    FLUAAG Not Detected 08/27/2024    FLUBAG Not Detected 08/27/2024    RAPSCRN Negative 08/27/2024    INR 0.88 02/08/2024    BILIRUBINUR Negative 02/16/2025          Assessment and Plan    Diagnoses and all orders for this visit:    1. Type 2 diabetes mellitus with hyperglycemia, without long-term current use of insulin (Primary)  Comments:  chck labs. cont current regimen.  Orders:  -     CBC & Differential  -     Hemoglobin A1c    2. Encounter for long-term (current) use of medications  -     POC Medline 12 Panel Urine Drug Screen    3. Need for tetanus booster    4. Need for COVID-19 vaccine  -     COVID-19 (Pfizer) 12yrs+ (COMIRNATY)    5. Need for shingles vaccine    6. Mixed hyperlipidemia  Comments:  switch to rosuvastatin and monitor efficacy. goal LDL <55.  Orders:  -     Lipid Panel  -     rosuvastatin (CRESTOR) 20 MG tablet; Take 1 tablet by mouth Daily.  Dispense: 90 tablet; Refill: 3    7. Acquired hypothyroidism  -     TSH    8. Vitamin D deficiency  -     Vitamin D,25-Hydroxy    9. NAFLD (nonalcoholic fatty liver disease)  Comments:  refer  to GI to consider more advanced imaging and rezdiffra.  Orders:  -     Comprehensive Metabolic Panel  -     Ambulatory Referral to Gastroenterology    10. Fall, sequela  -     CT Head Without Contrast; Future    11. Traumatic injury of head, sequela  -     CT Head Without Contrast; Future          Medications Discontinued During This Encounter   Medication Reason    atorvastatin (LIPITOR) 20 MG tablet Alternate therapy          Follow Up   Return in about 4 months (around 10/27/2025) for DM, HTN.  Patient was given instructions and counseling regarding her condition or for health maintenance advice. Please see specific information pulled into the AVS if appropriate.       Rigoberto Gomes Jr, MD  06/27/25  14:59 EDT

## 2025-06-27 ENCOUNTER — OFFICE VISIT (OUTPATIENT)
Dept: INTERNAL MEDICINE | Facility: CLINIC | Age: 75
End: 2025-06-27
Payer: MEDICARE

## 2025-06-27 VITALS
OXYGEN SATURATION: 95 % | HEIGHT: 64 IN | DIASTOLIC BLOOD PRESSURE: 65 MMHG | WEIGHT: 148 LBS | HEART RATE: 85 BPM | TEMPERATURE: 97.3 F | SYSTOLIC BLOOD PRESSURE: 102 MMHG | BODY MASS INDEX: 25.27 KG/M2

## 2025-06-27 DIAGNOSIS — Z23 NEED FOR COVID-19 VACCINE: ICD-10-CM

## 2025-06-27 DIAGNOSIS — E11.65 TYPE 2 DIABETES MELLITUS WITH HYPERGLYCEMIA, WITHOUT LONG-TERM CURRENT USE OF INSULIN: Primary | ICD-10-CM
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
## 2025-06-27 DIAGNOSIS — S09.90XS TRAUMATIC INJURY OF HEAD, SEQUELA: ICD-10-CM

## 2025-06-27 DIAGNOSIS — K76.0 NAFLD (NONALCOHOLIC FATTY LIVER DISEASE): ICD-10-CM

## 2025-06-27 DIAGNOSIS — E55.9 VITAMIN D DEFICIENCY: ICD-10-CM

## 2025-06-27 DIAGNOSIS — E03.9 ACQUIRED HYPOTHYROIDISM: ICD-10-CM

## 2025-06-27 DIAGNOSIS — Z23 NEED FOR TETANUS BOOSTER: ICD-10-CM

## 2025-06-27 DIAGNOSIS — Z79.899 ENCOUNTER FOR LONG-TERM (CURRENT) USE OF MEDICATIONS: ICD-10-CM

## 2025-06-27 DIAGNOSIS — Z23 NEED FOR SHINGLES VACCINE: ICD-10-CM

## 2025-06-27 DIAGNOSIS — E78.2 MIXED HYPERLIPIDEMIA: ICD-10-CM

## 2025-06-27 LAB
25(OH)D3 SERPL-MCNC: 69.7 NG/ML (ref 30–100)
ALBUMIN SERPL-MCNC: 4.4 G/DL (ref 3.5–5.2)
ALBUMIN/GLOB SERPL: 1.8 G/DL
ALP SERPL-CCNC: 62 U/L (ref 39–117)
ALT SERPL W P-5'-P-CCNC: 23 U/L (ref 1–33)
AMPHET+METHAMPHET UR QL: NEGATIVE
AMPHETAMINE INTERNAL CONTROL: NORMAL
AMPHETAMINES UR QL: NEGATIVE
ANION GAP SERPL CALCULATED.3IONS-SCNC: 15.3 MMOL/L (ref 5–15)
AST SERPL-CCNC: 31 U/L (ref 1–32)
BARBITURATE INTERNAL CONTROL: NORMAL
BARBITURATES UR QL SCN: NEGATIVE
BASOPHILS # BLD AUTO: 0.02 10*3/MM3 (ref 0–0.2)
BASOPHILS NFR BLD AUTO: 0.4 % (ref 0–1.5)
BENZODIAZ UR QL SCN: NEGATIVE
BENZODIAZEPINE INTERNAL CONTROL: NORMAL
BILIRUB SERPL-MCNC: 0.9 MG/DL (ref 0–1.2)
BUN SERPL-MCNC: 15 MG/DL (ref 8–23)
BUN/CREAT SERPL: 13.8 (ref 7–25)
BUPRENORPHINE INTERNAL CONTROL: NORMAL
BUPRENORPHINE SERPL-MCNC: NEGATIVE NG/ML
CALCIUM SPEC-SCNC: 9.8 MG/DL (ref 8.6–10.5)
CANNABINOIDS SERPL QL: NEGATIVE
CHLORIDE SERPL-SCNC: 102 MMOL/L (ref 98–107)
CHOLEST SERPL-MCNC: 153 MG/DL (ref 0–200)
CO2 SERPL-SCNC: 22.7 MMOL/L (ref 22–29)
COCAINE INTERNAL CONTROL: NORMAL
COCAINE UR QL: NEGATIVE
CREAT SERPL-MCNC: 1.09 MG/DL (ref 0.57–1)
DEPRECATED RDW RBC AUTO: 47.2 FL (ref 37–54)
EGFRCR SERPLBLD CKD-EPI 2021: 53.4 ML/MIN/1.73
EOSINOPHIL # BLD AUTO: 0.07 10*3/MM3 (ref 0–0.4)
EOSINOPHIL NFR BLD AUTO: 1.3 % (ref 0.3–6.2)
ERYTHROCYTE [DISTWIDTH] IN BLOOD BY AUTOMATED COUNT: 12.8 % (ref 12.3–15.4)
EXPIRATION DATE: 0
GLOBULIN UR ELPH-MCNC: 2.5 GM/DL
GLUCOSE SERPL-MCNC: 272 MG/DL (ref 65–99)
HBA1C MFR BLD: 7.6 % (ref 4.8–5.6)
HCT VFR BLD AUTO: 47.5 % (ref 34–46.6)
HDLC SERPL-MCNC: 39 MG/DL (ref 40–60)
HGB BLD-MCNC: 15.4 G/DL (ref 12–15.9)
IMM GRANULOCYTES # BLD AUTO: 0.02 10*3/MM3 (ref 0–0.05)
IMM GRANULOCYTES NFR BLD AUTO: 0.4 % (ref 0–0.5)
LDLC SERPL CALC-MCNC: 75 MG/DL (ref 0–100)
LDLC/HDLC SERPL: 1.72 {RATIO}
LYMPHOCYTES # BLD AUTO: 1.88 10*3/MM3 (ref 0.7–3.1)
LYMPHOCYTES NFR BLD AUTO: 34.8 % (ref 19.6–45.3)
Lab: 0
MCH RBC QN AUTO: 32.6 PG (ref 26.6–33)
MCHC RBC AUTO-ENTMCNC: 32.4 G/DL (ref 31.5–35.7)
MCV RBC AUTO: 100.6 FL (ref 79–97)
MDMA (ECSTASY) INTERNAL CONTROL: NORMAL
MDMA UR QL SCN: NEGATIVE
METHADONE INTERNAL CONTROL: NORMAL
METHADONE UR QL SCN: NEGATIVE
METHAMPHETAMINE INTERNAL CONTROL: NORMAL
MONOCYTES # BLD AUTO: 0.41 10*3/MM3 (ref 0.1–0.9)
MONOCYTES NFR BLD AUTO: 7.6 % (ref 5–12)
MORPHINE INTERNAL CONTROL: NORMAL
MORPHINE/OPIATES SCREEN, URINE: NEGATIVE
NEUTROPHILS NFR BLD AUTO: 3.01 10*3/MM3 (ref 1.7–7)
NEUTROPHILS NFR BLD AUTO: 55.5 % (ref 42.7–76)
NRBC BLD AUTO-RTO: 0 /100 WBC (ref 0–0.2)
OXYCODONE INTERNAL CONTROL: NORMAL
OXYCODONE UR QL SCN: NEGATIVE
PCP UR QL SCN: NEGATIVE
PHENCYCLIDINE INTERNAL CONTROL: NORMAL
PLATELET # BLD AUTO: 201 10*3/MM3 (ref 140–450)
PMV BLD AUTO: 11.1 FL (ref 6–12)
POTASSIUM SERPL-SCNC: 4 MMOL/L (ref 3.5–5.2)
PROT SERPL-MCNC: 6.9 G/DL (ref 6–8.5)
RBC # BLD AUTO: 4.72 10*6/MM3 (ref 3.77–5.28)
SODIUM SERPL-SCNC: 140 MMOL/L (ref 136–145)
THC INTERNAL CONTROL: NORMAL
TRIGL SERPL-MCNC: 234 MG/DL (ref 0–150)
TSH SERPL DL<=0.05 MIU/L-ACNC: 3.06 UIU/ML (ref 0.27–4.2)
VLDLC SERPL-MCNC: 39 MG/DL (ref 5–40)
WBC NRBC COR # BLD AUTO: 5.41 10*3/MM3 (ref 3.4–10.8)

## 2025-06-27 PROCEDURE — 80053 COMPREHEN METABOLIC PANEL: CPT | Performed by: INTERNAL MEDICINE

## 2025-06-27 PROCEDURE — 80061 LIPID PANEL: CPT | Performed by: INTERNAL MEDICINE

## 2025-06-27 PROCEDURE — 83036 HEMOGLOBIN GLYCOSYLATED A1C: CPT | Performed by: INTERNAL MEDICINE

## 2025-06-27 PROCEDURE — 85025 COMPLETE CBC W/AUTO DIFF WBC: CPT | Performed by: INTERNAL MEDICINE

## 2025-06-27 PROCEDURE — 84443 ASSAY THYROID STIM HORMONE: CPT | Performed by: INTERNAL MEDICINE

## 2025-06-27 PROCEDURE — 82306 VITAMIN D 25 HYDROXY: CPT | Performed by: INTERNAL MEDICINE

## 2025-06-27 RX ORDER — ROSUVASTATIN CALCIUM 20 MG/1
20 TABLET, COATED ORAL DAILY
Qty: 90 TABLET | Refills: 3 | Status: SHIPPED | OUTPATIENT
Start: 2025-06-27

## 2025-07-10 ENCOUNTER — TELEPHONE (OUTPATIENT)
Dept: GASTROENTEROLOGY | Facility: CLINIC | Age: 75
End: 2025-07-10

## 2025-07-10 ENCOUNTER — SPECIALTY PHARMACY (OUTPATIENT)
Dept: OTHER | Facility: HOSPITAL | Age: 75
End: 2025-07-10
Payer: MEDICARE

## 2025-07-10 ENCOUNTER — OFFICE VISIT (OUTPATIENT)
Dept: GASTROENTEROLOGY | Facility: CLINIC | Age: 75
End: 2025-07-10
Payer: MEDICARE

## 2025-07-10 VITALS
HEART RATE: 85 BPM | BODY MASS INDEX: 25.78 KG/M2 | DIASTOLIC BLOOD PRESSURE: 70 MMHG | WEIGHT: 151 LBS | HEIGHT: 64 IN | SYSTOLIC BLOOD PRESSURE: 107 MMHG

## 2025-07-10 DIAGNOSIS — K76.0 FATTY LIVER: Primary | ICD-10-CM

## 2025-07-10 DIAGNOSIS — K59.00 CONSTIPATION, UNSPECIFIED CONSTIPATION TYPE: ICD-10-CM

## 2025-07-10 DIAGNOSIS — E66.3 OVERWEIGHT (BMI 25.0-29.9): ICD-10-CM

## 2025-07-10 NOTE — PROGRESS NOTES
Chief Complaint  Fatty Liver (Follow up )    Susan Hernandez is a 74 y.o. female who presents to [facility] [place] for follow up.     History of present Illness  Established patient dating back to 2016. She has history of colon cancer in 2006 and 2011. 2006 was very early and did not require surgery, cancer returned in 2011 where she had resection and chemotherapy.    Office visit 2/01/24 - elevated liver enzymes noted. Denies alcohol use.   US liver 11/21/23 - hepatic steatosis, small liver cyst  Liver workup 2/8/2024 - normal.   FibroScan 6/24/2024-F2, moderate to severe liver fat (Rezdiffra discussed, patient deferred)     EGD/colonoscopy 4/2/2024 by Dr. Velazquez-normal esophagus, mild gastritis, and normal duodenum.  Stomach biopsies-mild reactive gastropathy.  Small tubular adenoma polyp in the transverse colon, poor prep with moderate stool throughout precluding visualization.  Repeat colonoscopy 5/20/2024 by Dr. Velazquez-evidence of end-to-side anastomosis and proximal transverse colon otherwise normal mucosa throughout.  Repeat 3 years.    Patient admitted to [facility] 2/16/2023 for abdominal pain.  CT completed that showed small bowel obstruction.  Obstruction resolved with bowel rest.    Patient presents to the office for follow up. Patient has gained 8 pounds since last office visit. Denies RUQ pain, extremity/abdominal swelling, excessive bruising/bleeding, problems sleeping, and forgetfulness.She has hard bowel movement 2-3 times a day. Taking stool softener daily. Denies abdominal pain, melena, and hematochezia. Denies upper GI symptoms    Past Medical History:   Diagnosis Date    Arthritis     Cancer     Colon cancer     Diabetes mellitus, type 2     Disease of thyroid gland     GERD (gastroesophageal reflux disease)     Lumbar pain     Small bowel obstruction        Past Surgical History:   Procedure Laterality Date    APPENDECTOMY      COLON SURGERY  2011,2006    CANCER, CHEMO     COLONOSCOPY      COLONOSCOPY N/A 07/19/2022    Procedure: COLONOSCOPY WITH POLYPECTOMIES;  Surgeon: Manny Velazquez MD;  Location: [facility] ENDOSCOPY;  Service: Gastroenterology;  Laterality: N/A;  COLON POLYPS    COLONOSCOPY N/A 04/02/2024    Procedure: COLONOSCOPY COLD SNARE POLYPECTOMY;  Surgeon: Manny Velazquez MD;  Location: [facility] ENDOSCOPY;  Service: Gastroenterology;  Laterality: N/A;  POOR PREP, COLON POLYP, PREVIOUS SURGERY    COLONOSCOPY N/A 5/20/2024    Procedure: COLONOSCOPY;  Surgeon: Manny Velazquez MD;  Location: [facility] ENDOSCOPY;  Service: Gastroenterology;  Laterality: N/A;  PREVIOUS SURGERY    ENDOSCOPY N/A 04/02/2024    Procedure: ESOPHAGOGASTRODUODENOSCOPY WITH BIOPSIES;  Surgeon: Manny Velazquez MD;  Location: [facility] ENDOSCOPY;  Service: Gastroenterology;  Laterality: N/A;  ERROSIVE GASTRITIS    HYSTERECTOMY  2004    LYSIS OF ABDOMINAL ADHESIONS           Current Outpatient Medications:     carboxymethylcellulose (Refresh Plus) 0.5 % solution, Administer 2 drops to both eyes 3 (Three) Times a Day As Needed for Dry Eyes., Disp: 70 each, Rfl: 3    cholecalciferol (VITAMIN D3) 25 MCG (1000 UT) tablet, Take 1 tablet by mouth 2 (Two) Times a Day., Disp: 180 tablet, Rfl: 3    DULoxetine (CYMBALTA) 30 MG capsule, Take 1 capsule by mouth Daily., Disp: 90 capsule, Rfl: 3    empagliflozin (Jardiance) 25 MG tablet tablet, Take 1 tablet by mouth Daily., Disp: 90 tablet, Rfl: 3    levothyroxine (Synthroid) 25 MCG tablet, Take 1 tablet by mouth Daily., Disp: 90 tablet, Rfl: 1    lidocaine (LIDODERM) 5 %, Place 1 patch on the skin as directed by provider Daily. Remove & Discard patch within 12 hours or as directed by MD, Disp: 90 patch, Rfl: 3    metFORMIN (GLUCOPHAGE) 500 MG tablet, Take 1 tablet by mouth 2 (Two) Times a Day With Meals., Disp: 180 tablet, Rfl: 3    multivitamins-minerals (PRESERVISION AREDS 2) capsule capsule, Take 1 capsule by mouth 2 (Two) Times a Day., Disp: , Rfl:     " omeprazole (priLOSEC) 40 MG capsule, Take 1 capsule by mouth Daily., Disp: 90 capsule, Rfl: 3    pregabalin (Lyrica) 300 MG capsule, Take 1 capsule by mouth 2 (Two) Times a Day As Needed (pain)., Disp: 60 capsule, Rfl: 0    rosuvastatin (CRESTOR) 20 MG tablet, Take 1 tablet by mouth Daily., Disp: 90 tablet, Rfl: 3    SITagliptin (Januvia) 100 MG tablet, Take 1 tablet by mouth Daily., Disp: 90 tablet, Rfl: 3    pioglitazone (Actos) 15 MG tablet, Take 1 tablet by mouth Daily., Disp: 90 tablet, Rfl: 3    polyethylene glycol (MIRALAX) 17 g packet, Take 17 g by mouth Daily. (Patient not taking: Reported on 7/10/2025), Disp: 100 each, Rfl: 3     Allergies   Allergen Reactions    Aspirin Hives and Unknown - High Severity    Penicillins Unknown - High Severity    Penicillin V Potassium Rash       Family History   Adopted: Yes   Problem Relation Age of Onset    Breast cancer Brother     Other Brother         RENAL CANCER    Colon cancer Neg Hx         Social History     Social History Narrative    Not on file       Objective       Vital Signs:   /70 (BP Location: Left arm, Patient Position: Sitting, Cuff Size: Adult)   Pulse 85   Ht 162.6 cm (64\")   Wt 68.5 kg (151 lb)   BMI 25.92 kg/m²       Physical Exam  Constitutional:       Appearance: Normal appearance. She is normal weight.   HENT:      Head: Normocephalic and atraumatic.      Nose: Nose normal.   Pulmonary:      Effort: Pulmonary effort is normal.   Skin:     General: Skin is warm and dry.   Neurological:      Mental Status: She is alert and oriented to person, place, and time. Mental status is at baseline.   Psychiatric:         Mood and Affect: Mood normal.         Behavior: Behavior normal.         Thought Content: Thought content normal.         Judgment: Judgment normal.         Result Review :       CBC w/diff          2/21/2025    06:00 2/22/2025    06:41 6/27/2025    14:23   CBC w/Diff   WBC 4.42  6.07  5.41    RBC 4.08  4.31  4.72    Hemoglobin " 13.3  13.9  15.4    Hematocrit 39.3  40.5  47.5    MCV 96.3  94.0  100.6    MCH 32.6  32.3  32.6    MCHC 33.8  34.3  32.4    RDW 13.4  13.1  12.8    Platelets 136  144  201    Neutrophil Rel % 47.2  43.4  55.5    Immature Granulocyte Rel % 10.2  7.4  0.4    Lymphocyte Rel % 25.6  28.8  34.8    Monocyte Rel % 15.4  17.1  7.6    Eosinophil Rel % 1.4  0.8  1.3    Basophil Rel % 0.2  2.5  0.4      CMP          2/22/2025    06:41 2/25/2025    13:41 6/27/2025    14:23   CMP   Glucose 164  196  272    BUN 8  18  15.0    Creatinine 0.58  1.01  1.09    EGFR 95.1  58.5  53.4    Sodium 134  140  140    Potassium 3.7  4.0  4.0    Chloride 99  102  102    Calcium 8.6  10.2  9.8    Total Protein 6.1  6.9  6.9    Albumin 3.4  3.8  4.4    Globulin  3.1  2.5    Total Bilirubin 0.8  0.5  0.9    Alkaline Phosphatase 46  51  62    AST (SGOT) 18  26  31    ALT (SGPT) 14  17  23    Albumin/Globulin Ratio  1.2  1.8    BUN/Creatinine Ratio 13.8  17.8  13.8    Anion Gap 14.8  14.9  15.3              Assessment and Plan    Diagnoses and all orders for this visit:    1. Fatty liver (Primary)    2. Overweight (BMI 25.0-29.9)    3. Constipation, unspecified constipation type    Discussed Rezdiffra with patient again, she is agreeable. We will initiate PA process. Plan for repeat liver enzymes 6 weeks after initiating medication.   Advise patient to maintain a healthy lifestyle: weight loss of 10%, cutting back on carbs, sugars, and fried fatty foods, limiting intake of soda and sugary drinks, and abstain from alcohol.   Increase stool softener BID     Follow Up   Return in about 6 months (around 1/10/2026).  Patient was given instructions and counseling regarding her condition or for health maintenance advice. Please see specific information pulled into the AVS if appropriate.

## 2025-07-14 ENCOUNTER — HOSPITAL ENCOUNTER (OUTPATIENT)
Dept: CT IMAGING | Facility: HOSPITAL | Age: 75
Discharge: HOME | End: 2025-07-14
Admitting: INTERNAL MEDICINE
Payer: MEDICARE
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
## 2025-07-14 DIAGNOSIS — S09.90XS TRAUMATIC INJURY OF HEAD, SEQUELA: ICD-10-CM

## 2025-07-14 PROCEDURE — 70450 CT HEAD/BRAIN W/O DYE: CPT

## 2025-07-14 RX ORDER — RESMETIROM 80 MG/1
80 TABLET, COATED ORAL DAILY
Qty: 30 TABLET | Refills: 5 | Status: SHIPPED | OUTPATIENT
Start: 2025-07-14

## 2025-07-15 ENCOUNTER — CLINICAL SUPPORT (OUTPATIENT)
Dept: INTERNAL MEDICINE | Facility: CLINIC | Age: 75
End: 2025-07-15
Payer: MEDICARE

## 2025-07-15 DIAGNOSIS — N17.9 AKI (ACUTE KIDNEY INJURY): ICD-10-CM

## 2025-07-15 LAB
ANION GAP SERPL CALCULATED.3IONS-SCNC: 10.8 MMOL/L (ref 5–15)
BUN SERPL-MCNC: 14 MG/DL (ref 8–23)
BUN/CREAT SERPL: 15.1 (ref 7–25)
CALCIUM SPEC-SCNC: 9.6 MG/DL (ref 8.6–10.5)
CHLORIDE SERPL-SCNC: 102 MMOL/L (ref 98–107)
CO2 SERPL-SCNC: 25.2 MMOL/L (ref 22–29)
CREAT SERPL-MCNC: 0.93 MG/DL (ref 0.57–1)
EGFRCR SERPLBLD CKD-EPI 2021: 64.6 ML/MIN/1.73
GLUCOSE SERPL-MCNC: 252 MG/DL (ref 65–99)
POTASSIUM SERPL-SCNC: 4.1 MMOL/L (ref 3.5–5.2)
SODIUM SERPL-SCNC: 138 MMOL/L (ref 136–145)

## 2025-07-15 PROCEDURE — 80048 BASIC METABOLIC PNL TOTAL CA: CPT | Performed by: INTERNAL MEDICINE

## 2025-07-15 PROCEDURE — 36415 COLL VENOUS BLD VENIPUNCTURE: CPT | Performed by: INTERNAL MEDICINE

## 2025-07-21 ENCOUNTER — TELEPHONE (OUTPATIENT)
Dept: GASTROENTEROLOGY | Facility: CLINIC | Age: 75
End: 2025-07-21
Payer: MEDICARE

## 2025-07-21 NOTE — TELEPHONE ENCOUNTER
Caller: JOHNNA GUADALUPE    Relationship to patient: SELF    Best call back number: [phone]    Patient is needing: PT IS CALLING BECAUSE SHE HAS A QUESTION ABOUT A MEDICATION AND WANTS TO TALK TO ALBARO BEFORE SHE TAKES THIS NEW MEDICATION, BUT PT COULDN'T TELL ME THE NAME OF THE MEDICATION. ALSO WANTS AN APPT. WITH ALBARO BEFORE TAKING THE NEW MEDICATION PRESCRIBED TO HER, BUT THE FIRST AVAILABILITY FOR ALBARO IS ON SEPT. 4TH (SHE THINKS IT'S REZDICORDELLRA)

## 2025-07-22 NOTE — TELEPHONE ENCOUNTER
Went over providers recommendations, patient requested to make an appt with provider.     Scheduled 10.16.2025 11am.

## 2025-07-22 NOTE — TELEPHONE ENCOUNTER
Spoke with patient, she has questions regarding rezdiffra, she is wondering if this medication will give her kidney issues. Patient seems to think she was told that she was allergic to this medication by Roxanne. I told her we wouldn't prescribe it if we activity knew she was allergic to it but that I would verify.  Please advise.

## 2025-08-04 ENCOUNTER — TELEPHONE (OUTPATIENT)
Dept: INTERNAL MEDICINE | Facility: CLINIC | Age: 75
End: 2025-08-04
Payer: MEDICARE

## 2025-08-11 DIAGNOSIS — M50.30 DDD (DEGENERATIVE DISC DISEASE), CERVICAL: ICD-10-CM

## 2025-08-11 RX ORDER — PREGABALIN 300 MG/1
300 CAPSULE ORAL 2 TIMES DAILY PRN
Qty: 60 CAPSULE | Refills: 0 | Status: SHIPPED | OUTPATIENT
Start: 2025-08-11

## 2025-08-29 ENCOUNTER — OFFICE VISIT (OUTPATIENT)
Dept: GASTROENTEROLOGY | Facility: CLINIC | Age: 75
End: 2025-08-29
Payer: MEDICARE

## 2025-08-29 VITALS
SYSTOLIC BLOOD PRESSURE: 118 MMHG | BODY MASS INDEX: 25.71 KG/M2 | HEIGHT: 64 IN | OXYGEN SATURATION: 100 % | WEIGHT: 150.6 LBS | DIASTOLIC BLOOD PRESSURE: 67 MMHG | HEART RATE: 73 BPM

## 2025-08-29 DIAGNOSIS — K76.0 NAFLD (NONALCOHOLIC FATTY LIVER DISEASE): Primary | ICD-10-CM

## 2025-08-29 PROCEDURE — 99213 OFFICE O/P EST LOW 20 MIN: CPT

## 2025-08-29 PROCEDURE — 1160F RVW MEDS BY RX/DR IN RCRD: CPT

## 2025-08-29 PROCEDURE — 1159F MED LIST DOCD IN RCRD: CPT

## 2025-08-29 RX ORDER — PANTOPRAZOLE SODIUM 40 MG/1
TABLET, DELAYED RELEASE ORAL EVERY 24 HOURS
COMMUNITY

## 2025-08-29 RX ORDER — BIOTIN 10 MG
TABLET ORAL EVERY 24 HOURS
COMMUNITY
Start: 2025-05-05

## 2025-08-29 RX ORDER — DOCUSATE CALCIUM 240 MG
CAPSULE ORAL
COMMUNITY
Start: 2025-05-05

## 2025-08-29 RX ORDER — CHLORAL HYDRATE 500 MG
1 CAPSULE ORAL EVERY 24 HOURS
COMMUNITY

## (undated) DEVICE — SNAR E/S POLYP SNAREMASTER OVL/10MM 2.8X2300MM YEL

## (undated) DEVICE — Device: Brand: DEFENDO AIR/WATER/SUCTION AND BIOPSY VALVE

## (undated) DEVICE — SOL IRRG H2O PL/BG 1000ML STRL

## (undated) DEVICE — Device

## (undated) DEVICE — COLON KIT: Brand: MEDLINE INDUSTRIES, INC.

## (undated) DEVICE — SOLIDIFIER LIQLOC PLS 1500CC BT

## (undated) DEVICE — BLCK/BITE BLOX WO/DENTL/RIM W/STRAP 54F

## (undated) DEVICE — SINGLE-USE BIOPSY FORCEPS: Brand: RADIAL JAW 4

## (undated) DEVICE — LINER SURG CANSTR SXN S/RIGD 1500CC

## (undated) DEVICE — CONN JET HYDRA H20 AUXILIARY DISP

## (undated) DEVICE — THE SINGLE USE ETRAP – POLYP TRAP IS USED FOR SUCTION RETRIEVAL OF ENDOSCOPICALLY REMOVED POLYPS.: Brand: ETRAP